# Patient Record
Sex: FEMALE | Race: WHITE | NOT HISPANIC OR LATINO | Employment: OTHER | URBAN - METROPOLITAN AREA
[De-identification: names, ages, dates, MRNs, and addresses within clinical notes are randomized per-mention and may not be internally consistent; named-entity substitution may affect disease eponyms.]

---

## 2017-08-17 ENCOUNTER — COMMUNICATION - HEALTHEAST (OUTPATIENT)
Dept: INTERNAL MEDICINE | Facility: CLINIC | Age: 77
End: 2017-08-17

## 2017-08-31 ENCOUNTER — AMBULATORY - HEALTHEAST (OUTPATIENT)
Dept: NURSING | Facility: CLINIC | Age: 77
End: 2017-08-31

## 2017-08-31 DIAGNOSIS — M81.0 OSTEOPOROSIS, SENILE: ICD-10-CM

## 2017-09-16 ENCOUNTER — COMMUNICATION - HEALTHEAST (OUTPATIENT)
Dept: INTERNAL MEDICINE | Facility: CLINIC | Age: 77
End: 2017-09-16

## 2017-09-16 DIAGNOSIS — I10 UNSPECIFIED ESSENTIAL HYPERTENSION: ICD-10-CM

## 2017-09-17 ENCOUNTER — COMMUNICATION - HEALTHEAST (OUTPATIENT)
Dept: SCHEDULING | Facility: CLINIC | Age: 77
End: 2017-09-17

## 2017-11-16 ENCOUNTER — OFFICE VISIT - HEALTHEAST (OUTPATIENT)
Dept: INTERNAL MEDICINE | Facility: CLINIC | Age: 77
End: 2017-11-16

## 2017-11-16 DIAGNOSIS — I83.90 ASYMPTOMATIC VARICOSE VEINS: ICD-10-CM

## 2017-11-16 DIAGNOSIS — K21.00 REFLUX ESOPHAGITIS: ICD-10-CM

## 2017-11-16 DIAGNOSIS — Z00.00 ROUTINE GENERAL MEDICAL EXAMINATION AT A HEALTH CARE FACILITY: ICD-10-CM

## 2017-11-16 DIAGNOSIS — K58.9 IRRITABLE BOWEL SYNDROME: ICD-10-CM

## 2017-11-16 DIAGNOSIS — M81.0 OSTEOPOROSIS, SENILE: ICD-10-CM

## 2017-11-16 DIAGNOSIS — Z90.13 S/P BILATERAL MASTECTOMY: ICD-10-CM

## 2017-11-16 DIAGNOSIS — I10 ESSENTIAL HYPERTENSION: ICD-10-CM

## 2017-11-16 DIAGNOSIS — E78.5 HYPERLIPIDEMIA: ICD-10-CM

## 2017-11-16 DIAGNOSIS — G60.9 HEREDITARY AND IDIOPATHIC PERIPHERAL NEUROPATHY: ICD-10-CM

## 2017-11-16 DIAGNOSIS — G25.81 RESTLESS LEGS SYNDROME (RLS): ICD-10-CM

## 2017-11-16 DIAGNOSIS — D75.839 THROMBOCYTOSIS: ICD-10-CM

## 2017-11-16 DIAGNOSIS — C50.919 MALIGNANT NEOPLASM OF FEMALE BREAST (H): ICD-10-CM

## 2017-11-16 DIAGNOSIS — R30.0 DYSURIA: ICD-10-CM

## 2017-11-16 LAB
CHOLEST SERPL-MCNC: 188 MG/DL
FASTING STATUS PATIENT QL REPORTED: YES
HDLC SERPL-MCNC: 63 MG/DL
LDLC SERPL CALC-MCNC: 110 MG/DL
TRIGL SERPL-MCNC: 75 MG/DL

## 2017-11-16 ASSESSMENT — MIFFLIN-ST. JEOR: SCORE: 1201.16

## 2017-11-17 LAB
ERYTHROCYTE [DISTWIDTH] IN BLOOD BY AUTOMATED COUNT: 15.4 % (ref 11–14.5)
HCT VFR BLD AUTO: 48.1 % (ref 35–47)
HGB BLD-MCNC: 15.6 G/DL (ref 12–16)
LAB AP CHARGES (HE HISTORICAL CONVERSION): NORMAL
MCH RBC QN AUTO: 29.2 PG (ref 27–34)
MCHC RBC AUTO-ENTMCNC: 32.4 G/DL (ref 32–36)
MCV RBC AUTO: 90 FL (ref 80–100)
PATH REPORT.COMMENTS IMP SPEC: NORMAL
PATH REPORT.COMMENTS IMP SPEC: NORMAL
PATH REPORT.FINAL DX SPEC: NORMAL
PATH REPORT.MICROSCOPIC SPEC OTHER STN: ABNORMAL
PATH REPORT.MICROSCOPIC SPEC OTHER STN: NORMAL
PLATELET # BLD AUTO: 591 THOU/UL (ref 140–440)
PMV BLD AUTO: 9.9 FL (ref 8.5–12.5)
RBC # BLD AUTO: 5.35 MILL/UL (ref 3.8–5.4)
WBC: 8 THOU/UL (ref 4–11)

## 2017-11-20 ENCOUNTER — COMMUNICATION - HEALTHEAST (OUTPATIENT)
Dept: INTERNAL MEDICINE | Facility: CLINIC | Age: 77
End: 2017-11-20

## 2017-11-22 ENCOUNTER — COMMUNICATION - HEALTHEAST (OUTPATIENT)
Dept: INTERNAL MEDICINE | Facility: CLINIC | Age: 77
End: 2017-11-22

## 2017-11-22 DIAGNOSIS — R79.89 INCREASED PLATELET COUNT: ICD-10-CM

## 2017-11-24 ENCOUNTER — AMBULATORY - HEALTHEAST (OUTPATIENT)
Dept: INTERNAL MEDICINE | Facility: CLINIC | Age: 77
End: 2017-11-24

## 2017-11-27 ENCOUNTER — COMMUNICATION - HEALTHEAST (OUTPATIENT)
Dept: INTERNAL MEDICINE | Facility: CLINIC | Age: 77
End: 2017-11-27

## 2017-11-27 DIAGNOSIS — D75.839 THROMBOCYTOSIS: ICD-10-CM

## 2017-11-29 ENCOUNTER — COMMUNICATION - HEALTHEAST (OUTPATIENT)
Dept: HEALTH INFORMATION MANAGEMENT | Facility: CLINIC | Age: 77
End: 2017-11-29

## 2017-11-29 ENCOUNTER — COMMUNICATION - HEALTHEAST (OUTPATIENT)
Dept: TELEHEALTH | Facility: CLINIC | Age: 77
End: 2017-11-29

## 2017-12-01 ENCOUNTER — COMMUNICATION - HEALTHEAST (OUTPATIENT)
Dept: ONCOLOGY | Facility: CLINIC | Age: 77
End: 2017-12-01

## 2017-12-01 ENCOUNTER — COMMUNICATION - HEALTHEAST (OUTPATIENT)
Dept: INTERNAL MEDICINE | Facility: CLINIC | Age: 77
End: 2017-12-01

## 2017-12-01 ENCOUNTER — OFFICE VISIT - HEALTHEAST (OUTPATIENT)
Dept: INTERNAL MEDICINE | Facility: CLINIC | Age: 77
End: 2017-12-01

## 2017-12-01 DIAGNOSIS — D75.839 THROMBOCYTOSIS: ICD-10-CM

## 2017-12-01 DIAGNOSIS — Z01.818 PREOP EXAM FOR INTERNAL MEDICINE: ICD-10-CM

## 2017-12-01 DIAGNOSIS — I10 ESSENTIAL HYPERTENSION: ICD-10-CM

## 2017-12-01 ASSESSMENT — MIFFLIN-ST. JEOR: SCORE: 1202.98

## 2017-12-07 ENCOUNTER — COMMUNICATION - HEALTHEAST (OUTPATIENT)
Dept: ONCOLOGY | Facility: CLINIC | Age: 77
End: 2017-12-07

## 2017-12-15 ENCOUNTER — OFFICE VISIT - HEALTHEAST (OUTPATIENT)
Dept: INTERNAL MEDICINE | Facility: CLINIC | Age: 77
End: 2017-12-15

## 2017-12-15 DIAGNOSIS — R21 RASH: ICD-10-CM

## 2017-12-21 ENCOUNTER — OFFICE VISIT - HEALTHEAST (OUTPATIENT)
Dept: ONCOLOGY | Facility: CLINIC | Age: 77
End: 2017-12-21

## 2017-12-21 DIAGNOSIS — R79.89 INCREASED PLATELET COUNT: ICD-10-CM

## 2017-12-21 DIAGNOSIS — D75.839 THROMBOCYTOSIS: ICD-10-CM

## 2017-12-21 ASSESSMENT — MIFFLIN-ST. JEOR: SCORE: 1210.69

## 2017-12-26 LAB — ANA SER QL: 0.3 U

## 2017-12-27 LAB
MISCELLANEOUS TEST DEPT. - HE HISTORICAL: NORMAL
PERFORMING LAB: NORMAL
SPECIMEN STATUS: NORMAL
TEST NAME: NORMAL

## 2017-12-29 ENCOUNTER — RECORDS - HEALTHEAST (OUTPATIENT)
Dept: ADMINISTRATIVE | Facility: OTHER | Age: 77
End: 2017-12-29

## 2018-01-02 ENCOUNTER — RECORDS - HEALTHEAST (OUTPATIENT)
Dept: ADMINISTRATIVE | Facility: OTHER | Age: 78
End: 2018-01-02

## 2018-01-03 ENCOUNTER — RECORDS - HEALTHEAST (OUTPATIENT)
Dept: ADMINISTRATIVE | Facility: OTHER | Age: 78
End: 2018-01-03

## 2018-01-04 ENCOUNTER — OFFICE VISIT - HEALTHEAST (OUTPATIENT)
Dept: ONCOLOGY | Facility: CLINIC | Age: 78
End: 2018-01-04

## 2018-01-04 DIAGNOSIS — D46.9 MYELODYSPLASTIC SYNDROME (H): ICD-10-CM

## 2018-01-04 DIAGNOSIS — D47.3 ESSENTIAL THROMBOCYTHEMIA (H): ICD-10-CM

## 2018-01-04 ASSESSMENT — MIFFLIN-ST. JEOR: SCORE: 1206.15

## 2018-01-08 ENCOUNTER — COMMUNICATION - HEALTHEAST (OUTPATIENT)
Dept: INTERNAL MEDICINE | Facility: CLINIC | Age: 78
End: 2018-01-08

## 2018-01-08 DIAGNOSIS — R35.0 URINARY FREQUENCY: ICD-10-CM

## 2018-01-08 DIAGNOSIS — G47.00 INSOMNIA: ICD-10-CM

## 2018-01-09 ENCOUNTER — AMBULATORY - HEALTHEAST (OUTPATIENT)
Dept: INFUSION THERAPY | Facility: CLINIC | Age: 78
End: 2018-01-09

## 2018-01-09 ENCOUNTER — COMMUNICATION - HEALTHEAST (OUTPATIENT)
Dept: INTERNAL MEDICINE | Facility: CLINIC | Age: 78
End: 2018-01-09

## 2018-01-09 ENCOUNTER — AMBULATORY - HEALTHEAST (OUTPATIENT)
Dept: LAB | Facility: CLINIC | Age: 78
End: 2018-01-09

## 2018-01-09 DIAGNOSIS — E78.5 HYPERLIPEMIA: ICD-10-CM

## 2018-01-09 DIAGNOSIS — D46.9 MYELODYSPLASTIC SYNDROME (H): ICD-10-CM

## 2018-01-09 DIAGNOSIS — K21.00 REFLUX ESOPHAGITIS: ICD-10-CM

## 2018-01-09 DIAGNOSIS — I10 ESSENTIAL HYPERTENSION: ICD-10-CM

## 2018-01-09 DIAGNOSIS — D47.3 ESSENTIAL THROMBOCYTHEMIA (H): ICD-10-CM

## 2018-01-09 LAB
BASOPHILS # BLD AUTO: 0.1 THOU/UL (ref 0–0.2)
BASOPHILS NFR BLD AUTO: 1 % (ref 0–2)
EOSINOPHIL # BLD AUTO: 0.6 THOU/UL (ref 0–0.4)
EOSINOPHIL NFR BLD AUTO: 8 % (ref 0–6)
ERYTHROCYTE [DISTWIDTH] IN BLOOD BY AUTOMATED COUNT: 15.2 % (ref 11–14.5)
HCT VFR BLD AUTO: 46.6 % (ref 35–47)
HGB BLD-MCNC: 15.4 G/DL (ref 12–16)
LYMPHOCYTES # BLD AUTO: 1.7 THOU/UL (ref 0.8–4.4)
LYMPHOCYTES NFR BLD AUTO: 25 % (ref 20–40)
MCH RBC QN AUTO: 29 PG (ref 27–34)
MCHC RBC AUTO-ENTMCNC: 33 G/DL (ref 32–36)
MCV RBC AUTO: 88 FL (ref 80–100)
MONOCYTES # BLD AUTO: 0.6 THOU/UL (ref 0–0.9)
MONOCYTES NFR BLD AUTO: 9 % (ref 2–10)
NEUTROPHILS # BLD AUTO: 4 THOU/UL (ref 2–7.7)
NEUTROPHILS NFR BLD AUTO: 57 % (ref 50–70)
PLATELET # BLD AUTO: 587 THOU/UL (ref 140–440)
PMV BLD AUTO: 9.1 FL (ref 8.5–12.5)
RBC # BLD AUTO: 5.31 MILL/UL (ref 3.8–5.4)
WBC: 7 THOU/UL (ref 4–11)

## 2018-01-10 LAB
LAB AP CHARGES (HE HISTORICAL CONVERSION): NORMAL
PATH REPORT.COMMENTS IMP SPEC: NORMAL
PATH REPORT.FINAL DX SPEC: NORMAL
PATH REPORT.MICROSCOPIC SPEC OTHER STN: NORMAL
RESULT FLAG (HE HISTORICAL CONVERSION): NORMAL

## 2018-01-16 ENCOUNTER — OFFICE VISIT - HEALTHEAST (OUTPATIENT)
Dept: ONCOLOGY | Facility: CLINIC | Age: 78
End: 2018-01-16

## 2018-01-16 DIAGNOSIS — D47.3 ESSENTIAL THROMBOCYTHEMIA (H): ICD-10-CM

## 2018-01-17 LAB
BKR LAB AP CORE BIOPSY/ASPIRATE CLOT: ABNORMAL
LAB AP ASPIRATE SMEAR (HE HISTORICAL CONVERSION): ABNORMAL
LAB AP BONE MARROW DIFF (HE HISTORICAL CONVERSION): ABNORMAL
LAB AP CHARGES (HE HISTORICAL CONVERSION): ABNORMAL
LAB AP CYTOGENETICS REPORT,ADDENDUM (HE HISTORICAL CONVERSION): ABNORMAL
PATH REPORT.COMMENTS IMP SPEC: ABNORMAL
PATH REPORT.FINAL DX SPEC: ABNORMAL
PATH REPORT.MICROSCOPIC SPEC OTHER STN: ABNORMAL
PATH REPORT.RELEVANT HX SPEC: ABNORMAL
RESULT FLAG (HE HISTORICAL CONVERSION): ABNORMAL
SPECIMEN STATUS: NORMAL

## 2018-01-23 ENCOUNTER — COMMUNICATION - HEALTHEAST (OUTPATIENT)
Dept: ONCOLOGY | Facility: CLINIC | Age: 78
End: 2018-01-23

## 2018-01-23 ENCOUNTER — AMBULATORY - HEALTHEAST (OUTPATIENT)
Dept: INFUSION THERAPY | Facility: CLINIC | Age: 78
End: 2018-01-23

## 2018-01-23 DIAGNOSIS — D47.3 ESSENTIAL THROMBOCYTHEMIA (H): ICD-10-CM

## 2018-01-23 LAB
BASOPHILS # BLD AUTO: 0.1 THOU/UL (ref 0–0.2)
BASOPHILS NFR BLD AUTO: 1 % (ref 0–2)
EOSINOPHIL # BLD AUTO: 0.5 THOU/UL (ref 0–0.4)
EOSINOPHIL NFR BLD AUTO: 5 % (ref 0–6)
ERYTHROCYTE [DISTWIDTH] IN BLOOD BY AUTOMATED COUNT: 15.1 % (ref 11–14.5)
HCT VFR BLD AUTO: 45 % (ref 35–47)
HGB BLD-MCNC: 15 G/DL (ref 12–16)
LYMPHOCYTES # BLD AUTO: 1.9 THOU/UL (ref 0.8–4.4)
LYMPHOCYTES NFR BLD AUTO: 22 % (ref 20–40)
MCH RBC QN AUTO: 29.2 PG (ref 27–34)
MCHC RBC AUTO-ENTMCNC: 33.3 G/DL (ref 32–36)
MCV RBC AUTO: 88 FL (ref 80–100)
MONOCYTES # BLD AUTO: 0.7 THOU/UL (ref 0–0.9)
MONOCYTES NFR BLD AUTO: 8 % (ref 2–10)
NEUTROPHILS # BLD AUTO: 5.3 THOU/UL (ref 2–7.7)
NEUTROPHILS NFR BLD AUTO: 63 % (ref 50–70)
PLATELET # BLD AUTO: 570 THOU/UL (ref 140–440)
PMV BLD AUTO: 8.8 FL (ref 8.5–12.5)
RBC # BLD AUTO: 5.14 MILL/UL (ref 3.8–5.4)
WBC: 8.3 THOU/UL (ref 4–11)

## 2018-01-30 ENCOUNTER — AMBULATORY - HEALTHEAST (OUTPATIENT)
Dept: INFUSION THERAPY | Facility: CLINIC | Age: 78
End: 2018-01-30

## 2018-01-30 ENCOUNTER — COMMUNICATION - HEALTHEAST (OUTPATIENT)
Dept: ONCOLOGY | Facility: CLINIC | Age: 78
End: 2018-01-30

## 2018-01-30 DIAGNOSIS — D47.3 ESSENTIAL THROMBOCYTHEMIA (H): ICD-10-CM

## 2018-01-30 LAB
BASOPHILS # BLD AUTO: 0.1 THOU/UL (ref 0–0.2)
BASOPHILS NFR BLD AUTO: 1 % (ref 0–2)
EOSINOPHIL # BLD AUTO: 0.5 THOU/UL (ref 0–0.4)
EOSINOPHIL NFR BLD AUTO: 6 % (ref 0–6)
ERYTHROCYTE [DISTWIDTH] IN BLOOD BY AUTOMATED COUNT: 15.4 % (ref 11–14.5)
HCT VFR BLD AUTO: 45.2 % (ref 35–47)
HGB BLD-MCNC: 15.1 G/DL (ref 12–16)
LYMPHOCYTES # BLD AUTO: 1.9 THOU/UL (ref 0.8–4.4)
LYMPHOCYTES NFR BLD AUTO: 22 % (ref 20–40)
MCH RBC QN AUTO: 29.2 PG (ref 27–34)
MCHC RBC AUTO-ENTMCNC: 33.4 G/DL (ref 32–36)
MCV RBC AUTO: 87 FL (ref 80–100)
MONOCYTES # BLD AUTO: 0.7 THOU/UL (ref 0–0.9)
MONOCYTES NFR BLD AUTO: 8 % (ref 2–10)
NEUTROPHILS # BLD AUTO: 5.5 THOU/UL (ref 2–7.7)
NEUTROPHILS NFR BLD AUTO: 64 % (ref 50–70)
PLATELET # BLD AUTO: 583 THOU/UL (ref 140–440)
PMV BLD AUTO: 8.8 FL (ref 8.5–12.5)
RBC # BLD AUTO: 5.17 MILL/UL (ref 3.8–5.4)
WBC: 8.7 THOU/UL (ref 4–11)

## 2018-02-06 ENCOUNTER — AMBULATORY - HEALTHEAST (OUTPATIENT)
Dept: INFUSION THERAPY | Facility: CLINIC | Age: 78
End: 2018-02-06

## 2018-02-06 ENCOUNTER — COMMUNICATION - HEALTHEAST (OUTPATIENT)
Dept: ONCOLOGY | Facility: CLINIC | Age: 78
End: 2018-02-06

## 2018-02-06 DIAGNOSIS — D47.3 ESSENTIAL THROMBOCYTHEMIA (H): ICD-10-CM

## 2018-02-06 LAB
BASOPHILS # BLD AUTO: 0.1 THOU/UL (ref 0–0.2)
BASOPHILS NFR BLD AUTO: 1 % (ref 0–2)
EOSINOPHIL # BLD AUTO: 0.4 THOU/UL (ref 0–0.4)
EOSINOPHIL NFR BLD AUTO: 5 % (ref 0–6)
ERYTHROCYTE [DISTWIDTH] IN BLOOD BY AUTOMATED COUNT: 15.6 % (ref 11–14.5)
HCT VFR BLD AUTO: 44.7 % (ref 35–47)
HGB BLD-MCNC: 14.8 G/DL (ref 12–16)
LYMPHOCYTES # BLD AUTO: 1.7 THOU/UL (ref 0.8–4.4)
LYMPHOCYTES NFR BLD AUTO: 21 % (ref 20–40)
MCH RBC QN AUTO: 29 PG (ref 27–34)
MCHC RBC AUTO-ENTMCNC: 33.1 G/DL (ref 32–36)
MCV RBC AUTO: 88 FL (ref 80–100)
MONOCYTES # BLD AUTO: 0.8 THOU/UL (ref 0–0.9)
MONOCYTES NFR BLD AUTO: 10 % (ref 2–10)
NEUTROPHILS # BLD AUTO: 4.9 THOU/UL (ref 2–7.7)
NEUTROPHILS NFR BLD AUTO: 62 % (ref 50–70)
PLATELET # BLD AUTO: 577 THOU/UL (ref 140–440)
PMV BLD AUTO: 8.9 FL (ref 8.5–12.5)
RBC # BLD AUTO: 5.11 MILL/UL (ref 3.8–5.4)
WBC: 8 THOU/UL (ref 4–11)

## 2018-02-13 ENCOUNTER — AMBULATORY - HEALTHEAST (OUTPATIENT)
Dept: INFUSION THERAPY | Facility: CLINIC | Age: 78
End: 2018-02-13

## 2018-02-13 ENCOUNTER — OFFICE VISIT - HEALTHEAST (OUTPATIENT)
Dept: ONCOLOGY | Facility: CLINIC | Age: 78
End: 2018-02-13

## 2018-02-13 DIAGNOSIS — D47.3 ESSENTIAL THROMBOCYTHEMIA (H): ICD-10-CM

## 2018-02-13 LAB
BASOPHILS # BLD AUTO: 0.1 THOU/UL (ref 0–0.2)
BASOPHILS NFR BLD AUTO: 1 % (ref 0–2)
EOSINOPHIL # BLD AUTO: 0.4 THOU/UL (ref 0–0.4)
EOSINOPHIL NFR BLD AUTO: 5 % (ref 0–6)
ERYTHROCYTE [DISTWIDTH] IN BLOOD BY AUTOMATED COUNT: 15.7 % (ref 11–14.5)
HCT VFR BLD AUTO: 46.6 % (ref 35–47)
HGB BLD-MCNC: 15.4 G/DL (ref 12–16)
LYMPHOCYTES # BLD AUTO: 1.7 THOU/UL (ref 0.8–4.4)
LYMPHOCYTES NFR BLD AUTO: 20 % (ref 20–40)
MCH RBC QN AUTO: 28.8 PG (ref 27–34)
MCHC RBC AUTO-ENTMCNC: 33 G/DL (ref 32–36)
MCV RBC AUTO: 87 FL (ref 80–100)
MONOCYTES # BLD AUTO: 0.7 THOU/UL (ref 0–0.9)
MONOCYTES NFR BLD AUTO: 9 % (ref 2–10)
NEUTROPHILS # BLD AUTO: 5.3 THOU/UL (ref 2–7.7)
NEUTROPHILS NFR BLD AUTO: 65 % (ref 50–70)
PLATELET # BLD AUTO: 489 THOU/UL (ref 140–440)
PMV BLD AUTO: 8.7 FL (ref 8.5–12.5)
RBC # BLD AUTO: 5.34 MILL/UL (ref 3.8–5.4)
WBC: 8.3 THOU/UL (ref 4–11)

## 2018-02-27 ENCOUNTER — AMBULATORY - HEALTHEAST (OUTPATIENT)
Dept: INFUSION THERAPY | Facility: CLINIC | Age: 78
End: 2018-02-27

## 2018-02-27 ENCOUNTER — COMMUNICATION - HEALTHEAST (OUTPATIENT)
Dept: ONCOLOGY | Facility: CLINIC | Age: 78
End: 2018-02-27

## 2018-02-27 DIAGNOSIS — D47.3 ESSENTIAL THROMBOCYTHEMIA (H): ICD-10-CM

## 2018-02-27 LAB
BASOPHILS # BLD AUTO: 0.1 THOU/UL (ref 0–0.2)
BASOPHILS NFR BLD AUTO: 1 % (ref 0–2)
EOSINOPHIL # BLD AUTO: 0.3 THOU/UL (ref 0–0.4)
EOSINOPHIL NFR BLD AUTO: 4 % (ref 0–6)
ERYTHROCYTE [DISTWIDTH] IN BLOOD BY AUTOMATED COUNT: 15.9 % (ref 11–14.5)
HCT VFR BLD AUTO: 45.3 % (ref 35–47)
HGB BLD-MCNC: 15.1 G/DL (ref 12–16)
LYMPHOCYTES # BLD AUTO: 1.7 THOU/UL (ref 0.8–4.4)
LYMPHOCYTES NFR BLD AUTO: 22 % (ref 20–40)
MCH RBC QN AUTO: 29.3 PG (ref 27–34)
MCHC RBC AUTO-ENTMCNC: 33.3 G/DL (ref 32–36)
MCV RBC AUTO: 88 FL (ref 80–100)
MONOCYTES # BLD AUTO: 0.7 THOU/UL (ref 0–0.9)
MONOCYTES NFR BLD AUTO: 10 % (ref 2–10)
NEUTROPHILS # BLD AUTO: 4.9 THOU/UL (ref 2–7.7)
NEUTROPHILS NFR BLD AUTO: 63 % (ref 50–70)
PLATELET # BLD AUTO: 488 THOU/UL (ref 140–440)
PMV BLD AUTO: 8.7 FL (ref 8.5–12.5)
RBC # BLD AUTO: 5.16 MILL/UL (ref 3.8–5.4)
WBC: 7.8 THOU/UL (ref 4–11)

## 2018-03-08 ENCOUNTER — OFFICE VISIT - HEALTHEAST (OUTPATIENT)
Dept: INTERNAL MEDICINE | Facility: CLINIC | Age: 78
End: 2018-03-08

## 2018-03-08 DIAGNOSIS — H91.90 HEARING LOSS: ICD-10-CM

## 2018-03-08 DIAGNOSIS — D47.3 ESSENTIAL THROMBOCYTHEMIA (H): ICD-10-CM

## 2018-03-08 DIAGNOSIS — M81.0 OSTEOPOROSIS, SENILE: ICD-10-CM

## 2018-03-14 ENCOUNTER — AMBULATORY - HEALTHEAST (OUTPATIENT)
Dept: INFUSION THERAPY | Facility: CLINIC | Age: 78
End: 2018-03-14

## 2018-03-14 ENCOUNTER — OFFICE VISIT - HEALTHEAST (OUTPATIENT)
Dept: ONCOLOGY | Facility: CLINIC | Age: 78
End: 2018-03-14

## 2018-03-14 DIAGNOSIS — D47.3 ESSENTIAL THROMBOCYTHEMIA (H): ICD-10-CM

## 2018-03-14 LAB
ALBUMIN SERPL-MCNC: 3.7 G/DL (ref 3.5–5)
ALP SERPL-CCNC: 42 U/L (ref 45–120)
ALT SERPL W P-5'-P-CCNC: 20 U/L (ref 0–45)
ANION GAP SERPL CALCULATED.3IONS-SCNC: 7 MMOL/L (ref 5–18)
AST SERPL W P-5'-P-CCNC: 28 U/L (ref 0–40)
BASOPHILS # BLD AUTO: 0.1 THOU/UL (ref 0–0.2)
BASOPHILS NFR BLD AUTO: 1 % (ref 0–2)
BILIRUB SERPL-MCNC: 0.4 MG/DL (ref 0–1)
BUN SERPL-MCNC: 14 MG/DL (ref 8–28)
CALCIUM SERPL-MCNC: 9.9 MG/DL (ref 8.5–10.5)
CHLORIDE BLD-SCNC: 106 MMOL/L (ref 98–107)
CO2 SERPL-SCNC: 28 MMOL/L (ref 22–31)
CREAT SERPL-MCNC: 0.98 MG/DL (ref 0.6–1.1)
EOSINOPHIL # BLD AUTO: 0.3 THOU/UL (ref 0–0.4)
EOSINOPHIL NFR BLD AUTO: 3 % (ref 0–6)
ERYTHROCYTE [DISTWIDTH] IN BLOOD BY AUTOMATED COUNT: 16 % (ref 11–14.5)
GFR SERPL CREATININE-BSD FRML MDRD: 55 ML/MIN/1.73M2
GLUCOSE BLD-MCNC: 139 MG/DL (ref 70–125)
HCT VFR BLD AUTO: 43.1 % (ref 35–47)
HGB BLD-MCNC: 14.2 G/DL (ref 12–16)
LYMPHOCYTES # BLD AUTO: 1.7 THOU/UL (ref 0.8–4.4)
LYMPHOCYTES NFR BLD AUTO: 21 % (ref 20–40)
MCH RBC QN AUTO: 29.2 PG (ref 27–34)
MCHC RBC AUTO-ENTMCNC: 32.9 G/DL (ref 32–36)
MCV RBC AUTO: 89 FL (ref 80–100)
MONOCYTES # BLD AUTO: 0.5 THOU/UL (ref 0–0.9)
MONOCYTES NFR BLD AUTO: 7 % (ref 2–10)
NEUTROPHILS # BLD AUTO: 5.6 THOU/UL (ref 2–7.7)
NEUTROPHILS NFR BLD AUTO: 69 % (ref 50–70)
PLATELET # BLD AUTO: 455 THOU/UL (ref 140–440)
PMV BLD AUTO: 8.8 FL (ref 8.5–12.5)
POTASSIUM BLD-SCNC: 3.9 MMOL/L (ref 3.5–5)
PROT SERPL-MCNC: 7 G/DL (ref 6–8)
RBC # BLD AUTO: 4.86 MILL/UL (ref 3.8–5.4)
SODIUM SERPL-SCNC: 141 MMOL/L (ref 136–145)
WBC: 8.1 THOU/UL (ref 4–11)

## 2018-03-14 ASSESSMENT — MIFFLIN-ST. JEOR: SCORE: 1207.51

## 2018-03-15 ENCOUNTER — COMMUNICATION - HEALTHEAST (OUTPATIENT)
Dept: ONCOLOGY | Facility: CLINIC | Age: 78
End: 2018-03-15

## 2018-03-23 ENCOUNTER — COMMUNICATION - HEALTHEAST (OUTPATIENT)
Dept: ADMINISTRATIVE | Facility: CLINIC | Age: 78
End: 2018-03-23

## 2018-03-29 ENCOUNTER — OFFICE VISIT - HEALTHEAST (OUTPATIENT)
Dept: AUDIOLOGY | Facility: CLINIC | Age: 78
End: 2018-03-29

## 2018-03-29 DIAGNOSIS — H93.13 TINNITUS OF BOTH EARS: ICD-10-CM

## 2018-03-29 DIAGNOSIS — H90.3 SENSORINEURAL HEARING LOSS, BILATERAL: ICD-10-CM

## 2018-05-10 ENCOUNTER — AMBULATORY - HEALTHEAST (OUTPATIENT)
Dept: INFUSION THERAPY | Facility: CLINIC | Age: 78
End: 2018-05-10

## 2018-05-10 ENCOUNTER — HOSPITAL ENCOUNTER (OUTPATIENT)
Dept: CARDIOLOGY | Facility: CLINIC | Age: 78
Discharge: HOME | End: 2018-05-10
Attending: INTERNAL MEDICINE

## 2018-05-10 ENCOUNTER — OFFICE VISIT - HEALTHEAST (OUTPATIENT)
Dept: ONCOLOGY | Facility: CLINIC | Age: 78
End: 2018-05-10

## 2018-05-10 ENCOUNTER — COMMUNICATION - HEALTHEAST (OUTPATIENT)
Dept: ONCOLOGY | Facility: CLINIC | Age: 78
End: 2018-05-10

## 2018-05-10 DIAGNOSIS — R42 DIZZINESS AND GIDDINESS: ICD-10-CM

## 2018-05-10 LAB
ALBUMIN SERPL-MCNC: 3.5 G/DL (ref 3.5–5)
ALP SERPL-CCNC: 48 U/L (ref 45–120)
ALT SERPL W P-5'-P-CCNC: 20 U/L (ref 0–45)
ANION GAP SERPL CALCULATED.3IONS-SCNC: 8 MMOL/L (ref 5–18)
AST SERPL W P-5'-P-CCNC: 22 U/L (ref 0–40)
BASOPHILS # BLD AUTO: 0.1 THOU/UL (ref 0–0.2)
BASOPHILS NFR BLD AUTO: 1 % (ref 0–2)
BILIRUB SERPL-MCNC: 0.4 MG/DL (ref 0–1)
BUN SERPL-MCNC: 16 MG/DL (ref 8–28)
CALCIUM SERPL-MCNC: 9.4 MG/DL (ref 8.5–10.5)
CHLORIDE BLD-SCNC: 106 MMOL/L (ref 98–107)
CO2 SERPL-SCNC: 26 MMOL/L (ref 22–31)
CREAT SERPL-MCNC: 0.77 MG/DL (ref 0.6–1.1)
EOSINOPHIL # BLD AUTO: 0.3 THOU/UL (ref 0–0.4)
EOSINOPHIL NFR BLD AUTO: 4 % (ref 0–6)
ERYTHROCYTE [DISTWIDTH] IN BLOOD BY AUTOMATED COUNT: 16 % (ref 11–14.5)
GFR SERPL CREATININE-BSD FRML MDRD: >60 ML/MIN/1.73M2
GLUCOSE BLD-MCNC: 97 MG/DL (ref 70–125)
HCT VFR BLD AUTO: 42.9 % (ref 35–47)
HGB BLD-MCNC: 14 G/DL (ref 12–16)
LYMPHOCYTES # BLD AUTO: 1.5 THOU/UL (ref 0.8–4.4)
LYMPHOCYTES NFR BLD AUTO: 17 % (ref 20–40)
MCH RBC QN AUTO: 29.4 PG (ref 27–34)
MCHC RBC AUTO-ENTMCNC: 32.6 G/DL (ref 32–36)
MCV RBC AUTO: 90 FL (ref 80–100)
MONOCYTES # BLD AUTO: 0.9 THOU/UL (ref 0–0.9)
MONOCYTES NFR BLD AUTO: 10 % (ref 2–10)
NEUTROPHILS # BLD AUTO: 6.1 THOU/UL (ref 2–7.7)
NEUTROPHILS NFR BLD AUTO: 68 % (ref 50–70)
PLATELET # BLD AUTO: 419 THOU/UL (ref 140–440)
PMV BLD AUTO: 9.1 FL (ref 8.5–12.5)
POTASSIUM BLD-SCNC: 3.9 MMOL/L (ref 3.5–5)
PROT SERPL-MCNC: 6.9 G/DL (ref 6–8)
RBC # BLD AUTO: 4.77 MILL/UL (ref 3.8–5.4)
SODIUM SERPL-SCNC: 140 MMOL/L (ref 136–145)
WBC: 8.9 THOU/UL (ref 4–11)

## 2018-05-11 LAB
ATRIAL RATE - MUSE: 74 BPM
DIASTOLIC BLOOD PRESSURE - MUSE: NORMAL MMHG
INTERPRETATION ECG - MUSE: NORMAL
P AXIS - MUSE: 68 DEGREES
PR INTERVAL - MUSE: 164 MS
QRS DURATION - MUSE: 96 MS
QT - MUSE: 386 MS
QTC - MUSE: 428 MS
R AXIS - MUSE: -14 DEGREES
SYSTOLIC BLOOD PRESSURE - MUSE: NORMAL MMHG
T AXIS - MUSE: 58 DEGREES
VENTRICULAR RATE- MUSE: 74 BPM

## 2018-05-14 ENCOUNTER — OFFICE VISIT - HEALTHEAST (OUTPATIENT)
Dept: INTERNAL MEDICINE | Facility: CLINIC | Age: 78
End: 2018-05-14

## 2018-05-14 DIAGNOSIS — I26.99 PULMONARY EMBOLISM (H): ICD-10-CM

## 2018-05-15 ENCOUNTER — OFFICE VISIT - HEALTHEAST (OUTPATIENT)
Dept: CARDIOLOGY | Facility: CLINIC | Age: 78
End: 2018-05-15

## 2018-05-15 DIAGNOSIS — I26.99 OTHER ACUTE PULMONARY EMBOLISM WITHOUT ACUTE COR PULMONALE (H): ICD-10-CM

## 2018-05-15 DIAGNOSIS — I10 HYPERTENSION: ICD-10-CM

## 2018-05-15 DIAGNOSIS — R55 SYNCOPE AND COLLAPSE: ICD-10-CM

## 2018-05-15 ASSESSMENT — MIFFLIN-ST. JEOR: SCORE: 1207.51

## 2018-06-04 ENCOUNTER — COMMUNICATION - HEALTHEAST (OUTPATIENT)
Dept: FAMILY MEDICINE | Facility: CLINIC | Age: 78
End: 2018-06-04

## 2018-06-04 ENCOUNTER — COMMUNICATION - HEALTHEAST (OUTPATIENT)
Dept: INTERNAL MEDICINE | Facility: CLINIC | Age: 78
End: 2018-06-04

## 2018-06-05 ENCOUNTER — OFFICE VISIT - HEALTHEAST (OUTPATIENT)
Dept: INTERNAL MEDICINE | Facility: CLINIC | Age: 78
End: 2018-06-05

## 2018-06-05 DIAGNOSIS — I26.99 PULMONARY EMBOLI (H): ICD-10-CM

## 2018-06-06 ENCOUNTER — COMMUNICATION - HEALTHEAST (OUTPATIENT)
Dept: INTERNAL MEDICINE | Facility: CLINIC | Age: 78
End: 2018-06-06

## 2018-06-14 ENCOUNTER — AMBULATORY - HEALTHEAST (OUTPATIENT)
Dept: INFUSION THERAPY | Facility: CLINIC | Age: 78
End: 2018-06-14

## 2018-06-14 ENCOUNTER — OFFICE VISIT - HEALTHEAST (OUTPATIENT)
Dept: ONCOLOGY | Facility: CLINIC | Age: 78
End: 2018-06-14

## 2018-06-14 DIAGNOSIS — D47.3 ESSENTIAL THROMBOCYTHEMIA (H): ICD-10-CM

## 2018-06-14 DIAGNOSIS — I26.99 OTHER ACUTE PULMONARY EMBOLISM WITHOUT ACUTE COR PULMONALE (H): ICD-10-CM

## 2018-06-14 LAB
ALBUMIN SERPL-MCNC: 3.8 G/DL (ref 3.5–5)
ALP SERPL-CCNC: 44 U/L (ref 45–120)
ALT SERPL W P-5'-P-CCNC: 23 U/L (ref 0–45)
ANION GAP SERPL CALCULATED.3IONS-SCNC: 10 MMOL/L (ref 5–18)
AST SERPL W P-5'-P-CCNC: 23 U/L (ref 0–40)
BASOPHILS # BLD AUTO: 0.1 THOU/UL (ref 0–0.2)
BASOPHILS NFR BLD AUTO: 1 % (ref 0–2)
BILIRUB SERPL-MCNC: 0.5 MG/DL (ref 0–1)
BUN SERPL-MCNC: 15 MG/DL (ref 8–28)
CALCIUM SERPL-MCNC: 9.3 MG/DL (ref 8.5–10.5)
CHLORIDE BLD-SCNC: 106 MMOL/L (ref 98–107)
CO2 SERPL-SCNC: 26 MMOL/L (ref 22–31)
CREAT SERPL-MCNC: 0.77 MG/DL (ref 0.6–1.1)
EOSINOPHIL # BLD AUTO: 0.4 THOU/UL (ref 0–0.4)
EOSINOPHIL NFR BLD AUTO: 5 % (ref 0–6)
ERYTHROCYTE [DISTWIDTH] IN BLOOD BY AUTOMATED COUNT: 15.1 % (ref 11–14.5)
GFR SERPL CREATININE-BSD FRML MDRD: >60 ML/MIN/1.73M2
GLUCOSE BLD-MCNC: 85 MG/DL (ref 70–125)
HCT VFR BLD AUTO: 43.8 % (ref 35–47)
HGB BLD-MCNC: 14.4 G/DL (ref 12–16)
LDH SERPL L TO P-CCNC: 200 U/L (ref 125–220)
LYMPHOCYTES # BLD AUTO: 1.8 THOU/UL (ref 0.8–4.4)
LYMPHOCYTES NFR BLD AUTO: 22 % (ref 20–40)
MCH RBC QN AUTO: 29.9 PG (ref 27–34)
MCHC RBC AUTO-ENTMCNC: 32.9 G/DL (ref 32–36)
MCV RBC AUTO: 91 FL (ref 80–100)
MONOCYTES # BLD AUTO: 0.8 THOU/UL (ref 0–0.9)
MONOCYTES NFR BLD AUTO: 9 % (ref 2–10)
NEUTROPHILS # BLD AUTO: 5 THOU/UL (ref 2–7.7)
NEUTROPHILS NFR BLD AUTO: 63 % (ref 50–70)
PLATELET # BLD AUTO: 493 THOU/UL (ref 140–440)
PMV BLD AUTO: 9.2 FL (ref 8.5–12.5)
POTASSIUM BLD-SCNC: 4.3 MMOL/L (ref 3.5–5)
PROT SERPL-MCNC: 6.9 G/DL (ref 6–8)
RBC # BLD AUTO: 4.82 MILL/UL (ref 3.8–5.4)
SODIUM SERPL-SCNC: 142 MMOL/L (ref 136–145)
WBC: 8.1 THOU/UL (ref 4–11)

## 2018-06-14 ASSESSMENT — MIFFLIN-ST. JEOR: SCORE: 1212.49

## 2018-06-18 ENCOUNTER — RECORDS - HEALTHEAST (OUTPATIENT)
Dept: ADMINISTRATIVE | Facility: OTHER | Age: 78
End: 2018-06-18

## 2018-06-18 ENCOUNTER — RECORDS - HEALTHEAST (OUTPATIENT)
Dept: BONE DENSITY | Facility: CLINIC | Age: 78
End: 2018-06-18

## 2018-06-18 DIAGNOSIS — M81.0 AGE-RELATED OSTEOPOROSIS WITHOUT CURRENT PATHOLOGICAL FRACTURE: ICD-10-CM

## 2018-07-19 ENCOUNTER — AMBULATORY - HEALTHEAST (OUTPATIENT)
Dept: INFUSION THERAPY | Facility: CLINIC | Age: 78
End: 2018-07-19

## 2018-07-19 ENCOUNTER — OFFICE VISIT - HEALTHEAST (OUTPATIENT)
Dept: ONCOLOGY | Facility: CLINIC | Age: 78
End: 2018-07-19

## 2018-07-19 DIAGNOSIS — D47.3 ESSENTIAL THROMBOCYTHEMIA (H): ICD-10-CM

## 2018-07-19 DIAGNOSIS — I26.99 OTHER ACUTE PULMONARY EMBOLISM WITHOUT ACUTE COR PULMONALE (H): ICD-10-CM

## 2018-07-19 LAB
ALBUMIN SERPL-MCNC: 3.8 G/DL (ref 3.5–5)
ALP SERPL-CCNC: 50 U/L (ref 45–120)
ALT SERPL W P-5'-P-CCNC: 17 U/L (ref 0–45)
ANION GAP SERPL CALCULATED.3IONS-SCNC: 9 MMOL/L (ref 5–18)
AST SERPL W P-5'-P-CCNC: 22 U/L (ref 0–40)
BASOPHILS # BLD AUTO: 0.1 THOU/UL (ref 0–0.2)
BASOPHILS NFR BLD AUTO: 1 % (ref 0–2)
BILIRUB SERPL-MCNC: 0.5 MG/DL (ref 0–1)
BUN SERPL-MCNC: 15 MG/DL (ref 8–28)
CALCIUM SERPL-MCNC: 10.1 MG/DL (ref 8.5–10.5)
CHLORIDE BLD-SCNC: 106 MMOL/L (ref 98–107)
CO2 SERPL-SCNC: 26 MMOL/L (ref 22–31)
CREAT SERPL-MCNC: 0.79 MG/DL (ref 0.6–1.1)
EOSINOPHIL # BLD AUTO: 0.3 THOU/UL (ref 0–0.4)
EOSINOPHIL NFR BLD AUTO: 3 % (ref 0–6)
ERYTHROCYTE [DISTWIDTH] IN BLOOD BY AUTOMATED COUNT: 15.6 % (ref 11–14.5)
GFR SERPL CREATININE-BSD FRML MDRD: >60 ML/MIN/1.73M2
GLUCOSE BLD-MCNC: 108 MG/DL (ref 70–125)
HCT VFR BLD AUTO: 43.9 % (ref 35–47)
HGB BLD-MCNC: 14.5 G/DL (ref 12–16)
LYMPHOCYTES # BLD AUTO: 2.1 THOU/UL (ref 0.8–4.4)
LYMPHOCYTES NFR BLD AUTO: 23 % (ref 20–40)
MCH RBC QN AUTO: 29.8 PG (ref 27–34)
MCHC RBC AUTO-ENTMCNC: 33 G/DL (ref 32–36)
MCV RBC AUTO: 90 FL (ref 80–100)
MONOCYTES # BLD AUTO: 0.8 THOU/UL (ref 0–0.9)
MONOCYTES NFR BLD AUTO: 9 % (ref 2–10)
NEUTROPHILS # BLD AUTO: 5.9 THOU/UL (ref 2–7.7)
NEUTROPHILS NFR BLD AUTO: 64 % (ref 50–70)
PLATELET # BLD AUTO: 462 THOU/UL (ref 140–440)
PMV BLD AUTO: 9.3 FL (ref 8.5–12.5)
POTASSIUM BLD-SCNC: 3.8 MMOL/L (ref 3.5–5)
PROT SERPL-MCNC: 6.8 G/DL (ref 6–8)
RBC # BLD AUTO: 4.87 MILL/UL (ref 3.8–5.4)
SODIUM SERPL-SCNC: 141 MMOL/L (ref 136–145)
WBC: 9.2 THOU/UL (ref 4–11)

## 2018-07-19 RX ORDER — POLYETHYLENE GLYCOL 3350 17 G/17G
17 POWDER, FOR SOLUTION ORAL DAILY PRN
Status: SHIPPED | COMMUNITY
Start: 2018-07-19 | End: 2022-05-04

## 2018-07-19 ASSESSMENT — MIFFLIN-ST. JEOR: SCORE: 1202.52

## 2018-08-06 ENCOUNTER — COMMUNICATION - HEALTHEAST (OUTPATIENT)
Dept: INTERNAL MEDICINE | Facility: CLINIC | Age: 78
End: 2018-08-06

## 2018-08-14 ENCOUNTER — OFFICE VISIT - HEALTHEAST (OUTPATIENT)
Dept: INTERNAL MEDICINE | Facility: CLINIC | Age: 78
End: 2018-08-14

## 2018-08-14 DIAGNOSIS — I26.99 PULMONARY EMBOLI (H): ICD-10-CM

## 2018-08-14 DIAGNOSIS — R53.83 FATIGUE: ICD-10-CM

## 2018-08-14 DIAGNOSIS — D47.3 ESSENTIAL THROMBOCYTHEMIA (H): ICD-10-CM

## 2018-08-14 LAB
ALBUMIN SERPL-MCNC: 3.8 G/DL (ref 3.5–5)
ALP SERPL-CCNC: 51 U/L (ref 45–120)
ALT SERPL W P-5'-P-CCNC: 28 U/L (ref 0–45)
ANION GAP SERPL CALCULATED.3IONS-SCNC: 11 MMOL/L (ref 5–18)
AST SERPL W P-5'-P-CCNC: 20 U/L (ref 0–40)
BILIRUB SERPL-MCNC: 0.4 MG/DL (ref 0–1)
BUN SERPL-MCNC: 15 MG/DL (ref 8–28)
CALCIUM SERPL-MCNC: 10.2 MG/DL (ref 8.5–10.5)
CHLORIDE BLD-SCNC: 105 MMOL/L (ref 98–107)
CO2 SERPL-SCNC: 26 MMOL/L (ref 22–31)
CREAT SERPL-MCNC: 1.04 MG/DL (ref 0.6–1.1)
ERYTHROCYTE [DISTWIDTH] IN BLOOD BY AUTOMATED COUNT: 14.9 % (ref 11–14.5)
GFR SERPL CREATININE-BSD FRML MDRD: 51 ML/MIN/1.73M2
GLUCOSE BLD-MCNC: 76 MG/DL (ref 70–125)
HCT VFR BLD AUTO: 45.7 % (ref 35–47)
HGB BLD-MCNC: 15 G/DL (ref 12–16)
MAGNESIUM SERPL-MCNC: 2.3 MG/DL (ref 1.8–2.6)
MCH RBC QN AUTO: 29.9 PG (ref 27–34)
MCHC RBC AUTO-ENTMCNC: 32.9 G/DL (ref 32–36)
MCV RBC AUTO: 91 FL (ref 80–100)
PLATELET # BLD AUTO: 486 THOU/UL (ref 140–440)
PMV BLD AUTO: 6.7 FL (ref 7–10)
POTASSIUM BLD-SCNC: 4.1 MMOL/L (ref 3.5–5)
PROT SERPL-MCNC: 6.8 G/DL (ref 6–8)
RBC # BLD AUTO: 5.02 MILL/UL (ref 3.8–5.4)
SODIUM SERPL-SCNC: 142 MMOL/L (ref 136–145)
TSH SERPL DL<=0.005 MIU/L-ACNC: 1.16 UIU/ML (ref 0.3–5)
VIT B12 SERPL-MCNC: 488 PG/ML (ref 213–816)
WBC: 8.2 THOU/UL (ref 4–11)

## 2018-08-15 LAB
25(OH)D3 SERPL-MCNC: 43.2 NG/ML (ref 30–80)
25(OH)D3 SERPL-MCNC: 43.2 NG/ML (ref 30–80)

## 2018-08-29 ENCOUNTER — RECORDS - HEALTHEAST (OUTPATIENT)
Dept: GENERAL RADIOLOGY | Facility: CLINIC | Age: 78
End: 2018-08-29

## 2018-08-29 ENCOUNTER — OFFICE VISIT - HEALTHEAST (OUTPATIENT)
Dept: INTERNAL MEDICINE | Facility: CLINIC | Age: 78
End: 2018-08-29

## 2018-08-29 ENCOUNTER — COMMUNICATION - HEALTHEAST (OUTPATIENT)
Dept: INTERNAL MEDICINE | Facility: CLINIC | Age: 78
End: 2018-08-29

## 2018-08-29 DIAGNOSIS — M25.551 PAIN IN RIGHT HIP: ICD-10-CM

## 2018-08-29 DIAGNOSIS — M25.551 HIP PAIN, RIGHT: ICD-10-CM

## 2018-08-29 DIAGNOSIS — R53.83 FATIGUE: ICD-10-CM

## 2018-09-10 ENCOUNTER — AMBULATORY - HEALTHEAST (OUTPATIENT)
Dept: NURSING | Facility: CLINIC | Age: 78
End: 2018-09-10

## 2018-09-12 ENCOUNTER — OFFICE VISIT - HEALTHEAST (OUTPATIENT)
Dept: INTERNAL MEDICINE | Facility: CLINIC | Age: 78
End: 2018-09-12

## 2018-09-12 DIAGNOSIS — M16.11 PRIMARY OSTEOARTHRITIS OF RIGHT HIP: ICD-10-CM

## 2018-09-12 DIAGNOSIS — M81.0 OSTEOPOROSIS, SENILE: ICD-10-CM

## 2018-09-12 RX ORDER — ACETAMINOPHEN 500 MG
1000 TABLET ORAL EVERY EVENING
Status: SHIPPED | COMMUNITY
Start: 2018-09-12 | End: 2022-03-02

## 2018-09-20 ENCOUNTER — COMMUNICATION - HEALTHEAST (OUTPATIENT)
Dept: ONCOLOGY | Facility: CLINIC | Age: 78
End: 2018-09-20

## 2018-09-20 ENCOUNTER — AMBULATORY - HEALTHEAST (OUTPATIENT)
Dept: INFUSION THERAPY | Facility: CLINIC | Age: 78
End: 2018-09-20

## 2018-09-20 DIAGNOSIS — D47.3 ESSENTIAL THROMBOCYTHEMIA (H): ICD-10-CM

## 2018-09-20 LAB
ALBUMIN SERPL-MCNC: 3.6 G/DL (ref 3.5–5)
ALP SERPL-CCNC: 54 U/L (ref 45–120)
ALT SERPL W P-5'-P-CCNC: 32 U/L (ref 0–45)
ANION GAP SERPL CALCULATED.3IONS-SCNC: 9 MMOL/L (ref 5–18)
AST SERPL W P-5'-P-CCNC: 25 U/L (ref 0–40)
BASOPHILS # BLD AUTO: 0.1 THOU/UL (ref 0–0.2)
BASOPHILS NFR BLD AUTO: 1 % (ref 0–2)
BILIRUB SERPL-MCNC: 0.4 MG/DL (ref 0–1)
BUN SERPL-MCNC: 14 MG/DL (ref 8–28)
CALCIUM SERPL-MCNC: 9.8 MG/DL (ref 8.5–10.5)
CHLORIDE BLD-SCNC: 105 MMOL/L (ref 98–107)
CO2 SERPL-SCNC: 27 MMOL/L (ref 22–31)
CREAT SERPL-MCNC: 0.76 MG/DL (ref 0.6–1.1)
EOSINOPHIL # BLD AUTO: 0.3 THOU/UL (ref 0–0.4)
EOSINOPHIL NFR BLD AUTO: 3 % (ref 0–6)
ERYTHROCYTE [DISTWIDTH] IN BLOOD BY AUTOMATED COUNT: 16.1 % (ref 11–14.5)
GFR SERPL CREATININE-BSD FRML MDRD: >60 ML/MIN/1.73M2
GLUCOSE BLD-MCNC: 124 MG/DL (ref 70–125)
HCT VFR BLD AUTO: 44.2 % (ref 35–47)
HGB BLD-MCNC: 14.4 G/DL (ref 12–16)
LYMPHOCYTES # BLD AUTO: 2.2 THOU/UL (ref 0.8–4.4)
LYMPHOCYTES NFR BLD AUTO: 23 % (ref 20–40)
MCH RBC QN AUTO: 30.7 PG (ref 27–34)
MCHC RBC AUTO-ENTMCNC: 32.6 G/DL (ref 32–36)
MCV RBC AUTO: 94 FL (ref 80–100)
MONOCYTES # BLD AUTO: 0.7 THOU/UL (ref 0–0.9)
MONOCYTES NFR BLD AUTO: 8 % (ref 2–10)
NEUTROPHILS # BLD AUTO: 6.4 THOU/UL (ref 2–7.7)
NEUTROPHILS NFR BLD AUTO: 66 % (ref 50–70)
PLATELET # BLD AUTO: 417 THOU/UL (ref 140–440)
PMV BLD AUTO: 8.8 FL (ref 8.5–12.5)
POTASSIUM BLD-SCNC: 3.7 MMOL/L (ref 3.5–5)
PROT SERPL-MCNC: 6.7 G/DL (ref 6–8)
RBC # BLD AUTO: 4.69 MILL/UL (ref 3.8–5.4)
SODIUM SERPL-SCNC: 141 MMOL/L (ref 136–145)
WBC: 9.7 THOU/UL (ref 4–11)

## 2018-10-04 ENCOUNTER — COMMUNICATION - HEALTHEAST (OUTPATIENT)
Dept: INTERNAL MEDICINE | Facility: CLINIC | Age: 78
End: 2018-10-04

## 2018-10-17 ENCOUNTER — OFFICE VISIT - HEALTHEAST (OUTPATIENT)
Dept: ONCOLOGY | Facility: CLINIC | Age: 78
End: 2018-10-17

## 2018-10-17 ENCOUNTER — AMBULATORY - HEALTHEAST (OUTPATIENT)
Dept: INFUSION THERAPY | Facility: CLINIC | Age: 78
End: 2018-10-17

## 2018-10-17 DIAGNOSIS — D47.3 ESSENTIAL THROMBOCYTHEMIA (H): ICD-10-CM

## 2018-10-17 DIAGNOSIS — C50.919 BREAST CANCER (H): ICD-10-CM

## 2018-10-17 DIAGNOSIS — I26.99 PULMONARY EMBOLI (H): ICD-10-CM

## 2018-10-17 LAB
ALBUMIN SERPL-MCNC: 3.8 G/DL (ref 3.5–5)
ALP SERPL-CCNC: 45 U/L (ref 45–120)
ALT SERPL W P-5'-P-CCNC: 22 U/L (ref 0–45)
ANION GAP SERPL CALCULATED.3IONS-SCNC: 8 MMOL/L (ref 5–18)
AST SERPL W P-5'-P-CCNC: 20 U/L (ref 0–40)
BASOPHILS # BLD AUTO: 0.1 THOU/UL (ref 0–0.2)
BASOPHILS NFR BLD AUTO: 1 % (ref 0–2)
BILIRUB SERPL-MCNC: 0.4 MG/DL (ref 0–1)
BUN SERPL-MCNC: 13 MG/DL (ref 8–28)
CALCIUM SERPL-MCNC: 10 MG/DL (ref 8.5–10.5)
CHLORIDE BLD-SCNC: 106 MMOL/L (ref 98–107)
CO2 SERPL-SCNC: 27 MMOL/L (ref 22–31)
CREAT SERPL-MCNC: 0.7 MG/DL (ref 0.6–1.1)
EOSINOPHIL # BLD AUTO: 0.3 THOU/UL (ref 0–0.4)
EOSINOPHIL NFR BLD AUTO: 3 % (ref 0–6)
ERYTHROCYTE [DISTWIDTH] IN BLOOD BY AUTOMATED COUNT: 15.6 % (ref 11–14.5)
GFR SERPL CREATININE-BSD FRML MDRD: >60 ML/MIN/1.73M2
GLUCOSE BLD-MCNC: 99 MG/DL (ref 70–125)
HCT VFR BLD AUTO: 43.9 % (ref 35–47)
HGB BLD-MCNC: 14.6 G/DL (ref 12–16)
LYMPHOCYTES # BLD AUTO: 2.1 THOU/UL (ref 0.8–4.4)
LYMPHOCYTES NFR BLD AUTO: 24 % (ref 20–40)
MCH RBC QN AUTO: 31.1 PG (ref 27–34)
MCHC RBC AUTO-ENTMCNC: 33.3 G/DL (ref 32–36)
MCV RBC AUTO: 94 FL (ref 80–100)
MONOCYTES # BLD AUTO: 0.8 THOU/UL (ref 0–0.9)
MONOCYTES NFR BLD AUTO: 9 % (ref 2–10)
NEUTROPHILS # BLD AUTO: 5.6 THOU/UL (ref 2–7.7)
NEUTROPHILS NFR BLD AUTO: 63 % (ref 50–70)
PLATELET # BLD AUTO: 449 THOU/UL (ref 140–440)
PMV BLD AUTO: 8.9 FL (ref 8.5–12.5)
POTASSIUM BLD-SCNC: 3.7 MMOL/L (ref 3.5–5)
PROT SERPL-MCNC: 7 G/DL (ref 6–8)
RBC # BLD AUTO: 4.69 MILL/UL (ref 3.8–5.4)
SODIUM SERPL-SCNC: 141 MMOL/L (ref 136–145)
WBC: 8.9 THOU/UL (ref 4–11)

## 2018-11-01 ENCOUNTER — COMMUNICATION - HEALTHEAST (OUTPATIENT)
Dept: INTERNAL MEDICINE | Facility: CLINIC | Age: 78
End: 2018-11-01

## 2018-11-09 ENCOUNTER — OFFICE VISIT - HEALTHEAST (OUTPATIENT)
Dept: INTERNAL MEDICINE | Facility: CLINIC | Age: 78
End: 2018-11-09

## 2018-11-09 ENCOUNTER — OFFICE VISIT - HEALTHEAST (OUTPATIENT)
Dept: PHYSICAL THERAPY | Facility: REHABILITATION | Age: 78
End: 2018-11-09

## 2018-11-09 DIAGNOSIS — M62.81 GENERALIZED MUSCLE WEAKNESS: ICD-10-CM

## 2018-11-09 DIAGNOSIS — M54.50 ACUTE BILATERAL LOW BACK PAIN WITHOUT SCIATICA: ICD-10-CM

## 2018-11-09 DIAGNOSIS — M25.551 RIGHT HIP PAIN: ICD-10-CM

## 2018-11-09 DIAGNOSIS — M54.50 LUMBAR BACK PAIN: ICD-10-CM

## 2018-11-09 DIAGNOSIS — M25.551 HIP PAIN, RIGHT: ICD-10-CM

## 2018-11-12 ENCOUNTER — HOSPITAL ENCOUNTER (OUTPATIENT)
Dept: PHYSICAL MEDICINE AND REHAB | Facility: CLINIC | Age: 78
Discharge: HOME | End: 2018-11-12
Attending: PHYSICIAN ASSISTANT

## 2018-11-12 DIAGNOSIS — M53.3 SACROILIAC JOINT PAIN: ICD-10-CM

## 2018-11-12 DIAGNOSIS — Z85.9 HISTORY OF CANCER: ICD-10-CM

## 2018-11-12 DIAGNOSIS — M54.50 LUMBAR SPINE PAIN: ICD-10-CM

## 2018-11-12 DIAGNOSIS — M16.11 PRIMARY OSTEOARTHRITIS OF RIGHT HIP: ICD-10-CM

## 2018-11-12 ASSESSMENT — MIFFLIN-ST. JEOR: SCORE: 1188.46

## 2018-11-13 ENCOUNTER — COMMUNICATION - HEALTHEAST (OUTPATIENT)
Dept: INTERNAL MEDICINE | Facility: CLINIC | Age: 78
End: 2018-11-13

## 2018-11-20 ENCOUNTER — COMMUNICATION - HEALTHEAST (OUTPATIENT)
Dept: INTERNAL MEDICINE | Facility: CLINIC | Age: 78
End: 2018-11-20

## 2018-11-26 ENCOUNTER — COMMUNICATION - HEALTHEAST (OUTPATIENT)
Dept: ONCOLOGY | Facility: CLINIC | Age: 78
End: 2018-11-26

## 2018-11-28 ENCOUNTER — COMMUNICATION - HEALTHEAST (OUTPATIENT)
Dept: PHYSICAL MEDICINE AND REHAB | Facility: CLINIC | Age: 78
End: 2018-11-28

## 2018-11-28 DIAGNOSIS — M54.16 LUMBAR RADICULOPATHY: ICD-10-CM

## 2018-11-29 ENCOUNTER — AMBULATORY - HEALTHEAST (OUTPATIENT)
Dept: INFUSION THERAPY | Facility: CLINIC | Age: 78
End: 2018-11-29

## 2018-11-29 DIAGNOSIS — D47.3 ESSENTIAL THROMBOCYTHEMIA (H): ICD-10-CM

## 2018-11-29 LAB
ALBUMIN SERPL-MCNC: 3.9 G/DL (ref 3.5–5)
ALP SERPL-CCNC: 43 U/L (ref 45–120)
ALT SERPL W P-5'-P-CCNC: 41 U/L (ref 0–45)
ANION GAP SERPL CALCULATED.3IONS-SCNC: 8 MMOL/L (ref 5–18)
AST SERPL W P-5'-P-CCNC: 32 U/L (ref 0–40)
BASOPHILS # BLD AUTO: 0.1 THOU/UL (ref 0–0.2)
BASOPHILS NFR BLD AUTO: 1 % (ref 0–2)
BILIRUB SERPL-MCNC: 0.5 MG/DL (ref 0–1)
BUN SERPL-MCNC: 12 MG/DL (ref 8–28)
CALCIUM SERPL-MCNC: 9.6 MG/DL (ref 8.5–10.5)
CHLORIDE BLD-SCNC: 107 MMOL/L (ref 98–107)
CO2 SERPL-SCNC: 27 MMOL/L (ref 22–31)
CREAT SERPL-MCNC: 0.7 MG/DL (ref 0.6–1.1)
EOSINOPHIL # BLD AUTO: 0.3 THOU/UL (ref 0–0.4)
EOSINOPHIL NFR BLD AUTO: 4 % (ref 0–6)
ERYTHROCYTE [DISTWIDTH] IN BLOOD BY AUTOMATED COUNT: 15 % (ref 11–14.5)
GFR SERPL CREATININE-BSD FRML MDRD: >60 ML/MIN/1.73M2
GLUCOSE BLD-MCNC: 80 MG/DL (ref 70–125)
HCT VFR BLD AUTO: 45.9 % (ref 35–47)
HGB BLD-MCNC: 14.9 G/DL (ref 12–16)
LYMPHOCYTES # BLD AUTO: 1.8 THOU/UL (ref 0.8–4.4)
LYMPHOCYTES NFR BLD AUTO: 24 % (ref 20–40)
MCH RBC QN AUTO: 31.3 PG (ref 27–34)
MCHC RBC AUTO-ENTMCNC: 32.5 G/DL (ref 32–36)
MCV RBC AUTO: 96 FL (ref 80–100)
MONOCYTES # BLD AUTO: 0.7 THOU/UL (ref 0–0.9)
MONOCYTES NFR BLD AUTO: 10 % (ref 2–10)
NEUTROPHILS # BLD AUTO: 4.4 THOU/UL (ref 2–7.7)
NEUTROPHILS NFR BLD AUTO: 61 % (ref 50–70)
PLATELET # BLD AUTO: 436 THOU/UL (ref 140–440)
PMV BLD AUTO: 8.8 FL (ref 8.5–12.5)
POTASSIUM BLD-SCNC: 4.2 MMOL/L (ref 3.5–5)
PROT SERPL-MCNC: 6.7 G/DL (ref 6–8)
RBC # BLD AUTO: 4.76 MILL/UL (ref 3.8–5.4)
SODIUM SERPL-SCNC: 142 MMOL/L (ref 136–145)
WBC: 7.4 THOU/UL (ref 4–11)

## 2018-11-30 ENCOUNTER — COMMUNICATION - HEALTHEAST (OUTPATIENT)
Dept: ONCOLOGY | Facility: CLINIC | Age: 78
End: 2018-11-30

## 2018-11-30 ENCOUNTER — HOSPITAL ENCOUNTER (OUTPATIENT)
Dept: PHYSICAL MEDICINE AND REHAB | Facility: CLINIC | Age: 78
Discharge: HOME | End: 2018-11-30
Attending: PHYSICIAN ASSISTANT

## 2018-11-30 DIAGNOSIS — M54.16 LUMBAR RADICULOPATHY: ICD-10-CM

## 2018-12-03 ENCOUNTER — COMMUNICATION - HEALTHEAST (OUTPATIENT)
Dept: ONCOLOGY | Facility: CLINIC | Age: 78
End: 2018-12-03

## 2018-12-13 ENCOUNTER — OFFICE VISIT - HEALTHEAST (OUTPATIENT)
Dept: INTERNAL MEDICINE | Facility: CLINIC | Age: 78
End: 2018-12-13

## 2018-12-13 DIAGNOSIS — R35.0 URINARY FREQUENCY: ICD-10-CM

## 2018-12-13 DIAGNOSIS — I26.99 OTHER PULMONARY EMBOLISM WITHOUT ACUTE COR PULMONALE, UNSPECIFIED CHRONICITY (H): ICD-10-CM

## 2018-12-13 DIAGNOSIS — M54.41 MIDLINE LOW BACK PAIN WITH RIGHT-SIDED SCIATICA, UNSPECIFIED CHRONICITY: ICD-10-CM

## 2018-12-14 ENCOUNTER — HOSPITAL ENCOUNTER (OUTPATIENT)
Dept: PHYSICAL MEDICINE AND REHAB | Facility: CLINIC | Age: 78
Discharge: HOME | End: 2018-12-14
Attending: PHYSICIAN ASSISTANT

## 2018-12-14 DIAGNOSIS — M54.16 LUMBAR RADICULITIS: ICD-10-CM

## 2018-12-14 DIAGNOSIS — M51.26 LUMBAR DISC HERNIATION: ICD-10-CM

## 2019-01-17 ENCOUNTER — OFFICE VISIT - HEALTHEAST (OUTPATIENT)
Dept: ONCOLOGY | Facility: CLINIC | Age: 79
End: 2019-01-17

## 2019-01-17 ENCOUNTER — AMBULATORY - HEALTHEAST (OUTPATIENT)
Dept: INFUSION THERAPY | Facility: CLINIC | Age: 79
End: 2019-01-17

## 2019-01-17 DIAGNOSIS — D47.3 ESSENTIAL THROMBOCYTHEMIA (H): ICD-10-CM

## 2019-01-17 LAB
ALBUMIN SERPL-MCNC: 3.9 G/DL (ref 3.5–5)
ALP SERPL-CCNC: 45 U/L (ref 45–120)
ALT SERPL W P-5'-P-CCNC: 23 U/L (ref 0–45)
ANION GAP SERPL CALCULATED.3IONS-SCNC: 7 MMOL/L (ref 5–18)
AST SERPL W P-5'-P-CCNC: 20 U/L (ref 0–40)
BASOPHILS # BLD AUTO: 0.1 THOU/UL (ref 0–0.2)
BASOPHILS NFR BLD AUTO: 1 % (ref 0–2)
BILIRUB SERPL-MCNC: 0.4 MG/DL (ref 0–1)
BUN SERPL-MCNC: 14 MG/DL (ref 8–28)
CALCIUM SERPL-MCNC: 10.1 MG/DL (ref 8.5–10.5)
CHLORIDE BLD-SCNC: 105 MMOL/L (ref 98–107)
CO2 SERPL-SCNC: 29 MMOL/L (ref 22–31)
CREAT SERPL-MCNC: 0.79 MG/DL (ref 0.6–1.1)
EOSINOPHIL # BLD AUTO: 0.4 THOU/UL (ref 0–0.4)
EOSINOPHIL NFR BLD AUTO: 5 % (ref 0–6)
ERYTHROCYTE [DISTWIDTH] IN BLOOD BY AUTOMATED COUNT: 14.9 % (ref 11–14.5)
GFR SERPL CREATININE-BSD FRML MDRD: >60 ML/MIN/1.73M2
GLUCOSE BLD-MCNC: 109 MG/DL (ref 70–125)
HCT VFR BLD AUTO: 45 % (ref 35–47)
HGB BLD-MCNC: 14.7 G/DL (ref 12–16)
LYMPHOCYTES # BLD AUTO: 1.6 THOU/UL (ref 0.8–4.4)
LYMPHOCYTES NFR BLD AUTO: 20 % (ref 20–40)
MCH RBC QN AUTO: 31.8 PG (ref 27–34)
MCHC RBC AUTO-ENTMCNC: 32.7 G/DL (ref 32–36)
MCV RBC AUTO: 97 FL (ref 80–100)
MONOCYTES # BLD AUTO: 0.7 THOU/UL (ref 0–0.9)
MONOCYTES NFR BLD AUTO: 8 % (ref 2–10)
NEUTROPHILS # BLD AUTO: 5.5 THOU/UL (ref 2–7.7)
NEUTROPHILS NFR BLD AUTO: 67 % (ref 50–70)
PLATELET # BLD AUTO: 413 THOU/UL (ref 140–440)
PMV BLD AUTO: 8.7 FL (ref 8.5–12.5)
POTASSIUM BLD-SCNC: 3.9 MMOL/L (ref 3.5–5)
PROT SERPL-MCNC: 7.1 G/DL (ref 6–8)
RBC # BLD AUTO: 4.62 MILL/UL (ref 3.8–5.4)
SODIUM SERPL-SCNC: 141 MMOL/L (ref 136–145)
WBC: 8.2 THOU/UL (ref 4–11)

## 2019-01-17 RX ORDER — ASPIRIN 325 MG
325 TABLET ORAL DAILY
Status: ON HOLD | COMMUNITY
Start: 2019-01-17 | End: 2022-03-05

## 2019-02-12 ENCOUNTER — OFFICE VISIT - HEALTHEAST (OUTPATIENT)
Dept: INTERNAL MEDICINE | Facility: CLINIC | Age: 79
End: 2019-02-12

## 2019-02-12 DIAGNOSIS — Z00.00 ENCOUNTER FOR MEDICARE ANNUAL WELLNESS EXAM: ICD-10-CM

## 2019-02-12 DIAGNOSIS — C50.919 MALIGNANT NEOPLASM OF FEMALE BREAST, UNSPECIFIED ESTROGEN RECEPTOR STATUS, UNSPECIFIED LATERALITY, UNSPECIFIED SITE OF BREAST (H): ICD-10-CM

## 2019-02-12 DIAGNOSIS — K21.00 REFLUX ESOPHAGITIS: ICD-10-CM

## 2019-02-12 DIAGNOSIS — R35.0 URINARY FREQUENCY: ICD-10-CM

## 2019-02-12 DIAGNOSIS — M81.0 AGE-RELATED OSTEOPOROSIS WITHOUT CURRENT PATHOLOGICAL FRACTURE: ICD-10-CM

## 2019-02-12 DIAGNOSIS — I10 ESSENTIAL HYPERTENSION: ICD-10-CM

## 2019-02-12 DIAGNOSIS — E78.5 HYPERLIPIDEMIA: ICD-10-CM

## 2019-02-12 DIAGNOSIS — I26.99 OTHER PULMONARY EMBOLISM WITHOUT ACUTE COR PULMONALE, UNSPECIFIED CHRONICITY (H): ICD-10-CM

## 2019-02-12 DIAGNOSIS — D47.3 ESSENTIAL THROMBOCYTHEMIA (H): ICD-10-CM

## 2019-02-12 DIAGNOSIS — M81.0 OSTEOPOROSIS, SENILE: ICD-10-CM

## 2019-02-12 DIAGNOSIS — Z00.00 HEALTH CARE MAINTENANCE: ICD-10-CM

## 2019-02-12 DIAGNOSIS — L65.9 HAIR LOSS: ICD-10-CM

## 2019-02-12 LAB
CHOLEST SERPL-MCNC: 200 MG/DL
FASTING STATUS PATIENT QL REPORTED: YES
HDLC SERPL-MCNC: 58 MG/DL
LDLC SERPL CALC-MCNC: 113 MG/DL
TRIGL SERPL-MCNC: 146 MG/DL

## 2019-02-12 ASSESSMENT — MIFFLIN-ST. JEOR: SCORE: 1200.25

## 2019-02-13 LAB
25(OH)D3 SERPL-MCNC: 41.4 NG/ML (ref 30–80)
25(OH)D3 SERPL-MCNC: 41.4 NG/ML (ref 30–80)

## 2019-02-22 ENCOUNTER — OFFICE VISIT - HEALTHEAST (OUTPATIENT)
Dept: INTERNAL MEDICINE | Facility: CLINIC | Age: 79
End: 2019-02-22

## 2019-02-22 DIAGNOSIS — H61.21 IMPACTED CERUMEN OF RIGHT EAR: ICD-10-CM

## 2019-03-05 ENCOUNTER — COMMUNICATION - HEALTHEAST (OUTPATIENT)
Dept: ADMINISTRATIVE | Facility: CLINIC | Age: 79
End: 2019-03-05

## 2019-03-15 ENCOUNTER — COMMUNICATION - HEALTHEAST (OUTPATIENT)
Dept: NURSING | Facility: CLINIC | Age: 79
End: 2019-03-15

## 2019-03-19 ENCOUNTER — AMBULATORY - HEALTHEAST (OUTPATIENT)
Dept: NURSING | Facility: CLINIC | Age: 79
End: 2019-03-19

## 2019-03-28 ENCOUNTER — RECORDS - HEALTHEAST (OUTPATIENT)
Dept: ADMINISTRATIVE | Facility: OTHER | Age: 79
End: 2019-03-28

## 2019-03-29 ENCOUNTER — RECORDS - HEALTHEAST (OUTPATIENT)
Dept: ADMINISTRATIVE | Facility: OTHER | Age: 79
End: 2019-03-29

## 2019-07-12 ENCOUNTER — OFFICE VISIT - HEALTHEAST (OUTPATIENT)
Dept: ONCOLOGY | Facility: CLINIC | Age: 79
End: 2019-07-12

## 2019-07-12 ENCOUNTER — AMBULATORY - HEALTHEAST (OUTPATIENT)
Dept: INFUSION THERAPY | Facility: CLINIC | Age: 79
End: 2019-07-12

## 2019-07-12 DIAGNOSIS — Z86.711 HISTORY OF PULMONARY EMBOLISM: ICD-10-CM

## 2019-07-12 DIAGNOSIS — D47.3 ESSENTIAL THROMBOCYTHEMIA (H): ICD-10-CM

## 2019-07-12 DIAGNOSIS — L65.9 HAIR LOSS: ICD-10-CM

## 2019-07-12 DIAGNOSIS — Z79.899 ENCOUNTER FOR MEDICATION MANAGEMENT: ICD-10-CM

## 2019-07-12 LAB
ALBUMIN SERPL-MCNC: 3.7 G/DL (ref 3.5–5)
ALP SERPL-CCNC: 51 U/L (ref 45–120)
ALT SERPL W P-5'-P-CCNC: 14 U/L (ref 0–45)
ANION GAP SERPL CALCULATED.3IONS-SCNC: 7 MMOL/L (ref 5–18)
AST SERPL W P-5'-P-CCNC: 16 U/L (ref 0–40)
BASOPHILS # BLD AUTO: 0.1 THOU/UL (ref 0–0.2)
BASOPHILS NFR BLD AUTO: 1 % (ref 0–2)
BILIRUB SERPL-MCNC: 0.3 MG/DL (ref 0–1)
BUN SERPL-MCNC: 15 MG/DL (ref 8–28)
CALCIUM SERPL-MCNC: 9.7 MG/DL (ref 8.5–10.5)
CHLORIDE BLD-SCNC: 105 MMOL/L (ref 98–107)
CO2 SERPL-SCNC: 29 MMOL/L (ref 22–31)
CREAT SERPL-MCNC: 0.78 MG/DL (ref 0.6–1.1)
EOSINOPHIL # BLD AUTO: 0.3 THOU/UL (ref 0–0.4)
EOSINOPHIL NFR BLD AUTO: 4 % (ref 0–6)
ERYTHROCYTE [DISTWIDTH] IN BLOOD BY AUTOMATED COUNT: 14.6 % (ref 11–14.5)
GFR SERPL CREATININE-BSD FRML MDRD: >60 ML/MIN/1.73M2
GLUCOSE BLD-MCNC: 92 MG/DL (ref 70–125)
HCT VFR BLD AUTO: 43.5 % (ref 35–47)
HGB BLD-MCNC: 14.1 G/DL (ref 12–16)
LDH SERPL L TO P-CCNC: 148 U/L (ref 125–220)
LYMPHOCYTES # BLD AUTO: 1.7 THOU/UL (ref 0.8–4.4)
LYMPHOCYTES NFR BLD AUTO: 23 % (ref 20–40)
MCH RBC QN AUTO: 31.4 PG (ref 27–34)
MCHC RBC AUTO-ENTMCNC: 32.4 G/DL (ref 32–36)
MCV RBC AUTO: 97 FL (ref 80–100)
MONOCYTES # BLD AUTO: 0.6 THOU/UL (ref 0–0.9)
MONOCYTES NFR BLD AUTO: 8 % (ref 2–10)
NEUTROPHILS # BLD AUTO: 4.6 THOU/UL (ref 2–7.7)
NEUTROPHILS NFR BLD AUTO: 64 % (ref 50–70)
PLATELET # BLD AUTO: 462 THOU/UL (ref 140–440)
PMV BLD AUTO: 9.1 FL (ref 8.5–12.5)
POTASSIUM BLD-SCNC: 3.9 MMOL/L (ref 3.5–5)
PROT SERPL-MCNC: 6.6 G/DL (ref 6–8)
RBC # BLD AUTO: 4.49 MILL/UL (ref 3.8–5.4)
SODIUM SERPL-SCNC: 141 MMOL/L (ref 136–145)
TSH SERPL DL<=0.005 MIU/L-ACNC: 1.2 UIU/ML (ref 0.3–5)
WBC: 7.2 THOU/UL (ref 4–11)

## 2019-08-20 ENCOUNTER — OFFICE VISIT - HEALTHEAST (OUTPATIENT)
Dept: INTERNAL MEDICINE | Facility: CLINIC | Age: 79
End: 2019-08-20

## 2019-08-20 DIAGNOSIS — L60.3 BRITTLE NAILS: ICD-10-CM

## 2019-08-20 DIAGNOSIS — M25.551 HIP PAIN, RIGHT: ICD-10-CM

## 2019-08-20 DIAGNOSIS — R21 RASH: ICD-10-CM

## 2019-08-22 ENCOUNTER — RECORDS - HEALTHEAST (OUTPATIENT)
Dept: ADMINISTRATIVE | Facility: OTHER | Age: 79
End: 2019-08-22

## 2019-09-03 ENCOUNTER — COMMUNICATION - HEALTHEAST (OUTPATIENT)
Dept: INTERNAL MEDICINE | Facility: CLINIC | Age: 79
End: 2019-09-03

## 2019-09-11 ENCOUNTER — RECORDS - HEALTHEAST (OUTPATIENT)
Dept: ADMINISTRATIVE | Facility: OTHER | Age: 79
End: 2019-09-11

## 2019-09-11 ENCOUNTER — COMMUNICATION - HEALTHEAST (OUTPATIENT)
Dept: INTERNAL MEDICINE | Facility: CLINIC | Age: 79
End: 2019-09-11

## 2019-09-19 ENCOUNTER — AMBULATORY - HEALTHEAST (OUTPATIENT)
Dept: NURSING | Facility: CLINIC | Age: 79
End: 2019-09-19

## 2019-09-24 ENCOUNTER — OFFICE VISIT - HEALTHEAST (OUTPATIENT)
Dept: INTERNAL MEDICINE | Facility: CLINIC | Age: 79
End: 2019-09-24

## 2019-09-24 DIAGNOSIS — M81.0 OSTEOPOROSIS, SENILE: ICD-10-CM

## 2019-09-24 DIAGNOSIS — K21.00 REFLUX ESOPHAGITIS: ICD-10-CM

## 2019-10-01 ENCOUNTER — OFFICE VISIT - HEALTHEAST (OUTPATIENT)
Dept: INTERNAL MEDICINE | Facility: CLINIC | Age: 79
End: 2019-10-01

## 2019-10-01 DIAGNOSIS — R07.9 CHEST PAIN, UNSPECIFIED TYPE: ICD-10-CM

## 2019-10-01 LAB
ATRIAL RATE - MUSE: 70 BPM
DIASTOLIC BLOOD PRESSURE - MUSE: NORMAL
INTERPRETATION ECG - MUSE: NORMAL
P AXIS - MUSE: 75 DEGREES
PR INTERVAL - MUSE: 160 MS
QRS DURATION - MUSE: 94 MS
QT - MUSE: 386 MS
QTC - MUSE: 416 MS
R AXIS - MUSE: -10 DEGREES
SYSTOLIC BLOOD PRESSURE - MUSE: NORMAL
T AXIS - MUSE: 82 DEGREES
TROPONIN I SERPL-MCNC: <0.01 NG/ML (ref 0–0.29)
VENTRICULAR RATE- MUSE: 70 BPM

## 2019-10-16 ENCOUNTER — OFFICE VISIT - HEALTHEAST (OUTPATIENT)
Dept: ONCOLOGY | Facility: CLINIC | Age: 79
End: 2019-10-16

## 2019-10-16 ENCOUNTER — AMBULATORY - HEALTHEAST (OUTPATIENT)
Dept: INFUSION THERAPY | Facility: CLINIC | Age: 79
End: 2019-10-16

## 2019-10-16 DIAGNOSIS — D47.3 ESSENTIAL THROMBOCYTHEMIA (H): ICD-10-CM

## 2019-10-16 DIAGNOSIS — D46.9 MYELODYSPLASTIC SYNDROME (H): ICD-10-CM

## 2019-10-16 LAB
ERYTHROCYTE [DISTWIDTH] IN BLOOD BY AUTOMATED COUNT: 15.6 % (ref 11–14.5)
HCT VFR BLD AUTO: 45.2 % (ref 35–47)
HGB BLD-MCNC: 14.5 G/DL (ref 12–16)
MCH RBC QN AUTO: 31.2 PG (ref 27–34)
MCHC RBC AUTO-ENTMCNC: 32.1 G/DL (ref 32–36)
MCV RBC AUTO: 97 FL (ref 80–100)
PLATELET # BLD AUTO: 456 THOU/UL (ref 140–440)
PMV BLD AUTO: 9 FL (ref 8.5–12.5)
RBC # BLD AUTO: 4.65 MILL/UL (ref 3.8–5.4)
WBC: 7.2 THOU/UL (ref 4–11)

## 2019-10-24 ENCOUNTER — OFFICE VISIT - HEALTHEAST (OUTPATIENT)
Dept: INTERNAL MEDICINE | Facility: CLINIC | Age: 79
End: 2019-10-24

## 2019-10-24 DIAGNOSIS — K21.00 REFLUX ESOPHAGITIS: ICD-10-CM

## 2019-10-24 DIAGNOSIS — D47.3 ESSENTIAL THROMBOCYTHEMIA (H): ICD-10-CM

## 2019-10-28 ENCOUNTER — COMMUNICATION - HEALTHEAST (OUTPATIENT)
Dept: SCHEDULING | Facility: CLINIC | Age: 79
End: 2019-10-28

## 2019-10-28 DIAGNOSIS — E78.5 HYPERLIPIDEMIA, UNSPECIFIED HYPERLIPIDEMIA TYPE: ICD-10-CM

## 2019-11-13 ENCOUNTER — COMMUNICATION - HEALTHEAST (OUTPATIENT)
Dept: INTERNAL MEDICINE | Facility: CLINIC | Age: 79
End: 2019-11-13

## 2019-11-26 ENCOUNTER — COMMUNICATION - HEALTHEAST (OUTPATIENT)
Dept: ADMINISTRATIVE | Facility: HOSPITAL | Age: 79
End: 2019-11-26

## 2019-11-27 ENCOUNTER — COMMUNICATION - HEALTHEAST (OUTPATIENT)
Dept: INTERNAL MEDICINE | Facility: CLINIC | Age: 79
End: 2019-11-27

## 2019-11-27 DIAGNOSIS — R07.9 CHEST PAIN, UNSPECIFIED TYPE: ICD-10-CM

## 2019-12-11 ENCOUNTER — COMMUNICATION - HEALTHEAST (OUTPATIENT)
Dept: ADMINISTRATIVE | Facility: HOSPITAL | Age: 79
End: 2019-12-11

## 2020-01-14 ENCOUNTER — OFFICE VISIT - HEALTHEAST (OUTPATIENT)
Dept: ONCOLOGY | Facility: CLINIC | Age: 80
End: 2020-01-14

## 2020-01-14 ENCOUNTER — AMBULATORY - HEALTHEAST (OUTPATIENT)
Dept: INFUSION THERAPY | Facility: CLINIC | Age: 80
End: 2020-01-14

## 2020-01-14 DIAGNOSIS — D47.3 ESSENTIAL THROMBOCYTHEMIA (H): ICD-10-CM

## 2020-01-14 DIAGNOSIS — M89.8X6 BONE PAIN OF LOWER LEG: ICD-10-CM

## 2020-01-14 LAB
ALBUMIN SERPL-MCNC: 3.8 G/DL (ref 3.5–5)
ALP SERPL-CCNC: 42 U/L (ref 45–120)
ALT SERPL W P-5'-P-CCNC: 19 U/L (ref 0–45)
ANION GAP SERPL CALCULATED.3IONS-SCNC: 9 MMOL/L (ref 5–18)
AST SERPL W P-5'-P-CCNC: 18 U/L (ref 0–40)
BASOPHILS # BLD AUTO: 0.1 THOU/UL (ref 0–0.2)
BASOPHILS NFR BLD AUTO: 1 % (ref 0–2)
BILIRUB SERPL-MCNC: 0.5 MG/DL (ref 0–1)
BUN SERPL-MCNC: 11 MG/DL (ref 8–28)
CALCIUM SERPL-MCNC: 9.4 MG/DL (ref 8.5–10.5)
CHLORIDE BLD-SCNC: 106 MMOL/L (ref 98–107)
CO2 SERPL-SCNC: 27 MMOL/L (ref 22–31)
CREAT SERPL-MCNC: 0.74 MG/DL (ref 0.6–1.1)
EOSINOPHIL # BLD AUTO: 0.2 THOU/UL (ref 0–0.4)
EOSINOPHIL NFR BLD AUTO: 3 % (ref 0–6)
ERYTHROCYTE [DISTWIDTH] IN BLOOD BY AUTOMATED COUNT: 14.6 % (ref 11–14.5)
GFR SERPL CREATININE-BSD FRML MDRD: >60 ML/MIN/1.73M2
GLUCOSE BLD-MCNC: 87 MG/DL (ref 70–125)
HCT VFR BLD AUTO: 46 % (ref 35–47)
HGB BLD-MCNC: 14.9 G/DL (ref 12–16)
LYMPHOCYTES # BLD AUTO: 1.4 THOU/UL (ref 0.8–4.4)
LYMPHOCYTES NFR BLD AUTO: 23 % (ref 20–40)
MCH RBC QN AUTO: 31.5 PG (ref 27–34)
MCHC RBC AUTO-ENTMCNC: 32.4 G/DL (ref 32–36)
MCV RBC AUTO: 97 FL (ref 80–100)
MONOCYTES # BLD AUTO: 0.5 THOU/UL (ref 0–0.9)
MONOCYTES NFR BLD AUTO: 8 % (ref 2–10)
NEUTROPHILS # BLD AUTO: 3.8 THOU/UL (ref 2–7.7)
NEUTROPHILS NFR BLD AUTO: 64 % (ref 50–70)
PLATELET # BLD AUTO: 404 THOU/UL (ref 140–440)
PMV BLD AUTO: 8.9 FL (ref 8.5–12.5)
POTASSIUM BLD-SCNC: 3.6 MMOL/L (ref 3.5–5)
PROT SERPL-MCNC: 6.9 G/DL (ref 6–8)
RBC # BLD AUTO: 4.73 MILL/UL (ref 3.8–5.4)
SODIUM SERPL-SCNC: 142 MMOL/L (ref 136–145)
WBC: 6 THOU/UL (ref 4–11)

## 2020-01-21 ENCOUNTER — HOSPITAL ENCOUNTER (OUTPATIENT)
Dept: NUCLEAR MEDICINE | Facility: CLINIC | Age: 80
Setting detail: RADIATION/ONCOLOGY SERIES
Discharge: STILL ADMITTED OR ADMINISTRATIVE | End: 2020-01-21
Attending: INTERNAL MEDICINE

## 2020-01-21 DIAGNOSIS — M89.8X6 BONE PAIN OF LOWER LEG: ICD-10-CM

## 2020-01-24 ENCOUNTER — COMMUNICATION - HEALTHEAST (OUTPATIENT)
Dept: ONCOLOGY | Facility: CLINIC | Age: 80
End: 2020-01-24

## 2020-02-04 ENCOUNTER — OFFICE VISIT - HEALTHEAST (OUTPATIENT)
Dept: FAMILY MEDICINE | Facility: CLINIC | Age: 80
End: 2020-02-04

## 2020-02-04 DIAGNOSIS — R51.9 FACIAL PAIN: ICD-10-CM

## 2020-02-04 DIAGNOSIS — G60.9 IDIOPATHIC PERIPHERAL NEUROPATHY: ICD-10-CM

## 2020-02-04 DIAGNOSIS — M79.604 LEG PAIN, BILATERAL: ICD-10-CM

## 2020-02-04 DIAGNOSIS — B35.3 TINEA PEDIS OF BOTH FEET: ICD-10-CM

## 2020-02-04 DIAGNOSIS — R82.90 ABNORMAL URINE ODOR: ICD-10-CM

## 2020-02-04 DIAGNOSIS — M79.605 LEG PAIN, BILATERAL: ICD-10-CM

## 2020-02-04 LAB
ALBUMIN UR-MCNC: NEGATIVE MG/DL
APPEARANCE UR: CLEAR
BACTERIA #/AREA URNS HPF: ABNORMAL HPF
BILIRUB UR QL STRIP: NEGATIVE
COLOR UR AUTO: YELLOW
GLUCOSE UR STRIP-MCNC: NEGATIVE MG/DL
HGB UR QL STRIP: NEGATIVE
KETONES UR STRIP-MCNC: NEGATIVE MG/DL
LEUKOCYTE ESTERASE UR QL STRIP: ABNORMAL
NITRATE UR QL: NEGATIVE
PH UR STRIP: 6.5 [PH] (ref 5–8)
RBC #/AREA URNS AUTO: ABNORMAL HPF
SP GR UR STRIP: 1.01 (ref 1–1.03)
SQUAMOUS #/AREA URNS AUTO: ABNORMAL LPF
UROBILINOGEN UR STRIP-ACNC: ABNORMAL
WBC #/AREA URNS AUTO: ABNORMAL HPF
WBC CLUMPS #/AREA URNS HPF: PRESENT /[HPF]

## 2020-02-05 ENCOUNTER — AMBULATORY - HEALTHEAST (OUTPATIENT)
Dept: FAMILY MEDICINE | Facility: CLINIC | Age: 80
End: 2020-02-05

## 2020-02-05 ENCOUNTER — COMMUNICATION - HEALTHEAST (OUTPATIENT)
Dept: FAMILY MEDICINE | Facility: CLINIC | Age: 80
End: 2020-02-05

## 2020-02-05 DIAGNOSIS — R82.90 ABNORMAL URINE ODOR: ICD-10-CM

## 2020-02-06 LAB — BACTERIA SPEC CULT: ABNORMAL

## 2020-02-07 ENCOUNTER — COMMUNICATION - HEALTHEAST (OUTPATIENT)
Dept: FAMILY MEDICINE | Facility: CLINIC | Age: 80
End: 2020-02-07

## 2020-02-09 ENCOUNTER — COMMUNICATION - HEALTHEAST (OUTPATIENT)
Dept: INTERNAL MEDICINE | Facility: CLINIC | Age: 80
End: 2020-02-09

## 2020-02-09 DIAGNOSIS — G47.00 INSOMNIA, UNSPECIFIED TYPE: ICD-10-CM

## 2020-02-24 ENCOUNTER — COMMUNICATION - HEALTHEAST (OUTPATIENT)
Dept: INTERNAL MEDICINE | Facility: CLINIC | Age: 80
End: 2020-02-24

## 2020-02-24 DIAGNOSIS — I10 ESSENTIAL HYPERTENSION: ICD-10-CM

## 2020-03-01 ENCOUNTER — COMMUNICATION - HEALTHEAST (OUTPATIENT)
Dept: FAMILY MEDICINE | Facility: CLINIC | Age: 80
End: 2020-03-01

## 2020-03-01 DIAGNOSIS — B35.3 TINEA PEDIS OF BOTH FEET: ICD-10-CM

## 2020-03-03 RX ORDER — KETOCONAZOLE 20 MG/G
CREAM TOPICAL
Qty: 60 G | Refills: 0 | Status: SHIPPED | OUTPATIENT
Start: 2020-03-03 | End: 2022-03-02

## 2020-03-13 ENCOUNTER — COMMUNICATION - HEALTHEAST (OUTPATIENT)
Dept: ONCOLOGY | Facility: CLINIC | Age: 80
End: 2020-03-13

## 2020-03-13 DIAGNOSIS — D47.3 ESSENTIAL THROMBOCYTHEMIA (H): ICD-10-CM

## 2020-03-25 ENCOUNTER — OFFICE VISIT - HEALTHEAST (OUTPATIENT)
Dept: INTERNAL MEDICINE | Facility: CLINIC | Age: 80
End: 2020-03-25

## 2020-03-25 DIAGNOSIS — R35.0 URINARY FREQUENCY: ICD-10-CM

## 2020-06-11 ENCOUNTER — AMBULATORY - HEALTHEAST (OUTPATIENT)
Dept: ONCOLOGY | Facility: CLINIC | Age: 80
End: 2020-06-11

## 2020-06-11 DIAGNOSIS — D47.3 ESSENTIAL THROMBOCYTHEMIA (H): ICD-10-CM

## 2020-06-16 ENCOUNTER — OFFICE VISIT - HEALTHEAST (OUTPATIENT)
Dept: ONCOLOGY | Facility: CLINIC | Age: 80
End: 2020-06-16

## 2020-06-16 ENCOUNTER — AMBULATORY - HEALTHEAST (OUTPATIENT)
Dept: INFUSION THERAPY | Facility: CLINIC | Age: 80
End: 2020-06-16

## 2020-06-16 DIAGNOSIS — Z79.899 ENCOUNTER FOR MEDICATION MANAGEMENT: ICD-10-CM

## 2020-06-16 DIAGNOSIS — D47.3 ESSENTIAL THROMBOCYTHEMIA (H): ICD-10-CM

## 2020-06-16 LAB
ALBUMIN SERPL-MCNC: 3.7 G/DL (ref 3.5–5)
ALP SERPL-CCNC: 56 U/L (ref 45–120)
ALT SERPL W P-5'-P-CCNC: 15 U/L (ref 0–45)
ANION GAP SERPL CALCULATED.3IONS-SCNC: 9 MMOL/L (ref 5–18)
AST SERPL W P-5'-P-CCNC: 17 U/L (ref 0–40)
BASOPHILS # BLD AUTO: 0.1 THOU/UL (ref 0–0.2)
BASOPHILS NFR BLD AUTO: 1 % (ref 0–2)
BILIRUB SERPL-MCNC: 0.4 MG/DL (ref 0–1)
BUN SERPL-MCNC: 15 MG/DL (ref 8–28)
CALCIUM SERPL-MCNC: 10 MG/DL (ref 8.5–10.5)
CHLORIDE BLD-SCNC: 107 MMOL/L (ref 98–107)
CO2 SERPL-SCNC: 28 MMOL/L (ref 22–31)
CREAT SERPL-MCNC: 0.8 MG/DL (ref 0.6–1.1)
EOSINOPHIL # BLD AUTO: 0.2 THOU/UL (ref 0–0.4)
EOSINOPHIL NFR BLD AUTO: 2 % (ref 0–6)
ERYTHROCYTE [DISTWIDTH] IN BLOOD BY AUTOMATED COUNT: 14.6 % (ref 11–14.5)
GFR SERPL CREATININE-BSD FRML MDRD: >60 ML/MIN/1.73M2
GLUCOSE BLD-MCNC: 96 MG/DL (ref 70–125)
HCT VFR BLD AUTO: 43.1 % (ref 35–47)
HGB BLD-MCNC: 14.3 G/DL (ref 12–16)
LDH SERPL L TO P-CCNC: 173 U/L (ref 125–220)
LYMPHOCYTES # BLD AUTO: 1.7 THOU/UL (ref 0.8–4.4)
LYMPHOCYTES NFR BLD AUTO: 26 % (ref 20–40)
MCH RBC QN AUTO: 32.6 PG (ref 27–34)
MCHC RBC AUTO-ENTMCNC: 33.2 G/DL (ref 32–36)
MCV RBC AUTO: 98 FL (ref 80–100)
MONOCYTES # BLD AUTO: 0.6 THOU/UL (ref 0–0.9)
MONOCYTES NFR BLD AUTO: 9 % (ref 2–10)
NEUTROPHILS # BLD AUTO: 4.1 THOU/UL (ref 2–7.7)
NEUTROPHILS NFR BLD AUTO: 62 % (ref 50–70)
PLATELET # BLD AUTO: 379 THOU/UL (ref 140–440)
PMV BLD AUTO: 8.9 FL (ref 8.5–12.5)
POTASSIUM BLD-SCNC: 4 MMOL/L (ref 3.5–5)
PROT SERPL-MCNC: 6.9 G/DL (ref 6–8)
RBC # BLD AUTO: 4.39 MILL/UL (ref 3.8–5.4)
SODIUM SERPL-SCNC: 144 MMOL/L (ref 136–145)
WBC: 6.7 THOU/UL (ref 4–11)

## 2020-06-30 ENCOUNTER — RECORDS - HEALTHEAST (OUTPATIENT)
Dept: ADMINISTRATIVE | Facility: OTHER | Age: 80
End: 2020-06-30

## 2020-08-29 ENCOUNTER — COMMUNICATION - HEALTHEAST (OUTPATIENT)
Dept: INTERNAL MEDICINE | Facility: CLINIC | Age: 80
End: 2020-08-29

## 2020-08-29 DIAGNOSIS — I10 ESSENTIAL HYPERTENSION: ICD-10-CM

## 2020-09-01 RX ORDER — LISINOPRIL 5 MG/1
TABLET ORAL
Qty: 90 TABLET | Refills: 2 | Status: SHIPPED | OUTPATIENT
Start: 2020-09-01 | End: 2022-05-04

## 2020-09-15 ENCOUNTER — OFFICE VISIT - HEALTHEAST (OUTPATIENT)
Dept: ONCOLOGY | Facility: CLINIC | Age: 80
End: 2020-09-15

## 2020-09-15 ENCOUNTER — AMBULATORY - HEALTHEAST (OUTPATIENT)
Dept: INFUSION THERAPY | Facility: CLINIC | Age: 80
End: 2020-09-15

## 2020-09-15 DIAGNOSIS — Z79.899 ENCOUNTER FOR MEDICATION MANAGEMENT: ICD-10-CM

## 2020-09-15 DIAGNOSIS — D47.3 ESSENTIAL THROMBOCYTHEMIA (H): ICD-10-CM

## 2020-09-15 DIAGNOSIS — K13.79 MOUTH SORES: ICD-10-CM

## 2020-09-15 LAB
ALBUMIN SERPL-MCNC: 3.7 G/DL (ref 3.5–5)
ALP SERPL-CCNC: 77 U/L (ref 45–120)
ALT SERPL W P-5'-P-CCNC: 13 U/L (ref 0–45)
ANION GAP SERPL CALCULATED.3IONS-SCNC: 8 MMOL/L (ref 5–18)
AST SERPL W P-5'-P-CCNC: 15 U/L (ref 0–40)
BASOPHILS # BLD AUTO: 0.1 THOU/UL (ref 0–0.2)
BASOPHILS NFR BLD AUTO: 1 % (ref 0–2)
BILIRUB SERPL-MCNC: 0.3 MG/DL (ref 0–1)
BUN SERPL-MCNC: 12 MG/DL (ref 8–28)
CALCIUM SERPL-MCNC: 9.6 MG/DL (ref 8.5–10.5)
CHLORIDE BLD-SCNC: 106 MMOL/L (ref 98–107)
CO2 SERPL-SCNC: 27 MMOL/L (ref 22–31)
CREAT SERPL-MCNC: 0.79 MG/DL (ref 0.6–1.1)
EOSINOPHIL # BLD AUTO: 0.2 THOU/UL (ref 0–0.4)
EOSINOPHIL NFR BLD AUTO: 2 % (ref 0–6)
ERYTHROCYTE [DISTWIDTH] IN BLOOD BY AUTOMATED COUNT: 14.6 % (ref 11–14.5)
GFR SERPL CREATININE-BSD FRML MDRD: >60 ML/MIN/1.73M2
GLUCOSE BLD-MCNC: 127 MG/DL (ref 70–125)
HCT VFR BLD AUTO: 42.7 % (ref 35–47)
HGB BLD-MCNC: 14.3 G/DL (ref 12–16)
IMM GRANULOCYTES # BLD: 0 THOU/UL
IMM GRANULOCYTES NFR BLD: 0 %
LDH SERPL L TO P-CCNC: 163 U/L (ref 125–220)
LYMPHOCYTES # BLD AUTO: 1.5 THOU/UL (ref 0.8–4.4)
LYMPHOCYTES NFR BLD AUTO: 23 % (ref 20–40)
MCH RBC QN AUTO: 32.4 PG (ref 27–34)
MCHC RBC AUTO-ENTMCNC: 33.5 G/DL (ref 32–36)
MCV RBC AUTO: 97 FL (ref 80–100)
MONOCYTES # BLD AUTO: 0.5 THOU/UL (ref 0–0.9)
MONOCYTES NFR BLD AUTO: 8 % (ref 2–10)
NEUTROPHILS # BLD AUTO: 4.5 THOU/UL (ref 2–7.7)
NEUTROPHILS NFR BLD AUTO: 66 % (ref 50–70)
PLATELET # BLD AUTO: 430 THOU/UL (ref 140–440)
PMV BLD AUTO: 9 FL (ref 8.5–12.5)
POTASSIUM BLD-SCNC: 3.6 MMOL/L (ref 3.5–5)
PROT SERPL-MCNC: 6.6 G/DL (ref 6–8)
RBC # BLD AUTO: 4.42 MILL/UL (ref 3.8–5.4)
SODIUM SERPL-SCNC: 141 MMOL/L (ref 136–145)
WBC: 6.8 THOU/UL (ref 4–11)

## 2020-09-30 ENCOUNTER — COMMUNICATION - HEALTHEAST (OUTPATIENT)
Dept: INTERNAL MEDICINE | Facility: CLINIC | Age: 80
End: 2020-09-30

## 2020-09-30 DIAGNOSIS — K21.00 REFLUX ESOPHAGITIS: ICD-10-CM

## 2020-10-02 RX ORDER — FAMOTIDINE 20 MG/1
TABLET, FILM COATED ORAL
Qty: 180 TABLET | Refills: 3 | Status: SHIPPED | OUTPATIENT
Start: 2020-10-02 | End: 2021-12-29

## 2020-10-22 ENCOUNTER — COMMUNICATION - HEALTHEAST (OUTPATIENT)
Dept: SCHEDULING | Facility: CLINIC | Age: 80
End: 2020-10-22

## 2020-10-22 DIAGNOSIS — E78.5 HYPERLIPIDEMIA, UNSPECIFIED HYPERLIPIDEMIA TYPE: ICD-10-CM

## 2020-11-03 ENCOUNTER — COMMUNICATION - HEALTHEAST (OUTPATIENT)
Dept: ONCOLOGY | Facility: CLINIC | Age: 80
End: 2020-11-03

## 2020-11-03 DIAGNOSIS — D47.3 ESSENTIAL THROMBOCYTHEMIA (H): ICD-10-CM

## 2020-11-12 ENCOUNTER — COMMUNICATION - HEALTHEAST (OUTPATIENT)
Dept: ONCOLOGY | Facility: CLINIC | Age: 80
End: 2020-11-12

## 2020-12-15 ENCOUNTER — OFFICE VISIT - HEALTHEAST (OUTPATIENT)
Dept: ONCOLOGY | Facility: CLINIC | Age: 80
End: 2020-12-15

## 2020-12-15 ENCOUNTER — AMBULATORY - HEALTHEAST (OUTPATIENT)
Dept: INFUSION THERAPY | Facility: CLINIC | Age: 80
End: 2020-12-15

## 2020-12-15 ENCOUNTER — COMMUNICATION - HEALTHEAST (OUTPATIENT)
Dept: ADMINISTRATIVE | Facility: HOSPITAL | Age: 80
End: 2020-12-15

## 2020-12-15 DIAGNOSIS — D75.839 THROMBOCYTOSIS: ICD-10-CM

## 2020-12-15 DIAGNOSIS — D47.3 ESSENTIAL THROMBOCYTHEMIA (H): ICD-10-CM

## 2020-12-15 LAB
ALBUMIN SERPL-MCNC: 3.9 G/DL (ref 3.5–5)
ALP SERPL-CCNC: 80 U/L (ref 45–120)
ALT SERPL W P-5'-P-CCNC: 16 U/L (ref 0–45)
ANION GAP SERPL CALCULATED.3IONS-SCNC: 9 MMOL/L (ref 5–18)
AST SERPL W P-5'-P-CCNC: 19 U/L (ref 0–40)
BASOPHILS # BLD AUTO: 0.1 THOU/UL (ref 0–0.2)
BASOPHILS NFR BLD AUTO: 1 % (ref 0–2)
BILIRUB SERPL-MCNC: 0.4 MG/DL (ref 0–1)
BUN SERPL-MCNC: 14 MG/DL (ref 8–28)
CALCIUM SERPL-MCNC: 9.6 MG/DL (ref 8.5–10.5)
CHLORIDE BLD-SCNC: 105 MMOL/L (ref 98–107)
CO2 SERPL-SCNC: 28 MMOL/L (ref 22–31)
CREAT SERPL-MCNC: 0.77 MG/DL (ref 0.6–1.1)
EOSINOPHIL # BLD AUTO: 0.2 THOU/UL (ref 0–0.4)
EOSINOPHIL NFR BLD AUTO: 2 % (ref 0–6)
ERYTHROCYTE [DISTWIDTH] IN BLOOD BY AUTOMATED COUNT: 14.7 % (ref 11–14.5)
FERRITIN SERPL-MCNC: 116 NG/ML (ref 10–130)
GFR SERPL CREATININE-BSD FRML MDRD: >60 ML/MIN/1.73M2
GLUCOSE BLD-MCNC: 96 MG/DL (ref 70–125)
HCT VFR BLD AUTO: 46.1 % (ref 35–47)
HGB BLD-MCNC: 15 G/DL (ref 12–16)
IMM GRANULOCYTES # BLD: 0 THOU/UL
IMM GRANULOCYTES NFR BLD: 0 %
LDH SERPL L TO P-CCNC: 174 U/L (ref 125–220)
LYMPHOCYTES # BLD AUTO: 1.6 THOU/UL (ref 0.8–4.4)
LYMPHOCYTES NFR BLD AUTO: 23 % (ref 20–40)
MCH RBC QN AUTO: 31.5 PG (ref 27–34)
MCHC RBC AUTO-ENTMCNC: 32.5 G/DL (ref 32–36)
MCV RBC AUTO: 97 FL (ref 80–100)
MONOCYTES # BLD AUTO: 0.6 THOU/UL (ref 0–0.9)
MONOCYTES NFR BLD AUTO: 9 % (ref 2–10)
NEUTROPHILS # BLD AUTO: 4.6 THOU/UL (ref 2–7.7)
NEUTROPHILS NFR BLD AUTO: 65 % (ref 50–70)
PLATELET # BLD AUTO: 495 THOU/UL (ref 140–440)
PMV BLD AUTO: 8.7 FL (ref 8.5–12.5)
POTASSIUM BLD-SCNC: 4 MMOL/L (ref 3.5–5)
PROT SERPL-MCNC: 7.4 G/DL (ref 6–8)
RBC # BLD AUTO: 4.76 MILL/UL (ref 3.8–5.4)
SODIUM SERPL-SCNC: 142 MMOL/L (ref 136–145)
WBC: 7.1 THOU/UL (ref 4–11)

## 2021-01-19 ENCOUNTER — COMMUNICATION - HEALTHEAST (OUTPATIENT)
Dept: ONCOLOGY | Facility: CLINIC | Age: 81
End: 2021-01-19

## 2021-01-19 DIAGNOSIS — D47.3 ESSENTIAL THROMBOCYTHEMIA (H): ICD-10-CM

## 2021-01-19 RX ORDER — HYDROXYUREA 500 MG/1
CAPSULE ORAL
Qty: 120 CAPSULE | Refills: 2 | Status: SHIPPED | OUTPATIENT
Start: 2021-01-19 | End: 2022-03-01

## 2021-01-26 ENCOUNTER — AMBULATORY - HEALTHEAST (OUTPATIENT)
Dept: INFUSION THERAPY | Facility: CLINIC | Age: 81
End: 2021-01-26

## 2021-01-26 ENCOUNTER — OFFICE VISIT - HEALTHEAST (OUTPATIENT)
Dept: ONCOLOGY | Facility: CLINIC | Age: 81
End: 2021-01-26

## 2021-01-26 DIAGNOSIS — D47.3 ESSENTIAL THROMBOCYTHEMIA (H): ICD-10-CM

## 2021-01-26 LAB
ERYTHROCYTE [DISTWIDTH] IN BLOOD BY AUTOMATED COUNT: 14.8 % (ref 11–14.5)
HCT VFR BLD AUTO: 44.8 % (ref 35–47)
HGB BLD-MCNC: 14.7 G/DL (ref 12–16)
MCH RBC QN AUTO: 31.6 PG (ref 27–34)
MCHC RBC AUTO-ENTMCNC: 32.8 G/DL (ref 32–36)
MCV RBC AUTO: 96 FL (ref 80–100)
PLATELET # BLD AUTO: 454 THOU/UL (ref 140–440)
PMV BLD AUTO: 8.6 FL (ref 8.5–12.5)
RBC # BLD AUTO: 4.65 MILL/UL (ref 3.8–5.4)
WBC: 7.4 THOU/UL (ref 4–11)

## 2021-02-09 ENCOUNTER — COMMUNICATION - HEALTHEAST (OUTPATIENT)
Dept: INTERNAL MEDICINE | Facility: CLINIC | Age: 81
End: 2021-02-09

## 2021-02-09 DIAGNOSIS — G47.00 INSOMNIA, UNSPECIFIED TYPE: ICD-10-CM

## 2021-02-10 ENCOUNTER — COMMUNICATION - HEALTHEAST (OUTPATIENT)
Dept: INTERNAL MEDICINE | Facility: CLINIC | Age: 81
End: 2021-02-10

## 2021-02-16 ENCOUNTER — COMMUNICATION - HEALTHEAST (OUTPATIENT)
Dept: ONCOLOGY | Facility: CLINIC | Age: 81
End: 2021-02-16

## 2021-02-18 ENCOUNTER — AMBULATORY - HEALTHEAST (OUTPATIENT)
Dept: NURSING | Facility: CLINIC | Age: 81
End: 2021-02-18

## 2021-03-11 ENCOUNTER — AMBULATORY - HEALTHEAST (OUTPATIENT)
Dept: NURSING | Facility: CLINIC | Age: 81
End: 2021-03-11

## 2021-03-20 ENCOUNTER — COMMUNICATION - HEALTHEAST (OUTPATIENT)
Dept: SCHEDULING | Facility: CLINIC | Age: 81
End: 2021-03-20

## 2021-03-20 DIAGNOSIS — E78.5 HYPERLIPIDEMIA, UNSPECIFIED HYPERLIPIDEMIA TYPE: ICD-10-CM

## 2021-04-27 ENCOUNTER — COMMUNICATION - HEALTHEAST (OUTPATIENT)
Dept: SCHEDULING | Facility: CLINIC | Age: 81
End: 2021-04-27

## 2021-04-27 DIAGNOSIS — E78.5 HYPERLIPIDEMIA, UNSPECIFIED HYPERLIPIDEMIA TYPE: ICD-10-CM

## 2021-04-28 RX ORDER — SIMVASTATIN 20 MG
TABLET ORAL
Qty: 90 TABLET | Refills: 1 | Status: SHIPPED | OUTPATIENT
Start: 2021-04-28 | End: 2022-05-04

## 2021-05-18 ENCOUNTER — COMMUNICATION - HEALTHEAST (OUTPATIENT)
Dept: INTERNAL MEDICINE | Facility: CLINIC | Age: 81
End: 2021-05-18

## 2021-05-18 DIAGNOSIS — G47.00 INSOMNIA, UNSPECIFIED TYPE: ICD-10-CM

## 2021-05-18 RX ORDER — TRAZODONE HYDROCHLORIDE 100 MG/1
100 TABLET ORAL AT BEDTIME
Qty: 90 TABLET | Refills: 3 | Status: SHIPPED | OUTPATIENT
Start: 2021-05-18 | End: 2022-05-04

## 2021-05-29 ENCOUNTER — RECORDS - HEALTHEAST (OUTPATIENT)
Dept: ADMINISTRATIVE | Facility: CLINIC | Age: 81
End: 2021-05-29

## 2021-05-30 ENCOUNTER — RECORDS - HEALTHEAST (OUTPATIENT)
Dept: ADMINISTRATIVE | Facility: CLINIC | Age: 81
End: 2021-05-30

## 2021-05-30 NOTE — PATIENT INSTRUCTIONS - HE
Recent Results (from the past 24 hour(s))   Comprehensive Metabolic Panel   Result Value Ref Range    Sodium 141 136 - 145 mmol/L    Potassium 3.9 3.5 - 5.0 mmol/L    Chloride 105 98 - 107 mmol/L    CO2 29 22 - 31 mmol/L    Anion Gap, Calculation 7 5 - 18 mmol/L    Glucose 92 70 - 125 mg/dL    BUN 15 8 - 28 mg/dL    Creatinine 0.78 0.60 - 1.10 mg/dL    GFR MDRD Af Amer >60 >60 mL/min/1.73m2    GFR MDRD Non Af Amer >60 >60 mL/min/1.73m2    Bilirubin, Total 0.3 0.0 - 1.0 mg/dL    Calcium 9.7 8.5 - 10.5 mg/dL    Protein, Total 6.6 6.0 - 8.0 g/dL    Albumin 3.7 3.5 - 5.0 g/dL    Alkaline Phosphatase 51 45 - 120 U/L    AST 16 0 - 40 U/L    ALT 14 0 - 45 U/L   Lactate Dehydrogenase (LDH)   Result Value Ref Range    LD (LDH) 148 125 - 220 U/L   HM1 (CBC with Diff)   Result Value Ref Range    WBC 7.2 4.0 - 11.0 thou/uL    RBC 4.49 3.80 - 5.40 mill/uL    Hemoglobin 14.1 12.0 - 16.0 g/dL    Hematocrit 43.5 35.0 - 47.0 %    MCV 97 80 - 100 fL    MCH 31.4 27.0 - 34.0 pg    MCHC 32.4 32.0 - 36.0 g/dL    RDW 14.6 (H) 11.0 - 14.5 %    Platelets 462 (H) 140 - 440 thou/uL    MPV 9.1 8.5 - 12.5 fL    Neutrophils % 64 50 - 70 %    Lymphocytes % 23 20 - 40 %    Monocytes % 8 2 - 10 %    Eosinophils % 4 0 - 6 %    Basophils % 1 0 - 2 %    Neutrophils Absolute 4.6 2.0 - 7.7 thou/uL    Lymphocytes Absolute 1.7 0.8 - 4.4 thou/uL    Monocytes Absolute 0.6 0.0 - 0.9 thou/uL    Eosinophils Absolute 0.3 0.0 - 0.4 thou/uL    Basophils Absolute 0.1 0.0 - 0.2 thou/uL

## 2021-05-30 NOTE — PROGRESS NOTES
[facility] Hematology and Oncology Progress Note    Patient: Libertad Hernadez  MRN: [id number]  Date of Service: 7/12/2019        Reason for Visit    Follow-up essential thrombocytosis (ET).    Assessment and Plan:    1) JAK2 positive essential thrombocytosis:    78 y.o.     1990's history of stage II breast cancer with positive lymph nodes.  Completed chemotherapy and tamoxifen.    2018, January - diagnosed JAK2 positive essential thrombocytosis.      High risk for thrombotic complication based on age, AK2 V617F mutation and cardiovascular risk factors of hypertension and diabetes.      Unilateral bone marrow biopsy showed 30% cellularity, 1-2+/3 reticulin stain.  Cytogenetics- normal.    01/16/18 - Started Hydrea therapy 500 mg every other day.    07/12/19:    CBC - PLTs slightly high @ 462,000.  Otherwise WNL.    CMP - WNL     The patient looks and feels quite good.  Tolerating Hydrea therapy very well.  No nausea.  Chronic arthritis and chronic neuropathy (bottoms of feet burn and ankle and shin pains) previously on gabapentin.  Hair thinning and slowly falling out.  Progressive chronic (R) hip discomfort - putting off needed LAMBERT.    Continue Hydrea, but increase from 500 mg daily to 500 mg/daily 6 days/week + 500 mg twice daily one day/week.     Add on TSH cascade.    10/16/19 - provider follow up with CBC to be certain Plts dropping with slightly increased dose Hydrea.    2) Pulmonary Embolism:    05/12/18 - took evening Trazodone in morning.  Caused drowsiness and she fell and injured her ankle.  3 days later noted (L) chest pain and was seen in the ED.    CTA chest - pulmonary emboli in the left lower lobe.  Small clot burden. Trace effusion without evidence of pulmonary infarct.  No RV strain.  Evidence of previous granulomatous disease.  Large simple liver cyst.    US (B) legs - No evidence of DVT in either leg.    Treated with Xarelto ~7 months.    No bleeding or major side effects from Xarelto, however she  could not afford any longer.  She does not want to take warfarin because of needing lab monitoring and her perception of a rat poison. If she has a recurrent venous thromboembolism she should be on anticoagulation for extended duration.    With ET, duration of Xarelto controversial, minimum 6 months, perhaps lifelong.    Continue  mg/day.    3) History bilateral early stage breast cancers:    1992 Stage II breast cancer  - (L) mastectomy + adjuvant chemotherapy.   No adjuvant endocrine therapy due to (-) ER status    2010 stage I breast cancer - (R) mastectomy and EBRT.  No adjuvant endocrine therapy due to (-) ER status    ECOG Performance:     ECOG Performance Status: 1     Distress Assessment:    Distress Assessment Score: 3         TT > 25 minutes face to face with > 50% in coordination and counseling of care.    Olvin Villanueva, CNP  _________________________________________    Interim History:    The patient presents alone, looking and feeling quite good.  She denies any intolerance to Hydrea therapy.  No nausea.  She has not had any recent syncopal episodes.  She denies cough, fever, chills, chest pain, shortness of breath, palpitations, focal weakness, bowel or bladder issues, visual or mentation disturbances.  Her appetite, weight and PS remain very stable.  She is noting more (R) hip pain and has been putting off LAMBERT for quite some time, considering now having it done.    Pain Status:    Currently in Pain: Yes    Review of Systems:    Constitutional  Constitutional (WDL): Exceptions to WDL  Fatigue: Fatigue relieved by rest  Neurosensory  Neurosensory (WDL): Exceptions to WDL  Peripheral Motor Neuropathy: Asymptomatic, clinical or diagnostic observations only, intervention not indicated  Ataxia: Asymptomatic, clinical or diagnostic observations only, intervention not indicated  Peripheral Sensory Neuropathy: Asymptomatic, loss of deep tendon reflexes or paresthesia  Eye   Eye Disorder (WDL): All  eye disorder elements are within defined limits  Ear  Ear Disorder (WDL): All ear disorder elements are within defined limits  Cardiovascular  Cardiovascular (WDL): All cardiovascular elements are within defined limits  Pulmonary  Respiratory (WDL): Within Defined Limits  Gastrointestinal  Gastrointestinal (WDL): Exceptions to WDL(had an episode 3 weeks ago with lower abdomen pain and epigastric pain. it was a bad day but has not had this since)  Genitourinary  Genitourinary (WDL): All genitourinary elements are within defined limits  Lymphatic  Lymph (WDL): All lymph disorder elements are within defined limits  Musculoskeletal and Connective Tissue  Musculoskeletal and Connetive Tissue Disorders (WDL): Exceptions to WDL  Arthralgia: Moderate pain, limiting instrumental ADL(arthritis feels like its gotten worse lately)  Bone Pain: Moderate pain, limiting instrumental ADL(right hip , left ankle and lower back pain)  Muscle Weakness : Symptomatic, perceived by patient but not evident on physical exam  Integumentary  Integumentary (WDL): All integumentary elements are within defined limits  Patient Coping  Patient Coping: Accepting;Open/discussion  Distress Assessment  Distress Assessment Score: 3  Accompanied by  Accompanied by: Alone  Oral Chemo Adherence       Past Histories:    Past Medical History:   Diagnosis Date     Breast Cancer     Created by [employer] Annotation: Oct 21 2010  9:56AM - Shirley Comer: stage 2;  10/18 lymph nodes positives/p chemo and [id number]      Hyperlipidemia     Created by Conversion      Hypertension     Created by Conversion      Osteopenia     Created by Conversion      Peripheral Neuropathy     Created by Conversion      Primary cancer of bone marrow (H)      Varicose Veins     Created by Conversion        Physical Exam:    Recent Vitals 7/12/2019   Height -   Weight 163 lbs   BSA (m2) 1.84 m2   /61   Pulse 71   Temp 97.6   Temp src 1   SpO2 95   Some recent  data might be hidden     General:  Alert.  Pleasant.  No acute distress.    HEENT:  Anicteric sclera.  CONSTANCE.  Normal buccal mucosa.   Lymphatics:  No abnormally enlarged lymph nodes.  Chest:  Lungs clear to auscultation.  Heart:   S1 S2 heard.  RRR.  No murmur heard.   Abdomen:  Soft.  Not tender or distended.  No palpable masses, organomegaly or guarding  Extremities:  No edema.  Skin:   No rash noted.  CNS:   Non focal.    Lab Results:    Reviewed with patient.    Recent Results (from the past 24 hour(s))   Comprehensive Metabolic Panel   Result Value Ref Range    Sodium 141 136 - 145 mmol/L    Potassium 3.9 3.5 - 5.0 mmol/L    Chloride 105 98 - 107 mmol/L    CO2 29 22 - 31 mmol/L    Anion Gap, Calculation 7 5 - 18 mmol/L    Glucose 92 70 - 125 mg/dL    BUN 15 8 - 28 mg/dL    Creatinine 0.78 0.60 - 1.10 mg/dL    GFR MDRD Af Amer >60 >60 mL/min/1.73m2    GFR MDRD Non Af Amer >60 >60 mL/min/1.73m2    Bilirubin, Total 0.3 0.0 - 1.0 mg/dL    Calcium 9.7 8.5 - 10.5 mg/dL    Protein, Total 6.6 6.0 - 8.0 g/dL    Albumin 3.7 3.5 - 5.0 g/dL    Alkaline Phosphatase 51 45 - 120 U/L    AST 16 0 - 40 U/L    ALT 14 0 - 45 U/L   Lactate Dehydrogenase (LDH)   Result Value Ref Range    LD (LDH) 148 125 - 220 U/L   HM1 (CBC with Diff)   Result Value Ref Range    WBC 7.2 4.0 - 11.0 thou/uL    RBC 4.49 3.80 - 5.40 mill/uL    Hemoglobin 14.1 12.0 - 16.0 g/dL    Hematocrit 43.5 35.0 - 47.0 %    MCV 97 80 - 100 fL    MCH 31.4 27.0 - 34.0 pg    MCHC 32.4 32.0 - 36.0 g/dL    RDW 14.6 (H) 11.0 - 14.5 %    Platelets 462 (H) 140 - 440 thou/uL    MPV 9.1 8.5 - 12.5 fL    Neutrophils % 64 50 - 70 %    Lymphocytes % 23 20 - 40 %    Monocytes % 8 2 - 10 %    Eosinophils % 4 0 - 6 %    Basophils % 1 0 - 2 %    Neutrophils Absolute 4.6 2.0 - 7.7 thou/uL    Lymphocytes Absolute 1.7 0.8 - 4.4 thou/uL    Monocytes Absolute 0.6 0.0 - 0.9 thou/uL    Eosinophils Absolute 0.3 0.0 - 0.4 thou/uL    Basophils Absolute 0.1 0.0 - 0.2 thou/uL        Imaging:    No results found.

## 2021-05-31 VITALS — WEIGHT: 161 LBS | BODY MASS INDEX: 26.79 KG/M2

## 2021-05-31 VITALS — WEIGHT: 163.2 LBS | HEIGHT: 65 IN | BODY MASS INDEX: 27.19 KG/M2

## 2021-05-31 VITALS — HEIGHT: 65 IN | BODY MASS INDEX: 27.01 KG/M2 | WEIGHT: 162.1 LBS

## 2021-05-31 VITALS — HEIGHT: 65 IN | WEIGHT: 162.5 LBS | BODY MASS INDEX: 27.07 KG/M2

## 2021-05-31 VITALS — WEIGHT: 164.2 LBS | HEIGHT: 65 IN | BODY MASS INDEX: 27.36 KG/M2

## 2021-05-31 VITALS — BODY MASS INDEX: 27.46 KG/M2 | WEIGHT: 165 LBS

## 2021-05-31 NOTE — PROGRESS NOTES
Clinic Note    Assessment:     Assessment and Plan:  1. Hip pain, right  She has had hip pain in her right hip for at least the last 6 months, going on one year.  - Ambulatory referral to Orthopedics    2. Rash  - ketoconazole (NIZORAL) 2 % cream; Apply to rash 3 times daily until skin clears  Dispense: 15 g; Refill: 0    3. Brittle nails  Recommended that she try OTC prenatal vitamin.       Patient Instructions   I sent a prescription for ketoconazole cream into your pharmacy.  Apply a mixture of antifungal cream with a small amount of cortisone lotion to your foot 3 times daily.    If you do not start to see resolution after 7 days, call the clinic and I will place referral to dermatology.    Referral to [facility] is in, regarding your hip pain.  Please call to schedule.  Number is [phone]    Consider using prenatal vitamin for hair, skin, and nail health.  Moisturize daily.    Return in about 3 months (around 11/20/2019).         Subjective:      Libertad Hernadez is a 78 y.o. female who comes to the clinic today for a variety of issues.    Right hip pain: She has had right hip pain for at least the last year.  She tried a variety of exercises physical activity routines which have not helped much.  She had one session of physical therapy but did not return due to cost of copayments.  She had steroid injection in her spine last year to help with some low back pain but has not improved her hip pain much.  She does not have any radiating pain down the leg.  Ambulation is difficult.  Range of motion is limited.    Rash: She has a dry, scaly rash on the bottom of her left foot that has been present for the last month or so.  She has been using cortisone cream without much success.    Brittle nails: She has had some cracking of her nails on her feet and her hands lately.  She is wondering about supplements or dietary changes she can make to improve this.    The following portions of the patient's history were  reviewed and updated as appropriate: Allergies, medications, problems, prior note.    Review of Systems:    Review is otherwise negative except for what is mentioned above.     Social Hx:    Social History     Tobacco Use   Smoking Status Never Smoker   Smokeless Tobacco Never Used         Objective:     Vitals:    08/20/19 1451   BP: 122/64   Pulse: 64   Weight: 163 lb 4.8 oz (74.1 kg)       Exam:    General: No apparent distress. Calm. Alert and Oriented X3. Pt behavior is appropriate.  Musculoskeletal: Pain with palpation to right hip joint space with limited range of motion  Neurologic: Interactive, alert, no focal findings, CNs intact.   Skin: Dry scaling type skin lesion on plantar aspect of patient's left foot with some evidence of central clearing      Patient Active Problem List   Diagnosis     Breast cancer (H)     Irritable Bowel Syndrome     Chronic Reflux Esophagitis     Tuberculin PPD Induration Positive Interpretation     Peripheral neuropathy     Varicose Veins     Restless Legs Syndrome     Essential hypertension     Hyperlipidemia     Wrist fracture     Osteoporosis, senile     S/P bilateral mastectomy     Urinary frequency     Essential thrombocythemia (H)     Pulmonary emboli (H)     History of pulmonary embolism     Encounter for medication management     Current Outpatient Medications   Medication Sig Dispense Refill     acetaminophen (TYLENOL) 500 MG tablet Take 1,000 mg by mouth every evening.              ascorbic acid, vitamin C, (VITAMIN C) 250 MG tablet Take 250 mg by mouth daily.       aspirin 325 MG tablet Take 325 mg by mouth daily.       BIOTIN ORAL Take 5,000 mcg by mouth daily.        calcium carbonate-vitamin D3 (CALCIUM 600 + D,3,) 600 mg(1,500mg) -200 unit per tablet Take 1 tablet by mouth 2 (two) times a day.        hydroxyurea (HYDREA) 500 mg capsule Take 500 mg daily 6 days/week and 500 mg twice daily one day/week. 100 capsule 2     lisinopril (PRINIVIL,ZESTRIL) 5 MG tablet  Take 1 tablet (5 mg total) by mouth daily. 90 tablet 3     polyethylene glycol (MIRALAX) 17 gram packet Take 17 g by mouth daily as needed.       polyvinyl alcohol (LIQUIFILM TEARS) 1.4 % ophthalmic solution Administer 1 drop to both eyes as needed for dry eyes.       ranitidine (ZANTAC) 150 MG tablet Take 1 tablet (150 mg total) by mouth 2 (two) times a day. 180 tablet 3     simvastatin (ZOCOR) 20 MG tablet Take 1 tablet (20 mg total) by mouth at bedtime. 90 tablet 3     traZODone (DESYREL) 100 MG tablet Take 1 tablet (100 mg total) by mouth at bedtime. 90 tablet 3     ketoconazole (NIZORAL) 2 % cream Apply to rash 3 times daily until skin clears 15 g 0     Current Facility-Administered Medications   Medication Dose Route Frequency Provider Last Rate Last Dose     denosumab 60 mg (PROLIA 60 mg/ml)  60 mg Subcutaneous Q6 Months Renetta Carrillo MD   60 mg at 03/19/19 0856         Delano Blake CNP (Rob)    8/20/2019

## 2021-05-31 NOTE — TELEPHONE ENCOUNTER
Spoke to Libertad and she has switched Ins. Since last jenny. PA started and will call her back   After getting back that information.

## 2021-05-31 NOTE — TELEPHONE ENCOUNTER
Patient called. When is she due for a Osteo f/u with Dr. Carrillo?   She is due for a Prolia injection after 09.19.2019 and was hoping to combine both appointments.   She may be due for a DXA scan, can some one check on this for her also?    Libertad @ [phone]

## 2021-05-31 NOTE — PATIENT INSTRUCTIONS - HE
I sent a prescription for ketoconazole cream into your pharmacy.  Apply a mixture of antifungal cream with a small amount of cortisone lotion to your foot 3 times daily.    If you do not start to see resolution after 7 days, call the clinic and I will place referral to dermatology.    Referral to [facility] is in, regarding your hip pain.  Please call to schedule.  Number is [phone]    Consider using prenatal vitamin for hair, skin, and nail health.  Moisturize daily.

## 2021-06-01 VITALS — WEIGHT: 164.1 LBS | BODY MASS INDEX: 26.49 KG/M2

## 2021-06-01 VITALS — BODY MASS INDEX: 27.24 KG/M2 | WEIGHT: 163.5 LBS | HEIGHT: 65 IN

## 2021-06-01 VITALS — BODY MASS INDEX: 25.97 KG/M2 | WEIGHT: 160.9 LBS

## 2021-06-01 VITALS — BODY MASS INDEX: 26.91 KG/M2 | WEIGHT: 161.7 LBS

## 2021-06-01 VITALS — HEIGHT: 66 IN | BODY MASS INDEX: 25.71 KG/M2 | WEIGHT: 160 LBS

## 2021-06-01 VITALS — BODY MASS INDEX: 25.89 KG/M2 | WEIGHT: 161.1 LBS | HEIGHT: 66 IN

## 2021-06-01 VITALS — BODY MASS INDEX: 26.64 KG/M2 | WEIGHT: 160.1 LBS

## 2021-06-01 VITALS — BODY MASS INDEX: 25.82 KG/M2 | WEIGHT: 160 LBS

## 2021-06-01 VITALS — WEIGHT: 158.9 LBS | BODY MASS INDEX: 25.54 KG/M2 | HEIGHT: 66 IN

## 2021-06-01 VITALS — WEIGHT: 162.1 LBS | BODY MASS INDEX: 26.97 KG/M2

## 2021-06-01 VITALS — BODY MASS INDEX: 26.16 KG/M2 | WEIGHT: 162.1 LBS

## 2021-06-01 NOTE — PROGRESS NOTES
"Prolia Injection Phone Screen      Screening questions have been asked 2-3 days prior to administration visit for Prolia. If any questions are answered with \"Yes,\" this phone encounter were will routed to ordering provider for further evaluation.     1.  When was the last injection?  3/19/19    2.  Has insurance for this injection been verified?  Yes    3.  Did you experience any new onset achiness or rashes that lasted for over a month with your previous Prolia injection?   Yes, right hip pain and bilateral calf pain intermittently    4.  Do you have a fever over 101?F or a new deep cough that is unusual for you today? No    5.  Have you started any new medications in the last 6 months that you were told could affect your immune system? These may have been prescribed by oncologist, transplant, rheumatology, or dermatology.   No    6.  In the last 6 months have you have gastric bypass or parathyroid surgery?   No    7.  Do you plan dental work requiring drilling into the bone such as implants/extractions or oral surgery in the next 2-3 months?   No    8. Do you have new insurance since the last injection? No    Patient informed if symptoms discussed above present prior to their administration appointment, they are to notify clinic immediately.     Silvia Rodriguez     The following steps were completed to comply with the REMS program for Prolia:  1. Ordering provider has previously reviewed information in the Medication Guide and Patient Counseling Chart, including the serious risks of Prolia  and the symptoms of each risk and have been advised to seek prompt medical attention if they have signs or symptoms of any of the serious risks.  2. Provided each patient a copy of the Medication Guide and Patient Brochure.  See MAR for administration details.   Indication: Prolia  (denosumab) is a prescription medicine used to treat osteoporosis in patients who:   Are at high risk for fracture, meaning patients who have had a " fracture related to osteoporosis, or who have multiple risk factors for fracture; Cannot use another osteoporosis medicine or other osteoporosis medicines did not work well.   The timeline for early/late injections would be 4 weeks early and any time after the 6 month jaz. If a patient receives their injection late, then the subsequent injection would be 6 months from the date that they actually received the injection    Have the screening questions been asked prior to this administration? Yes

## 2021-06-01 NOTE — PROGRESS NOTES
Clinic Note    Assessment:     Assessment and Plan:  1. Chest pain, unspecified type  EKG is largely unremarkable/unchanged from prior studies.  Check troponin today.  Her symptoms could be related to her switch from Zantac to Pepcid last week.  We will add twice daily Carafate to her regimen and have her follow-up with her PCP in 2 weeks for recheck.  - Electrocardiogram Perform and Read       Patient Instructions   EKG looks normal today.    Lab work today to check troponin levels.    Continue with Pepcid twice daily.    Prescription for Carafate sent into pharmacy.  Take 1 tablet twice daily.    Follow-up with Dr. Carrillo for recheck of symptoms in approximately 2 weeks.    Return in about 2 weeks (around 10/15/2019).         Subjective:      Libertad Hernadez is a 79 y.o. female who comes to clinic today with left-sided jaw pain, chest pressure, and fatigue.    She had a few intermittent episodes of jaw pain that started last week.  She has not had the sensation for the last few days now.  Says that the jaw pain was not related to eating, mouth opening/closing, or overuse.  She does not have a history of TMJ.    She had been having some chest pressure for the last week or so.  Last week, she stopped using Zantac due to reports of carcinogenic ingredients in the medication and switched to Pepcid.  Since then, she is been having some substernal chest pressure that is intermittent but seems to be worse at night.  She is also been feeling nauseous at times and has been burping more often.  No vomiting.    Denies any dyspnea on exertion.  No orthopnea.  No shortness of breath.  No new cough.    She has been feeling more fatigued lately    The following portions of the patient's history were reviewed and updated as appropriate: Allergies, medications, problems, prior note.    Review of Systems:    Review is otherwise negative except for what is mentioned above.     Social Hx:    Social History     Tobacco Use   Smoking  Status Never Smoker   Smokeless Tobacco Never Used         Objective:     Vitals:    10/01/19 1429   BP: 122/70   Pulse: 68   Weight: 161 lb 4.8 oz (73.2 kg)       Exam:    General: No apparent distress. Calm. Alert and Oriented X3. Pt behavior is appropriate.  Head:Atraumatic. Normocephalic, non-tender to palpation  Neck: Supple. No JVD. Full ROM. No adenopathy  Eyes: PERRL, No discharge. No strabismus. No nystagmus.  Ears: TMs pearly gray with landmarks visible.   Nose/Mouth/Throat: Patent nares, no oral lesions, pharynx clear and without exudate. Uvula mid-line. Nasal septum mid-line. Clear turbinates.   Lymph: No axillar or cervical adenopathy.   Chest/Lungs: Normal chest wall, clear to auscultation, normal respiratory effort and rate.   Heart/Pulses: Regular rate and rhythm, strong and equal radial pulses, no murmurs. Capillary refill <2 seconds. No edema.   Abdomen: Soft, no palpable masses. No hepatosplenomegaly, mild tenderness to epigastric region noted. Bowel sounds active in all quadrants. No increased tympany.   Genitalia: Not examined.   Musculoskeletal: No CVA tenderness with palpation. Good ROM with extremities.   Neurologic: Interactive, alert, no focal findings, CNs intact.   Skin: Warm, dry. Normal hair pattern. Free of lesions. Normal skin turgor.       Patient Active Problem List   Diagnosis     Breast cancer (H)     Irritable Bowel Syndrome     Chronic Reflux Esophagitis     Tuberculin PPD Induration Positive Interpretation     Peripheral neuropathy     Varicose Veins     Restless Legs Syndrome     Essential hypertension     Hyperlipidemia     Wrist fracture     Osteoporosis, senile     S/P bilateral mastectomy     Urinary frequency     Essential thrombocythemia (H)     Pulmonary emboli (H)     History of pulmonary embolism     Encounter for medication management     Current Outpatient Medications   Medication Sig Dispense Refill     acetaminophen (TYLENOL) 500 MG tablet Take 1,000 mg by mouth  every evening.              ascorbic acid, vitamin C, (VITAMIN C) 250 MG tablet Take 250 mg by mouth daily.       aspirin 325 MG tablet Take 325 mg by mouth daily.       BIOTIN ORAL Take 5,000 mcg by mouth daily.        calcium carbonate-vitamin D3 (CALCIUM 600 + D,3,) 600 mg(1,500mg) -200 unit per tablet Take 1 tablet by mouth 2 (two) times a day.        famotidine (PEPCID) 20 MG tablet Take 1 tablet (20 mg total) by mouth 2 (two) times a day. 180 tablet 3     hydroxyurea (HYDREA) 500 mg capsule Take 500 mg daily 6 days/week and 500 mg twice daily one day/week. 100 capsule 2     ketoconazole (NIZORAL) 2 % cream Apply to rash 3 times daily until skin clears 15 g 0     lisinopril (PRINIVIL,ZESTRIL) 5 MG tablet Take 1 tablet (5 mg total) by mouth daily. 90 tablet 3     polyethylene glycol (MIRALAX) 17 gram packet Take 17 g by mouth daily as needed.       polyvinyl alcohol (LIQUIFILM TEARS) 1.4 % ophthalmic solution Administer 1 drop to both eyes as needed for dry eyes.       ranitidine (ZANTAC) 150 MG tablet Take 1 tablet (150 mg total) by mouth 2 (two) times a day. 180 tablet 3     simvastatin (ZOCOR) 20 MG tablet Take 1 tablet (20 mg total) by mouth at bedtime. 90 tablet 3     traZODone (DESYREL) 100 MG tablet Take 1 tablet (100 mg total) by mouth at bedtime. 90 tablet 3     No current facility-administered medications for this visit.        I spent 25 minutes with patient face to face, of which >50% was counseling regarding the above plan       Delano Blake (Rob), BOONE    10/1/2019

## 2021-06-01 NOTE — TELEPHONE ENCOUNTER
See other encounter. PA Approved and patient can schedule.  Silvia Rodriguez CMA ............... 9:23 AM, 09/12/19

## 2021-06-01 NOTE — PROGRESS NOTES
1. Chronic Reflux Esophagitis  famotidine (PEPCID) 20 MG tablet   2. Osteoporosis, senile  DXA Bone Density Scan     Patient was educated on safety of Prolia utilizing Patient Counseling Chart for Healthcare Providers, as outlined by the Prolia REMS progam.     Return in about 6 months (around 3/24/2020) for Recheck, Annual physical, Prolia injection.    Patient Instructions   Prolia 8th today.  Prolia 9th in 6 months with me with your physical.    DXA in 6/2020 .   Phone number to schedule [phone].    Daily calcium need is 9465-2690 mg a day from the diet and supplements.  Calcium citrate is easier to digest.  Vitamin D 2000 IU daily recommended.      Chief Complaint   Patient presents with     Follow-up     F/U Prolia- Achiness in shine bones and joints       /65   Pulse 71   Wt 161 lb 4.8 oz (73.2 kg)   SpO2 95%   BMI 26.84 kg/m        Did you experience any problems with previous Prolia injection? On and off achiness in the ankles, lower leg. Just had hip injection and feeling much better  Any medication change in the last 6 months? no  Did you take prednisone or other immunosupressant drugs in the last 6 months   (chemo, transplant, rheum, dermatology conditions)? no  Did you have any serious infection in the last 6 months?no  Any recent hospitalizations?no  Do you plan any dental work in the next 2-3 months?no  How much calcium do you take daily from the diet and supplements?1200 mg  How much vit D do you take daily? 2000 IU  Last DXA? 6/2018  Reviewed and discussed      Patient is here today for the 8th Prolia injection. Patient tolerated previous injections well.   We discussed calcium and vit D daily needs today.   Next Prolia injection will be in 6 months.     SHe is concerned about recent Zantac recall. I sent new Rx for Pepcid.    15 minutes spent with the patient and more then 50 % of the time in counseling.  This note has been dictated using voice recognition software. Any grammatical  or context distortions are unintentional and inherent to the software      Patient Active Problem List   Diagnosis     Breast cancer (H)     Irritable Bowel Syndrome     Chronic Reflux Esophagitis     Tuberculin PPD Induration Positive Interpretation     Peripheral neuropathy     Varicose Veins     Restless Legs Syndrome     Essential hypertension     Hyperlipidemia     Wrist fracture     Osteoporosis, senile     S/P bilateral mastectomy     Urinary frequency     Essential thrombocythemia (H)     Pulmonary emboli (H)     History of pulmonary embolism     Encounter for medication management       Current Outpatient Medications on File Prior to Visit   Medication Sig Dispense Refill     ascorbic acid, vitamin C, (VITAMIN C) 250 MG tablet Take 250 mg by mouth daily.       aspirin 325 MG tablet Take 325 mg by mouth daily.       BIOTIN ORAL Take 5,000 mcg by mouth daily.        calcium carbonate-vitamin D3 (CALCIUM 600 + D,3,) 600 mg(1,500mg) -200 unit per tablet Take 1 tablet by mouth 2 (two) times a day.        hydroxyurea (HYDREA) 500 mg capsule Take 500 mg daily 6 days/week and 500 mg twice daily one day/week. 100 capsule 2     ketoconazole (NIZORAL) 2 % cream Apply to rash 3 times daily until skin clears 15 g 0     lisinopril (PRINIVIL,ZESTRIL) 5 MG tablet Take 1 tablet (5 mg total) by mouth daily. 90 tablet 3     polyethylene glycol (MIRALAX) 17 gram packet Take 17 g by mouth daily as needed.       polyvinyl alcohol (LIQUIFILM TEARS) 1.4 % ophthalmic solution Administer 1 drop to both eyes as needed for dry eyes.       ranitidine (ZANTAC) 150 MG tablet Take 1 tablet (150 mg total) by mouth 2 (two) times a day. 180 tablet 3     simvastatin (ZOCOR) 20 MG tablet Take 1 tablet (20 mg total) by mouth at bedtime. 90 tablet 3     traZODone (DESYREL) 100 MG tablet Take 1 tablet (100 mg total) by mouth at bedtime. 90 tablet 3     acetaminophen (TYLENOL) 500 MG tablet Take 1,000 mg by mouth every evening.               Current Facility-Administered Medications on File Prior to Visit   Medication Dose Route Frequency Provider Last Rate Last Dose     [COMPLETED] denosumab 60 mg (PROLIA 60 mg/ml)  60 mg Subcutaneous Q6 Months Renetta Carrillo MD   60 mg at 09/24/19 1523

## 2021-06-01 NOTE — TELEPHONE ENCOUNTER
Left voicemail for patient to return call to clinic. When patient returns call, please give them below message.      She can set up an appointment for her Prolia injection.

## 2021-06-01 NOTE — PROGRESS NOTES
Spoke with Dr. Carrillo regarding new achiness and she would like to discuss at an appointment next week. Patient scheduled to f/u with Dr. Carrillo 9/24/19. No shot given today.  Silvia Rodriguez CMA ............... 1:39 PM, 09/19/19

## 2021-06-01 NOTE — PATIENT INSTRUCTIONS - HE
EKG looks normal today.    Lab work today to check troponin levels.    Continue with Pepcid twice daily.    Prescription for Carafate sent into pharmacy.  Take 1 tablet twice daily.    Follow-up with Dr. Carrillo for recheck of symptoms in approximately 2 weeks.

## 2021-06-01 NOTE — PATIENT INSTRUCTIONS - HE
Prolia 8th today.  Prolia 9th in 6 months with me with your physical.    DXA in 6/2020 .   Phone number to schedule [phone].    Daily calcium need is 2574-4408 mg a day from the diet and supplements.  Calcium citrate is easier to digest.  Vitamin D 2000 IU daily recommended.

## 2021-06-02 VITALS — BODY MASS INDEX: 26.98 KG/M2 | WEIGHT: 161.9 LBS | HEIGHT: 65 IN

## 2021-06-02 VITALS — WEIGHT: 161 LBS | BODY MASS INDEX: 26.79 KG/M2

## 2021-06-02 VITALS — WEIGHT: 161.8 LBS | BODY MASS INDEX: 26.92 KG/M2

## 2021-06-02 VITALS — BODY MASS INDEX: 26.26 KG/M2 | WEIGHT: 162.7 LBS

## 2021-06-02 VITALS — BODY MASS INDEX: 25.91 KG/M2 | WEIGHT: 160.5 LBS

## 2021-06-02 VITALS — WEIGHT: 159.3 LBS | HEIGHT: 65 IN | BODY MASS INDEX: 26.54 KG/M2

## 2021-06-02 VITALS — WEIGHT: 161.9 LBS | BODY MASS INDEX: 26.94 KG/M2

## 2021-06-02 VITALS — BODY MASS INDEX: 25.84 KG/M2 | WEIGHT: 160.1 LBS

## 2021-06-02 VITALS — BODY MASS INDEX: 26.79 KG/M2 | WEIGHT: 161 LBS

## 2021-06-02 NOTE — PROGRESS NOTES
[facility] Hematology and Oncology Progress Note    Patient: Libertad Hernadez  MRN: [id number]  Date of Service: 10/16/2019        Reason for Visit    Follow-up essential thrombocytosis.    Assessment and Plan  Cancer Staging  No matching staging information was found for the patient.    ECOG Performance   ECOG Performance Status: 0     Distress Assessment  Distress Assessment Score: 3(disease related)    Pain  Currently in Pain: No/denies    #. JAK2 positive essential thrombocytosis, diagnosed in 2018.  High risk for thrombotic complication based on age, JAK2 V617F mutation and cardiovascular risk factor of hypertension, diabetes.  Unilateral bone marrow biopsy showed 30% cellularity, 1-2+/3 reticulin stain.  Cytogenetics- normal.  She developed acute appeared to be provoked pulmonary emboli (small [place]) in May 2018.   Labs were reviewed.  Her platelet counts is slightly elevated at 456.  Normal WBC and hemoglobin.  No major intolerable side effects from hydroxyurea (mild intermittent mucositis).     Recommended to increase hydroxyurea to 500 mg 5 days a week, 1000 mg 2 days a week.  She does not need to split the dose.    Follow-up labs and provider visit in about 3 months.     #.  Small burden pulmonary emboli in the left lower lobe in May 2018, presented with syncopal episode and chest pain   No DVT.  She was treated with Xarelto for about 7 months.  She stop in December 2018 and started aspirin 325 mg daily.  She did not have any bleeding or major side effects from Xarelto, however she could not afford any longer.      #.  History of stage II breast cancer with positive lymph nodes (10/18) in 1992. S/p left breast mastectomy, axillary lymph node dissection followed by adjuvant chemotherapy and tamoxifen.  She had a second breast cancer in the right breast in 2010, stage I, post lumpectomy followed by radiation.  Did not take adjuvant endocrine therapy.     Problem List    1. Essential thrombocythemia (H)  HM1(CBC  and Differential)    Comprehensive Metabolic Panel      ______________________________________________________________________________  Diagnosis  January 2018-diagnosed with ANGELA 2 positive essential thrombocytosis platelet counts at high 500.    Treatment to date  January 2018-started Hydrea 500 mg every other day (due to patient's fear of side effects) and dose adjusted to 500 mg once a day in July 2018    History of Present Illness    Libertad presents today accompanied by her .  She has intermittent dizzy spells and they are not getting better.  She feels she is more tired since she started Hydrea.  No mouth sores persistently, however she noted intermittent soreness in her mouth and easily resolved with baking soda mouth rinse.      Pain Status  Currently in Pain: No/denies    Review of Systems  Constitutional  Constitutional (WDL): Exceptions to WDL  Fatigue: Concerns(all the time)  Hot flashes/Night Sweats: Concerns  Neurosensory  Neurosensory (WDL): Exceptions to WDL  Peripheral Motor Neuropathy: Concerns(both feet)  Dizziness: Concerns(intermittent for a few months. )  Eye   Eye Disorder (WDL): Exceptions to WDL(Terrible night vision)  Blurred Vision: Concerns(dizziness spells)  Ear  Ear Disorder (WDL): Exceptions to WDL  Tinnitus: Concerns(both ears)  Cardiovascular  Cardiovascular (WDL): All cardiovascular elements are within defined limits  Pulmonary  Respiratory (WDL): Exceptions to WDL  Dyspnea: Concerns(with activity)  Gastrointestinal  Gastrointestinal (WDL): Exceptions to WDL  Mucositis Oral: Concerns(intermittent)  Constipation: Concerns(Miralax prn)  Genitourinary  Genitourinary (WDL): All genitourinary elements are within defined limits  Lymphatic  Lymph (WDL): All lymph disorder elements are within defined limits  Musculoskeletal and Connective Tissue  Musculoskeletal and Connetive Tissue Disorders (WDL): Exceptions to WDL  Arthralgia: Concerns(right hip and right  knee)  Integumentary  Integumentary (WDL): All integumentary elements are within defined limits  Patient Coping  Patient Coping: Accepting  Accompanied by  Accompanied by: Family Member  Oral Chemo Adherence         Past History  Past Medical History:   Diagnosis Date     Breast Cancer     Created by Conversion [facility] Annotation: Oct 21 2010  9:56AM - Shirley [place]er: stage 2;  10/18 lymph nodes positives/p chemo and [id number]      Hyperlipidemia     Created by Conversion      Hypertension     Created by Conversion      Osteopenia     Created by Conversion      Peripheral Neuropathy     Created by Conversion      Primary cancer of bone marrow (H)      Varicose Veins     Created by Conversion        Physical Exam    Recent Vitals 10/16/2019   Weight 162 lbs 2 oz   BSA (m2) 1.84 m2   /69   Pulse 68   Temp 97.6   Temp src 1   SpO2 98   Some recent data might be hidden     General: alert, awake, not in acute distress  HEENT: Head: Normal, normocephalic, atraumatic.  Eye: Normal external eye, conjunctiva, lids cornea, CONSTANCE.  Ears:  Non-tender.  Nose: Normal external nose, mucus membranes and septum.  Pharynx: Dental Hygiene adequate. Normal buccal mucosa. Normal pharynx.  Neck / Thyroid: Supple, no masses, nodes, nodules or enlargement.  Lymphatics: No abnormally enlarged lymph nodes.  Chest: Normal chest wall and respirations. Clear to auscultation.  Breasts: surgically absent breast bilaterally.  Heart: S1 S2 RRR, no murmur.   Abdomen: abdomen is soft without significant tenderness, masses, organomegaly or guarding  Extremities: normal strength, tone, and muscle mass  Skin: normal. no rash or abnormalities  CNS: non focal.      Lab Results    Recent Results (from the past 168 hour(s))   HM2 (CBC W/O DIFF)   Result Value Ref Range    WBC 7.2 4.0 - 11.0 thou/uL    RBC 4.65 3.80 - 5.40 mill/uL    Hemoglobin 14.5 12.0 - 16.0 g/dL    Hematocrit 45.2 35.0 - 47.0 %    MCV 97 80 - 100 fL    MCH 31.2 27.0 -  34.0 pg    MCHC 32.1 32.0 - 36.0 g/dL    RDW 15.6 (H) 11.0 - 14.5 %    Platelets 456 (H) 140 - 440 thou/uL    MPV 9.0 8.5 - 12.5 fL       Imaging    No results found.    Signed by: Camden Bourne MD

## 2021-06-02 NOTE — PROGRESS NOTES
Assessment/Plan:        1. Chronic Reflux Esophagitis     2. Essential thrombocythemia (H)       Multiple issues discussed with the patient today.  She had a lot of questions.  She will continue same treatment for GERD.  Since she did not she did not have any similar chest pressure, she will continue monitoring for this symptoms.  I will see her in March for her next Prolia injection and annual wellness visit.    Return in about 5 months (around 3/24/2020) for Annual physical.    There are no Patient Instructions on file for this visit.    Chief Complaint   Patient presents with     Follow-up     F/U Chest Pain     Patient is here today for the follow-up.  She was seen on October 1 for atypical chest pain, started after she switch from Zantac to Pepcid a week before.  At that time Carafate was added to Pepcid and she did not have a similar type of chest pain.  She was describing the chest pressure at that time and having also occasional nausea.  She was also describing some occasional episodes of the left jaw pain mostly in the left TMJ area.    She denies any of the symptoms now.  I did examine her left ear and it was normal and she does have some mild crepitations in the left TMJ.  I do not think that the symptoms are related to the chest pressure and nausea that she has.    Since is taking Carafate, the symptoms of nausea and chest pressure also resolved.    She did follow-up with heme-onc for essential thrombocytosis and the hydroxyurea dose was recently increased.    She also mentioned today that she is struggling with some hip pain and right knee and lower leg pain and she had recently done the hip steroid injection and her symptoms are improving.    /62   Pulse 72   Wt 161 lb 12.8 oz (73.4 kg)   SpO2 97%   BMI 26.92 kg/m    25 minutes spent with the patient and more then 50 % of the time in counseling.  This note has been dictated using voice recognition software. Any grammatical or context  distortions are unintentional and inherent to the software      Patient Active Problem List   Diagnosis     Breast cancer (H)     Irritable Bowel Syndrome     Chronic Reflux Esophagitis     Tuberculin PPD Induration Positive Interpretation     Peripheral neuropathy     Varicose Veins     Restless Legs Syndrome     Essential hypertension     Hyperlipidemia     Wrist fracture     Osteoporosis, senile     S/P bilateral mastectomy     Urinary frequency     Essential thrombocythemia (H)     Pulmonary emboli (H)     History of pulmonary embolism     Encounter for medication management       Current Outpatient Medications on File Prior to Visit   Medication Sig Dispense Refill     ascorbic acid, vitamin C, (VITAMIN C) 250 MG tablet Take 250 mg by mouth daily.       aspirin 325 MG tablet Take 325 mg by mouth daily.       BIOTIN ORAL Take 5,000 mcg by mouth daily.        calcium carbonate-vitamin D3 (CALCIUM 600 + D,3,) 600 mg(1,500mg) -200 unit per tablet Take 1 tablet by mouth 2 (two) times a day.        famotidine (PEPCID) 20 MG tablet Take 1 tablet (20 mg total) by mouth 2 (two) times a day. 180 tablet 3     hydroxyurea (HYDREA) 500 mg capsule Take 500 mg daily 6 days/week and 500 mg twice daily one day/week. 100 capsule 2     lisinopril (PRINIVIL,ZESTRIL) 5 MG tablet Take 1 tablet (5 mg total) by mouth daily. 90 tablet 3     polyethylene glycol (MIRALAX) 17 gram packet Take 17 g by mouth daily as needed.       polyvinyl alcohol (LIQUIFILM TEARS) 1.4 % ophthalmic solution Administer 1 drop to both eyes as needed for dry eyes.       simvastatin (ZOCOR) 20 MG tablet Take 1 tablet (20 mg total) by mouth at bedtime. 90 tablet 3     sucralfate (CARAFATE) 1 gram tablet Take 1 tablet (1 g total) by mouth 2 (two) times a day. 60 tablet 1     traZODone (DESYREL) 100 MG tablet Take 1 tablet (100 mg total) by mouth at bedtime. 90 tablet 3     acetaminophen (TYLENOL) 500 MG tablet Take 1,000 mg by mouth every evening.               No current facility-administered medications on file prior to visit.

## 2021-06-03 VITALS — WEIGHT: 163.3 LBS | BODY MASS INDEX: 27.17 KG/M2

## 2021-06-03 VITALS
OXYGEN SATURATION: 97 % | SYSTOLIC BLOOD PRESSURE: 139 MMHG | DIASTOLIC BLOOD PRESSURE: 62 MMHG | HEART RATE: 72 BPM | BODY MASS INDEX: 26.92 KG/M2 | WEIGHT: 161.8 LBS

## 2021-06-03 VITALS
DIASTOLIC BLOOD PRESSURE: 65 MMHG | SYSTOLIC BLOOD PRESSURE: 137 MMHG | BODY MASS INDEX: 26.84 KG/M2 | OXYGEN SATURATION: 95 % | WEIGHT: 161.3 LBS | HEART RATE: 71 BPM

## 2021-06-03 VITALS
DIASTOLIC BLOOD PRESSURE: 69 MMHG | TEMPERATURE: 97.6 F | OXYGEN SATURATION: 98 % | WEIGHT: 162.1 LBS | SYSTOLIC BLOOD PRESSURE: 150 MMHG | BODY MASS INDEX: 26.97 KG/M2 | HEART RATE: 68 BPM

## 2021-06-03 VITALS
DIASTOLIC BLOOD PRESSURE: 70 MMHG | WEIGHT: 161.3 LBS | BODY MASS INDEX: 26.84 KG/M2 | SYSTOLIC BLOOD PRESSURE: 122 MMHG | HEART RATE: 68 BPM

## 2021-06-03 VITALS — WEIGHT: 163 LBS | BODY MASS INDEX: 27.12 KG/M2

## 2021-06-03 NOTE — TELEPHONE ENCOUNTER
Refill Approved    Rx renewed per Medication Renewal Policy. Medication was last renewed on 10/1/19.    Kathi Dunham, [facility] Connection Triage/Med Refill 11/28/2019     Requested Prescriptions   Pending Prescriptions Disp Refills     sucralfate (CARAFATE) 1 gram tablet [Pharmacy Med Name: SUCRALFATE 1GM TABS] 60 tablet 1     Sig: TAKE ONE TABLET BY MOUTH TWICE A DAY       GI Medications Refill Protocol Passed - 11/27/2019  4:53 AM        Passed - PCP or prescribing provider visit in last 12 or next 3 months.     Last office visit with prescriber/PCP: 10/1/2019 Delano Blake CNP OR same dept: 10/24/2019 Renetta Carrillo MD OR same specialty: 10/24/2019 Renetta Carrillo MD  Last physical: 2/12/2019 Last MTM visit: Visit date not found   Next visit within 3 mo: Visit date not found  Next physical within 3 mo: Visit date not found  Prescriber OR PCP: Delano Blake CNP  Last diagnosis associated with med order: 1. Chest pain, unspecified type  - sucralfate (CARAFATE) 1 gram tablet [Pharmacy Med Name: SUCRALFATE 1GM TABS]; TAKE ONE TABLET BY MOUTH TWICE A DAY  Dispense: 60 tablet; Refill: 1    If protocol passes may refill for 12 months if within 3 months of last provider visit (or a total of 15 months).

## 2021-06-03 NOTE — TELEPHONE ENCOUNTER
Medication Request  Medication name:   Ranitidine  mg tabs   - Quantity: 180  Sig: Take 1 tablet by mouth twice a day  Last Fill Date: 8/15/19  [facility] [place]  Reason for request: Request received from patient's pharmacy via fax.      Medication is not listed on patient's current medication list.  When did you use medication last?:  Information not provided.   Patient offered appointment:  N/a  Okay to leave a detailed message: no

## 2021-06-04 VITALS
HEART RATE: 73 BPM | OXYGEN SATURATION: 98 % | BODY MASS INDEX: 26.92 KG/M2 | SYSTOLIC BLOOD PRESSURE: 150 MMHG | WEIGHT: 161.8 LBS | TEMPERATURE: 97.7 F | DIASTOLIC BLOOD PRESSURE: 70 MMHG

## 2021-06-04 VITALS
DIASTOLIC BLOOD PRESSURE: 82 MMHG | HEART RATE: 76 BPM | SYSTOLIC BLOOD PRESSURE: 150 MMHG | WEIGHT: 160.9 LBS | BODY MASS INDEX: 26.78 KG/M2

## 2021-06-04 VITALS
HEART RATE: 74 BPM | WEIGHT: 160.1 LBS | OXYGEN SATURATION: 97 % | DIASTOLIC BLOOD PRESSURE: 73 MMHG | BODY MASS INDEX: 26.64 KG/M2 | TEMPERATURE: 97.9 F | SYSTOLIC BLOOD PRESSURE: 138 MMHG

## 2021-06-05 ENCOUNTER — RECORDS - HEALTHEAST (OUTPATIENT)
Dept: INTERNAL MEDICINE | Facility: CLINIC | Age: 81
End: 2021-06-05

## 2021-06-05 VITALS
HEART RATE: 67 BPM | DIASTOLIC BLOOD PRESSURE: 82 MMHG | WEIGHT: 153.8 LBS | TEMPERATURE: 98 F | BODY MASS INDEX: 25.59 KG/M2 | OXYGEN SATURATION: 98 % | SYSTOLIC BLOOD PRESSURE: 180 MMHG

## 2021-06-05 VITALS
TEMPERATURE: 97.4 F | HEART RATE: 77 BPM | BODY MASS INDEX: 26.24 KG/M2 | WEIGHT: 157.7 LBS | OXYGEN SATURATION: 98 % | DIASTOLIC BLOOD PRESSURE: 77 MMHG | SYSTOLIC BLOOD PRESSURE: 166 MMHG

## 2021-06-05 VITALS
OXYGEN SATURATION: 98 % | BODY MASS INDEX: 26.04 KG/M2 | HEART RATE: 76 BPM | TEMPERATURE: 98 F | SYSTOLIC BLOOD PRESSURE: 134 MMHG | WEIGHT: 156.5 LBS | DIASTOLIC BLOOD PRESSURE: 63 MMHG

## 2021-06-05 DIAGNOSIS — R63.4 WEIGHT LOSS: ICD-10-CM

## 2021-06-05 NOTE — PROGRESS NOTES
ASSESSMENT:  1. Leg pain, bilateral  Patient has several weeks to even several months of bilateral leg pain, generally the anterior surfaces, right worse than left, tends to be worse in the middle of the night not really present during the day.  Not clear of the origin of this, she does have a known history of neuropathy and that certainly a possibility.  If not typical for restless leg, recent bone scan did not reveal any obvious bony abnormality of the lower extremities.    2. Peripheral neuropathy  Patient with a known history of neuropathy, though is not clear if the leg pain she is having is from neuropathy but she did not benefit from gabapentin teen.    3. Abnormal urine odor  Objective sensation of what she describes as a medicinal odor to her urine and some increased urinary frequency.  - Urinalysis Macro & Micro  - Culture, Urine    4. Tinea pedis of both feet  Patient previously diagnosed with a bilateral plantar foot tinea infection.  - ketoconazole (NIZORAL) 2 % cream; Apply to fungal rash twice daily  Dispense: 60 g; Refill: 0    5. Facial pain  Bilateral facial pain, present in an area below the ears, not in the area of the TMJ exactly, though it is possible that there is some referred pain or arthritis in this area.        PLAN:  1.  Prescription for acute econazole 2% cream to apply twice daily to the fungal infection of the feet.  2.  Urinalysis and urine culture, would treat if positive.  3.  In terms of the leg pain it seems to be worse in the middle of the night, patient already takes trazodone 100 mg at night, I advised that she could take another 50 mg in the middle of the night.  4.  Of note, the patient has not done well on gabapentin teen, another possible option would be a trial of restless leg medication.  5.  I told the patient I do not have any simple or easy answers to some of her above medical issues, she probably will need ongoing follow-up if they continue persist or  "worsen.    Orders Placed This Encounter   Procedures     Culture, Urine     Urinalysis Macro & Micro     There are no discontinued medications.    Return in about 1 week (around 2/11/2020) for recheck w/ PCP if symptoms persist or worsen.    CHIEF COMPLAINT:  Chief Complaint   Patient presents with     Jaw Pain     both sides that she has noticed off and on for a \"couple of months\"      Leg Pain     in front part of lower leg at night that she has noticed in both legs off and on and will keep her awake at night      Foot Problem     foot fungus is still bothersome and she is out cream that David gave her      Nocturia     pt thinks her urine smells \"medicinal\" and gets a pain in the evenings        SUBJECTIVE:  Libertad is a 79 y.o. female has come into clinic today for an office visit with complaints of nocturia, foot problems, and pain to her jaw and leg. She was last seen in clinic 10/24/2019 for an office visit.    Leg: She reports having a bone scan last week due to having terrible pain from her ankle to her knees in her right leg; the scan showed that there was nothing wrong. She says the pain is a very deep ache. The patient does have a history of neuropathy in her feet. Additionally, the patient states that this causes her to not be able to sleep. She says that now she is starting to feel the pain in her left leg as well. She says that she takes acetaminophen to try and help but it seems to not to do anything. As this issue bothers her more during the night than day. She denies any swelling in her legs. Also, it does not help when she gets up and walks around.     Bottom of feet: She reports having fungus on the bottom of both her feet. She has run out of the fungal cream that was prescribed and wants to get another prescription.     : When she was younger she had a lot of bladder infections. She reports that she has not had one in 20 years. The patient reports that her urine smell metallic and leaves a burning " sensation after urinating. Also, she seems to be urinating more often than usual.     Jaw: She denies any pain when opening jaw. The patient does have pain to palpation. She reports that she had a past history of problems with her jaw clicking.     Trazodone: She remembers taking 2 trazodone pills and blacked out on the floor. Now she is on 1 pill due to the symptoms experienced with taking 2.     REVIEW OF SYSTEMS:   All other systems are negative.    PFSH:  Immunization History   Administered Date(s) Administered     DT (pediatric) 10/01/2005     Hep A, historic 04/04/2008, 10/09/2008     Influenza high dose,seasonal,PF, 65+ yrs 10/13/2015, 10/07/2016, 10/04/2017, 10/17/2018, 10/11/2019     Influenza, Seasonal, Inj PF IIV3 10/20/2010     Influenza, inj, historic,unspecified 10/09/2008, 09/25/2013, 09/01/2014     Influenza,seasonal, Inj IIV3 11/20/2001, 10/18/2004     Pneumo Conj 13-V (2010&after) 12/18/2014     Pneumo Polysac 23-V 10/01/2005     Td, Adult, Absorbed 04/28/1997     Td,adult,historic,unspecified 10/01/2005, 11/09/2012     Tdap 11/09/2012     ZOSTER, LIVE 04/06/2009     ZOSTER, RECOMBINANT, IM 07/26/2019, 10/11/2019     Social History     Socioeconomic History     Marital status:      Spouse name: Not on file     Number of children: Not on file     Years of education: Not on file     Highest education level: Not on file   Occupational History     Not on file   Social Needs     Financial resource strain: Not on file     Food insecurity:     Worry: Not on file     Inability: Not on file     Transportation needs:     Medical: Not on file     Non-medical: Not on file   Tobacco Use     Smoking status: Never Smoker     Smokeless tobacco: Never Used   Substance and Sexual Activity     Alcohol use: Yes     Alcohol/week: 1.0 standard drinks     Types: 1 Glasses of wine per week     Drug use: No     Sexual activity: Never   Lifestyle     Physical activity:     Days per week: Not on file     Minutes per  session: Not on file     Stress: Not on file   Relationships     Social connections:     Talks on phone: Not on file     Gets together: Not on file     Attends Islam service: Not on file     Active member of club or organization: Not on file     Attends meetings of clubs or organizations: Not on file     Relationship status: Not on file     Intimate partner violence:     Fear of current or ex partner: Not on file     Emotionally abused: Not on file     Physically abused: Not on file     Forced sexual activity: Not on file   Other Topics Concern     Not on file   Social History Narrative     Not on file     Past Medical History:   Diagnosis Date     Breast Cancer     Created by Conversion [facility] Annotation: Oct 21 2010  9:56AM - Shirley Comer: stage 2;  10/18 lymph nodes positives/p chemo and [id number]      Hyperlipidemia     Created by Conversion      Hypertension     Created by Conversion      Osteopenia     Created by Conversion      Peripheral Neuropathy     Created by Conversion      Primary cancer of bone marrow (H)      Varicose Veins     Created by Conversion      Family History   Problem Relation Age of Onset     Hypertension Father      Heart disease Father      Diabetes Sister      Prostate cancer Brother        MEDICATIONS:  Current Outpatient Medications   Medication Sig Dispense Refill     acetaminophen (TYLENOL) 500 MG tablet Take 1,000 mg by mouth every evening.              ascorbic acid, vitamin C, (VITAMIN C) 250 MG tablet Take 250 mg by mouth daily.       aspirin 325 MG tablet Take 325 mg by mouth daily.       BIOTIN ORAL Take 5,000 mcg by mouth daily.        calcium carbonate-vitamin D3 (CALCIUM 600 + D,3,) 600 mg(1,500mg) -200 unit per tablet Take 1 tablet by mouth 2 (two) times a day.        famotidine (PEPCID) 20 MG tablet Take 1 tablet (20 mg total) by mouth 2 (two) times a day. 180 tablet 3     hydroxyurea (HYDREA) 500 mg capsule Take 500 mg daily 6 days/week and 500 mg  twice daily one day/week. 100 capsule 2     lisinopril (PRINIVIL,ZESTRIL) 5 MG tablet Take 1 tablet (5 mg total) by mouth daily. 90 tablet 3     polyethylene glycol (MIRALAX) 17 gram packet Take 17 g by mouth daily as needed.       polyvinyl alcohol (LIQUIFILM TEARS) 1.4 % ophthalmic solution Administer 1 drop to both eyes as needed for dry eyes.       simvastatin (ZOCOR) 20 MG tablet Take 1 tablet (20 mg total) by mouth at bedtime. 90 tablet 3     sucralfate (CARAFATE) 1 gram tablet Take 1 tablet (1 g total) by mouth 2 (two) times a day. 180 tablet 3     traZODone (DESYREL) 100 MG tablet Take 1 tablet (100 mg total) by mouth at bedtime. 90 tablet 3     ketoconazole (NIZORAL) 2 % cream Apply to fungal rash twice daily 60 g 0     No current facility-administered medications for this visit.        TOBACCO USE:  Social History     Tobacco Use   Smoking Status Never Smoker   Smokeless Tobacco Never Used       VITALS:  Vitals:    02/04/20 1126   BP: 150/82   Pulse: 76   Weight: 160 lb 14.4 oz (73 kg)     Wt Readings from Last 3 Encounters:   02/04/20 160 lb 14.4 oz (73 kg)   01/14/20 161 lb 12.8 oz (73.4 kg)   10/24/19 161 lb 12.8 oz (73.4 kg)       PHYSICAL EXAM:  Constitutional:   Reveals a healthy appearing female.  Vitals: per nursing notes.  HEENT: No repoducable TMJ pain   Ears:  External canals, TMs clear.    Eyes:  EOMs full, PERRL.   Lungs: Clear to A&P without rales or wheezes.  Respiratory effort normal.  Cardiac:   Regular rate and rhythm, normal S1, S2, no murmur or gallop.  Musculoskeletal: No peripheral swelling.  Neuro:  Alert and oriented. Cranial nerves, motor, sensory exams are intact.  No gross focal deficits.  Psychiatric:  Memory intact, mood appropriate.  Lower extremity exam: no obvious swelling or deformities on lower leg  Skin: diffuse redness plantar surfaces both feet.     DATA REVIEWED:  Additional History from Old Records Summarized (2): None.  Decision to Obtain Records (1):  None.  Radiology Tests Summarized or Ordered (1): 01/21/2020 NM bone scan whole body reviewed.   Labs Reviewed or Ordered (1): Labs ordered today.  Medicine Test Summarized or Ordered (1): None.  Independent Review of EKG, X-RAY, or RAPID STREP (2 each): None.    The visit lasted a total of 17 minutes face to face with the patient. Over 50% of the time was spent counseling and educating the patient about the problem listed above.    I, Dolly Schmitz, am scribing for and in the presence of, Dr. Vasques.    I, Dr. Vasques, personally performed the services described in this documentation, as scribed by Dolly Schmitz in my presence, and it is both accurate and complete.    Total data points: 2

## 2021-06-05 NOTE — PROGRESS NOTES
Patient here today for labs and follow-up visit with Dr. Bourne for essential thrombocytopenia/THAI Panchal RN

## 2021-06-05 NOTE — TELEPHONE ENCOUNTER
Left message on patient voicemail to return call. Please relay message below to patient when she call back. Thanks.     Denita REED CMA

## 2021-06-05 NOTE — TELEPHONE ENCOUNTER
Dr. Bourne, please review and advise.   Patient calling for results of NM Bone scan.   Brisa Candelario RN

## 2021-06-05 NOTE — PROGRESS NOTES
[facility] Hematology and Oncology Progress Note    Patient: Libertad eHrnadez  MRN: [id number]  Date of Service: 1/14/2020        Reason for Visit    Follow-up essential thrombocytosis.    Assessment and Plan  Cancer Staging  No matching staging information was found for the patient.    ECOG Performance   ECOG Performance Status: 0     Distress Assessment  Distress Assessment Score: 3    Pain  Currently in Pain: No/denies  Pain Score (Initial OR Reassessment): No/Denies Pain  Location: R lower back and radiates down to R toes    #. JAK2 positive essential thrombocytosis, diagnosed in 2018.  High risk for thrombotic complication based on age, JAK2 V617F mutation and cardiovascular risk factor of hypertension, diabetes.  Unilateral bone marrow biopsy showed 30% cellularity, 1-2+/3 reticulin stain.  Cytogenetics- normal.  She developed acute appeared to be provoked pulmonary emboli (small Harcourt) in May 2018.   She is clinically stable in terms of myeloproliferative neoplasm.  No thrombotic events.  Labs were reviewed and her platelet count is in normal range along with normal WBC and hemoglobin.  No major intolerable side effects from hydroxyurea except mild intermittent mucositis.    I  I recommend to continue hydroxyurea 500 mg 5 days a week, 1000 mg 2 days a week. She continue aspirin 325 mg daily.    Follow-up labs and provider visit in about 3 months.    #.  Right-sided hip pain, or back pain with radiculopathy.   She had MRI of the lumbar spine without contrast little over a year ago and it was unremarkable.  Because of her underlying history of breast cancer x2, I suggested to obtain a nuclear medicine bone scan.  She can complete at any time.  I will inform her when the result is available.    #.  Small burden pulmonary emboli in the left lower lobe in May 2018, presented with syncopal episode and chest pain   No DVT.  She was treated with Xarelto for about 7 months.  She stop in December 2018 and started aspirin  325 mg daily.  She did not have any bleeding or major side effects from Xarelto, however she could not afford any longer.      #.  History of stage II breast cancer with positive lymph nodes (10/18) in 1992. S/p left breast mastectomy, axillary lymph node dissection followed by adjuvant chemotherapy and tamoxifen.  She had a second breast cancer in the right breast in 2010, stage I, post lumpectomy followed by radiation.  Did not take adjuvant endocrine therapy.     Problem List    1. Essential thrombocythemia (H)     2. Bone pain of lower leg  NM Bone Scan Whole Body      ______________________________________________________________________________  Diagnosis  January 2018-diagnosed with ANGELA 2 positive essential thrombocytosis platelet counts at high 500.    Treatment to date  January 2018-started Hydrea 500 mg every other day (due to patient's fear of side effects) and dose adjusted to 500 mg once a day in July 2018, then adjusted to 500 mg 6 days a week/1000 mg 1 day a week in July 2019, then further dose increase to 500 mg 5 days a week/1000 mg 2 days a week in October 2019.    History of Present Illness    Libertad presents herself today.  She has increasing hot flashes and night sweats, occasional palpitation.  She is currently taking hydroxyurea as directed.  No major side effects.  She reported worsening right-sided back pain radiating down to her right toe.  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  Noted more achy bone in her lower extremity and back pain woke her up from sleep at times.  No other bone pain.  Appetite is good.  Weight is stable.  No trouble breathing.      Pain Status  Currently in Pain: No/denies    Review of Systems  Constitutional  Constitutional (WDL): Exceptions to WDL  Fatigue: Fatigue relieved by rest(doesn't sleep well due to R sided pain)  Fever: None  Chills: None  Weight Gain: None  Weight Loss: None  Neurosensory  Neurosensory (WDL): Exceptions to  "WDL  Peripheral Motor Neuropathy: None  Ataxia: Asymptomatic, clinical or diagnostic observations only, intervention not indicated(\"don't have good balance\")  Peripheral Sensory Neuropathy: Asymptomatic, loss of deep tendon reflexes or paresthesia(intermittent hands>feet, at night)  Confusion: None  Syncope: None  Eye   Eye Disorder (WDL): All eye disorder elements are within defined limits  Ear  Ear Disorder (WDL): All ear disorder elements are within defined limits  Cardiovascular  Cardiovascular (WDL): Exceptions to WDL  Palpitations: Definition: A disorder characterized by inflammation of the muscle tissue of the heart.(occ @ noc)  Edema: No  Phlebitis: None  Superficial thrombophlebitis: None  Pulmonary  Respiratory (WDL): Within Defined Limits  Gastrointestinal  Gastrointestinal (WDL): Exceptions to WDL  Anorexia: None  Constipation: Occasional or intermittent symptoms, occasional use of stool softeners, laxatives, dietary modification, or enema(miralax controls)  Diarrhea: None  Dysphagia: None  Esophagitis: Asymptomatic, clinical or diagnostic observations only, intervention not indicated(@ noc)  Nausea: None  Pharyngitis: None  Vomiting: None  Dysgeusia: None  Dry Mouth: None  Genitourinary  Genitourinary (WDL): All genitourinary elements are within defined limits  Lymphatic  Lymph (WDL): All lymph disorder elements are within defined limits  Musculoskeletal and Connective Tissue  Musculoskeletal and Connetive Tissue Disorders (WDL): Exceptions to WDL  Arthralgia: Mild pain(R back, R hip, feet, ankles)  Bone Pain: None  Muscle Weakness : None  Myalgia: Mild pain(R leg)  Integumentary  Integumentary (WDL): All integumentary elements are within defined limits  Patient Coping  Patient Coping: Accepting;Open/discussion  Distress Assessment  Distress Assessment Score: 3  Accompanied by  Accompanied by: Alone  Oral Chemo Adherence  Oral Chemo Adherence  What Oral Chemotherapy is the patient taking?: " Hydroxyurea  Did the patient fill and  the prescription as written?: Yes  Did patient start taking oral chemo on time?: Yes, patient started taking oral chemo on time  Does the patient report missing any doses?: No    Past History  Past Medical History:   Diagnosis Date     Breast Cancer     Created by Conversion [facility] Annotation: Oct 21 2010  9:56AM - Shirley Comer: stage 2;  10/18 lymph nodes positives/p chemo and [id number]      Hyperlipidemia     Created by Conversion      Hypertension     Created by Conversion      Osteopenia     Created by Conversion      Peripheral Neuropathy     Created by Conversion      Primary cancer of bone marrow (H)      Varicose Veins     Created by Conversion        Physical Exam    Recent Vitals 1/14/2020   Weight 161 lbs 13 oz   BSA (m2) 1.83 m2   /70   Pulse 73   Temp 97.7   Temp src 1   SpO2 98   Some recent data might be hidden     General: alert, awake, not in acute distress  HEENT: Head: Normal, normocephalic, atraumatic.  Eye: Normal external eye, conjunctiva, lids cornea, CONSTANCE.  Ears:  Non-tender.  Nose: Normal external nose, mucus membranes and septum.  Pharynx: Dental Hygiene adequate. Normal buccal mucosa. Normal pharynx.  Neck / Thyroid: Supple, no masses, nodes, nodules or enlargement.  Lymphatics: No abnormally enlarged lymph nodes.  Chest: Normal chest wall and respirations. Clear to auscultation.  Heart: S1 S2 RRR, no murmur.   Abdomen: abdomen is soft without significant tenderness, masses, organomegaly or guarding  Extremities: normal strength, tone, and muscle mass.  Tenderness on palpation over the right hip.  Skin: normal. no rash or abnormalities  CNS: non focal.    Lab Results    Recent Results (from the past 168 hour(s))   Comprehensive Metabolic Panel   Result Value Ref Range    Sodium 142 136 - 145 mmol/L    Potassium 3.6 3.5 - 5.0 mmol/L    Chloride 106 98 - 107 mmol/L    CO2 27 22 - 31 mmol/L    Anion Gap, Calculation 9 5 - 18  mmol/L    Glucose 87 70 - 125 mg/dL    BUN 11 8 - 28 mg/dL    Creatinine 0.74 0.60 - 1.10 mg/dL    GFR MDRD Af Amer >60 >60 mL/min/1.73m2    GFR MDRD Non Af Amer >60 >60 mL/min/1.73m2    Bilirubin, Total 0.5 0.0 - 1.0 mg/dL    Calcium 9.4 8.5 - 10.5 mg/dL    Protein, Total 6.9 6.0 - 8.0 g/dL    Albumin 3.8 3.5 - 5.0 g/dL    Alkaline Phosphatase 42 (L) 45 - 120 U/L    AST 18 0 - 40 U/L    ALT 19 0 - 45 U/L   HM1 (CBC with Diff)   Result Value Ref Range    WBC 6.0 4.0 - 11.0 thou/uL    RBC 4.73 3.80 - 5.40 mill/uL    Hemoglobin 14.9 12.0 - 16.0 g/dL    Hematocrit 46.0 35.0 - 47.0 %    MCV 97 80 - 100 fL    MCH 31.5 27.0 - 34.0 pg    MCHC 32.4 32.0 - 36.0 g/dL    RDW 14.6 (H) 11.0 - 14.5 %    Platelets 404 140 - 440 thou/uL    MPV 8.9 8.5 - 12.5 fL    Neutrophils % 64 50 - 70 %    Lymphocytes % 23 20 - 40 %    Monocytes % 8 2 - 10 %    Eosinophils % 3 0 - 6 %    Basophils % 1 0 - 2 %    Neutrophils Absolute 3.8 2.0 - 7.7 thou/uL    Lymphocytes Absolute 1.4 0.8 - 4.4 thou/uL    Monocytes Absolute 0.5 0.0 - 0.9 thou/uL    Eosinophils Absolute 0.2 0.0 - 0.4 thou/uL    Basophils Absolute 0.1 0.0 - 0.2 thou/uL       Imaging    No results found.    Signed by: Camden Bourne MD

## 2021-06-06 NOTE — TELEPHONE ENCOUNTER
RN cannot approve Refill Request    RN can NOT refill this medication med is not covered by policy/route to provider. Last office visit: 2/4/2020 Jacques Vasques MD Last Physical: Visit date not found Last MTM visit: Visit date not found Last visit same specialty: 2/4/2020 Jacques Vasques MD.  Next visit within 3 mo: Visit date not found  Next physical within 3 mo: Visit date not found      Valorie Moon, [facility] Triage/Med Refill 3/1/2020    Requested Prescriptions   Pending Prescriptions Disp Refills     ketoconazole (NIZORAL) 2 % cream [Pharmacy Med Name: KETOCONAZOLE 2% CREA] 60 g 0     Sig: APPLY TO AFFECTED AREA(S) TWO TIMES A DAY       There is no refill protocol information for this order

## 2021-06-06 NOTE — TELEPHONE ENCOUNTER
Refill Approved    Rx renewed per Medication Renewal Policy. Medication was last renewed on 2/12/19.    Laurel Trevino, [facility] Triage/Med Refill 2/25/2020     Requested Prescriptions   Pending Prescriptions Disp Refills     lisinopriL (PRINIVIL,ZESTRIL) 5 MG tablet [Pharmacy Med Name: LISINOPRIL 5MG TABS] 90 tablet 3     Sig: TAKE ONE TABLET BY MOUTH EVERY DAY       [facility] Inhibitors Refill Protocol Passed - 2/24/2020  4:27 AM        Passed - PCP or prescribing provider visit in past 12 months       Last office visit with prescriber/PCP: 10/1/2019 Delano Blake CNP OR same dept: 10/24/2019 Renetta Carrillo MD OR same specialty: 10/24/2019 Renetta Carrillo MD  Last physical: 2/12/2019 Last MTM visit: Visit date not found   Next visit within 3 mo: Visit date not found  Next physical within 3 mo: Visit date not found  Prescriber OR PCP: Delano Blake CNP  Last diagnosis associated with med order: There are no diagnoses linked to this encounter.  If protocol passes may refill for 12 months if within 3 months of last provider visit (or a total of 15 months).             Passed - Serum Potassium in past 12 months     Lab Results   Component Value Date    Potassium 3.6 01/14/2020             Passed - Blood pressure filed in past 12 months     BP Readings from Last 1 Encounters:   02/04/20 150/82             Passed - Serum Creatinine in past 12 months     Creatinine   Date Value Ref Range Status   01/14/2020 0.74 0.60 - 1.10 mg/dL Final

## 2021-06-06 NOTE — TELEPHONE ENCOUNTER
RN cannot approve Refill Request    RN can NOT refill this medication associated diagnosis needed. Last office visit: 10/1/2019 Delano Blake CNP Last Physical: 2/12/2019 Last MTM visit: Visit date not found Last visit same specialty: 10/24/2019 Renetta Carrillo MD.  Next visit within 3 mo: Visit date not found  Next physical within 3 mo: Visit date not found      Luda Newberry, [facility] Connection Triage/Med Refill 2/12/2020    Requested Prescriptions   Pending Prescriptions Disp Refills     traZODone (DESYREL) 100 MG tablet [Pharmacy Med Name: TRAZODONE HCL 100MG TABS] 90 tablet 3     Sig: TAKE ONE TABLET BY MOUTH AT BEDTIME       Tricyclics/Misc Antidepressant/Antianxiety Meds Refill Protocol Passed - 2/9/2020  4:35 AM        Passed - PCP or prescribing provider visit in last year     Last office visit with prescriber/PCP: 10/1/2019 Delano Blake CNP OR same dept: 10/24/2019 Renetta Carrillo MD OR same specialty: 10/24/2019 Renetta Carrillo MD  Last physical: 2/12/2019 Last MTM visit: Visit date not found   Next visit within 3 mo: Visit date not found  Next physical within 3 mo: Visit date not found  Prescriber OR PCP: Delano Blake CNP  Last diagnosis associated with med order: There are no diagnoses linked to this encounter.  If protocol passes may refill for 12 months if within 3 months of last provider visit (or a total of 15 months).

## 2021-06-07 NOTE — PROGRESS NOTES
"Libertad Hernadez is a 79 y.o. female who is being evaluated via a billable telephone visit.      The patient has been notified of following:     \"This telephone visit will be conducted via a call between you and your physician/provider. We have found that certain health care needs can be provided without the need for a physical exam.  This service lets us provide the care you need with a short phone conversation.  If a prescription is necessary we can send it directly to your pharmacy.  If lab work is needed we can place an order for that and you can then stop by our lab to have the test done at a later time.    If during the course of the call the physician/provider feels a telephone visit is not appropriate, you will not be charged for this service.\"         Libertad Hernadez complains of    Chief Complaint   Patient presents with     Urinary Tract Infection     Possible UTI-Burning and frequent urination, A little cloudy       I have reviewed and updated the patient's Past Medical History, Social History, Family History and Medication List.    ALLERGIES  Adhesive tape-silicones and Penicillins    Additional provider notes: see below    Assessment/Plan:  1. Urinary frequency  Patient reports 2 days symptom of burning and frequent urination. No hematuria, fever, chills, vomiting, abdominal or back pain. She had UTI in Feb and was treated with cephalosporin. She is allergic to penicillin. Based on that urine culture result, we will start treatment with Septra and pyridium. She will increase fluid intake. She will call if not better in 2-3 days since we are treating her UTI blindly at this point.        Phone call duration:  7 minutes    Geri Thomason"

## 2021-06-08 NOTE — PATIENT INSTRUCTIONS - HE
Recent Results (from the past 24 hour(s))   Comprehensive Metabolic Panel   Result Value Ref Range    Sodium 144 136 - 145 mmol/L    Potassium 4.0 3.5 - 5.0 mmol/L    Chloride 107 98 - 107 mmol/L    CO2 28 22 - 31 mmol/L    Anion Gap, Calculation 9 5 - 18 mmol/L    Glucose 96 70 - 125 mg/dL    BUN 15 8 - 28 mg/dL    Creatinine 0.80 0.60 - 1.10 mg/dL    GFR MDRD Af Amer >60 >60 mL/min/1.73m2    GFR MDRD Non Af Amer >60 >60 mL/min/1.73m2    Bilirubin, Total 0.4 0.0 - 1.0 mg/dL    Calcium 10.0 8.5 - 10.5 mg/dL    Protein, Total 6.9 6.0 - 8.0 g/dL    Albumin 3.7 3.5 - 5.0 g/dL    Alkaline Phosphatase 56 45 - 120 U/L    AST 17 0 - 40 U/L    ALT 15 0 - 45 U/L   HM1 (CBC with Diff)   Result Value Ref Range    WBC 6.7 4.0 - 11.0 thou/uL    RBC 4.39 3.80 - 5.40 mill/uL    Hemoglobin 14.3 12.0 - 16.0 g/dL    Hematocrit 43.1 35.0 - 47.0 %    MCV 98 80 - 100 fL    MCH 32.6 27.0 - 34.0 pg    MCHC 33.2 32.0 - 36.0 g/dL    RDW 14.6 (H) 11.0 - 14.5 %    Platelets 379 140 - 440 thou/uL    MPV 8.9 8.5 - 12.5 fL    Neutrophils % 62 50 - 70 %    Lymphocytes % 26 20 - 40 %    Monocytes % 9 2 - 10 %    Eosinophils % 2 0 - 6 %    Basophils % 1 0 - 2 %    Neutrophils Absolute 4.1 2.0 - 7.7 thou/uL    Lymphocytes Absolute 1.7 0.8 - 4.4 thou/uL    Monocytes Absolute 0.6 0.0 - 0.9 thou/uL    Eosinophils Absolute 0.2 0.0 - 0.4 thou/uL    Basophils Absolute 0.1 0.0 - 0.2 thou/uL

## 2021-06-08 NOTE — PROGRESS NOTES
[facility] Hematology and Oncology Progress Note    Patient: Libertad Hernadez  MRN: [id number]  Date of Service: 6/16/2020        Reason for Visit    Follow-up essential thrombocytosis (ET).    Assessment and Plan:    1. JAK2 positive essential thrombocytosis:    79 y.o.     1990's history of stage II breast cancer with positive lymph nodes.  Completed chemotherapy and tamoxifen.    2018, January - diagnosed JAK2 positive essential thrombocytosis.      High risk for thrombotic complication based on age, AK2 V617F mutation and cardiovascular risk factors of hypertension and diabetes.      Unilateral bone marrow biopsy showed 30% cellularity, 1-2+/3 reticulin stain.  Cytogenetics- normal.    01/16/18 - Started Hydrea therapy 500 mg every other day.    06/16/20:    CBC - basically WNL.    CMP - WNL     LDH - pending.    Libertad looks and feels quite good other than her chronic (R) hip pain - still debating having LAMBERT.  Tolerating Hydrea therapy very well.  No nausea.  Chronic arthritis and chronic neuropathy (bottoms of feet burn and ankle and shin pains) previously on gabapentin.  Hot flashes and occasional night sweats, can go for months without.      Clinically stable.  No thrombotic events.  Platelet count is in normal range along with normal WBC and hemoglobin.  No major intolerable side effects from hydroxyurea.         Continue hydroxyurea 500 mg 5 days a week, 1000 mg 2 days a week.      Continue aspirin 325 mg daily.     09/16/20 - Follow-up with HEME1, LDH, CMP and provider.    2. Pulmonary Embolism:    05/12/18 - took evening Trazodone in morning.  Caused drowsiness and she fell and injured her ankle.  3 days later noted (L) chest pain and was seen in the ED.    CTA chest - pulmonary emboli in the left lower lobe.  Small clot burden. Trace effusion without evidence of pulmonary infarct.  No RV strain.  Evidence of previous granulomatous disease.  Large simple liver cyst.    US (B) legs - No evidence of DVT in either  leg.    Treated with Xarelto ~7 months.    No bleeding or major side effects from Xarelto, however she could not afford any longer.  She does not want to take warfarin because of needing lab monitoring and her perception of a rat poison. If she has a recurrent venous thromboembolism she should be on anticoagulation for extended duration.    With ET, duration of Xarelto controversial, minimum 6 months, perhaps lifelong.    Continue  mg/day.    3. History bilateral early stage breast cancers:    1992 Stage II breast cancer  - (L) mastectomy + adjuvant chemotherapy.   No adjuvant endocrine therapy due to (-) ER status    2010 stage I breast cancer - (R) mastectomy and EBRT.  No adjuvant endocrine therapy due to (-) ER status    4. Right-sided hip pain, or back pain with radiculopathy.  LAMBERT.    11/27/18 MRI of the lumbar spine without contrast was unremarkable.      01/21/20 Nuclear medicine bone scan - focal radiotracer uptake in the right superior acetabular region favored to represent degenerative change.    Libertad is still debating having LAMBERT.  I offered referral to Ortho.  She is not yet ready to commit.       ECOG Performance:     ECOG Performance Status: 1     Distress Assessment:    Distress Assessment Score: 3         TT > 25 minutes face to face with > 50% in coordination and counseling of care.    Olvin Villanueva, CNP  _________________________________________    Interim History:    The patient presents alone, looking and feeling quite good other than her chronic (R) hip pain.  She is still debating having LAMBERT.  Her spouse had one several years ago without any improvement in symptoms.  She denies any intolerance to Hydrea therapy.  No nausea.  She has not had any recent syncopal episodes.  She denies cough, fever, chills, chest pain, shortness of breath, palpitations, focal weakness, bowel or bladder issues, visual or mentation disturbances.  Her appetite, weight and PS remain very stable.      Pain  Status:    Currently in Pain: Yes    Review of Systems:    Constitutional  Constitutional (WDL): Exceptions to WDL  Fatigue: Fatigue not relieved by rest - Limiting instrumental ADL  Neurosensory  Neurosensory (WDL): Exceptions to WDL  Peripheral Motor Neuropathy: Asymptomatic, clinical or diagnostic observations only, intervention not indicated(right hip pain)  Ataxia: Moderate symptoms, limiting instrumental ADL(right hip pain)  Peripheral Sensory Neuropathy: Asymptomatic, loss of deep tendon reflexes or paresthesia(positional)  Eye   Eye Disorder (WDL): Assessment not pertinent to visit  Ear  Ear Disorder (WDL): Assessment not pertinent to visit  Cardiovascular  Cardiovascular (WDL): All cardiovascular elements are within defined limits  Pulmonary  Respiratory (WDL): Exceptions to WDL  Cough: Mild symptoms, nonprescription intervention indicated(cough at night)  Gastrointestinal  Gastrointestinal (WDL): Exceptions to WDL  Constipation: Occasional or intermittent symptoms, occasional use of stool softeners, laxatives, dietary modification, or enema  Genitourinary  Genitourinary (WDL): All genitourinary elements are within defined limits  Lymphatic  Lymph (WDL): Assessment not pertinent to visit  Musculoskeletal and Connective Tissue  Musculoskeletal and Connetive Tissue Disorders (WDL): Exceptions to WDL  Arthralgia: Moderate pain, limiting instrumental ADL(right hip)  Bone Pain: Moderate pain, limiting instrumental ADL(right hip/leg)  Muscle Weakness : Symptomatic, perceived by patient but not evident on physical exam(RLE)  Myalgia: Mild pain(RLE)  Integumentary  Integumentary (WDL): All integumentary elements are within defined limits  Patient Coping  Patient Coping: Open/discussion  Distress Assessment  Distress Assessment Score: 3  Accompanied by  Accompanied by: Alone  Oral Chemo Adherence       Past Histories:    Past Medical History:   Diagnosis Date     Breast Cancer     Created by [facility]  Annotation: Oct 21 2010  9:56AM - Shirley Comer: stage 2;  10/18 lymph nodes positives/p chemo and [id number]      Hyperlipidemia     Created by Conversion      Hypertension     Created by Conversion      Osteopenia     Created by Conversion      Peripheral Neuropathy     Created by Conversion      Primary cancer of bone marrow (H)      Varicose Veins     Created by Conversion        Physical Exam:    Recent Vitals 2/4/2020   Weight 160 lbs 14 oz   BSA (m2) 1.83 m2   /82   Pulse 76   Temp -   Temp src -   SpO2 -   Some recent data might be hidden     General:  Alert.  Pleasant.  No acute distress.    HEENT:  Anicteric sclera.  CONSTANCE.  Normal buccal mucosa.   Lymphatics:  No abnormally enlarged lymph nodes.  Chest:  Lungs clear to auscultation.  Heart:   S1 S2 heard.  RRR.  No murmur heard.   Abdomen:  Soft.  Not tender.  Not distended.  No palpable masses, organomegaly or guarding  Extremities:  No edema.  Skin:   No rash noted.  CNS:   Non focal.    Lab Results:    Reviewed with patient.    Recent Results (from the past 24 hour(s))   Comprehensive Metabolic Panel   Result Value Ref Range    Sodium 144 136 - 145 mmol/L    Potassium 4.0 3.5 - 5.0 mmol/L    Chloride 107 98 - 107 mmol/L    CO2 28 22 - 31 mmol/L    Anion Gap, Calculation 9 5 - 18 mmol/L    Glucose 96 70 - 125 mg/dL    BUN 15 8 - 28 mg/dL    Creatinine 0.80 0.60 - 1.10 mg/dL    GFR MDRD Af Amer >60 >60 mL/min/1.73m2    GFR MDRD Non Af Amer >60 >60 mL/min/1.73m2    Bilirubin, Total 0.4 0.0 - 1.0 mg/dL    Calcium 10.0 8.5 - 10.5 mg/dL    Protein, Total 6.9 6.0 - 8.0 g/dL    Albumin 3.7 3.5 - 5.0 g/dL    Alkaline Phosphatase 56 45 - 120 U/L    AST 17 0 - 40 U/L    ALT 15 0 - 45 U/L   HM1 (CBC with Diff)   Result Value Ref Range    WBC 6.7 4.0 - 11.0 thou/uL    RBC 4.39 3.80 - 5.40 mill/uL    Hemoglobin 14.3 12.0 - 16.0 g/dL    Hematocrit 43.1 35.0 - 47.0 %    MCV 98 80 - 100 fL    MCH 32.6 27.0 - 34.0 pg    MCHC 33.2 32.0 - 36.0 g/dL    RDW 14.6  (H) 11.0 - 14.5 %    Platelets 379 140 - 440 thou/uL    MPV 8.9 8.5 - 12.5 fL    Neutrophils % 62 50 - 70 %    Lymphocytes % 26 20 - 40 %    Monocytes % 9 2 - 10 %    Eosinophils % 2 0 - 6 %    Basophils % 1 0 - 2 %    Neutrophils Absolute 4.1 2.0 - 7.7 thou/uL    Lymphocytes Absolute 1.7 0.8 - 4.4 thou/uL    Monocytes Absolute 0.6 0.0 - 0.9 thou/uL    Eosinophils Absolute 0.2 0.0 - 0.4 thou/uL    Basophils Absolute 0.1 0.0 - 0.2 thou/uL       Imaging:    No results found.

## 2021-06-11 NOTE — TELEPHONE ENCOUNTER
Refill Approved    Rx renewed per Medication Renewal Policy. Medication was last renewed on 2/25/20.    Laurel Trevino, [facility] Triage/Med Refill 9/1/2020     Requested Prescriptions   Pending Prescriptions Disp Refills     lisinopriL (PRINIVIL,ZESTRIL) 5 MG tablet [Pharmacy Med Name: LISINOPRIL 5MG TABS] 90 tablet 2     Sig: TAKE ONE TABLET BY MOUTH EVERY DAY       [employer] Refill Protocol Passed - 8/29/2020 10:42 AM        Passed - PCP or prescribing provider visit in past 12 months       Last office visit with prescriber/PCP: 10/1/2019 Delano Blake CNP OR same dept: 10/24/2019 Renetta Carrillo MD OR same specialty: 10/24/2019 Renetta Carrillo MD  Last physical: 2/12/2019 Last MTM visit: Visit date not found   Next visit within 3 mo: Visit date not found  Next physical within 3 mo: Visit date not found  Prescriber OR PCP: Delano Blake CNP  Last diagnosis associated with med order: 1. Essential hypertension  - lisinopriL (PRINIVIL,ZESTRIL) 5 MG tablet [Pharmacy Med Name: LISINOPRIL 5MG TABS]; TAKE ONE TABLET BY MOUTH EVERY DAY  Dispense: 90 tablet; Refill: 2    If protocol passes may refill for 12 months if within 3 months of last provider visit (or a total of 15 months).             Passed - Serum Potassium in past 12 months     Lab Results   Component Value Date    Potassium 4.0 06/16/2020             Passed - Blood pressure filed in past 12 months     BP Readings from Last 1 Encounters:   06/16/20 138/73             Passed - Serum Creatinine in past 12 months     Creatinine   Date Value Ref Range Status   06/16/2020 0.80 0.60 - 1.10 mg/dL Final

## 2021-06-11 NOTE — PROGRESS NOTES
Patient here today for labs and 3 month follow-up with Dr. Bourne for essential thrombocythemia /THAI Panchal RN

## 2021-06-11 NOTE — PROGRESS NOTES
[facility] Hematology and Oncology Progress Note    Patient: Libertad Hernadez  MRN: [id number]  Date of Service: 9/15/2020        Reason for Visit    Follow-up essential thrombocytosis.    Assessment and Plan  Cancer Staging  No matching staging information was found for the patient.    ECOG Performance   ECOG Performance Status: 0     Distress Assessment  Distress Assessment Score: 5(Covid, the world right now, visit today)    Pain  Currently in Pain: Yes  Pain Score (Initial OR Reassessment): 3  Location: R hip    #. JAK2 positive essential thrombocytosis.    High risk for thrombotic complication based on age, JAK2 V617F mutation and cardiovascular risk factor of hypertension, diabetes.  She developed acute appeared to be provoked pulmonary emboli (small [place]) in May 2018.      Clinically stable.  She has some side effects with mild mucositis and fatigue from Hydrea.  Reviewed her labs and they are in satisfactory range.  I discussed about using Magic mouthwash for mild mucositis. If she can managed the side effects, I advised her to continue hydroxyurea at current dose of 500 mg 5 days a week, 1000 mg 2 days a week given her platelet count has been upper limit of normal range.   Continue aspirin 325 mg daily.   Follow-up labs and provider visit in 3 months.    #.  Wt loss   About 4 lbs in the last 6-7 months. No other associated symptoms. Will continue to monitor. Consider imaging with any other concerns due to h/o recurrent breast cancer.    Problem List    1. Essential thrombocythemia (H)  HM1(CBC and Differential)    Comprehensive Metabolic Panel    Lactate Dehydrogenase (LDH)   2. Mouth sores  alum/mag hydrox-simethicone-diphenhydramine-lidocaine (MAGIC MOUTHWASH) suspension      ______________________________________________________________________________  Diagnosis  January 2018-diagnosed with ANGELA 2 positive essential thrombocytosis platelet counts at high 500.   -  Unilateral bone marrow biopsy showed 30%  cellularity, 1-2+/3 reticulin stain.  Cytogenetics- normal.     Treatment to date  January 2018-started Hydrea 500 mg every other day (due to patient's fear of side effects) and dose adjusted to 500 mg once a day in July 2018, then adjusted to 500 mg 6 days a week/1000 mg 1 day a week in July 2019, then further dose increase to 500 mg 5 days a week/1000 mg 2 days a week in October 2019.    Other commorbidities  #.  Small burden pulmonary emboli in the left lower lobe in May 2018, presented with syncopal episode and chest pain   No DVT.  She was treated with Xarelto for about 7 months.  She stop in December 2018 and started aspirin 325 mg daily.  She did not have any bleeding or major side effects from Xarelto, however she could not afford any longer.      #.  History of stage II breast cancer with positive lymph nodes (10/18) in 1992. S/p left breast mastectomy, axillary lymph node dissection followed by adjuvant chemotherapy and tamoxifen.  She had a second breast cancer in the right breast in 2010, stage I, post lumpectomy followed by radiation.  Did not take adjuvant endocrine therapy.      History of Present Illness    Libertad presented herself today.  Reported tiredness, fatigue, dizziness in the morning. Wt loss reported.  Reported sores in the mouth sometimes. No skin ulcers. Arthritis +.  Takes Hydrea regularly.    Pain Status  Currently in Pain: Yes    Review of Systems  Constitutional  Constitutional (WDL): Exceptions to WDL  Fatigue: Fatigue relieved by rest  Fever: None  Chills: None  Weight Gain: None  Weight Loss: to <10% from baseline, intervention not indicated(4# 6/16/20)  Neurosensory  Neurosensory (WDL): Exceptions to WDL  Peripheral Motor Neuropathy: None  Ataxia: None  Peripheral Sensory Neuropathy: Asymptomatic, loss of deep tendon reflexes or paresthesia(intermittent numbness in hands and feet @ noc)  Confusion: None  Syncope: None  Eye   Eye Disorder (WDL): Exceptions to WDL  Blurred Vision:  Intervention not indicated(intermittent blurriness in eyes-L>R)  Dry Eye: None  Eye Pain: None  Watering Eyes: None  Ear  Ear Disorder (WDL): Exceptions to WDL  Ear Pain: Mild Pain(intermittent ear pain)  Tinnitus: None  Cardiovascular  Cardiovascular (WDL): All cardiovascular elements are within defined limits  Pulmonary  Respiratory (WDL): Within Defined Limits  Gastrointestinal  Gastrointestinal (WDL): Exceptions to WDL  Anorexia: None  Constipation: Occasional or intermittent symptoms, occasional use of stool softeners, laxatives, dietary modification, or enema(Last BM 9/15/20, miralax prn)  Diarrhea: None  Dysphagia: None  Esophagitis: Symptomatic, altered eating/swallowing, oral supplements indicated  Nausea: None  Pharyngitis: None  Vomiting: None  Dysgeusia: None  Dry Mouth: None  Genitourinary  Genitourinary (WDL): Exceptions to WDL  Urinary Frequency: Present(up 2-3 times per noc, small amounts)  Urinary Retention: None  Urinary Tract Pain: None  Lymphatic  Lymph (WDL): All lymph disorder elements are within defined limits  Musculoskeletal and Connective Tissue  Musculoskeletal and Connetive Tissue Disorders (WDL): Exceptions to WDL  Arthralgia: Mild pain(R hip)  Bone Pain: None  Muscle Weakness : None  Myalgia: None  Integumentary  Integumentary (WDL): Exceptions to WDL  Alopecia: Hair loss of up to 50% of normal for that individual that is not obvious from a distance but only on close inspection, a different hair style may be required to cover the hair loss but it does not require a wig or hair piece to camouflage(says hair is thinning)  Rash Maculo-Papular: None  Pruritus: None  Urticaria: None  Palmar-Plantar Erythrodysesthesia Syndrome: None  Flushing: None  Patient Coping  Patient Coping: Accepting;Open/discussion  Distress Assessment  Distress Assessment Score: 5(Covid, the world right now, visit today)  Accompanied by  Accompanied by: Alone  Oral Chemo Adherence       Past History  Past Medical  History:   Diagnosis Date     Breast Cancer     Created by Conversion [facility] Annotation: Oct 21 2010  9:56AM - Shirley Comer: stage 2;  10/18 lymph nodes positives/p chemo and [id number]      Hyperlipidemia     Created by Conversion      Hypertension     Created by Conversion      Osteopenia     Created by Conversion      Peripheral Neuropathy     Created by Conversion      Primary cancer of bone marrow (H)      Varicose Veins     Created by Conversion        Physical Exam    Recent Vitals 9/15/2020   Weight 156 lbs 8 oz   BSA (m2) 1.8 m2   /63   Pulse 76   Temp 98   Temp src 1   SpO2 98   Some recent data might be hidden     General: alert, awake, not in acute distress  HEENT: Head: Normal, normocephalic, atraumatic.  Eye: Normal external eye, conjunctiva, lids cornea, CONSTANCE.  Ears:  Non-tender.  Nose: Normal external nose, mucus membranes and septum.  Pharynx: Dental Hygiene adequate. Superficial clean based small ulcers in buccal mucosa.  Neck / Thyroid: Supple, no masses, nodes, nodules or enlargement.  Lymphatics: No abnormally enlarged lymph nodes.  Chest: Normal chest wall and respirations. Clear to auscultation.  Heart: S1 S2 RRR, no murmur.   Abdomen: abdomen is soft without significant tenderness, masses, organomegaly or guarding  Extremities: normal strength, tone, and muscle mass  Skin: normal. no rash or abnormalities  CNS: non focal.    Lab Results    Recent Results (from the past 168 hour(s))   LD(LDH)   Result Value Ref Range    LD (LDH) 163 125 - 220 U/L   Comprehensive Metabolic Panel   Result Value Ref Range    Sodium 141 136 - 145 mmol/L    Potassium 3.6 3.5 - 5.0 mmol/L    Chloride 106 98 - 107 mmol/L    CO2 27 22 - 31 mmol/L    Anion Gap, Calculation 8 5 - 18 mmol/L    Glucose 127 (H) 70 - 125 mg/dL    BUN 12 8 - 28 mg/dL    Creatinine 0.79 0.60 - 1.10 mg/dL    GFR MDRD Af Amer >60 >60 mL/min/1.73m2    GFR MDRD Non Af Amer >60 >60 mL/min/1.73m2    Bilirubin, Total 0.3 0.0 - 1.0  mg/dL    Calcium 9.6 8.5 - 10.5 mg/dL    Protein, Total 6.6 6.0 - 8.0 g/dL    Albumin 3.7 3.5 - 5.0 g/dL    Alkaline Phosphatase 77 45 - 120 U/L    AST 15 0 - 40 U/L    ALT 13 0 - 45 U/L   HM1 (CBC with Diff)   Result Value Ref Range    WBC 6.8 4.0 - 11.0 thou/uL    RBC 4.42 3.80 - 5.40 mill/uL    Hemoglobin 14.3 12.0 - 16.0 g/dL    Hematocrit 42.7 35.0 - 47.0 %    MCV 97 80 - 100 fL    MCH 32.4 27.0 - 34.0 pg    MCHC 33.5 32.0 - 36.0 g/dL    RDW 14.6 (H) 11.0 - 14.5 %    Platelets 430 140 - 440 thou/uL    MPV 9.0 8.5 - 12.5 fL    Neutrophils % 66 50 - 70 %    Lymphocytes % 23 20 - 40 %    Monocytes % 8 2 - 10 %    Eosinophils % 2 0 - 6 %    Basophils % 1 0 - 2 %    Immature Granulocyte % 0 <=0 %    Neutrophils Absolute 4.5 2.0 - 7.7 thou/uL    Lymphocytes Absolute 1.5 0.8 - 4.4 thou/uL    Monocytes Absolute 0.5 0.0 - 0.9 thou/uL    Eosinophils Absolute 0.2 0.0 - 0.4 thou/uL    Basophils Absolute 0.1 0.0 - 0.2 thou/uL    Immature Granulocyte Absolute 0.0 <=0.0 thou/uL       Imaging    No results found.    Signed by: Camden Bourne MD

## 2021-06-12 NOTE — TELEPHONE ENCOUNTER
Who is calling:  Patient  Reason for Call:  Checking status of refill for Famotidine. She is completley out. I did make patient aware that request is still pending for Dr to approve the refill.   Date of last appointment with primary care: 3/25/2020  Okay to leave a detailed message: Yes

## 2021-06-12 NOTE — PROGRESS NOTES
Chief Complaint   Patient presents with     PROLIA #4     1. Did you experience any problems with previous Prolia injection? NO  2. Do you feel sick today?(fever, RS, GI,  issues)? NO  3. Any medication changes in the last 6 months? NO  4. Did you take prednisone or other immunosuppressant drugs in the last 6 months?(chemo, transplant, rheu, dermatology conditions)? NO  5. Did you have any serious infection in the last 6 months? (pancreatitis) NO  6. Any recent hospitalizations/ surgeries (especially gastric bypass, thyroid, parathyroid)? NO  7. Do you plan any dental work?(especially implants and extractions) in the next 2-3 months? NO  8. Did you have any fractures in the last year? NO    Due to Prolia PA is in process so, ABN was given/ signed and sent to medical records to scan     Yearly F/U appointment  with Dr. Carrillo is made.     María Gonzalez, [facility] 8/31/2017 8:21 AM

## 2021-06-12 NOTE — TELEPHONE ENCOUNTER
Refill Approved    Rx renewed per Medication Renewal Policy. Medication was last renewed on 10/29/19.    Laurel Trevino, [facility] Connection Triage/Med Refill 10/23/2020     Requested Prescriptions   Pending Prescriptions Disp Refills     simvastatin (ZOCOR) 20 MG tablet [Pharmacy Med Name: SIMVASTATIN 20MG TABS] 90 tablet 3     Sig: TAKE ONE TABLET BY MOUTH AT BEDTIME       Statins Refill Protocol (Hmg CoA Reductase Inhibitors) Passed - 10/22/2020  7:10 AM        Passed - PCP or prescribing provider visit in past 12 months      Last office visit with prescriber/PCP: 10/24/2019 Renetta Carrillo MD OR same dept: Visit date not found OR same specialty: Visit date not found  Last physical: 11/16/2017 Last MTM visit: Visit date not found   Next visit within 3 mo: Visit date not found  Next physical within 3 mo: Visit date not found  Prescriber OR PCP: Renetta Carrillo MD  Last diagnosis associated with med order: 1. Hyperlipidemia, unspecified hyperlipidemia type  - simvastatin (ZOCOR) 20 MG tablet [Pharmacy Med Name: SIMVASTATIN 20MG TABS]; TAKE ONE TABLET BY MOUTH AT BEDTIME  Dispense: 90 tablet; Refill: 3    If protocol passes may refill for 12 months if within 3 months of last provider visit (or a total of 15 months).

## 2021-06-13 NOTE — PROGRESS NOTES
Patient here today for labs and follow-up visit with Dr. Bourne for essential thrombocytosis/THAI Panchal RN

## 2021-06-13 NOTE — PROGRESS NOTES
[facility] Hematology and Oncology Progress Note    Patient: Libertad Hernadez  MRN: [id number]  Date of Service: 12/15/2020        Reason for Visit    Follow-up essential thrombocytosis.    Assessment and Plan  Cancer Staging  No matching staging information was found for the patient.    ECOG Performance   ECOG Performance Status: 0     Distress Assessment  Distress Assessment Score: 5(car trouble getting here)    Pain  Currently in Pain: Yes  Location: R lower, R hip, R knee, R ankle, R lower leg    #. JAK2 positive essential thrombocytosis   High risk for thrombotic complication based on age, JAK2 V617F mutation and cardiovascular risk factor of hypertension, diabetes.  She developed acute appeared to be provoked pulmonary emboli (small [place]) in May 2018.   She appears to be clinically stable.  Labs were reviewed and it showed normal WBC, hemoglobin.  However her platelet count has increased to 500 K at this point.  Normal electrolytes, liver function and LDH.   I advised her to increase hydroxyurea to 500 mg 4 days a week, or 1000 mg 3 days a week due to increase in platelet count.   Continue aspirin 325 mg daily.   Follow-up labs provide a visit in about 6 weeks.  Because of her insurance changes, she will be transferring care to [facility].  She will cancel our appointment if she is able to see a doctor at [facility] soon enough.     #.  Thrombocytosis   She is normocytic.  We would expect macrocytosis with hydroxyurea use.  The question is whether she has coexisting microcytosis masking macrocytosis.  I will check ferritin level to assure she is not iron deficient.    #.  Isolated episode of chest tightness   I advised her to seek medical attention right away if she has recurrent episode.    #.  Right lower back, right knee, right hip, right ankle pain related to osteoarthritis.   Not on any medication.  She uses warm packs and limited exercises.  I suggested a trial of glucosamine to see whether  it has some benefit for her.    #.  She is overdue for annual physical.  She is also looking for a primary care provider.    Problem List    1. Essential thrombocythemia (H)     2. Thrombocytosis (H)  Ferritin      ______________________________________________________________________________  Diagnosis  January 2018-diagnosed with ANGELA 2 positive essential thrombocytosis platelet counts at high 500.   -  Unilateral bone marrow biopsy showed 30% cellularity, 1-2+/3 reticulin stain.  Cytogenetics- normal.     Treatment to date  January 2018-started Hydrea 500 mg every other day (due to patient's fear of side effects) and dose adjusted to 500 mg once a day in July 2018, then adjusted to 500 mg 6 days a week/1000 mg 1 day a week in July 2019, then further dose increase to 500 mg 5 days a week/1000 mg 2 days a week in October 2019.    Other commorbidities  #.  Small burden pulmonary emboli in the left lower lobe in May 2018, presented with syncopal episode and chest pain   No DVT.  She was treated with Xarelto for about 7 months.  She stop in December 2018 and started aspirin 325 mg daily.  She did not have any bleeding or major side effects from Xarelto, however she could not afford any longer.      #.  History of stage II breast cancer with positive lymph nodes (10/18) in 1992. S/p left breast mastectomy, axillary lymph node dissection followed by adjuvant chemotherapy and tamoxifen.  She had a second breast cancer in the right breast in 2010, stage I, post right breast mastectomy followed by radiation (reported the tumor was close to the chest wall).  Did not take adjuvant endocrine therapy.      History of Present Illness    Libertad presented herself today for follow up.    Reported chest tightness in the middle of the chest along with shortness of breath while she was watching news about 2-3 weeks ago.  Lasted about 5-10 minutes and resolve on its own.  No diaphoresis.  Did not seek medical attention at that point.  No  recurrence.    She has ongoing right lower back, right knee, right hip and right ankle pain.  No significant change from prior evaluations.  She is going to have a follow-up with orthopedic surgeon for another knee joint injection.  She admitted that she has been taking hydroxyurea 1 tablet 5 days a week, 2 tablets 2 days a week regularly.  She also take aspirin 325 mg daily.  Apart from tiredness, she does not recall any other intolerance.    Pain Status  Currently in Pain: Yes    Review of Systems  Constitutional  Constitutional (WDL): Exceptions to WDL  Fatigue: Fatigue relieved by rest(doesn't sleep well at night)  Fever: None  Chills: None  Weight Gain: None  Weight Loss: None  Neurosensory  Neurosensory (WDL): Exceptions to WDL  Peripheral Motor Neuropathy: None  Ataxia: None  Peripheral Sensory Neuropathy: Asymptomatic, loss of deep tendon reflexes or paresthesia(feet)  Confusion: None  Syncope: None  Eye   Eye Disorder (WDL): Exceptions to WDL  Blurred Vision: Intervention not indicated  Dry Eye: None  Eye Pain: None  Watering Eyes: None  Ear  Ear Disorder (WDL): Exceptions to WDL  Ear Pain: None  Tinnitus: Mild symptoms, intervention not indicated(chronic, intermittent)  Cardiovascular  Cardiovascular (WDL): Exceptions to WDL  Palpitations: None  Edema: Yes  Edema Limbs: 5 - 10% inter-limb discrepancy in volume or circumference at point of greatest visible difference, swelling or obscuration of anatomic architecture on close inspection(slight bilat ankles)  Phlebitis: None  Superficial thrombophlebitis: None  Pulmonary  Respiratory (WDL): Exceptions to WDL  Cough: Mild symptoms, nonprescription intervention indicated(occ, at night, phlegm-clear)  Dyspnea: None  Hypoxia: None  Gastrointestinal  Gastrointestinal (WDL): Exceptions to WDL  Anorexia: None  Constipation: Occasional or intermittent symptoms, occasional use of stool softeners, laxatives, dietary modification, or enema(Last BM 12/15/20)  Diarrhea:  None  Dysphagia: None  Esophagitis: Symptomatic, altered eating/swallowing, oral supplements indicated  Nausea: None  Pharyngitis: None  Vomiting: None  Dysgeusia: None  Dry Mouth: None  Genitourinary  Genitourinary (WDL): All genitourinary elements are within defined limits  Lymphatic  Lymph (WDL): All lymph disorder elements are within defined limits  Musculoskeletal and Connective Tissue  Musculoskeletal and Connetive Tissue Disorders (WDL): Exceptions to WDL  Arthralgia: Moderate pain, limiting instrumental ADL  Bone Pain: Moderate pain, limiting instrumental ADL  Muscle Weakness : None  Myalgia: Moderate pain, limiting instrumental ADL  Integumentary  Integumentary (WDL): All integumentary elements are within defined limits  Patient Coping  Patient Coping: Accepting;Open/discussion  Distress Assessment  Distress Assessment Score: 5(car trouble getting here)  Accompanied by  Accompanied by: Alone  Oral Chemo Adherence       Past History  Past Medical History:   Diagnosis Date     Breast Cancer     Created by [facility] Annotation: Oct 21 2010  [id number] - Shirley Comer: stage 2;  10/18 lymph nodes positives/p chemo and [id number]      Hyperlipidemia     Created by Conversion      Hypertension     Created by Conversion      Osteopenia     Created by Conversion      Peripheral Neuropathy     Created by Conversion      Primary cancer of bone marrow (H)      Varicose Veins     Created by Conversion        Physical Exam    Recent Vitals 12/15/2020   Weight 157 lbs 11 oz   BSA (m2) 1.81 m2   /77   Pulse 77   Temp 97.4   Temp src 1   SpO2 98   Some recent data might be hidden     General: alert, awake, not in acute distress  HEENT: Head: Normal, normocephalic, atraumatic.  Eye: Normal external eye, conjunctiva, lids cornea, CONSTANCE.  Ears:  Non-tender.  Nose: Normal external nose, mucus membranes and septum.  Pharynx: Dental Hygiene adequate. Normal buccal mucosa. Normal pharynx.  Neck / Thyroid:  Supple, no masses, nodes, nodules or enlargement.  Lymphatics: No abnormally enlarged lymph nodes.  Chest: Normal chest wall and respirations. Clear to auscultation.  Heart: S1 S2 RRR, no murmur.   Abdomen: abdomen is soft without significant tenderness, masses, organomegaly or guarding  Extremities: normal strength, tone, and muscle mass  Skin: normal. no rash or abnormalities  CNS: non focal.    Lab Results    Recent Results (from the past 168 hour(s))   Comprehensive Metabolic Panel   Result Value Ref Range    Sodium 142 136 - 145 mmol/L    Potassium 4.0 3.5 - 5.0 mmol/L    Chloride 105 98 - 107 mmol/L    CO2 28 22 - 31 mmol/L    Anion Gap, Calculation 9 5 - 18 mmol/L    Glucose 96 70 - 125 mg/dL    BUN 14 8 - 28 mg/dL    Creatinine 0.77 0.60 - 1.10 mg/dL    GFR MDRD Af Amer >60 >60 mL/min/1.73m2    GFR MDRD Non Af Amer >60 >60 mL/min/1.73m2    Bilirubin, Total 0.4 0.0 - 1.0 mg/dL    Calcium 9.6 8.5 - 10.5 mg/dL    Protein, Total 7.4 6.0 - 8.0 g/dL    Albumin 3.9 3.5 - 5.0 g/dL    Alkaline Phosphatase 80 45 - 120 U/L    AST 19 0 - 40 U/L    ALT 16 0 - 45 U/L   Lactate Dehydrogenase (LDH)   Result Value Ref Range    LD (LDH) 174 125 - 220 U/L   HM1 (CBC with Diff)   Result Value Ref Range    WBC 7.1 4.0 - 11.0 thou/uL    RBC 4.76 3.80 - 5.40 mill/uL    Hemoglobin 15.0 12.0 - 16.0 g/dL    Hematocrit 46.1 35.0 - 47.0 %    MCV 97 80 - 100 fL    MCH 31.5 27.0 - 34.0 pg    MCHC 32.5 32.0 - 36.0 g/dL    RDW 14.7 (H) 11.0 - 14.5 %    Platelets 495 (H) 140 - 440 thou/uL    MPV 8.7 8.5 - 12.5 fL    Neutrophils % 65 50 - 70 %    Lymphocytes % 23 20 - 40 %    Monocytes % 9 2 - 10 %    Eosinophils % 2 0 - 6 %    Basophils % 1 0 - 2 %    Immature Granulocyte % 0 <=0 %    Neutrophils Absolute 4.6 2.0 - 7.7 thou/uL    Lymphocytes Absolute 1.6 0.8 - 4.4 thou/uL    Monocytes Absolute 0.6 0.0 - 0.9 thou/uL    Eosinophils Absolute 0.2 0.0 - 0.4 thou/uL    Basophils Absolute 0.1 0.0 - 0.2 thou/uL    Immature Granulocyte Absolute 0.0  <=0.0 thou/uL       Imaging    No results found.    Signed by: Camden Bourne MD

## 2021-06-13 NOTE — TELEPHONE ENCOUNTER
Diamond, Nurse Advisor from [facility] called with approval for continuity of care for patient. She said 2 forms were completed and approved for 1/1-3/31/21    [facility] authorization #[id number]    Dr. Bourne authorization #[id number]    Call back number [phone]. She said to give authorization# for reference.    I called back to see if additional information needed. Talked to Pinky Mullins.  He said no additional information is needed.  [facility] approved coverage for Dr. Bourne and [facility] for 1/1-3/31. I asked what happens to covered after 3/31.  He said coverage will need to be updated and extended after 3/31.  He said this is the responsibility of the provider and can be completed online.     Ref#[id number] for phone call.    Message routed to DAJUAN Dobson/THAI Panchal RN

## 2021-06-14 NOTE — PROGRESS NOTES
Faxed [facility] (famotidine, hydroxyurea) per patient's request to [phone]/THAI Panchal RN

## 2021-06-14 NOTE — PROGRESS NOTES
Assessment and Plan:     ADDENDUM:  Patient has scheduled cataract surgery on 12/5/2017. No contraindications for this surgery based on her complete physical exam done.    Acute complaints discussed today  1.  Worsening symptoms of GERD, patient cannot tolerate PPIs.  She is taking ranitidine twice a day.  She will schedule EGD.  2.  Urinary incontinence and frequency, UA will be checked today.  She will start Detrol.  Prescription sent.    1. Osteoporosis, senile    - HM2(CBC w/o Differential)  - Comprehensive Metabolic Panel  - Urinalysis-UC if Indicated  - Lipid Profile  - Vitamin D, Total (25-Hydroxy)    2. Essential hypertension    - HM2(CBC w/o Differential)  - Comprehensive Metabolic Panel  - Urinalysis-UC if Indicated  - Lipid Profile  - Vitamin D, Total (25-Hydroxy)    3. Hyperlipidemia    - HM2(CBC w/o Differential)  - Comprehensive Metabolic Panel  - Urinalysis-UC if Indicated  - Lipid Profile  - Vitamin D, Total (25-Hydroxy)    4. Restless Legs Syndrome    - HM2(CBC w/o Differential)  - Comprehensive Metabolic Panel  - Urinalysis-UC if Indicated  - Lipid Profile  - Vitamin D, Total (25-Hydroxy)    5. Malignant neoplasm of female breast    - HM2(CBC w/o Differential)  - Comprehensive Metabolic Panel  - Urinalysis-UC if Indicated  - Lipid Profile  - Vitamin D, Total (25-Hydroxy)    6. Irritable bowel syndrome    - HM2(CBC w/o Differential)  - Comprehensive Metabolic Panel  - Urinalysis-UC if Indicated  - Lipid Profile  - Vitamin D, Total (25-Hydroxy)    7. Chronic Reflux Esophagitis    - HM2(CBC w/o Differential)  - Comprehensive Metabolic Panel  - Urinalysis-UC if Indicated  - Lipid Profile  - Vitamin D, Total (25-Hydroxy)    8. Peripheral Neuropathy    - HM2(CBC w/o Differential)  - Comprehensive Metabolic Panel  - Urinalysis-UC if Indicated  - Lipid Profile  - Vitamin D, Total (25-Hydroxy)    9. Varicose Veins    - HM2(CBC w/o Differential)  - Comprehensive Metabolic Panel  - Urinalysis-UC if  Indicated  - Lipid Profile  - Vitamin D, Total (25-Hydroxy)      The patient's current medical problems were reviewed.    I have had an Advance Directives discussion with the patient.  The following health maintenance schedule was reviewed with the patient and provided in printed form in the after visit summary:   Health Maintenance   Topic Date Due     MAMMOGRAM  1940     DXA SCAN  06/06/2018     FALL RISK ASSESSMENT  11/16/2018     ADVANCE DIRECTIVES DISCUSSED WITH PATIENT  12/22/2020     TD 18+ HE  11/09/2022     PNEUMOCOCCAL POLYSACCHARIDE VACCINE AGE 65 AND OVER  Completed     INFLUENZA VACCINE RULE BASED  Completed     PNEUMOCOCCAL CONJUGATE VACCINE FOR ADULTS (PCV13 OR PREVNAR)  Completed     ZOSTER VACCINE  Completed        Subjective:   Chief Complaint: Libertad Hernadez is an 77 y.o. female here for an Annual Wellness visit.   HPI: Patient is here today for complete physical.  She is complaining about increased urinary frequency and urgency and some urinary leakage over the last year.  She denies any blood in the urine, pain with urination.  She is also complaining about worsening symptoms of the heartburn, has this issue for many years.  She could not tolerate PPIs because of the worsening abdominal pain and nausea.  She is taking ranitidine twice a day.    Review of Systems:    Please see above.  The rest of the review of systems are negative for all systems.    Patient Care Team:  Renetta Carrillo MD as PCP - General     Patient Active Problem List   Diagnosis     Breast Cancer     Irritable Bowel Syndrome     Chronic Reflux Esophagitis     Tuberculin PPD Induration Positive Interpretation     Peripheral Neuropathy     Varicose Veins     Restless Legs Syndrome     Essential hypertension     Hyperlipidemia     Wrist fracture     Osteoporosis, senile     Past Medical History:   Diagnosis Date     Breast Cancer     Created by [facility] Annotation: Oct 21 2010  9:56AM - Shirley Comer: stage  2;  10/18 lymph nodes positives/p chemo and [id number]      Hyperlipidemia     Created by Conversion      Hypertension     Created by Conversion      Osteopenia     Created by Conversion      Peripheral Neuropathy     Created by Conversion      Varicose Veins     Created by Conversion       Past Surgical History:   Procedure Laterality Date     FOOT SURGERY  2010     IN INCISE FINGER TENDON SHEATH      Description: Hand Incision Tendon Sheath Of A Finger;  Recorded: 04/04/2008;     IN MASTECTOMY, PARTIAL      Description: Right Breast Partial Mastectomy Inferior Lateral Quarter;  Recorded: 10/21/2010;     IN MASTECTOMY, RADICAL      Description: Radical Mastectomy Left Breast;  Recorded: 04/04/2008;     IN REMOVAL ADENOIDS,PRIMARY,<13 Y/O      Description: Adenoidectomy;  Recorded: 04/04/2008;     IN REMOVAL GALLBLADDER      Description: Cholecystectomy;  Recorded: 04/04/2008;     IN REMOVAL OF TONSILS,<13 Y/O      Description: Tonsillectomy;  Recorded: 04/04/2008;      Family History   Problem Relation Age of Onset     Hypertension Father      Heart disease Father      Diabetes Sister      Prostate cancer Brother       Social History     Social History     Marital status:      Spouse name: N/A     Number of children: N/A     Years of education: N/A     Occupational History     Not on file.     Social History Main Topics     Smoking status: Never Smoker     Smokeless tobacco: Never Used     Alcohol use 0.6 oz/week     1 Glasses of wine per week     Drug use: No     Sexual activity: No     Other Topics Concern     Not on file     Social History Narrative      Current Outpatient Prescriptions   Medication Sig Dispense Refill     aspirin 81 MG EC tablet Take 1 tablet (81 mg total) by mouth daily. (Patient taking differently: Take 81 mg by mouth daily. States takes 1 in am and 1 in pm for total daily of 162 mg)  0     BIOTIN ORAL Take 5,000 mcg by mouth daily.        calcium carbonate-vitamin D3 (CALCIUM 600 +  "D,3,) 600 mg(1,500mg) -200 unit per tablet Take 2 tablets by mouth daily.       FLUZONE HIGH-DOSE 2016-17, PF, 180 mcg/0.5 mL Syrg injection        FLUZONE HIGH-DOSE 2017-18, PF, 180 mcg/0.5 mL Syrg injection TAKE AS DIRECTED  0     latanoprost (XALATAN) 0.005 % ophthalmic solution        lisinopril (PRINIVIL,ZESTRIL) 5 MG tablet Take 1 tablet (5 mg total) by mouth daily. 90 tablet 3     ranitidine (ZANTAC) 150 MG tablet Take 1 tablet (150 mg total) by mouth 2 (two) times a day. 180 tablet 3     simvastatin (ZOCOR) 20 MG tablet Take 1 tablet (20 mg total) by mouth daily. 90 tablet 3     traZODone (DESYREL) 100 MG tablet Take 2 tablets (200 mg total) by mouth bedtime. 180 tablet 3     No current facility-administered medications for this visit.       Objective:   Vital Signs:   Visit Vitals     /80     Pulse 62     Ht 5' 5\" (1.651 m)     Wt 162 lb 1.6 oz (73.5 kg)     BMI 26.97 kg/m2        VisionScreening:  No exam data present     PHYSICAL EXAM  Constitutional:  oriented to person, place, and time, appears well-nourished. No distress.   HENT:   Head: Normocephalic.   Mouth/Throat: Oropharynx is clear and moist.   Eyes: Conjunctivae are normal. Pupils are equal, round, and reactive to light.   Neck: Normal range of motion. Neck supple.   Cardiovascular: Normal rate, regular rhythm and normal heart sounds.    Pulmonary/Chest: Effort normal and breath sounds normal.  Breast exam: s/p bilateral mastectomy, no axillary LAD.   Abdominal: Soft. Bowel sounds are normal.   Musculoskeletal: Normal range of motion.   Neurological: alert and oriented to person, place, and time. Skin: Skin is warm.   Psychiatric: normal mood and affect.      Assessment Results 11/16/2017   Activities of Daily Living No help needed   Instrumental Activities of Daily Living No help needed   Mini Cog Total Score 5   Some recent data might be hidden     A Mini-Cog score of 0-2 suggests the possibility of dementia, score of 3-5 suggests no " dementia    Identified Health Risks:     The patient was provided with written information regarding signs of hearing loss.  Patient's advanced directive was discussed and I am comfortable with the patient's wishes.

## 2021-06-14 NOTE — PROGRESS NOTES
Clinic Note    Assessment:     Assessment and Plan:    1. Rash    I do not appreciate any type of noticeable or substantial rash today.  I think that the patient is likely dealing with some itchiness secondary to dry skin, as well as some contact irritation from the elastic band on her underwear.  See instructions below.     Patient Instructions   Go to your pharmacy and buy a 16 ounce tub of Eucerin.  Make sure that it is the intensive healing type.    Find a tube of hydrocortisone 1% with aloe at your pharmacy.    Performed the following twice daily and after showers:    -Take a quarter size dollop of the Eucerin and place it in your hands.  -Scored a dime size amount of hydrocortisone cream into the Eucerin.  -Mixed thoroughly.  -Apply liberal amounts of the cream/hydrocortisone to the irritated areas on her belt line.  -Apply small amount of this material to behind her ears as well.    If you are still having symptoms after 2 weeks come back and see me.  I do not think that you have shingles today.                 Subjective:      Libertad Hernadez is a 77 y.o. female who comes to the clinic with a rash. Says that the rash is itchy and burning. It is on bilateral hips and lower abdomen. Started one week ago. She has tried lotion and some cortisone cream, she says that this helps only a little. Says that she seems to have a rash like this every winter, but it usually goes away on its own. Has never seen anyone for this rash before. No hx of skin issues.     No weeping or oozing from the rash.  She tells me that she is having a cataract procedure on her eye in the next 2 weeks and she worries that this rash may  inhibit her from having the procedure done.    The following portions of the patient's history were reviewed and updated as appropriate: Allergies, Medications, Problem List.     Review of Systems:    Review is negative except for what is mentioned above.     Social Hx:    History   Smoking Status     Never  Smoker   Smokeless Tobacco     Never Used         Objective:     Vitals:    12/15/17 1259   BP: 132/84   Pulse: 64   Temp: 97.4  F (36.3  C)   SpO2: 98%   Weight: 165 lb (74.8 kg)       Exam:    General: No apparent distress. Calm. Alert and Oriented X3. Pt behavior is appropriate.  Skin: The patient was placed supine on the exam table.  There is very light amount of erythema along her beltline if anything at all.  I do not appreciate any vesicular or maculopapular or eczematous or fungal type of rash.  If anything it appears as though she may have some slight irritation from the elastic band on her underwear.  I do not appreciate any rash behind either of her ears.      Patient Active Problem List   Diagnosis     Breast Cancer     Irritable Bowel Syndrome     Chronic Reflux Esophagitis     Tuberculin PPD Induration Positive Interpretation     Peripheral Neuropathy     Varicose Veins     Restless Legs Syndrome     Essential hypertension     Hyperlipidemia     Wrist fracture     Osteoporosis, senile     S/P bilateral mastectomy     Current Outpatient Prescriptions   Medication Sig Dispense Refill     aspirin 81 MG EC tablet Take 1 tablet (81 mg total) by mouth daily.  0     BIOTIN ORAL Take 5,000 mcg by mouth daily.        calcium carbonate-vitamin D3 (CALCIUM 600 + D,3,) 600 mg(1,500mg) -200 unit per tablet Take 2 tablets by mouth daily.       latanoprost (XALATAN) 0.005 % ophthalmic solution        lisinopril (PRINIVIL,ZESTRIL) 5 MG tablet Take 1 tablet (5 mg total) by mouth daily. 90 tablet 3     ranitidine (ZANTAC) 150 MG tablet Take 1 tablet (150 mg total) by mouth 2 (two) times a day. 180 tablet 3     simvastatin (ZOCOR) 20 MG tablet Take 1 tablet (20 mg total) by mouth daily. 90 tablet 3     traZODone (DESYREL) 100 MG tablet Take 2 tablets (200 mg total) by mouth bedtime. 180 tablet 3     tolterodine (DETROL LA) 4 MG ER capsule Take 1 capsule (4 mg total) by mouth daily. 30 capsule 2     No current  facility-administered medications for this visit.        Total time spent with patient was 15 minutes with >50% of time spent in face-to-face counseling regarding the above plan       Delano Blake CNP (Rob)    12/15/2017

## 2021-06-14 NOTE — PROGRESS NOTES
ASSESSMENT:    1. Preop exam for internal medicine  No contraindication for the surgical procedure.  Patient is on aspirin and was asked to stop because of the glaucoma stenting procedure.  Not certain if this is necessary but she certainly does not need the aspirin at this time was encouraged to restart 2 days after the procedure if that is okay with the surgeon.    2. Essential hypertension  Well-controlled on current meds and labs were great just 2 weeks ago.  They have been included the bottom of this visit.    3. Thrombocytosis  Not in a risky or dangerous range but interesting.  We will repeat today.  - HM1(CBC and Differential)  - HM1 (CBC with Diff)    PLAN:  Patient Instructions   No aspirin between now and the procedure and restart 2 days after          Orders Placed This Encounter   Procedures     HM1 (CBC with Diff)     Return if symptoms worsen or fail to improve.    ASSESSED PROBLEMS:  Problem List Items Addressed This Visit     Essential hypertension      Other Visit Diagnoses     Preop exam for internal medicine    -  Primary    Thrombocytosis        Relevant Orders    HM1(CBC and Differential)    HM1 (CBC with Diff)          CHIEF COMPLAINT:  Chief Complaint   Patient presents with     Pre-op Exam     cataract - right eye- - 12/05/2017- [facility]        HISTORY OF PRESENT ILLNESS:  Libertad Hernadez is a 77 y.o. female here for an internal medicine pre-operative consultation. The exam is requested by Dr. Nunes in preparation for Right Cataract to be performed at [facility] on 12/05/2017. Symptoms are blurred vision bilateral and requiring bilateral cataract surgery for correction as well as a procedure for her glaucoma providing some sort of stent in decreasing the pressure of the eye..     Libertad Hernadez has tolerated previous surgeries well without bleeding or anesthesia difficulty.     Past/Current Medical Problems:  Previous bleeding disorders: Negative    History of  anesthesia reactions: nausia    Past Medical History:   Diagnosis Date     Breast Cancer     Created by Conversion [facility] Annotation: Oct 21 2010  9:56AM - Shirley Comer: stage 2;  10/18 lymph nodes positives/p chemo and [id number]      Hyperlipidemia     Created by Conversion      Hypertension     Created by Conversion      Osteopenia     Created by Conversion      Peripheral Neuropathy     Created by Conversion      Varicose Veins     Created by Conversion          REVIEW OF SYSTEMS:   Constitutional: Negative  Eyes: cataracts  ENT: Negative  Respiratory: Negative  Breast/Skin: Negative  Cardiovascular:Negative   GI: constipation  Urinary: Negative  Reproductive: Negative  Musculoskeletal: Negative  Neuro: Negative  Endocrine: Negative  Mental Health: Negative   Lymph: Negative  Heme: Negative     Remaining Review of Systems negative.    QUESTIONS/HISTORY PERTINENT TO PRE-OP:  Body Piercings: None    Family history of bleeding disorders: Negative    Family history of anesthesia reactions: Negative      Surgeries:  Past Surgical History:   Procedure Laterality Date     FOOT SURGERY  2010     DC INCISE FINGER TENDON SHEATH      Description: Hand Incision Tendon Sheath Of A Finger;  Recorded: 04/04/2008;     DC MASTECTOMY, PARTIAL      Description: Right Breast Partial Mastectomy Inferior Lateral Quarter;  Recorded: 10/21/2010;     DC MASTECTOMY, RADICAL      Description: Radical Mastectomy Left Breast;  Recorded: 04/04/2008;     DC REMOVAL ADENOIDS,PRIMARY,<13 Y/O      Description: Adenoidectomy;  Recorded: 04/04/2008;     DC REMOVAL GALLBLADDER      Description: Cholecystectomy;  Recorded: 04/04/2008;     DC REMOVAL OF TONSILS,<13 Y/O      Description: Tonsillectomy;  Recorded: 04/04/2008;       Family History:   Family History   Problem Relation Age of Onset     Hypertension Father      Heart disease Father      Diabetes Sister      Prostate cancer Brother        Social History:  Social History  "    Social History     Marital status:      Spouse name: N/A     Number of children: N/A     Years of education: N/A     Occupational History     Not on file.     Social History Main Topics     Smoking status: Never Smoker     Smokeless tobacco: Never Used     Alcohol use 0.6 oz/week     1 Glasses of wine per week     Drug use: No     Sexual activity: No     Other Topics Concern     Not on file     Social History Narrative       VITALS:  Vitals:    12/01/17 0752   BP: 122/62   Pulse: 60   Weight: 162 lb 8 oz (73.7 kg)   Height: 5' 5\" (1.651 m)     Wt Readings from Last 3 Encounters:   12/01/17 162 lb 8 oz (73.7 kg)   11/16/17 162 lb 1.6 oz (73.5 kg)   12/20/16 165 lb 1.6 oz (74.9 kg)     Body mass index is 27.04 kg/(m^2).    PHYSICAL EXAM:  General Appearance: Alert, cooperative, no distress, appears stated age.  HEENT: EMOI,  Neck: Supple without adenopathy   Back: No CVA tenderness   Lungs: Clear to auscultation bilaterally, good air movement.  Heart: Regular rate and rhythm, S1 and S2 normal, no murmur or bruit.  Abdomen: Soft, non-tender, no HSM or masses.  Musculoskeletal: No gross abnormalities.  Extremities: No CCE, pulses II/IV and symmetric,   Skin: No worrisome lesions noted..  Neurologic: CNII-XII intact, strength V/V and symmetric, DTRs II/IV and symmetric, sensory grossly intact  Psychiatric: Normal mood and affect.      Ref Range & Units 11/16/17 11:29 AM     Sodium 136 - 145 mmol/L 141   Potassium 3.5 - 5.0 mmol/L 4.6   Chloride 98 - 107 mmol/L 104   CO2 22 - 31 mmol/L 28   Anion Gap, Calculation 5 - 18 mmol/L 9   Glucose 70 - 125 mg/dL 83   BUN 8 - 28 mg/dL 14   Creatinine 0.60 - 1.10 mg/dL 0.74   GFR MDRD Af Amer >60 mL/min/1.73m2 >60   GFR MDRD Non Af Amer >60 mL/min/1.73m2 >60   Bilirubin, Total 0.0 - 1.0 mg/dL 0.6   Calcium 8.5 - 10.5 mg/dL 10.4   Protein, Total 6.0 - 8.0 g/dL 7.0   Albumin 3.5 - 5.0 g/dL 3.8   Alkaline Phosphatase 45 - 120 U/L 48   AST 0 - 40 U/L 23   ALT 0 - 45 U/L 17 "           MEDICATIONS:  Current Outpatient Prescriptions   Medication Sig Dispense Refill     aspirin 81 MG EC tablet Take 1 tablet (81 mg total) by mouth daily.  0     BIOTIN ORAL Take 5,000 mcg by mouth daily.        calcium carbonate-vitamin D3 (CALCIUM 600 + D,3,) 600 mg(1,500mg) -200 unit per tablet Take 2 tablets by mouth daily.       latanoprost (XALATAN) 0.005 % ophthalmic solution        lisinopril (PRINIVIL,ZESTRIL) 5 MG tablet Take 1 tablet (5 mg total) by mouth daily. 90 tablet 3     ranitidine (ZANTAC) 150 MG tablet Take 1 tablet (150 mg total) by mouth 2 (two) times a day. 180 tablet 3     simvastatin (ZOCOR) 20 MG tablet Take 1 tablet (20 mg total) by mouth daily. 90 tablet 3     traZODone (DESYREL) 100 MG tablet Take 2 tablets (200 mg total) by mouth bedtime. 180 tablet 3     tolterodine (DETROL LA) 4 MG ER capsule Take 1 capsule (4 mg total) by mouth daily. 30 capsule 2     No current facility-administered medications for this visit.        ALLERGIES:  Allergies   Allergen Reactions     Adhesive Tape-Silicones Itching     States itchy rash with certain tapes     Penicillins Hives

## 2021-06-14 NOTE — PROGRESS NOTES
Patient here today for labs and follow-up visit with Dr. Bourne for essential thrombocythemia/THAI Panchal RN

## 2021-06-14 NOTE — CONSULTS
[facility] Hematology and Oncology Consult Note    Patient: Libertad Hernadez  MRN: [id number]  Date of Service: 12/21/2017      Reason for Visit    Referred by Dr. Renetta Carrillo MD regarding thrombocytosis    Assessment/Plan    ECOG Performance   ECOG Performance Status: 0  Distress Assessment  Distress Assessment Score: 3    A 77-year-old female with mild thrombocytosis.  She had prior chemotherapy exposure in 1992 during breast cancer treatment.   Upon review of her blood work, she had completely normal hemogram until November 2017.  She was noted to have platelet count of 570-590K with normal WBC and hemoglobin.  She did not recall any changes in her health no medication except she started on aspirin.  She does not have any chronic infection.  She has some arthritis in her thumb and knees, but no evidence of inflammatory arthritis.   We discussed about reactive thrombocytosis versus primary bone marrow disease with myeloproliferative neoplasm myelodysplastic syndrome.  I noted mild eosinophilia in the last hemogram otherwise unremarkable differential.  Peripheral smear morphology was reviewed and noted erythrocytosis and thrombocytosis.  The following labs were ordered to evaluate as the initial tests.  I explained to her that based on the findings, we will determine the next step in management.  If any of the studies suggest primary bone marrow disease, we will request a unilateral bone marrow biopsy for further evaluation.  Patient voiced understanding.   Follow-up with me in about 2 weeks to review the results and next step in care.    Problem List    1. Thrombocytosis  Antinuclear Antibodies Screen (HEIDI)    Reticulocytes    Lactate Dehydrogenase (LDH)    C-Reactive Protein    JAK2 V617F with reflex to JAK2 exon 12, CALR, MPL - Misc. Lab Test    Ferritin    Iron and Transferrin Iron Binding Capacity    CANCELED: HM1(CBC and Differential)    CANCELED: HM1 (CBC with Diff)   2. Increased platelet count  HM1(CBC and  Differential)    HM1 (CBC with Diff)     ______________________________________________________________________________    History    Ms. Libertad Hernadez is a very pleasant 69-year-old female accompanied by her  today for evaluation of thrombocytosis.  Her platelet count was found to be elevated on routine blood work.  She denies any headaches.  No prior history of blood clots or bleeding.  She denies fever, night sweats, or palpable adenopathy or bone pain.    Patient reported that she has prior history of breast cancer ×2.  In 1992, she was diagnosed with a left breast cancer with locally advanced breast cancer reported 10/18 lymph node positive.  She had left breast mastectomy, axillary lymph node dissection followed by adjuvant chemotherapy for 7 treatments and tamoxifen for 5 years.  She had another breast cancer in her right breast in 2010 has a stage I breast cancer which she underwent lumpectomy followed by radiation.  She did not take any endocrine therapy after.  Some of the details are not clear.  She was treated at [facility] in [place].    Her last colonoscopy was in 2012 and reported normal.  She was told she would not need any further screening colonoscopy.  Status post cystectomy in 2008.    Past History    Past Medical History:   Diagnosis Date     Breast Cancer     Created by [employer] [facility] Annotation: Oct 21 2010  9:56AM - Shirley Comer: stage 2;  10/18 lymph nodes positives/p chemo and [id number]      Hyperlipidemia     Created by Conversion      Hypertension     Created by Conversion      Osteopenia     Created by Conversion      Peripheral Neuropathy     Created by Conversion      Varicose Veins     Created by Conversion     Family History   Problem Relation Age of Onset     Hypertension Father      Heart disease Father      Diabetes Sister      Prostate cancer Brother       Past Surgical History:   Procedure Laterality Date     FOOT SURGERY  2010     SHEY THAKKAR  "FINGER TENDON SHEATH      Description: Hand Incision Tendon Sheath Of A Finger;  Recorded: 04/04/2008;     TN MASTECTOMY, PARTIAL      Description: Right Breast Partial Mastectomy Inferior Lateral Quarter;  Recorded: 10/21/2010;     TN MASTECTOMY, RADICAL      Description: Radical Mastectomy Left Breast;  Recorded: 04/04/2008;     TN REMOVAL ADENOIDS,PRIMARY,<11 Y/O      Description: Adenoidectomy;  Recorded: 04/04/2008;     TN REMOVAL GALLBLADDER      Description: Cholecystectomy;  Recorded: 04/04/2008;     TN REMOVAL OF TONSILS,<11 Y/O      Description: Tonsillectomy;  Recorded: 04/04/2008;    Social History     Social History     Marital status:      Spouse name: N/A     Number of children: N/A     Years of education: N/A     Occupational History     Not on file.     Social History Main Topics     Smoking status: Never Smoker     Smokeless tobacco: Never Used     Alcohol use 0.6 oz/week     1 Glasses of wine per week     Drug use: No     Sexual activity: No     Other Topics Concern     Not on file     Social History Narrative        Allergies    Allergies   Allergen Reactions     Adhesive Tape-Silicones Itching     States itchy rash with certain tapes     Penicillins Hives       Review of Systems  Pain  Currently in Pain: No/denies   Apart from describing in history, the remainder of comprehensive ROS was negative.      Physical Exam    Recent Vitals 12/21/2017   Height 5' 5\"   Weight 164 lbs 3 oz   BSA (m2) 1.85 m2   /67   Pulse 70   Temp -   SpO2 100   Some recent data might be hidden     General: alert, awake, not in acute distress  HEENT: Head: Normal, normocephalic, atraumatic.  Eye: Normal external eye, conjunctiva, lids cornea, CONSTANCE.  Ears: Non-tender.  Nose: Normal external nose, mucus membranes and septum.  Pharynx: Dental Hygiene adequate. Normal buccal mucosa. Normal pharynx.  Neck / Thyroid: Supple, no masses, nodes, nodules or enlargement.  Lymphatics: No abnormally enlarged lymph " nodes.  Chest: Normal chest wall and respirations. Clear to auscultation.  Heart: S1 S2 RRR, no murmur.   Abdomen: abdomen is soft without significant tenderness, masses, organomegaly or guarding  Extremities: normal strength, tone, and muscle mass  Skin: normal. no rash or abnormalities  CNS: non focal.    Lab Results    Recent Results (from the past 168 hour(s))   Reticulocytes   Result Value Ref Range    Retic Absolute Count 0.057 0.010 - 0.110 mill/uL   Lactate Dehydrogenase (LDH)   Result Value Ref Range    LD (LDH) 189 125 - 220 U/L   C-Reactive Protein   Result Value Ref Range    CRP 0.2 0.0 - 0.8 mg/dL   HM1 (CBC with Diff)   Result Value Ref Range    WBC 8.9 4.0 - 11.0 thou/uL    RBC 5.01 3.80 - 5.40 mill/uL    Hemoglobin 14.6 12.0 - 16.0 g/dL    Hematocrit 43.7 35.0 - 47.0 %    MCV 87 80 - 100 fL    MCH 29.1 27.0 - 34.0 pg    MCHC 33.4 32.0 - 36.0 g/dL    RDW 15.4 (H) 11.0 - 14.5 %    Platelets 466 (H) 140 - 440 thou/uL    MPV 9.0 8.5 - 12.5 fL    Neutrophils % 63 50 - 70 %    Lymphocytes % 21 20 - 40 %    Monocytes % 8 2 - 10 %    Eosinophils % 7 (H) 0 - 6 %    Basophils % 1 0 - 2 %    Neutrophils Absolute 5.6 2.0 - 7.7 thou/uL    Lymphocytes Absolute 1.9 0.8 - 4.4 thou/uL    Monocytes Absolute 0.7 0.0 - 0.9 thou/uL    Eosinophils Absolute 0.6 (H) 0.0 - 0.4 thou/uL    Basophils Absolute 0.1 0.0 - 0.2 thou/uL       Imaging Results    No results found.      Signed by: Camden Bourne MD

## 2021-06-14 NOTE — PROGRESS NOTES
[facility] Hematology and Oncology Progress Note    Patient: Libertad Hernadez  MRN: [id number]  Date of Service: 1/26/2021        Reason for Visit    Follow-up essential thrombocytosis.    Assessment and Plan  Cancer Staging  No matching staging information was found for the patient.    ECOG Performance   ECOG Performance Status: 0     Distress Assessment  Distress Assessment Score: 5(due to pandemic)    Pain  Currently in Pain: Yes  Location: R hip    #. JAK2 positive essential thrombocytosis   High risk for thrombotic complication based on age, JAK2 V617F mutation and cardiovascular risk factor of hypertension, diabetes.  She developed acute appeared to be provoked pulmonary emboli (small [place]) in May 2018.   She is clinically stable.  Labs are reviewed.  Noted normal WBC and hemoglobin.  Her platelet count has slightly improved from 6 weeks ago.  I recommended to continue current dose of hydroxyurea 500 mg 4 days a week, 1000 mg 3 days a week.  I advised her to follow-up and recheck labs in about 2-3 months.     Because of the insurance changes, she will have to find a different clinic and different hematologist/oncologist.  I advised her to find a new doctor as soon as possible, that way she can arrange a follow-up in appropriate timeframe.    I completed medication refill from [facility] with refill on it for the meantime.    #.  She is also looking for a primary care provider within her network as well.     Problem List    1. Essential thrombocythemia (H)  HM2(CBC w/o Differential)      ______________________________________________________________________________  Diagnosis  January 2018-diagnosed with ANGELA 2 positive essential thrombocytosis platelet counts at high 500.   -  Unilateral bone marrow biopsy showed 30% cellularity, 1-2+/3 reticulin stain.  Cytogenetics- normal.     Treatment to date  January 2018-started Hydrea 500 mg every other day (due to patient's fear of side effects) and dose adjusted to 500 mg  once a day in July 2018, then adjusted to 500 mg 6 days a week/1000 mg 1 day a week in July 2019, then further dose increase to 500 mg 5 days a week/1000 mg 2 days a week in October 2019.    Other commorbidities  #.  Small burden pulmonary emboli in the left lower lobe in May 2018, presented with syncopal episode and chest pain   No DVT.  She was treated with Xarelto for about 7 months.  She stop in December 2018 and started aspirin 325 mg daily.  She did not have any bleeding or major side effects from Xarelto, however she could not afford any longer.      #.  History of stage II breast cancer with positive lymph nodes (10/18) in 1992. S/p left breast mastectomy, axillary lymph node dissection followed by adjuvant chemotherapy and tamoxifen.  She had a second breast cancer in the right breast in 2010, stage I, post right breast mastectomy followed by radiation (reported the tumor was close to the chest wall).  Did not take adjuvant endocrine therapy.      History of Present Illness    Libertad presented herself today.  She has ongoing fatigue and not sleeping well.  She has occasional night sweats and hot flashes.  She is concerned about thinning of her hair.  She has tried cough.  Noted shortness of breath with stairs no chest pain.  Occasional mouth sores.  She is doing okay on hydroxyurea at this point.  We have increase hydroxyurea to 2 tablets 3-day a week, 1 tablet 4 days a week in mid December 2020.  She is under a lot of stress because of the COVID-19 and she was frustrated with getting  an appointment for vaccine.     Pain Status  Currently in Pain: Yes    Review of Systems  Constitutional  Constitutional (WDL): Exceptions to WDL  Fatigue: Fatigue relieved by rest(not sleeping well at night)  Fever: None  Chills: None  Weight Gain: None  Weight Loss: to <10% from baseline, intervention not indicated(4# 12/15/20)  Neurosensory  Neurosensory (WDL): Exceptions to WDL  Peripheral Motor Neuropathy: Asymptomatic,  clinical or diagnostic observations only, intervention not indicated  Ataxia: Asymptomatic, clinical or diagnostic observations only, intervention not indicated  Peripheral Sensory Neuropathy: Asymptomatic, loss of deep tendon reflexes or paresthesia(hands, feet, legs-worse at night)  Confusion: None  Syncope: None  Eye   Eye Disorder (WDL): Exceptions to WDL  Blurred Vision: None  Dry Eye: Asymptomatic, clinical or diagnostic observations only, mild symptoms relieved by lubricants  Eye Pain: None  Watering Eyes: None  Ear  Ear Disorder (WDL): Exceptions to WDL  Ear Pain: Mild Pain(itchy)  Tinnitus: Mild symptoms, intervention not indicated(chronic)  Cardiovascular  Cardiovascular (WDL): Exceptions to WDL  Palpitations: None  Edema: Yes  Edema Limbs: 5 - 10% inter-limb discrepancy in volume or circumference at point of greatest visible difference, swelling or obscuration of anatomic architecture on close inspection(bilat LE)  Phlebitis: None  Superficial thrombophlebitis: None  Pulmonary  Respiratory (WDL): Exceptions to WDL  Cough: Mild symptoms, nonprescription intervention indicated(occ, at night, dry, tickle)  Dyspnea: Shortness of breath with moderate exertion(going upstairs)  Hypoxia: None  Gastrointestinal  Gastrointestinal (WDL): Exceptions to WDL  Anorexia: None  Constipation: Occasional or intermittent symptoms, occasional use of stool softeners, laxatives, dietary modification, or enema(Last BM 1/25/21)  Diarrhea: None  Dysphagia: None  Esophagitis: Symptomatic, altered eating/swallowing, oral supplements indicated  Nausea: None  Pharyngitis: None  Vomiting: None  Dysgeusia: None  Dry Mouth: None  Genitourinary  Genitourinary (WDL): Exceptions to WDL(urinary urgency, burning in abd at night)  Urinary Frequency: Present(up ~ 2 times at night)  Urinary Retention: None  Urinary Tract Pain: None  Lymphatic  Lymph (WDL): All lymph disorder elements are within defined limits  Musculoskeletal and Connective  Tissue  Musculoskeletal and Connetive Tissue Disorders (WDL): Exceptions to WDL  Arthralgia: Moderate pain, limiting instrumental ADL(R hip)  Bone Pain: Moderate pain, limiting instrumental ADL(R hip)  Muscle Weakness : None  Myalgia: None  Integumentary  Integumentary (WDL): All integumentary elements are within defined limits  Patient Coping  Patient Coping: Accepting;Open/discussion  Distress Assessment  Distress Assessment Score: 5(due to pandemic)  Accompanied by  Accompanied by: Alone  Oral Chemo Adherence       Past History  Past Medical History:   Diagnosis Date     Breast Cancer     Created by [employer] [facility] Annotation: Oct 21 2010  9:56AM - Shirley Comer: stage 2;  10/18 lymph nodes positives/p chemo and [id number]      Hyperlipidemia     Created by Conversion      Hypertension     Created by Conversion      Osteopenia     Created by Conversion      Peripheral Neuropathy     Created by Conversion      Primary cancer of bone marrow (H)      Varicose Veins     Created by Conversion        Physical Exam    Recent Vitals 1/26/2021   Weight 153 lbs 13 oz   BSA (m2) 1.79 m2   /82   Pulse 67   Temp 98   Temp src 1   SpO2 98   Some recent data might be hidden     General: alert, awake, not in acute distress  HEENT: Head: Normal, normocephalic, atraumatic.  Eye: Normal external eye, conjunctiva, lids cornea, CONSTANCE.  Ears:  Non-tender.  Nose: Normal external nose, mucus membranes and septum.  Pharynx: Dental Hygiene adequate. Normal buccal mucosa. Normal pharynx.  Neck / Thyroid: Supple, no masses, nodes, nodules or enlargement.  Lymphatics: No abnormally enlarged lymph nodes.  Chest: Normal chest wall and respirations. Clear to auscultation.  Heart: S1 S2 RRR, no murmur.   Abdomen: abdomen is soft without significant tenderness, masses, organomegaly or guarding  Extremities: normal strength, tone, and muscle mass  Skin: normal. no rash or abnormalities  CNS: non focal.    Lab Results    Recent  Results (from the past 168 hour(s))   HM2(CBC w/o Differential)   Result Value Ref Range    WBC 7.4 4.0 - 11.0 thou/uL    RBC 4.65 3.80 - 5.40 mill/uL    Hemoglobin 14.7 12.0 - 16.0 g/dL    Hematocrit 44.8 35.0 - 47.0 %    MCV 96 80 - 100 fL    MCH 31.6 27.0 - 34.0 pg    MCHC 32.8 32.0 - 36.0 g/dL    RDW 14.8 (H) 11.0 - 14.5 %    Platelets 454 (H) 140 - 440 thou/uL    MPV 8.6 8.5 - 12.5 fL       Imaging    No results found.    Signed by: Camden Bourne MD

## 2021-06-15 NOTE — TELEPHONE ENCOUNTER
Patient called and left message stating she didn't receive printout after last visit with Dr. Bourne and unable to see on [employer]. She requested copy be mailed or put on [employer].     Called patient and told her I would mail a copy of her AVS and lab results from 1/26/21 today. Patient verbalized understanding/THAI Panchal RN

## 2021-06-15 NOTE — TELEPHONE ENCOUNTER
Refill Approved    Rx renewed per Medication Renewal Policy. Medication was last renewed on 2/13/20.    Olvin Means, [facility] Triage/Med Refill 2/9/2021     Requested Prescriptions   Pending Prescriptions Disp Refills     traZODone (DESYREL) 100 MG tablet [Pharmacy Med Name: TRAZODONE HCL 100MG TABS] 90 tablet 3     Sig: TAKE ONE TABLET BY MOUTH AT BEDTIME       Tricyclics/Misc Antidepressant/Antianxiety Meds Refill Protocol Passed - 2/9/2021  8:39 AM        Passed - PCP or prescribing provider visit in last year     Last office visit with prescriber/PCP: 10/24/2019 Renetta Carrillo MD OR same dept: Visit date not found OR same specialty: 10/24/2019 Renetta Carrillo MD  Last physical: 11/16/2017 Last MTM visit: Visit date not found   Next visit within 3 mo: Visit date not found  Next physical within 3 mo: Visit date not found  Prescriber OR PCP: Renetta Carrillo MD  Last diagnosis associated with med order: 1. Insomnia, unspecified type  - traZODone (DESYREL) 100 MG tablet [Pharmacy Med Name: TRAZODONE HCL 100MG TABS]; TAKE ONE TABLET BY MOUTH AT BEDTIME  Dispense: 90 tablet; Refill: 3    If protocol passes may refill for 12 months if within 3 months of last provider visit (or a total of 15 months).

## 2021-06-15 NOTE — PROGRESS NOTES
[facility] Hematology and Oncology Progress Note    Patient: Libertad Hernadez  MRN: [id number]  Date of Service: 01/04/2018        Reason for Visit    Follow-up thrombocytosis.    Assessment and Plan  No matching staging information was found for the patient.    ECOG Performance   ECOG Performance Status: 0     Distress Assessment  Distress Assessment Score: 3    Pain  Currently in Pain: No/denies    #. JAK2 positive essential thrombocytosis     I review her labs in detail and also MPN panel.  I explained to her that her thrombocytosis is not likely reactive, or bad it is likely myeloproliferative neoplasm based on the presence of ANGELA 2 mutation although low burden of mutation.  I explained to her about WHO diagnostic criteria and we will need to proceed with bone marrow biopsy to satisfy that diagnostic criteria and assess the baseline disease status and having any evidence of progression of disease.   We reviewed the biology, clinical significance of essential thrombocythemia and explained to the patient that this is a clonal platelet production issues and falls into the category of myeloproliferative neoplasm. I also explained to the patient that at least half of the patient with essential thrombocythemia are asymptomatic at the time of diagnosis. Then, we discussed about goal of treatment to prevent thrombotic and hemorrhagic complications. Currently available treatments are not curative. In general, most patient with essential thrombocythemia has normal life expectancy, with expected median overall survival over 10 years. I also discussed about risk of transformation to acute leukemia, myelodysplastic syndrome, or progression of disease to myelofibrosis.     I discussed about the risk stratification which is mainly for the results of thrombotic complications. Because of his age, JAK2 V617F mutation and cardiovascular risk factor of hypertension, diabetes, he is at high risk of thrombotic complications. Cytoreductive  therapy (hydroxyurea as such) plus antiplatelet agents are recommended in high-risk patient.    I think she may need very low dose of hydroxyurea and explained to her that we would like to have the platelet around 400 K.      Plan:  -To resume aspirin 81 mg once daily.    -We will have a unilateral bone marrow biopsy and aspiration.    -She will review hydroxyurea medication.    -Follow-up with me about a week after completion of bone marrow biopsy.   - Patient education materials about essential thrombocythemia as well as hydroxyurea were given.        Problem List    1. Essential thrombocythemia  lidocaine 10 mg/mL (1 %) injection 10 mL    Bone Marrow Biopsy and Exam    Flow cytometry    HM1(CBC and Differential)    Chromosome Analysis, Bone Marrow    Bone Marrow Biopsy    Bone Marrow Aspiration    LORazepam tablet 0.5 mg (ATIVAN)    HYDROcodone-acetaminophen 5-325 mg per tablet 1 tablet    naloxone injection 0.2-0.4 mg (NARCAN)    naloxone injection 0.2-0.4 mg (NARCAN)   2. Myelodysplastic syndrome   Chromosome Analysis, Bone Marrow      ______________________________________________________________________________    History of Present Illness    Libertad presented herself today for follow-up.  She is overall doing okay with her ongoing issue with hot flashes, intermittent dizziness, or peripheral neuropathy in both feet and constipation.  She denies any pain.  She denies any chest pain, coronary event or cerebrovascular event.  Denies any leg swelling or pain.  She has stopped taking aspirin in the last several weeks due to stomach upset.    Pain Status  Currently in Pain: No/denies    Review of Systems    Constitutional  Constitutional (WDL): Exceptions to WDL  Fatigue: None  Fever: None  Chills: None  Weight Gain: None  Weight Loss: None  Neurosensory  Neurosensory (WDL): Exceptions to WDL  Peripheral Motor Neuropathy: Asymptomatic, clinical or diagnostic observations only, intervention not indicated  Ataxia:  Asymptomatic, clinical or diagnostic observations only, intervention not indicated  Peripheral Sensory Neuropathy: Asymptomatic, loss of deep tendon reflexes or paresthesia (neuropathy of bilat feet)  Confusion: None  Syncope: None  Eye   Eye Disorder (WDL): Exceptions to WDL  Blurred Vision: None  Dry Eye: Asymptomatic, clinical or diagnostic observations only, mild symptoms relieved by lubricants  Eye Pain: None  Watering Eyes: None  Ear  Ear Disorder (WDL): Exceptions to WDL  Ear Pain: None  Tinnitus: Mild symptoms, intervention not indicated (constant, bilat ears-chronic)  Cardiovascular  Cardiovascular (WDL): All cardiovascular elements are within defined limits  Pulmonary  Respiratory (WDL): Within Defined Limits  Gastrointestinal  Gastrointestinal (WDL): Exceptions to WDL  Anorexia: None  Constipation: Occasional or intermittent symptoms, occasional use of stool softeners, laxatives, dietary modification, or enema  Diarrhea: None  Dysphagia: None  Esophagitis: Asymptomatic, clinical or diagnostic observations only, intervention not indicated  Nausea: None  Pharyngitis: None  Vomiting: None  Dysgeusia: None  Dry Mouth: None  Genitourinary  Genitourinary (WDL): All genitourinary elements are within defined limits  Lymphatic  Lymph (WDL): All lymph disorder elements are within defined limits  Musculoskeletal and Connective Tissue  Musculoskeletal and Connetive Tissue Disorders (WDL): All Musculoskeletal and Connetive Tissue Disorder elements are within defined limits  Integumentary  Integumentary (WDL): All integumentary elements are within defined limits  Patient Coping  Patient Coping: Open/discussion  Distress Assessment  Distress Assessment Score: 3  Accompanied by  Accompanied by: Alone  Oral Chemo Adherence       Past History  Past Medical History:   Diagnosis Date     Breast Cancer     Created by [facility] Annotation: Oct 21 2010  9:56AM - Shirley Comer: stage 2;  10/18 lymph nodes  "positives/p chemo and [id number]      Hyperlipidemia     Created by Conversion      Hypertension     Created by Conversion      Osteopenia     Created by Conversion      Peripheral Neuropathy     Created by Conversion      Varicose Veins     Created by Conversion        Physical Exam    Recent Vitals 1/4/2018   Height 5' 5\"   Weight 163 lbs 3 oz   BSA (m2) 1.84 m2   /64   Pulse 78   Temp -   SpO2 99   Some recent data might be hidden     General: alert, awake, not in acute distress  HEENT: Head: Normal, normocephalic, atraumatic.  Eye: Normal external eye, conjunctiva, lids cornea, CONSTANCE.  Ears:  Non-tender.  Nose: Normal external nose, mucus membranes and septum.  Pharynx: Dental Hygiene adequate. Normal buccal mucosa. Normal pharynx.  Neck / Thyroid: Supple, no masses, nodes, nodules or enlargement.  Lymphatics: No abnormally enlarged lymph nodes.  Chest: Normal chest wall and respirations. Clear to auscultation.  Heart: S1 S2 RRR, no murmur.   Abdomen: abdomen is soft without significant tenderness, masses, organomegaly or guarding  Extremities: normal strength, tone, and muscle mass  Skin: normal. no rash or abnormalities  CNS: non focal.      Lab Results    No results found for this or any previous visit (from the past 168 hour(s)).    Imaging    No results found.      Signed by: Camden Bourne MD  "

## 2021-06-15 NOTE — PROGRESS NOTES
[facility] Hematology and Oncology Progress Note    Patient: Libertad Hernadez  MRN: [id number]  Date of Service: 1/16/2018        Reason for Visit    Follow-up essential thrombocytosis.    Assessment and Plan  No matching staging information was found for the patient.    ECOG Performance   ECOG Performance Status: 0     Distress Assessment  Distress Assessment Score: 4    Pain  Currently in Pain: No/denies  Pain Score (Initial OR Reassessment): No/Denies Pain    #. JAK2 positive essential thrombocytosis, diagnosis in January 2018.  High risk for thrombotic complication based on age,AK2 V617F mutation and cardiovascular risk factor of hypertension, diabetes.  Unilateral bone marrow biopsy showed 30% cellularity, 1-2+/3 reticulin stain.  Cytogenetics pending     She is clinically feeling well.  We reviewed the bone marrow biopsy results.  There is no evidence of leukemia.  Bone marrow biopsy confirmed the evidence of essential thrombocytosis.  She has reviewed the hydroxyurea information.  She is very reluctant to take the medication however she agreed that she would need medication to control her platelet counts.  She signed consent for hydroxyurea.  We decided to start hydroxyurea 500 mg every other day.  She is advised to call us with any concerns or question arise after she started on hydroxyurea.       Plan:  -We will start hydroxyurea 500 mg every other day per patient preference.  Will follow up hemogram weekly.  She is aware that we will need to adjust the medication dose until we achieve the target platelet around 400 K.   -Continue aspirin 81 mg once daily.    -Follow-up with me in about 4 weeks with labs and exam.    Problem List    1. Essential thrombocythemia  HM1(CBC and Differential)      ______________________________________________________________________________    History of Present Illness    Libertad presented today accompanied by her  for follow-up.  She is overall doing okay with her ongoing  issue with hot flashes, intermittent dizziness, or peripheral neuropathy in both feet and constipation.  She denies any pain.  She denies any chest pain, coronary event or cerebrovascular event.  Denies any leg swelling or pain.      Pain Status  Currently in Pain: No/denies    Review of Systems    Constitutional  Constitutional (WDL): Exceptions to WDL  Fatigue: None  Fever: None  Chills: None  Weight Gain: None  Weight Loss: None  Neurosensory  Neurosensory (WDL): Exceptions to WDL  Peripheral Motor Neuropathy: None  Ataxia: Asymptomatic, clinical or diagnostic observations only, intervention not indicated  Peripheral Sensory Neuropathy: Asymptomatic, loss of deep tendon reflexes or paresthesia (bilat feet)  Confusion: None  Syncope: None  Eye   Eye Disorder (WDL): Exceptions to WDL  Blurred Vision: None  Dry Eye: Asymptomatic, clinical or diagnostic observations only, mild symptoms relieved by lubricants  Eye Pain: None  Watering Eyes: None  Ear  Ear Disorder (WDL): Exceptions to WDL  Ear Pain: None  Tinnitus: Mild symptoms, intervention not indicated (chronic, constant)  Cardiovascular  Cardiovascular (WDL): All cardiovascular elements are within defined limits  Palpitations: Definition: A disorder characterized by inflammation of the muscle tissue of the heart. (intermittent at night)  Edema: No  Phlebitis: None  Superficial thrombophlebitis: None  Pulmonary  Respiratory (WDL): Exceptions to WDL  Cough: None  Dyspnea: Shortness of breath with moderate exertion (occ with climbing stairs)  Hypoxia: None  Gastrointestinal  Gastrointestinal (WDL): Exceptions to WDL  Anorexia: None  Constipation: Occasional or intermittent symptoms, occasional use of stool softeners, laxatives, dietary modification, or enema (Lat  1/15/18)  Diarrhea: None  Dysphagia: None  Esophagitis: Asymptomatic, clinical or diagnostic observations only, intervention not indicated (intermittent at noc)  Nausea: None  Pharyngitis:  "None  Vomiting: None  Dysgeusia: None  Dry Mouth: None  Genitourinary  Genitourinary (WDL): All genitourinary elements are within defined limits  Lymphatic  Lymph (WDL): All lymph disorder elements are within defined limits  Musculoskeletal and Connective Tissue  Musculoskeletal and Connetive Tissue Disorders (WDL): Exceptions to WDL  Arthralgia: Mild pain (bilat knees L>R, R wrist, and R heel, \"trigger\" fingers)  Bone Pain: None  Muscle Weakness : None  Myalgia: None  Integumentary  Integumentary (WDL): All integumentary elements are within defined limits  Patient Coping  Patient Coping: Accepting  Distress Assessment  Distress Assessment Score: 4  Accompanied by  Accompanied by: Family Member  Oral Chemo Adherence       Past History  Past Medical History:   Diagnosis Date     Breast Cancer     Created by [employer] [facility] Annotation: Oct 21 2010  9:56AM - Shirley Comer: stage 2;  10/18 lymph nodes positives/p chemo and [id number]      Hyperlipidemia     Created by [employer]version      Hypertension     Created by Conversion      Osteopenia     Created by Conversion      Peripheral Neuropathy     Created by Conversion      Varicose Veins     Created by Conversion        Physical Exam    Recent Vitals 1/16/2018   Height -   Weight 161 lbs   BSA (m2) -   /73   Pulse 77   Temp 97.3   Temp src 1   SpO2 96   Some recent data might be hidden     General: alert, awake, not in acute distress  HEENT: Head: Normal, normocephalic, atraumatic.  Eye: Normal external eye, conjunctiva, lids cornea, CONSTANCE.  Ears:  Non-tender.  Nose: Normal external nose, mucus membranes and septum.  Pharynx: Dental Hygiene adequate. Normal buccal mucosa. Normal pharynx.  Neck / Thyroid: Supple, no masses, nodes, nodules or enlargement.  Lymphatics: No abnormally enlarged lymph nodes.  Chest: Normal chest wall and respirations. Clear to auscultation.  Heart: S1 S2 RRR, no murmur.   Abdomen: abdomen is soft without significant tenderness, " masses, organomegaly or guarding  Extremities: normal strength, tone, and muscle mass  Skin: normal. no rash or abnormalities  CNS: non focal.      Lab Results    No results found for this or any previous visit (from the past 168 hour(s)).    Imaging    No results found.      Signed by: Camden Bourne MD

## 2021-06-15 NOTE — PROGRESS NOTES
Bone Marrow Biopsy and Aspiration Procedure Note     Informed consent was obtained and potential risks including bleeding, infection and pain were reviewed with the patient.     Posterior iliac crest(s) prepped with Betadine.     Lidocaine 1% local anesthesia infiltrated into the subcutaneous tissue.    Left PIC bone marrow biopsy and bone marrow aspirate was obtained.     The procedure was tolerated well and there were no complications.    Specimens sent for: routine.    Physician: Daniel Castro

## 2021-06-15 NOTE — PROGRESS NOTES
Patient tolerated BM biopsy without problems.  Patient awake and alert but states feels tired after medications.  Instructed not to drive or operate dangerous machinery for a min. Of 4 hours or if still tired/drowsy. She verbalized understanding.  Denies pain.  No blood or shadowing noted on dressing.  BM discharge instructions given and explained to patient and patient's .  Instructed to keep dressing on for 24 hours and to monitor dressing for bleeding.  Instructed to call, if bleeding continues after 15 minutes of pressure. Instructed to call if signs and symptoms of infection noted.  Both verbalized understanding of these instructions. Patient taken out to car, by writer, by w/c and assisted into car. THAI Panchal RN.

## 2021-06-16 ENCOUNTER — RECORDS - HEALTHEAST (OUTPATIENT)
Dept: LAB | Facility: CLINIC | Age: 81
End: 2021-06-16

## 2021-06-16 PROBLEM — D47.3 ESSENTIAL THROMBOCYTHEMIA (H): Status: ACTIVE | Noted: 2018-01-16

## 2021-06-16 PROBLEM — Z90.13 S/P BILATERAL MASTECTOMY: Status: ACTIVE | Noted: 2017-11-16

## 2021-06-16 PROBLEM — Z86.711 HISTORY OF PULMONARY EMBOLISM: Status: ACTIVE | Noted: 2019-07-12

## 2021-06-16 PROBLEM — R35.0 URINARY FREQUENCY: Status: ACTIVE | Noted: 2018-01-09

## 2021-06-16 PROBLEM — I26.99 PULMONARY EMBOLI (H): Status: ACTIVE | Noted: 2018-06-14

## 2021-06-16 LAB
ANION GAP SERPL CALCULATED.3IONS-SCNC: 11 MMOL/L (ref 5–18)
BUN SERPL-MCNC: 11 MG/DL (ref 8–28)
CALCIUM SERPL-MCNC: 9.8 MG/DL (ref 8.5–10.5)
CHLORIDE BLD-SCNC: 105 MMOL/L (ref 98–107)
CHOLEST SERPL-MCNC: 183 MG/DL
CO2 SERPL-SCNC: 25 MMOL/L (ref 22–31)
CREAT SERPL-MCNC: 0.68 MG/DL (ref 0.6–1.1)
FASTING STATUS PATIENT QL REPORTED: NORMAL
GFR SERPL CREATININE-BSD FRML MDRD: >60 ML/MIN/1.73M2
GLUCOSE BLD-MCNC: 84 MG/DL (ref 70–125)
HDLC SERPL-MCNC: 64 MG/DL
LDLC SERPL CALC-MCNC: 98 MG/DL
POTASSIUM BLD-SCNC: 4.6 MMOL/L (ref 3.5–5)
SODIUM SERPL-SCNC: 141 MMOL/L (ref 136–145)
TRIGL SERPL-MCNC: 107 MG/DL

## 2021-06-16 NOTE — PROGRESS NOTES
[facility] Hematology and Oncology Progress Note    Patient: Libertad Hernadez  MRN: [id number]  Date of Service: 2/13/2018        Reason for Visit    Follow-up essential thrombocytosis.    Assessment and Plan  No matching staging information was found for the patient.    ECOG Performance   ECOG Performance Status: 0     Distress Assessment  Distress Assessment Score: 4    Pain  Currently in Pain: No/denies  Pain Score (Initial OR Reassessment): No/Denies Pain  Location: knees, R wrist    #. JAK2 positive essential thrombocytosis, diagnosis in January 2018.  High risk for thrombotic complication based on age,AK2 V617F mutation and cardiovascular risk factor of hypertension, diabetes.  Unilateral bone marrow biopsy showed 30% cellularity, 1-2+/3 reticulin stain.  Cytogenetics- normal.  #.  Intolerance to aspirin (persistent nosebleeding)  #.  Left eye pressure and blurry vision (history of cataract removal and glaucoma)     She is clinically feeling well.  Her platelet count is now less than 500 K.  I explained to her that her platelet count appeared to be starting to get under control but not to the target level yet.  She does not have any intolerable side effects.  She is agree to continue hydroxyurea 500 mg every other day.  She has stopped taking aspirin due to persistent nose bleeding.  She again asked about the duration of hydroxyurea and I explained to her that it will be a long-term medication to maintain her platelet count around 400 K to avoid the complication of thrombocytosis.    Skin itch was unclear related to medication or underlying myeloproliferative neoplasm or simply dry skin.  No rash.  No dermatitis.  She is advised to aggressive moisturization and monitor.   She is advised to follow-up with her ophthalmologist for evaluation.         Plan:  -Follow-up hemogram in 2 weeks.    -Follow-up with me/Olvin Villanueva NP in about 4 weeks with labs and exam.  If her platelet count is less than 450 K, will plan  "to follow-up every 3 months going forward    Problem List    1. Essential thrombocythemia  HM1(CBC and Differential)    HM1 (CBC with Diff)      ______________________________________________________________________________    History of Present Illness    Libertad presented by herself today.  She reported nosebleeding every morning after she restarted back on aspirin 81 mg daily.  She is self discontinued aspirin about a week ago and bleeding has stopped.  She had similar nosebleeding prior.  She has skin itch, without rash all over the body.  She complained about left eye as burning pain and intermittently seen curtainlike blurry vision.  She had eye exam about 3 weeks ago and noticeably worse in the last couple weeks.    She is status post bilateral cataract removal.  She reported that she had glaucoma in the left eye as well.    Pain Status  Currently in Pain: No/denies    Review of Systems    Constitutional  Constitutional (WDL): Exceptions to WDL  Fatigue: Fatigue relieved by rest  Fever: None  Chills: None  Weight Gain: None  Weight Loss: None  Neurosensory  Neurosensory (WDL): Exceptions to WDL  Peripheral Motor Neuropathy: None  Ataxia: Asymptomatic, clinical or diagnostic observations only, intervention not indicated  Peripheral Sensory Neuropathy: Asymptomatic, loss of deep tendon reflexes or paresthesia (bilat feet-intermittent-usually @ noc )  Confusion: None  Syncope: None  Eye   Eye Disorder (WDL): Exceptions to WDL (sometimes wakes up and eyes matted shut/wears glasses)  Blurred Vision: Intervention not indicated (intermittent, left eye \"curtain\")  Dry Eye: Asymptomatic, clinical or diagnostic observations only, mild symptoms relieved by lubricants  Eye Pain: Mild pain (intermittent pain, burning.  L eye)  Watering Eyes: None  Ear  Ear Disorder (WDL): Exceptions to WDL (itchy)  Ear Pain: None  Tinnitus: Mild symptoms, intervention not indicated (chronic, constant)  Cardiovascular  Cardiovascular (WDL): " Exceptions to WDL  Palpitations: None  Phlebitis: None  Superficial thrombophlebitis: None  Pulmonary  Respiratory (WDL): Exceptions to WDL  Cough: Mild symptoms, nonprescription intervention indicated (occ cough in mornings)  Dyspnea: Shortness of breath with moderate exertion  Hypoxia: None  Gastrointestinal  Gastrointestinal (WDL): Exceptions to WDL  Anorexia: None  Constipation: Persistent symptoms with regular use of laxatives or enemas, limiting instrumental ADL (miralax helps)  Diarrhea: None  Dysphagia: None  Esophagitis: Symptomatic, altered eating/swallowing, oral supplements indicated (@ noc)  Nausea: None  Pharyngitis: None  Vomiting: None  Dysgeusia: None  Dry Mouth: None  Genitourinary  Genitourinary (WDL): All genitourinary elements are within defined limits  Lymphatic  Lymph (WDL): All lymph disorder elements are within defined limits  Musculoskeletal and Connective Tissue  Musculoskeletal and Connetive Tissue Disorders (WDL): Exceptions to WDL  Arthralgia: Mild pain (bilat knees)  Bone Pain: None  Muscle Weakness : Symptomatic, perceived by patient but not evident on physical exam  Myalgia: Mild pain  Integumentary  Integumentary (WDL): Exceptions to WDL  Alopecia: None  Rash Maculo-Papular: None  Pruritus: Mild or localized, topical intervention indicated (left FA, left abd, top of left foot, back)  Urticaria: None  Palmar-Plantar Erythrodysesthesia Syndrome: None  Flushing: None  Patient Coping  Patient Coping: Accepting  Distress Assessment  Distress Assessment Score: 4  Accompanied by  Accompanied by: Family Member  Oral Chemo Adherence  Oral Chemo Adherence  What Oral Chemotherapy is the patient taking?: Hydroxyurea  Did the patient fill and  the prescription as written?: Yes  Did patient start taking oral chemo on time?: Yes, patient started taking oral chemo on time  Does the patient report missing any doses?: No    Past History  Past Medical History:   Diagnosis Date     Breast Cancer      Created by Conversion [facility] Annotation: Oct 21 2010  9:56AM - Shirley Comer: stage 2;  10/18 lymph nodes positives/p chemo and [id number]      Hyperlipidemia     Created by Conversion      Hypertension     Created by Conversion      Osteopenia     Created by Conversion      Peripheral Neuropathy     Created by Conversion      Varicose Veins     Created by Conversion        Physical Exam    Recent Vitals 2/13/2018   Height -   Weight 161 lbs 11 oz   BSA (m2) -   /72   Pulse 76   Temp 97.4   Temp src -   SpO2 97   Some recent data might be hidden     General: alert, awake, not in acute distress  HEENT: Head: Normal, normocephalic, atraumatic.  Eye: Normal external eye, conjunctiva, lids cornea, CONSTANCE.  Ears:  Non-tender.  Nose: Normal external nose, mucus membranes and septum.  Pharynx: Dental Hygiene adequate. Normal buccal mucosa. Normal pharynx.  Neck / Thyroid: Supple, no masses, nodes, nodules or enlargement.  Lymphatics: No abnormally enlarged lymph nodes.  Chest: Normal chest wall and respirations. Clear to auscultation.  Heart: S1 S2 RRR, no murmur.   Abdomen: abdomen is soft without significant tenderness, masses, organomegaly or guarding  Extremities: normal strength, tone, and muscle mass  Skin: normal. no rash or abnormalities  CNS: non focal.      Lab Results    Recent Results (from the past 168 hour(s))   HM1 (CBC with Diff)   Result Value Ref Range    WBC 8.3 4.0 - 11.0 thou/uL    RBC 5.34 3.80 - 5.40 mill/uL    Hemoglobin 15.4 12.0 - 16.0 g/dL    Hematocrit 46.6 35.0 - 47.0 %    MCV 87 80 - 100 fL    MCH 28.8 27.0 - 34.0 pg    MCHC 33.0 32.0 - 36.0 g/dL    RDW 15.7 (H) 11.0 - 14.5 %    Platelets 489 (H) 140 - 440 thou/uL    MPV 8.7 8.5 - 12.5 fL    Neutrophils % 65 50 - 70 %    Lymphocytes % 20 20 - 40 %    Monocytes % 9 2 - 10 %    Eosinophils % 5 0 - 6 %    Basophils % 1 0 - 2 %    Neutrophils Absolute 5.3 2.0 - 7.7 thou/uL    Lymphocytes Absolute 1.7 0.8 - 4.4 thou/uL     Monocytes Absolute 0.7 0.0 - 0.9 thou/uL    Eosinophils Absolute 0.4 0.0 - 0.4 thou/uL    Basophils Absolute 0.1 0.0 - 0.2 thou/uL       Imaging    No results found.      Signed by: Camden Bourne MD

## 2021-06-16 NOTE — TELEPHONE ENCOUNTER
Telephone Encounter by Sangita Awan LPN at 2/6/2020  8:09 AM     Author: Sangita Awan LPN Service: -- Author Type: Licensed Nurse    Filed: 2/6/2020  8:10 AM Encounter Date: 2/5/2020 Status: Signed    : Sangita Awan LPN (Licensed Nurse)       Patient Returning Call  Reason for call:  patient returning call   Information relayed to patient:       2/5/20 5:17 PM   Note      ----- Message from Jacques Vasques MD sent at 2/5/2020  4:43 PM CST -----  Patient does have a urinary tract or bladder infection, I did fax an antibiotic Keflex, that she can start today.      Patient has additional questions:  No  If YES, what are your questions/concerns:  Patient verbalized understanding above message .  Okay to leave a detailed message?: No

## 2021-06-16 NOTE — PROGRESS NOTES
[facility] Hematology and Oncology Progress Note    Patient: Libertad Hernadez  MRN: [id number]  Date of Service: 3/14/2018        Reason for Visit    Follow-up essential thrombocytosis.    Assessment and Plan  No matching staging information was found for the patient.    ECOG Performance   ECOG Performance Status: 0     Distress Assessment  Distress Assessment Score: 4    Pain  Pain Score (Initial OR Reassessment): 4  Location: knees, R wrist, feet    #. JAK2 positive essential thrombocytosis, diagnosis in January 2018.  High risk for thrombotic complication based on age,AK2 V617F mutation and cardiovascular risk factor of hypertension, diabetes.  Unilateral bone marrow biopsy showed 30% cellularity, 1-2+/3 reticulin stain.  Cytogenetics- normal.  #.  Intolerance to aspirin (persistent nosebleeding)     She is clinically feeling well.  Her platelet count is now around 450 K.  I explained to her that her platelet count appeared to be around the target level.  He tolerates well at the current dose of hydroxyurea 500 mg every other day.           Plan:  -Continue hydroxyurea 500 mg every other day.  New prescription sent.  -No aspirin due to intolerance and bleeding.  -Follow-up with me/Olvin Villanueva NP in about 3 months with labs and exam.      Problem List    1. Essential thrombocythemia  HM1(CBC and Differential)    Comprehensive Metabolic Panel    Lactate Dehydrogenase (LDH)      ______________________________________________________________________________    History of Present Illness    Libertad presented by herself today.  She reported nosebleeding every morning after she restarted back on aspirin 81 mg daily.  She is self discontinued aspirin about a week ago and bleeding has stopped.  She had similar nosebleeding prior.  She has skin itch, without rash all over the body.  She complained about left eye as burning pain and intermittently seen curtainlike blurry vision.  She had eye exam about 3 weeks ago and noticeably  worse in the last couple weeks.    She is status post bilateral cataract removal.  She reported that she had glaucoma in the left eye as well.    Pain Status       Review of Systems    Constitutional  Constitutional (WDL): Exceptions to WDL  Fatigue: Fatigue relieved by rest  Fever: None  Chills: None  Weight Gain: 5 - <10% from baseline (3# 3/8/18)  Weight Loss: None  Neurosensory  Neurosensory (WDL): Exceptions to WDL  Peripheral Motor Neuropathy: Asymptomatic, clinical or diagnostic observations only, intervention not indicated  Ataxia: Asymptomatic, clinical or diagnostic observations only, intervention not indicated  Peripheral Sensory Neuropathy: Asymptomatic, loss of deep tendon reflexes or paresthesia (bilat feet-worse @ noc)  Confusion: None  Syncope: None  Eye   Eye Disorder (WDL): Exceptions to WDL  Blurred Vision: None  Dry Eye: Asymptomatic, clinical or diagnostic observations only, mild symptoms relieved by lubricants  Eye Pain: Mild pain (left eye-burning, pressure, feels like has sand in eyes)  Watering Eyes: None  Ear  Ear Disorder (WDL): Exceptions to WDL (bilat ears itch-internally)  Ear Pain: None  Tinnitus: Mild symptoms, intervention not indicated (chronic-bilat eyes)  Cardiovascular  Cardiovascular (WDL): All cardiovascular elements are within defined limits  Pulmonary  Respiratory (WDL): Exceptions to WDL  Cough: Mild symptoms, nonprescription intervention indicated  Dyspnea: Shortness of breath with moderate exertion (going up stairs)  Hypoxia: None  Gastrointestinal  Gastrointestinal (WDL): Exceptions to WDL  Anorexia: None  Constipation: Persistent symptoms with regular use of laxatives or enemas, limiting instrumental ADL  Diarrhea: None  Dysphagia: None  Esophagitis: Symptomatic, altered eating/swallowing, oral supplements indicated  Nausea: None  Pharyngitis: None  Vomiting: None  Dysgeusia: None  Dry Mouth: None  Genitourinary  Genitourinary (WDL): All genitourinary elements are within  "defined limits  Lymphatic  Lymph (WDL): All lymph disorder elements are within defined limits  Musculoskeletal and Connective Tissue  Musculoskeletal and Connetive Tissue Disorders (WDL): Exceptions to WDL  Arthralgia: Mild pain (bilat knees)  Bone Pain: None  Muscle Weakness : Symptomatic, perceived by patient but not evident on physical exam  Myalgia: Mild pain (feet)  Integumentary  Integumentary (WDL): Exceptions to WDL  Alopecia: None  Rash Maculo-Papular: None  Pruritus: Mild or localized, topical intervention indicated (back of neck)  Urticaria: None  Palmar-Plantar Erythrodysesthesia Syndrome: None  Flushing: None  Patient Coping  Patient Coping: Accepting  Distress Assessment  Distress Assessment Score: 4  Accompanied by  Accompanied by: Alone  Oral Chemo Adherence  Oral Chemo Adherence  What Oral Chemotherapy is the patient taking?: Hydroxyurea  Did the patient fill and  the prescription as written?: Yes  Did patient start taking oral chemo on time?: Yes, patient started taking oral chemo on time  Does the patient report missing any doses?: No    Past History  Past Medical History:   Diagnosis Date     Breast Cancer     Created by [employer] [facility] Annotation: Oct 21 2010  9:56AM - Shirley Comer: stage 2;  10/18 lymph nodes positives/p chemo and [id number]      Hyperlipidemia     Created by Conversion      Hypertension     Created by Conversion      Osteopenia     Created by Conversion      Peripheral Neuropathy     Created by Conversion      Varicose Veins     Created by Conversion        Physical Exam    Recent Vitals 3/14/2018   Height 5' 5\"   Weight 163 lbs 8 oz   BSA (m2) 1.84 m2   /63   Pulse 71   Temp -   Temp src -   SpO2 98   Some recent data might be hidden     General: alert, awake, not in acute distress  HEENT: Head: Normal, normocephalic, atraumatic.  Eye: Normal external eye, conjunctiva, lids cornea, CONSTANCE.  Ears:  Non-tender.  Nose: Normal external nose, mucus " membranes and septum.  Pharynx: Dental Hygiene adequate. Normal buccal mucosa. Normal pharynx.  Neck / Thyroid: Supple, no masses, nodes, nodules or enlargement.  Lymphatics: No abnormally enlarged lymph nodes.  Chest: Normal chest wall and respirations. Clear to auscultation.  Heart: S1 S2 RRR, no murmur.   Abdomen: abdomen is soft without significant tenderness, masses, organomegaly or guarding  Extremities: normal strength, tone, and muscle mass  Skin: normal. no rash or abnormalities  CNS: non focal.      Lab Results    Recent Results (from the past 168 hour(s))   Comprehensive Metabolic Panel   Result Value Ref Range    Sodium 141 136 - 145 mmol/L    Potassium 3.9 3.5 - 5.0 mmol/L    Chloride 106 98 - 107 mmol/L    CO2 28 22 - 31 mmol/L    Anion Gap, Calculation 7 5 - 18 mmol/L    Glucose 139 (H) 70 - 125 mg/dL    BUN 14 8 - 28 mg/dL    Creatinine 0.98 0.60 - 1.10 mg/dL    GFR MDRD Af Amer >60 >60 mL/min/1.73m2    GFR MDRD Non Af Amer 55 (L) >60 mL/min/1.73m2    Bilirubin, Total 0.4 0.0 - 1.0 mg/dL    Calcium 9.9 8.5 - 10.5 mg/dL    Protein, Total 7.0 6.0 - 8.0 g/dL    Albumin 3.7 3.5 - 5.0 g/dL    Alkaline Phosphatase 42 (L) 45 - 120 U/L    AST 28 0 - 40 U/L    ALT 20 0 - 45 U/L   HM1 (CBC with Diff)   Result Value Ref Range    WBC 8.1 4.0 - 11.0 thou/uL    RBC 4.86 3.80 - 5.40 mill/uL    Hemoglobin 14.2 12.0 - 16.0 g/dL    Hematocrit 43.1 35.0 - 47.0 %    MCV 89 80 - 100 fL    MCH 29.2 27.0 - 34.0 pg    MCHC 32.9 32.0 - 36.0 g/dL    RDW 16.0 (H) 11.0 - 14.5 %    Platelets 455 (H) 140 - 440 thou/uL    MPV 8.8 8.5 - 12.5 fL    Neutrophils % 69 50 - 70 %    Lymphocytes % 21 20 - 40 %    Monocytes % 7 2 - 10 %    Eosinophils % 3 0 - 6 %    Basophils % 1 0 - 2 %    Neutrophils Absolute 5.6 2.0 - 7.7 thou/uL    Lymphocytes Absolute 1.7 0.8 - 4.4 thou/uL    Monocytes Absolute 0.5 0.0 - 0.9 thou/uL    Eosinophils Absolute 0.3 0.0 - 0.4 thou/uL    Basophils Absolute 0.1 0.0 - 0.2 thou/uL       Imaging    No results  found.      Signed by: Camden Bourne MD

## 2021-06-16 NOTE — PROGRESS NOTES
1. Essential thrombocythemia     2. Osteoporosis, senile  DXA Bone Density Scan   3. Hearing loss  Ambulatory referral to Audiology       Return in about 6 months (around 9/8/2018) for Recheck.    Patient Instructions   Follow up BP in 3-4 months with David.    Prolia 5th today.  Prolia 6th in 6 months with my nurse. I will see you in 1 year.  DXA in 6/2018 .   Phone number to schedule [phone].  Please avoid any extensive dental work as implants and teeth extractions for the next 1-2 months.  Daily calcium need is 5876-1176 mg a day from the diet and supplements.  Calcium citrate is easier to digest.  Vitamin D 2000 IU daily recommended.          Chief Complaint   Patient presents with     Osteoporosis Follow Up       /70  Pulse 64  Wt 160 lb 1.6 oz (72.6 kg)  BMI 26.64 kg/m2      Did you experience any problems with previous Prolia injection? no  Any medication change in the last 6 months? no  Did you take prednisone or other immunosupressant drugs in the last 6 months   (chemo, transplant, rheum, dermatology conditions)? no  Did you have any serious infection in the last 6 months?no  Any recent hospitalizations?no  Do you plan any dental work in the next 2-3 months?no  How much calcium do you take daily from the diet and supplements?1200 mg  How much vit D do you take daily? 2000 IU  Last DXA? 6/2016      Patient is here today for the 5th Prolia injection. Patient tolerated previous injections well.   We discussed calcium and vit D daily needs today.   Next Prolia injection will be in 6 months.     Patient was recently diagnosed with essential thrombocythemia and is on hydroxyurea now.  Reglan decreased now and is in the 400s.  She has regular visits with oncology.  Discussed possible increased immunosuppression with use of Prolia and hydroxyurea together but since the frequency of infection very low describing the literature, will continue the same treatment and she will keep me updated if she  starts having more infection of any kind in the future.  She has a pressure sore and bunion formation her right foot that she wanted me to check today.  They recommended visit with podiatrist.  She also complained today about hearing loss and would like to have audiology testing.    25 minutes spent with the patient and more then 50 % of the time in counseling.  This note has been dictated using voice recognition software. Any grammatical or context distortions are unintentional and inherent to the software      Patient Active Problem List   Diagnosis     Breast Cancer     Irritable Bowel Syndrome     Chronic Reflux Esophagitis     Tuberculin PPD Induration Positive Interpretation     Peripheral Neuropathy     Varicose Veins     Restless Legs Syndrome     Essential hypertension     Hyperlipidemia     Wrist fracture     Osteoporosis, senile     S/P bilateral mastectomy     Urinary frequency     Essential thrombocythemia       Current Outpatient Prescriptions on File Prior to Visit   Medication Sig Dispense Refill     BIOTIN ORAL Take 5,000 mcg by mouth daily.        calcium carbonate-vitamin D3 (CALCIUM 600 + D,3,) 600 mg(1,500mg) -200 unit per tablet Take 2 tablets by mouth daily.       hydroxyurea (HYDREA) 500 mg capsule Take 1 capsule (500 mg total) by mouth every other day. 30 capsule 0     lisinopril (PRINIVIL,ZESTRIL) 5 MG tablet Take 1 tablet (5 mg total) by mouth daily. 90 tablet 3     ranitidine (ZANTAC) 150 MG tablet Take 1 tablet (150 mg total) by mouth 2 (two) times a day. 180 tablet 2     simvastatin (ZOCOR) 20 MG tablet Take 1 tablet (20 mg total) by mouth daily. 90 tablet 2     tolterodine (DETROL LA) 4 MG ER capsule Take 1 capsule (4 mg total) by mouth daily. 90 capsule 2     traZODone (DESYREL) 100 MG tablet TAKE TWO TABLETS BY MOUTH AT BEDTIME 180 tablet 3     No current facility-administered medications on file prior to visit.

## 2021-06-17 NOTE — PROGRESS NOTES
Patient here today for labs, EKG, and OV with Dr. Bourne. Hx Essential thrombocytosis.  THAI Panchal RN

## 2021-06-17 NOTE — TELEPHONE ENCOUNTER
Telephone Encounter by Brisa Candelario RN at 11/3/2020  8:13 AM     Author: Brisa Candelario RN Service: -- Author Type: Registered Nurse    Filed: 11/3/2020  8:14 AM Encounter Date: 11/3/2020 Status: Signed    : Brisa Candelario RN (Registered Nurse)       Cancer Care Refill Protocol Passed    Rerun Protocol (11/3/2020 7:57 AM)     Cancer Care visit in the last 12 months     Last office visit: 9/15/2020 Camden Bourne MD Next office visit within 3 mo: 12/15/2020 Camden Bourne MD  Last MTM visit: Visit date not found    Prescriber or current provider: Camden Bourne MD  Last diagnosis associated with med order:   1. Essential thrombocythemia (H)  - hydroxyurea (HYDREA) 500 mg capsule [Pharmacy Med Name: HYDROXYUREA 500MG CAPS]; TAKE ONE CAPSULE BY MOUTH 6 DAYS A WEEK AND ONE CAPSULE TWICE DAILY FOR 1 DAY A WEEK  Dispense: 100 capsule; Refill: 2

## 2021-06-17 NOTE — PROGRESS NOTES
[facility] Hematology and Oncology Progress Note    Patient: Libertad Hernadez  MRN: [id number]  Date of Service: 5/10/2018        Reason for Visit    Follow-up essential thrombocytosis.    Assessment and Plan  No matching staging information was found for the patient.    ECOG Performance   ECOG Performance Status: 0     Distress Assessment  Distress Assessment Score: 5    Pain  Currently in Pain: No/denies  Pain Score (Initial OR Reassessment): No/Denies Pain  Location: left ankle    #.  Syncopal episode   Presentation sounds like vasovagal.  It does not appear to be stroke or other neurologic event.  She denied any chest pain or shortness of breath.  She has chronic heartburn symptoms but they are not active today.  Her syncopal episode could be related to medication that she took trazodone right before the episode. Patient then reported that she has to get into bed within 10-20 minutes after she takes trazodone as it usually knocks her out fairly quickly and she reported it happened in the past. I advised her that trazodone is the medication that she will need to take before bedtime for sleep aid, and does not need to compensate on the next morning if she missed to take the dose.       I checked for blood work today and they were unremarkable.  The EKG today did not show any evidence of old coronary event no arrhythmia. (-Reviewed EKG and it showed normal sinus rhythm with 74 beats per minutes.  She has possible left atrial enlargement, left ventricular hypertrophy.  No acute ST or T-wave abnormalities.  Compared to her previous EKG from 2016, no changes noted). I explained to her that I do not feel that hydroxyurea is the culprit of her symptoms.     Advised her that if she has a recurrent episode, she should be checked into an emergency room.      #.  Insomnia, improved.   She would like to be off trazodone and she is wondering how she can stop taking the medication.  I explained to her that she has been taking  trazodone for many years (maybe 10 years or more) and it should be tapered.  I discussed with the pharmacy as well.  I advised her to decrease to 50 mg for 1 week, then 25 mg for 1 week.  If she develop any jitteriness, neurologic symptoms, she is advised to stop tapering at that dosing level.    I will follow-up with her in 2 weeks before she finished tapering off trazodone.  At the same time, I advised her to check with Dr. Carrillo as well.     #. JAK2 positive essential thrombocytosis, diagnosis in January 2018.  High risk for thrombotic complication based on age,AK2 V617F mutation and cardiovascular risk factor of hypertension, diabetes.  Unilateral bone marrow biopsy showed 30% cellularity, 1-2+/3 reticulin stain.  Cytogenetics- normal.   Advised to continue Hydrea 5 mg every other day.  I explained to her that we want to make sure that she does not develop acute arterial or venous events based on her essential thrombocytosis.  Current platelet level is in normal range that I advised her to continue at current dose.    #.  Intolerance to aspirin (persistent nosebleeding)    #.  History of stage II breast cancer with positive lymph nodes (10/18) in 1990s.  Completed chemotherapy and tamoxifen.       Problem List    1. Dizziness and giddiness  ECG 12 lead with MUSE    HM1(CBC and Differential)    Comprehensive Metabolic Panel    HM1 (CBC with Diff)      TT: 40 minutes and more than 50% was spent on coordination and counseling of care.  ______________________________________________________________________________    History of Present Illness    Libertad presented today accompanied by her .  Libertad called in earlier today due to her acute neurologic event that happened 2 days ago and requested to be seen.  She reported she had syncopal episode 2 days ago when she was going to attend an event.  Has been was a bystander.  It occurred after she uses the bathroom for urination.  She denies any straining.  She  "suddenly felt queasiness in her stomach while she was standing and suddenly she felt weak. She denies any experience of chest pain, palpitation, shortness of breath with the incident. She has loss of consciousness for a few seconds.  No tongue biting, leaking her urine.  No focal weakness.  She was fine after she woke up.  She reported that she took trazodone 100 mg on that morning as she forgot to take it at bedtime the night before.  She did not seek medical attention right away.  She called today and wondering whether it was related to hydroxyurea or not.    She has history of chest palpitation.    No recurrence since.    Pain Status  Currently in Pain: No/denies    Review of Systems    Constitutional  Constitutional (WDL): Exceptions to WDL  Fatigue: Fatigue not relieved by rest - Limiting instrumental ADL  Fever: None  Chills: None  Weight Gain: None  Weight Loss: None  Neurosensory  Neurosensory (WDL): Exceptions to WDL  Peripheral Motor Neuropathy: None  Ataxia: Asymptomatic, clinical or diagnostic observations only, intervention not indicated (due to lightheadedness)  Peripheral Sensory Neuropathy: Asymptomatic, loss of deep tendon reflexes or paresthesia (bilat )  Confusion: None  Syncope: Fainting, orthostatic collapse (took evening medication in the morning-Tues 5/8/18.  )  Eye   Eye Disorder (WDL): Exceptions to WDL  Blurred Vision: Intervention not indicated  Dry Eye: Asymptomatic, clinical or diagnostic observations only, mild symptoms relieved by lubricants  Eye Pain: None  Watering Eyes: None  Ear  Ear Disorder (WDL): Exceptions to WDL  Ear Pain: None  Tinnitus: Mild symptoms, intervention not indicated (chronic)  Cardiovascular  Cardiovascular (WDL): Exceptions to WDL  Palpitations: Definition: A disorder characterized by inflammation of the muscle tissue of the heart. (\"beats heavy\")  Edema: Yes  Edema Limbs: 5 - 10% inter-limb discrepancy in volume or circumference at point of greatest visible " difference, swelling or obscuration of anatomic architecture on close inspection (l ankle-twisted when fell on 5/8/18)  Phlebitis: None  Superficial thrombophlebitis: None  Pulmonary  Respiratory (WDL): Exceptions to WDL  Cough: None  Dyspnea: Shortness of breath with moderate exertion (going upstairs)  Hypoxia: None  Gastrointestinal  Gastrointestinal (WDL): Exceptions to WDL  Anorexia: None  Constipation: Persistent symptoms with regular use of laxatives or enemas, limiting instrumental ADL  Diarrhea: None  Dysphagia: None  Esophagitis: None  Nausea: Loss of appetite without alteration in eating habits  Pharyngitis: None  Vomiting: None  Dysgeusia: None  Dry Mouth: None  Genitourinary  Genitourinary (WDL): All genitourinary elements are within defined limits  Lymphatic  Lymph (WDL): All lymph disorder elements are within defined limits  Musculoskeletal and Connective Tissue  Musculoskeletal and Connetive Tissue Disorders (WDL): Exceptions to WDL  Arthralgia: Mild pain (left ankle )  Bone Pain: None  Muscle Weakness : None  Myalgia: None  Integumentary  Integumentary (WDL): All integumentary elements are within defined limits  Patient Coping  Patient Coping: Accepting  Distress Assessment  Distress Assessment Score: 5  Accompanied by  Accompanied by: Family Member  Oral Chemo Adherence       Past History  Past Medical History:   Diagnosis Date     Breast Cancer     Created by [employer] [facility] Annotation: Oct 21 2010  [id number] - Shirley Comer: stage 2;  10/18 lymph nodes positives/p chemo and [id number]      Hyperlipidemia     Created by Conversion      Hypertension     Created by Conversion      Osteopenia     Created by Conversion      Peripheral Neuropathy     Created by Conversion      Varicose Veins     Created by Conversion        Physical Exam    Recent Vitals 5/10/2018   Height -   Weight -   BSA (m2) -   /65   Pulse 81   Temp -   Temp src -   SpO2 -   Some recent data might be hidden      General: alert, awake, not in acute distress  HEENT: Head: Normal, normocephalic, atraumatic.  Eye: Normal external eye, conjunctiva, lids cornea, CONSTANCE.  Ears:  Non-tender.  Nose: Normal external nose, mucus membranes and septum.  Pharynx: Dental Hygiene adequate. Normal buccal mucosa. Normal pharynx.  Neck / Thyroid: Supple, no masses, nodes, nodules or enlargement.  Lymphatics: No abnormally enlarged lymph nodes.  Chest: Normal chest wall and respirations. Clear to auscultation.  Heart: S1 S2 RRR, no murmur.   Abdomen: abdomen is soft without significant tenderness, masses, organomegaly or guarding  Extremities: normal strength, tone, and muscle mass  Skin: normal. no rash or abnormalities  CNS: non focal.      Lab Results    Recent Results (from the past 168 hour(s))   HM1 (CBC with Diff)   Result Value Ref Range    WBC 8.9 4.0 - 11.0 thou/uL    RBC 4.77 3.80 - 5.40 mill/uL    Hemoglobin 14.0 12.0 - 16.0 g/dL    Hematocrit 42.9 35.0 - 47.0 %    MCV 90 80 - 100 fL    MCH 29.4 27.0 - 34.0 pg    MCHC 32.6 32.0 - 36.0 g/dL    RDW 16.0 (H) 11.0 - 14.5 %    Platelets 419 140 - 440 thou/uL    MPV 9.1 8.5 - 12.5 fL    Neutrophils % 68 50 - 70 %    Lymphocytes % 17 (L) 20 - 40 %    Monocytes % 10 2 - 10 %    Eosinophils % 4 0 - 6 %    Basophils % 1 0 - 2 %    Neutrophils Absolute 6.1 2.0 - 7.7 thou/uL    Lymphocytes Absolute 1.5 0.8 - 4.4 thou/uL    Monocytes Absolute 0.9 0.0 - 0.9 thou/uL    Eosinophils Absolute 0.3 0.0 - 0.4 thou/uL    Basophils Absolute 0.1 0.0 - 0.2 thou/uL   ECG 12 lead with MUSE   Result Value Ref Range    SYSTOLIC BLOOD PRESSURE  mmHg    DIASTOLIC BLOOD PRESSURE  mmHg    VENTRICULAR RATE 74 BPM    ATRIAL RATE 74 BPM    P-R INTERVAL 164 ms    QRS DURATION 96 ms    Q-T INTERVAL 386 ms    QTC CALCULATION (BEZET) 428 ms    P Axis 68 degrees    R AXIS -14 degrees    T AXIS 58 degrees    MUSE DIAGNOSIS       Normal sinus rhythm  Possible Left atrial enlargement  RSR' or QR pattern in V1 suggests  right ventricular conduction delay  Left ventricular hypertrophy  Abnormal ECG  When compared with ECG of 29-MAY-2016 07:38,  Premature supraventricular complexes are no longer Present         Imaging    No results found.      Signed by: Camden Bourne MD

## 2021-06-17 NOTE — PATIENT INSTRUCTIONS - HE
Patient Instructions by Delano Blake CNP at 2/12/2019  8:30 AM     Author: Delano Blake CNP Service: -- Author Type: Nurse Practitioner    Filed: 2/12/2019  9:00 AM Encounter Date: 2/12/2019 Status: Addendum    : Delano Blake CNP (Nurse Practitioner)    Related Notes: Original Note by Delano Blake CNP (Nurse Practitioner) filed at 2/12/2019  8:56 AM       Try cutting back on naps to 30 minute daily. Try adding melatonin 5 mg nightly 30 minutes prior to bed. Try this new routine for two weeks and see how you do.     Try using Maalox one hour before bed to help with acid reflux.     Call you insurance company and ask about coverage for the Shingrix vaccine for shingles. It can be cheaper at [employer] or [employer].     Call dermatology consultants at [phone] to have your hair loss evaluated and skin checked.    Increase physical activity.  Make sure to walk for 30 minutes every day.    Patient Education   Signs of Hearing Loss  Hearing loss is a problem shared by many people. In fact, it is one of the most common health conditions, particularly as people age. Most people over age 65 have some hearing loss, and by age 80, almost everyone does. Because hearing loss usually occurs slowly over the years, you may not realize your hearing ability has gotten worse.       Have your hearing checked  Contact your [facility] care provider if you:    Have to strain to hear normal conversation.    Have to watch other peoples faces very carefully to follow what theyre saying.    Need to ask people to repeat what theyve said.    Often misunderstand what people are saying.    Turn the volume of the television or radio up so high that others complain.    Feel that people are mumbling when theyre talking to you.    Find that the effort to hear leaves you feeling tired and irritated.    Notice, when using the phone, that you hear better with 1 ear than the other.    5815-6832 [employer]. [address] [place] [address]  [place] [address]. All rights reserved. This information is not intended as a substitute for professional medical care. Always follow your healthcare professional's instructions.         Patient Education   Preventing Falls in the Home  As you get older, falls are more likely. Thats because your reaction time slows. Your muscles and joints may also get stiffer, making them less flexible. Illness, medications, and vision changes can also affect your balance. A fall could leave you unable to live on your own. To make your home safer, follow these tips:    Floors    Put nonskid pads under area rugs.    Remove throw rugs.    Replace worn floor coverings.    Tack carpets firmly to each step on carpeted stairs. Put nonskid strips on the edges of uncarpeted stairs.    Keep floors and stairs free of clutter and cords.    Arrange furniture so there are clear pathways.    Clean up any spills right away.    Bathrooms    Install grab bars in the tub or shower.    Apply nonskid strips or put a nonskid rubber mat in the tub or shower.    Sit on a bath chair to bathe.    Use bathmats with nonskid backing.    Lighting    Keep a flashlight in each room.    Put a nightlight along the pathway between the bedroom and the bathroom.    7684-8794 [employer]. [address] [place] [address]. All rights reserved. This information is not intended as a substitute for professional medical care. Always follow your healthcare professional's instructions.           Advance Directive  Patients advance directive was discussed and I am comfortable with the patients wishes.  Patient Education   Personalized Prevention Plan  You are due for the preventive services outlined below.  Your care team is available to assist you in scheduling these services.  If you have already completed any of these items, please share that information with your care team to update in your medical record.  Health Maintenance   Topic Date Due    ? ZOSTER VACCINES (2 of 3) 06/01/2009   ? FALL RISK ASSESSMENT  12/13/2019   ? DXA SCAN  06/18/2020   ? TD 18+ HE  11/09/2022   ? ADVANCE DIRECTIVES DISCUSSED WITH PATIENT  11/16/2022   ? PNEUMOCOCCAL POLYSACCHARIDE VACCINE AGE 65 AND OVER  Completed   ? INFLUENZA VACCINE RULE BASED  Completed   ? PNEUMOCOCCAL CONJUGATE VACCINE FOR ADULTS (PCV13 OR PREVNAR)  Completed   ? MAMMOGRAM  Discontinued

## 2021-06-17 NOTE — TELEPHONE ENCOUNTER
RN cannot approve Refill Request    RN can NOT refill this medication PCP messaged that patient is overdue for Office Visit. Last office visit: 10/24/2019 Renetta Carrillo MD Last Physical: 11/16/2017 Last MTM visit: Visit date not found Last visit same specialty: Visit date not found.  Next visit within 3 mo: Visit date not found  Next physical within 3 mo: Visit date not found      Valorie Moon, [facility] Triage/Med Refill 4/27/2021    Requested Prescriptions   Pending Prescriptions Disp Refills     simvastatin (ZOCOR) 20 MG tablet [Pharmacy Med Name: SIMVASTATIN 20MG TABS] 90 tablet 1     Sig: TAKE ONE TABLET BY MOUTH AT BEDTIME       Statins Refill Protocol (Hmg CoA Reductase Inhibitors) Failed - 4/27/2021 10:25 AM        Failed - PCP or prescribing provider visit in past 12 months      Last office visit with prescriber/PCP: 10/24/2019 Renetta Carrillo MD OR same dept: Visit date not found OR same specialty: Visit date not found  Last physical: 11/16/2017 Last MTM visit: Visit date not found   Next visit within 3 mo: Visit date not found  Next physical within 3 mo: Visit date not found  Prescriber OR PCP: Renetta Carrillo MD  Last diagnosis associated with med order: 1. Hyperlipidemia, unspecified hyperlipidemia type  - simvastatin (ZOCOR) 20 MG tablet [Pharmacy Med Name: SIMVASTATIN 20MG TABS]; TAKE ONE TABLET BY MOUTH AT BEDTIME  Dispense: 90 tablet; Refill: 1    If protocol passes may refill for 12 months if within 3 months of last provider visit (or a total of 15 months).

## 2021-06-17 NOTE — PROGRESS NOTES
Audiology only; referred by Renetta Carrillo    History:  Patient has noted decreased hearing ability over the past three years, and also has had bilateral tinnitus since 1985. The tinnitus is non-pulsatile and is not debilitating. She indicated she must put the phone on speaker to make phone calls. She is currently on oral chemotherapy  (Hydroxyurea), and reported having had chemotherapy via infusion in the past. Ms. Hernadez reported no history of monitoring hearing during and after chemotherapy. She denied otalgia, otorrhea, aural fullness, dizziness, significant noise exposure, or recent illness.    Results:  Otoscopy was unremarkable in either ear. Hearing sensitivity was assessed with good reliability using insert phones.      Borderline normal hearing sensitivity for 250-1000 Hz, sloping to mild-moderate sensorineural hearing loss for 0819-3909 Hz, bilaterally.    Speech reception thresholds showed agreement with pure tone findings in each ear. Word recognition ability was excellent, bilaterally, with presentation levels significantly above typical conversational volume in both ears.    Tympanometry was not assessed.    Recommendations:  Follow-up with PCP; retest hearing annually (to monitor) or per medical management/chemotherapy protocol.  Wear hearing protection consistently in noise to preserve residual hearing sensitivity.  Libertad Hernadez is a potential binaural amplification candidate, if patient motivation exists and medical clearance is granted.    Arely Ness, [facility]  [place] Licensed Audiologist [phone]

## 2021-06-17 NOTE — TELEPHONE ENCOUNTER
Refill Request  Medication name:   Requested Prescriptions     Pending Prescriptions Disp Refills     traZODone (DESYREL) 100 MG tablet 90 tablet 0     Sig: Take 1 tablet (100 mg total) by mouth at bedtime.     Who prescribed the medication: Lorena    Last refill on medication: 02/09/21  Requested Pharmacy: [facility]  Last appointment with PCP: 03/25/20  Next appointment: Patient due for appointment    RT Clementine (R)

## 2021-06-18 NOTE — PROGRESS NOTES
Clinic Note    Assessment:     Assessment and Plan:  1. Pulmonary emboli  I am going to send in a refill for her to continue taking her Xarelto.  However, I suspect that when she goes to her pharmacy to fill this that it will be very expensive.  I had an honest conversation with her about the cost of her medication and told her to call back to the clinic if this was going to be cost prohibitive.  She plans on calling her pharmacist to inquire about the cost before filling it.  She has a follow-up appointment with her oncology team in about a week.  I encouraged her to have a detailed discussion about the total duration of the Xarelto treatment.  Today, I ensured her that she would at least need to be on the medication for a minimum of 3 months.    I am going to reach out to our clinical pharmacist about what to do in the event of her not being able to continue using the Xarelto medication and whether or not she needs to be bridged with Lovenox as we move on to warfarin.  Libertad is going to be traveling with her  on 6/6/2018, a day after this note was recorded.  She can be reached on her 's cell phone, number is [phone].     Patient Instructions   Call over to your pharmacy to inquire about the price of your Xarelto medication.  We would like to prescribe 30 days of the 20 mg tablet.  I will send this into your pharmacy.  But if the cost is too high, call back to the clinic and we can try and work something out.    I am going to reach out to your oncologist tomorrow about what to do if your Xarelto is too expensive.    If you can afford the Xarelto, take 120 mg tablet daily for the next 2 months.      Make sure to check with your oncologist next week about how long you take the medication for (whether you need to take it for greater than three months).     No Follow-up on file.         Subjective:      Patient presents the clinic today with questions about her Xarelto medication.    On 5/12/2018, the  patient presented to the [facility] emergency department with chest pain.  4 days prior to her presentation she had noticed that she had been feeling dizzy, and had been experiencing lightheadedness and nausea.  She had extensive workup in the ED which eventually revealed pulmonary emboli in her left lower lobe.  Clot burden appears small.  She had ultrasounds of her bilateral legs which was negative.  She was discharged from the emergency department with a prescription for Xarelto.    3 days later she was evaluated in the rapid access heart clinic.  Patient denied stress testing, and it was felt that her chest discomfort was likely from the pulmonary embolism as well as a recent fall that she has sustained.  It was recommended that she follow-up with her primary care provider.    The patient only has about 6 days left of her Xarelto medication and is wondering about a refill.    She denies chest pain.  She is not having any shortness of breath.  No dyspnea on exertion.  No new swelling in her lower extremities.  Vital signs are stable today.  She was recently [facility] for cellulitis in her left ear.  She was given a prescription for antibiotics and her ear is feeling better.    The following portions of the patient's history were reviewed and updated as appropriate: allergies, medications, problem list, prior ed note, labs.     Review of Systems:    Review is otherwise negative except for what is mentioned above.     Social Hx:    History   Smoking Status     Never Smoker   Smokeless Tobacco     Never Used         Objective:     Vitals:    06/05/18 1526   BP: 128/66   Pulse: 68   Weight: 160 lb 14.4 oz (73 kg)       Exam:    General: No apparent distress. Calm. Alert and Oriented X3. Pt behavior is appropriate.  Chest/Lungs: Normal chest wall, clear to auscultation, normal respiratory effort and rate.   Heart/Pulses: Regular rate and rhythm, strong and equal radial pulses, no murmurs. Capillary  refill <2 seconds. No edema.   Musculoskeletal: No CVA tenderness with palpation. Good ROM with extremities.   Neurologic: Interactive, alert, no focal findings, CNs intact.   Skin: Warm, dry. Normal hair pattern. Free of lesions. Normal skin turgor.       Patient Active Problem List   Diagnosis     Breast Cancer     Irritable Bowel Syndrome     Chronic Reflux Esophagitis     Tuberculin PPD Induration Positive Interpretation     Peripheral Neuropathy     Varicose Veins     Restless Legs Syndrome     Essential hypertension     Hyperlipidemia     Wrist fracture     Osteoporosis, senile     S/P bilateral mastectomy     Urinary frequency     Essential thrombocythemia     Current Outpatient Prescriptions   Medication Sig Dispense Refill     ascorbic acid, vitamin C, (VITAMIN C) 250 MG tablet Take 250 mg by mouth daily.       BIOTIN ORAL Take 5,000 mcg by mouth daily.        calcium carbonate-vitamin D3 (CALCIUM 600 + D,3,) 600 mg(1,500mg) -200 unit per tablet Take 1 tablet by mouth 2 (two) times a day.        hydroxyurea (HYDREA) 500 mg capsule Take 500 mg by mouth every other day.       lisinopril (PRINIVIL,ZESTRIL) 5 MG tablet Take 5 mg by mouth daily.       ranitidine (ZANTAC) 150 MG tablet Take 150 mg by mouth 2 (two) times a day.       simvastatin (ZOCOR) 20 MG tablet Take 20 mg by mouth at bedtime.       tolterodine (DETROL LA) 4 MG ER capsule Take 4 mg by mouth daily.       traZODone (DESYREL) 100 MG tablet Take 100 mg by mouth at bedtime.       polyvinyl alcohol (LIQUIFILM TEARS) 1.4 % ophthalmic solution Administer 1 drop to both eyes as needed for dry eyes.       propylene glycol/peg 400/PF (SYSTANE, PF, OPHT) Apply 1 drop to eye as needed.       rivaroxaban 20 mg Tab Take 1 tablet (20 mg total) by mouth once for 1 dose. 30 tablet 1     Current Facility-Administered Medications   Medication Dose Route Frequency Provider Last Rate Last Dose     denosumab 60 mg (PROLIA 60 mg/ml)  60 mg Subcutaneous Q6 Months Renetta  MD Lorena   60 mg at 03/08/18 1611       I spent 30 minutes with patient face to face, of which >50% was counseling regarding the above plan       Delano Blake CNP (Rob)    6/5/2018

## 2021-06-18 NOTE — PROGRESS NOTES
"Clinic Note    Assessment:     Assessment and Plan:  1. Pulmonary embolism  Patient is largely asymptomatic today in the exam room.  Her vital signs are normal.  She continues to have small amounts of lightheadedness and nausea at times.  No chest pain.  No shortness of breath.  No more dyspnea.  She was given directions to her rapid access appointment for tomorrow.  I reviewed the ER note with her today and offered anticipatory guidance.           Subjective:      Libertad Hernadez is a 77 y.o. female who comes to the clinic for an ER follow-up.     Patient presents to clinic today for follow-up of her pulmonary embolism.    Patient was initially seen in the ER 2 days ago at the [facility] emergency department.    She says that prior to presenting in the ER, she had accidentally taken her evening medications in the morning.  She thinks this led to a syncopal episode in which she fell to the ground.  She did not hit her head at that time but twisted her knee and injured her ankle.    Since her fall she continued to have lightheadedness as well as nausea.  She presented to the ER with pain in her chest rated as a 6 out of 10 that radiated up her left upper back.  Pain with deep inspiration.    EKG showed subtle depression in the ST segments of the lateral leads which was new.  CTA showed small pulmonary embolus in the segmental arterial branch and a tiny emboli in the left lower lobe.  Bilateral lower extremity ultrasounds were negative for DVT.  It was recommended that she be monitored in the hospital, however, the patient did not want to stay overnight.  She was started on Xarelto and discharged.    Today, she says that she is feeling a bit \"blah.\" She is not having any chest pain. She is breathing without pain. No cough. Sometimes she feels lightheaded and woozy, she does not feel like she is going to faint. She is eating and drinking good. She is ambulating without difficulty. Sometimes she has blurry vision, but she " recently had cataract surgery. No slurred speech or confusion. No new numbness or tingling.    Yesterday she was having some pain on the side of her ribs and across her chest. She was having some dyspnea. This has since resolved. She was having some pain in her knee last night but is wondering if this is related to her fall that she sustained prior to going to the ER.     She was told by her hematologist that she should decrease her Detrol and Trazadone to help with her dizziness.     The following portions of the patient's history were reviewed and updated as appropriate: Allergies, medications, problems, prior note.    Review of Systems:    Review is negative except for what is mentioned above.     Social Hx:    History   Smoking Status     Never Smoker   Smokeless Tobacco     Never Used         Objective:     Vitals:    05/14/18 1118   BP: 118/68   Pulse: 66   Weight: 164 lb 1.6 oz (74.4 kg)       Exam:    General: No apparent distress. Calm. Alert and Oriented X3. Pt behavior is appropriate.  Head:Atraumatic. Normocephalic, non-tender to palpation  Neck: Supple. No JVD. Full ROM. No adenopathy  Eyes: PERRL, No discharge. No strabismus. No nystagmus.  Ears: TMs pearly gray with landmarks visible.   Nose/Mouth/Throat: Patent nares, no oral lesions, pharynx clear and without exudate. Uvula mid-line. Nasal septum mid-line. Clear turbinates.   Lymph: No axillar or cervical adenopathy.   Chest/Lungs: Normal chest wall, clear to auscultation, normal respiratory effort and rate.   Heart/Pulses: Regular rate and rhythm, strong and equal radial pulses, no murmurs. Capillary refill <2 seconds. No edema.   Abdomen: Soft, no palpable masses. No hepatosplenomegaly, no tenderness with palpation noted. Bowel sounds active in all quadrants. No increased tympany.   Genitalia: Not examined.   Musculoskeletal: No CVA tenderness with palpation. Good ROM with extremities.   Neurologic: Interactive, alert, no focal findings, CNs  intact.   Skin: Warm, dry. Normal hair pattern. Free of lesions. Normal skin turgor.       Patient Active Problem List   Diagnosis     Breast Cancer     Irritable Bowel Syndrome     Chronic Reflux Esophagitis     Tuberculin PPD Induration Positive Interpretation     Peripheral Neuropathy     Varicose Veins     Restless Legs Syndrome     Essential hypertension     Hyperlipidemia     Wrist fracture     Osteoporosis, senile     S/P bilateral mastectomy     Urinary frequency     Essential thrombocythemia     Current Outpatient Prescriptions   Medication Sig Dispense Refill     ascorbic acid, vitamin C, (VITAMIN C) 250 MG tablet Take 250 mg by mouth daily.       BIOTIN ORAL Take 5,000 mcg by mouth daily.        calcium carbonate-vitamin D3 (CALCIUM 600 + D,3,) 600 mg(1,500mg) -200 unit per tablet Take 1 tablet by mouth 2 (two) times a day.        hydroxyurea (HYDREA) 500 mg capsule Take 500 mg by mouth every other day.       lisinopril (PRINIVIL,ZESTRIL) 5 MG tablet Take 5 mg by mouth daily.       polyvinyl alcohol (LIQUIFILM TEARS) 1.4 % ophthalmic solution Administer 1 drop to both eyes as needed for dry eyes.       ranitidine (ZANTAC) 150 MG tablet Take 150 mg by mouth 2 (two) times a day.       rivaroxaban 15 mg (42)- 20 mg (9) DsPk Take 1 tablet (15mg) po bid for 21 days then take 1 tablet (20mg) daily 51 tablet 0     simvastatin (ZOCOR) 20 MG tablet Take 20 mg by mouth at bedtime.       tolterodine (DETROL LA) 4 MG ER capsule Take 4 mg by mouth daily.       traZODone (DESYREL) 100 MG tablet Take 100 mg by mouth at bedtime.       propylene glycol/peg 400/PF (SYSTANE, PF, OPHT) Apply 1 drop to eye as needed.       Current Facility-Administered Medications   Medication Dose Route Frequency Provider Last Rate Last Dose     denosumab 60 mg (PROLIA 60 mg/ml)  60 mg Subcutaneous Q6 Months Renetta Carrillo MD   60 mg at 03/08/18 1611       Total time spent with patient was 30 minutes with >50% of time spent in face-to-face  counseling regarding the above plan       Delano Blake (Rob), CNP    5/14/2018

## 2021-06-18 NOTE — PROGRESS NOTES
Patient is here for follow-up of essential thrombosis. On hydrea 500 mg every other day.  New dx PE on 5/12/18. Accompanied by spouse, Rodrigue. Brisa Candelario RN

## 2021-06-18 NOTE — PROGRESS NOTES
[facility] Hematology and Oncology Progress Note    Patient: Libertad Hernadez  MRN: [id number]  Date of Service: 6/14/2018        Reason for Visit    Follow-up essential thrombocytosis (ET).    Assessment and Plan:    1) JAK2 positive essential thrombocytosis:    77 y.o.     1990's history of stage II breast cancer with positive lymph nodes.  Completed chemotherapy and tamoxifen.    2018, January - diagnosed JAK2 positive essential thrombocytosis.      High risk for thrombotic complication based on age, AK2 [id number] mutation and cardiovascular risk factors of hypertension and diabetes.      Unilateral bone marrow biopsy showed 30% cellularity, 1-2+/3 reticulin stain.  Cytogenetics- normal.    01/16/18 - Started Hydrea therapy 500 mg every other day.    06/14/18:    CBC - PLTS elevated @ 493,000.  Otherwise WNL.    CMP - WNL    LDH - WNL     Patient looks and feels quite good.  Tolerating Hydrea therapy very well.  No nausea.    Will increase Hydrea from 500 mg every other day to 500 mg Monday through Friday, Saturday and Sunday off.      Not on ASA - intolerance with persistent nosebleeding.    07/12/18 - follow up 4-weeks with CBC and CMP.    2) Pulmonary Embolism:    05/12/18 - took evening Trazodone in morning.  Caused drowsiness and she fell.  She injured her ankle.  3 days later noted (L) chest pain and was seen in the ED.    CTA chest - pulmonary emboli in the left lower lobe.  Small clot burden. Trace effusion without evidence of pulmonary infarct.  No RV strain.  Evidence of previous granulomatous disease.  Large simple liver cyst.    US (B) legs - No evidence of DVT in either leg.    Started and continues on Xarelto therapy.    No s/s bleeding.    With ET, duration of Xarelto controversial, minimum 6 months, perhaps lifelong.    ECOG Performance:     ECOG Performance Status: 1     Distress Assessment:    Distress Assessment Score: 3       TT > 25 minutes face to face with > 50% in coordination and counseling of  care.    Olvin Villanueva, CNP  ____________________________________________________________________    Interim History:    The patient presents accompanied by her .  She looks and feels quite good and has had no more syncopal episodes.  She denies cough, fever, chills, chest pain, shortness of breath, palpitations, focal weakness, bowel or bladder issues, visual or mentation disturbances.  Her appetite, weight and PS remain very stable.  She is tolerating Hydrea therapy without incidence.  No nausea.    Pain Status:    Currently in Pain: No/denies    Review of Systems:    Constitutional  Constitutional (WDL): Exceptions to WDL  Fatigue: Fatigue relieved by rest  Fever: None  Chills: None  Weight Gain: None  Weight Loss: None  Neurosensory  Neurosensory (WDL): Exceptions to WDL  Peripheral Motor Neuropathy: Asymptomatic, clinical or diagnostic observations only, intervention not indicated (chronic hands and feet)  Ataxia: Asymptomatic, clinical or diagnostic observations only, intervention not indicated  Peripheral Sensory Neuropathy: Asymptomatic, loss of deep tendon reflexes or paresthesia (chronic hands and feet)  Confusion: None  Syncope: None  Eye   Eye Disorder (WDL): Exceptions to WDL  Blurred Vision: None  Dry Eye: Asymptomatic, clinical or diagnostic observations only, mild symptoms relieved by lubricants  Eye Pain: None  Watering Eyes: None  Ear  Ear Disorder (WDL): Exceptions to WDL  Ear Pain: None  Tinnitus: Mild symptoms, intervention not indicated (chronic)  Cardiovascular  Cardiovascular (WDL): Exceptions to WDL  Palpitations: None  Edema: Yes (Lt ankle due to prior trauma)  Edema Limbs: 5 - 10% inter-limb discrepancy in volume or circumference at point of greatest visible difference, swelling or obscuration of anatomic architecture on close inspection  Phlebitis: None  Superficial thrombophlebitis: None  Pulmonary  Respiratory (WDL): Exceptions to WDL  Cough: Mild symptoms, nonprescription  "intervention indicated (x one month)  Dyspnea: Shortness of breath with moderate exertion  Hypoxia: None  Gastrointestinal  Gastrointestinal (WDL): Exceptions to WDL  Anorexia: None  Constipation: Occasional or intermittent symptoms, occasional use of stool softeners, laxatives, dietary modification, or enema  Diarrhea: None  Dysphagia: None  Esophagitis: Asymptomatic, clinical or diagnostic observations only, intervention not indicated  Nausea: Loss of appetite without alteration in eating habits (intermittent in AM's)  Pharyngitis: None  Vomiting: None  Dysgeusia: None  Dry Mouth: None  Genitourinary  Genitourinary (WDL): All genitourinary elements are within defined limits  Lymphatic  Lymph (WDL): All lymph disorder elements are within defined limits  Musculoskeletal and Connective Tissue  Musculoskeletal and Connetive Tissue Disorders (WDL): Exceptions to WDL  Arthralgia: Mild pain (Lt ankle, bilat knees)  Bone Pain: None  Muscle Weakness : None  Myalgia: Mild pain (posterior lower legs)  Integumentary  Integumentary (WDL): All integumentary elements are within defined limits  Patient Coping  Patient Coping: Open/discussion  Distress Assessment  Distress Assessment Score: 3  Accompanied by  Accompanied by: Family Member (spouse Rodrigue)  Oral Chemo Adherence       Past Histories:    Past Medical History:   Diagnosis Date     Breast Cancer     Created by [employer] [facility] Annotation: Oct 21 2010  [id number] - [facility]: stage 2;  10/18 lymph nodes positives/p chemo and [id number]      Hyperlipidemia     Created by Conversion      Hypertension     Created by Conversion      Osteopenia     Created by Conversion      Peripheral Neuropathy     Created by Conversion      Primary cancer of bone marrow (H)      Varicose Veins     Created by Conversion        Physical Exam:    Recent Vitals 6/14/2018   Height 5' 6\"   Weight 161 lbs 2 oz   BSA (m2) 1.84 m2   /63   Pulse 75   Temp 97.8   Temp src 1   SpO2 98 "   Some recent data might be hidden     General:  Alert.  Pleasant.  No acute distress.  Accompanied by spouse.  HEENT:  Anicteric sclera.  CONSTANCE.  Normal buccal mucosa.   Lymphatics:  No abnormally enlarged lymph nodes.  Chest:  Lungs clear to auscultation.  Heart:   S1 S2 heard.  RRR.  No murmur heard.   Abdomen:  Soft.  No tenderness, masses, organomegaly or guarding  Extremities:  No edema.  Skin:   No rash noted.  CNS:   Non focal.    Lab Results:    Reviewed with patient and her spouse.    Recent Results (from the past 24 hour(s))   Comprehensive Metabolic Panel   Result Value Ref Range    Sodium 142 136 - 145 mmol/L    Potassium 4.3 3.5 - 5.0 mmol/L    Chloride 106 98 - 107 mmol/L    CO2 26 22 - 31 mmol/L    Anion Gap, Calculation 10 5 - 18 mmol/L    Glucose 85 70 - 125 mg/dL    BUN 15 8 - 28 mg/dL    Creatinine 0.77 0.60 - 1.10 mg/dL    GFR MDRD Af Amer >60 >60 mL/min/1.73m2    GFR MDRD Non Af Amer >60 >60 mL/min/1.73m2    Bilirubin, Total 0.5 0.0 - 1.0 mg/dL    Calcium 9.3 8.5 - 10.5 mg/dL    Protein, Total 6.9 6.0 - 8.0 g/dL    Albumin 3.8 3.5 - 5.0 g/dL    Alkaline Phosphatase 44 (L) 45 - 120 U/L    AST 23 0 - 40 U/L    ALT 23 0 - 45 U/L   Lactate Dehydrogenase (LDH)   Result Value Ref Range    LD (LDH) 200 125 - 220 U/L   HM1 (CBC with Diff)   Result Value Ref Range    WBC 8.1 4.0 - 11.0 thou/uL    RBC 4.82 3.80 - 5.40 mill/uL    Hemoglobin 14.4 12.0 - 16.0 g/dL    Hematocrit 43.8 35.0 - 47.0 %    MCV 91 80 - 100 fL    MCH 29.9 27.0 - 34.0 pg    MCHC 32.9 32.0 - 36.0 g/dL    RDW 15.1 (H) 11.0 - 14.5 %    Platelets 493 (H) 140 - 440 thou/uL    MPV 9.2 8.5 - 12.5 fL    Neutrophils % 63 50 - 70 %    Lymphocytes % 22 20 - 40 %    Monocytes % 9 2 - 10 %    Eosinophils % 5 0 - 6 %    Basophils % 1 0 - 2 %    Neutrophils Absolute 5.0 2.0 - 7.7 thou/uL    Lymphocytes Absolute 1.8 0.8 - 4.4 thou/uL    Monocytes Absolute 0.8 0.0 - 0.9 thou/uL    Eosinophils Absolute 0.4 0.0 - 0.4 thou/uL    Basophils Absolute 0.1  0.0 - 0.2 thou/uL       Imaging:    No results found.

## 2021-06-19 NOTE — PROGRESS NOTES
[facility]ology [facility] Progress Note    Patient: Libertad Hernadez  MRN: [id number]  Date of Service: 7/19/2018        Reason for Visit    Follow-up essential thrombocytosis.    Assessment and Plan  No matching staging information was found for the patient.    ECOG Performance   ECOG Performance Status: 0     Distress Assessment  Distress Assessment Score: 1    Pain  Currently in Pain: Yes  Pain Score (Initial OR Reassessment): 4  Location: joints    #. JAK2 positive essential thrombocytosis, diagnosis in January 2018.  High risk for thrombotic complication based on age, JAK2 V617F mutation and cardiovascular risk factor of hypertension, diabetes.  Unilateral bone marrow biopsy showed 30% cellularity, 1-2+/3 reticulin stain.  Cytogenetics- normal.  She developed acute appeared to be provoked pulmonary emboli (small [place]) in May 2018.   Labs were reviewed.  Her platelets are continued to be stable around 460 K range.  I advised her to increase to 500 mg daily from 5 days a week.    Follow-up labs monthly and provider visit every 3 months.    #.  Small burden pulmonary emboli in the left lower lobe in May 2018, presented with syncopal episode and chest pain   No DVT.  She has been on Xarelto for the last 2 months.  I advised her to remain on for 6 months and revisit.  Because of her underlying myeloproliferative neoplasm, she would qualify for extended anticoagulation therapy.  She does not have any side effects or bleeding.  She is not thrilled about that.  I explained to her that when she decides to stop Xarelto, she should be taking aspirin.  She has history of persistent nose bleeding with aspirin that she could not do that either.  We will continue to revisit that topic in the next visits.    #.  Intolerance to aspirin (persistent nosebleeding)    #.  History of stage II breast cancer with positive lymph nodes (10/18) in 1990s. S/p  Completed chemotherapy and tamoxifen.     Problem List    1. Essential  thrombocythemia (H)  HM1(CBC and Differential)    Comprehensive Metabolic Panel   2. Other acute pulmonary embolism without acute cor pulmonale (H)        ______________________________________________________________________________  Diagnosis  January 2018-diagnosed with ANGELA 2 positive essential thrombocytosis platelet counts at high 500.    Treatment to date  January 2018-started Hydrea 500 mg every other day (due to patient's fear of side effects) and dose adjusted to 500 mg once a day in July 2018    History of Present Illness    Libertad presented today accompanied by her .  She has lost about 3 pounds in a month.  She has increased cough and occasional lightheadedness in the morning.  She has pain in her joints.  No recall side effects from Hydrea.  No skin rash.     Pain Status  Currently in Pain: Yes    Review of Systems    Constitutional  Constitutional (WDL): Exceptions to WDL  Fatigue: Fatigue relieved by rest  Fever: None  Chills: None  Weight Gain: None  Weight Loss: to <10% from baseline, intervention not indicated (#3 6/14/18)  Neurosensory  Neurosensory (WDL): Exceptions to WDL  Peripheral Motor Neuropathy: None  Ataxia: None  Peripheral Sensory Neuropathy: Asymptomatic, loss of deep tendon reflexes or paresthesia (bilat feet)  Confusion: None  Syncope: None  Eye   Eye Disorder (WDL): Exceptions to WDL  Blurred Vision: Intervention not indicated (intermittent, since cataract)  Dry Eye: Asymptomatic, clinical or diagnostic observations only, mild symptoms relieved by lubricants  Eye Pain: None  Watering Eyes: None  Ear  Ear Disorder (WDL): Exceptions to WDL  Ear Pain: None  Tinnitus: Mild symptoms, intervention not indicated (chronic since 1987)  Cardiovascular  Cardiovascular (WDL): All cardiovascular elements are within defined limits  Pulmonary  Respiratory (WDL): Exceptions to WDL  Cough: Mild symptoms, nonprescription intervention indicated (productive, clear)  Dyspnea: None  Hypoxia:  "None  Gastrointestinal  Gastrointestinal (WDL): Exceptions to WDL  Anorexia: None  Constipation: Occasional or intermittent symptoms, occasional use of stool softeners, laxatives, dietary modification, or enema (miralax prn, helps)  Diarrhea: None  Dysphagia: None  Esophagitis: Symptomatic, altered eating/swallowing, oral supplements indicated  Nausea: None  Pharyngitis: None  Vomiting: None  Dysgeusia: None  Dry Mouth: None  Genitourinary  Genitourinary (WDL): All genitourinary elements are within defined limits  Lymphatic  Lymph (WDL): All lymph disorder elements are within defined limits  Musculoskeletal and Connective Tissue  Musculoskeletal and Connetive Tissue Disorders (WDL): Exceptions to WDL  Arthralgia: Mild pain  Bone Pain: None  Muscle Weakness : None  Myalgia: None  Integumentary  Integumentary (WDL): All integumentary elements are within defined limits  Patient Coping  Patient Coping: Accepting  Distress Assessment  Distress Assessment Score: 1  Accompanied by  Accompanied by: Family Member  Oral Chemo Adherence       Past History  Past Medical History:   Diagnosis Date     Breast Cancer     Created by [employer] [facility] Annotation: Oct 21 2010  9:56AM - [facility] [place]er: stage 2;  10/18 lymph nodes positives/p chemo and [id number]      Hyperlipidemia     Created by Conversion      Hypertension     Created by Conversion      Osteopenia     Created by Conversion      Peripheral Neuropathy     Created by Conversion      Primary cancer of bone marrow (H)      Varicose Veins     Created by Conversion        Physical Exam    Recent Vitals 7/19/2018   Height 5' 6\"   Weight 158 lbs 14 oz   BSA (m2) 1.83 m2   /64   Pulse 76   Temp -   Temp src -   SpO2 96   Some recent data might be hidden     General: alert, awake, not in acute distress  HEENT: Head: Normal, normocephalic, atraumatic.  Eye: Normal external eye, conjunctiva, lids cornea, CONSTANCE.  Ears:  Non-tender.  Nose: Normal external nose, " mucus membranes and septum.  Pharynx: Dental Hygiene adequate. Normal buccal mucosa. Normal pharynx.  Neck / Thyroid: Supple, no masses, nodes, nodules or enlargement.  Lymphatics: No abnormally enlarged lymph nodes.  Chest: Normal chest wall and respirations. Clear to auscultation.  Heart: S1 S2 RRR, no murmur.   Abdomen: abdomen is soft without significant tenderness, masses, organomegaly or guarding  Extremities: normal strength, tone, and muscle mass  Skin: normal. no rash or abnormalities  CNS: non focal.      Lab Results    Recent Results (from the past 168 hour(s))   Comprehensive Metabolic Panel   Result Value Ref Range    Sodium 141 136 - 145 mmol/L    Potassium 3.8 3.5 - 5.0 mmol/L    Chloride 106 98 - 107 mmol/L    CO2 26 22 - 31 mmol/L    Anion Gap, Calculation 9 5 - 18 mmol/L    Glucose 108 70 - 125 mg/dL    BUN 15 8 - 28 mg/dL    Creatinine 0.79 0.60 - 1.10 mg/dL    GFR MDRD Af Amer >60 >60 mL/min/1.73m2    GFR MDRD Non Af Amer >60 >60 mL/min/1.73m2    Bilirubin, Total 0.5 0.0 - 1.0 mg/dL    Calcium 10.1 8.5 - 10.5 mg/dL    Protein, Total 6.8 6.0 - 8.0 g/dL    Albumin 3.8 3.5 - 5.0 g/dL    Alkaline Phosphatase 50 45 - 120 U/L    AST 22 0 - 40 U/L    ALT 17 0 - 45 U/L   HM1 (CBC with Diff)   Result Value Ref Range    WBC 9.2 4.0 - 11.0 thou/uL    RBC 4.87 3.80 - 5.40 mill/uL    Hemoglobin 14.5 12.0 - 16.0 g/dL    Hematocrit 43.9 35.0 - 47.0 %    MCV 90 80 - 100 fL    MCH 29.8 27.0 - 34.0 pg    MCHC 33.0 32.0 - 36.0 g/dL    RDW 15.6 (H) 11.0 - 14.5 %    Platelets 462 (H) 140 - 440 thou/uL    MPV 9.3 8.5 - 12.5 fL    Neutrophils % 64 50 - 70 %    Lymphocytes % 23 20 - 40 %    Monocytes % 9 2 - 10 %    Eosinophils % 3 0 - 6 %    Basophils % 1 0 - 2 %    Neutrophils Absolute 5.9 2.0 - 7.7 thou/uL    Lymphocytes Absolute 2.1 0.8 - 4.4 thou/uL    Monocytes Absolute 0.8 0.0 - 0.9 thou/uL    Eosinophils Absolute 0.3 0.0 - 0.4 thou/uL    Basophils Absolute 0.1 0.0 - 0.2 thou/uL       Imaging    No results  found.      Signed by: Camden Bourne MD

## 2021-06-19 NOTE — PROGRESS NOTES
Assessment/Plan:        1. Pulmonary emboli (H)  Thyroid Stimulating Hormone (TSH)    Comprehensive Metabolic Panel    Magnesium    Vitamin B12    HM2(CBC w/o Differential)    Vitamin D, Total (25-Hydroxy)   2. Essential thrombocythemia (H)  Thyroid Stimulating Hormone (TSH)    Comprehensive Metabolic Panel    Magnesium    Vitamin B12    HM2(CBC w/o Differential)    Vitamin D, Total (25-Hydroxy)   3. Fatigue  Thyroid Stimulating Hormone (TSH)    Comprehensive Metabolic Panel    Magnesium    Vitamin B12    HM2(CBC w/o Differential)    Vitamin D, Total (25-Hydroxy)     #1 recently diagnosed pulmonary emboli in May 2018, she will stay on Xarelto until she follow-up with oncology in October.    2.  Thrombocytosis, she will continue with hydroxyurea 500 mg daily.  Her platelets stays stable around 460 K.    3.  Persistent fatigue, medications discussed today.  She described occasional symptoms of dizziness after she takes lisinopril in the morning.  She will try to move lisinopril at the bedtime.  She will try to take hydroxyurea in the afternoon.  She will decrease dose of her insomnia medication.  I will check basic labs today.    This note has been dictated using voice recognition software. Any grammatical or context distortions are unintentional and inherent to the software.      Return in about 6 months (around 2/14/2019) for Annual physical.    There are no Patient Instructions on file for this visit.        Subjective:    Libertad Hernadez is a 77 y.o. female  here for    Chief Complaint   Patient presents with     Follow-up     Pulmonary Embolism     Patient is here today for the follow-up of the small burden pulmonary emboli in the left lower lobe diagnosed during the ER visit in May 2018 where she presented with syncopal episode and chest pain.  Ultrasound did not show DVT.  She has been on Xarelto now for 3 months.  She did see oncologist on July 19 for follow-up of her underlying myeloproliferative neoplasm and  based on that she would probably qualify for extended anticoagulation therapy.  She is taking hydroxyurea for thrombocytosis.  Xarelto is very expensive but she is not interested to switch to warfarin.  She has history of persistent nose bleeding with aspirin.  Oncologist recommended follow-up visit after 6 months on Xarelto.  She also has a history of stage II breast cancer with positive lymph nodes in 1990s.  She had chemotherapy and tamoxifen treatment.    Today she denies any shortness of breath or chest pain.  She is questioning how long she needs to be on anticoagulation treatment.  She described feeling of tiredness on a daily basis.  Sometimes he is provoked with her taking some sleep aids at night.  The syncopal episode definitely happen after she took her nighttime medications in the morning.    Social History     Social History     Marital status:      Spouse name: N/A     Number of children: N/A     Years of education: N/A     Occupational History     Not on file.     Social History Main Topics     Smoking status: Never Smoker     Smokeless tobacco: Never Used     Alcohol use 0.6 oz/week     1 Glasses of wine per week     Drug use: No     Sexual activity: No     Other Topics Concern     Not on file     Social History Narrative       Family History   Problem Relation Age of Onset     Hypertension Father      Heart disease Father      Diabetes Sister      Prostate cancer Brother      Review of Systems:     A 12 point comprehensive review of systems was negative except as noted in HPI.            Objective:    Physical Exam   /72 (Patient Site: Right Arm, Patient Position: Sitting, Cuff Size: Adult Regular)  Pulse 76  Wt 162 lb 1.6 oz (73.5 kg)  BMI 26.16 kg/m2    Constitutional: oriented to person, place, and time, appears well-nourished. No distress.   HENT:   Head: Normocephalic.   Mouth/Throat: Oropharynx is clear and moist.   Eyes: Conjunctivae are normal. Pupils are equal, round, and  reactive to light.   Neck: Normal range of motion. Neck supple.   Cardiovascular: Normal rate, regular rhythm and normal heart sounds.    Pulmonary/Chest: Effort normal and breath sounds normal.  Abdominal: Soft. Bowel sounds are normal.   Musculoskeletal: Normal range of motion.   Neurological: alert and oriented to person, place, and time.  Psychiatric:  normal mood and affect.    Patient Active Problem List   Diagnosis     Breast Cancer     Irritable Bowel Syndrome     Chronic Reflux Esophagitis     Tuberculin PPD Induration Positive Interpretation     Peripheral Neuropathy     Varicose Veins     Restless Legs Syndrome     Essential hypertension     Hyperlipidemia     Wrist fracture     Osteoporosis, senile     S/P bilateral mastectomy     Urinary frequency     Essential thrombocythemia (H)     Pulmonary emboli (H)       Current Outpatient Prescriptions on File Prior to Visit   Medication Sig Dispense Refill     ascorbic acid, vitamin C, (VITAMIN C) 250 MG tablet Take 250 mg by mouth daily.       BIOTIN ORAL Take 5,000 mcg by mouth daily.        calcium carbonate-vitamin D3 (CALCIUM 600 + D,3,) 600 mg(1,500mg) -200 unit per tablet Take 1 tablet by mouth 2 (two) times a day.        hydroxyurea (HYDREA) 500 mg capsule Take 1 capsule (500 mg total) by mouth daily. 90 capsule 2     lisinopril (PRINIVIL,ZESTRIL) 5 MG tablet Take 5 mg by mouth daily.       polyethylene glycol (MIRALAX) 17 gram packet Take 17 g by mouth daily as needed.       polyvinyl alcohol (LIQUIFILM TEARS) 1.4 % ophthalmic solution Administer 1 drop to both eyes as needed for dry eyes.       ranitidine (ZANTAC) 150 MG tablet Take 150 mg by mouth 2 (two) times a day.       simvastatin (ZOCOR) 20 MG tablet Take 20 mg by mouth at bedtime.       tolterodine (DETROL LA) 4 MG ER capsule Take 4 mg by mouth daily.       traZODone (DESYREL) 100 MG tablet Take 100 mg by mouth at bedtime.       XARELTO 20 mg Tab Take 1 tablet (20 mg total) by mouth once daily  30 tablet 1     propylene glycol/peg 400/PF (SYSTANE, PF, OPHT) Apply 1 drop to eye as needed.       Current Facility-Administered Medications on File Prior to Visit   Medication Dose Route Frequency Provider Last Rate Last Dose     denosumab 60 mg (PROLIA 60 mg/ml)  60 mg Subcutaneous Q6 Months Renetta Carrillo MD   60 mg at 03/08/18 1611               Renetta Carrillo  8/14/2018

## 2021-06-19 NOTE — PROGRESS NOTES
Patient here today for labs and 3 week follow-up with Dr. Bourne for essential thromboycytopenia/ADITHYA Panchal RN

## 2021-06-20 NOTE — PROGRESS NOTES
Clinic Note    Assessment:     Assessment and Plan:  1. Hip pain, right  Her x-ray does not show any acute fracture or dislocation.  She does have some moderate bilateral osteoarthritic changes evident on her x-ray with the right being worse than the left.  We talked about using Tylenol for her pain.  We talked about using Cymbalta if the Tylenol were insufficient.  - XR Pelvis W 2 Vw Hips Bilateral; Future    2. Fatigue  She discussed her fatigue with Dr. Carrillo at her appointment 12 days ago.  Lab work was drawn in this regard and has resulted as normal.  I discussed with her the possibility that her hydroxyurea was likely the culprit of her fatigue.     Patient Instructions   Dr. Carrillo got a variety of lab work the last time you saw her in order to better understand your fatigue.  Your lab work was normal.    I think that your symptoms could be related to the hydroxyurea that you are taking.    We will get an x-ray today to determine if you are dealing with severe arthritis.    In the meantime, I would encourage you to use Tylenol for your pain.  For someone like you, I would recommend 500-650 mg every 4-6 hours.  If this is not sufficient, you could increase to 1000 mg every 6 hours.    Let us know if the Tylenol is insufficient for your pain.  We discussed the possibility of adding Cymbalta to your regimen to see if this helps.  You can either schedule an appointment with me or Dr. Carrillo to discuss the Cymbalta further, or you could discuss it with your oncology doctors as well.    No Follow-up on file.         Subjective:      Libertad Hernadez is a 77 y.o. female who comes to the clinic with joint pain.     Her hip has been a problem for the past four months or so. She says that her right hip is the problem. No fevers. No chills. No abdominal pain. She does not spend a lot of time outside. She does not walk much any more due to the pain. Getting into and out of cars in a problem for her. No neck or  shoulder pain. She felt like she may have had a back spasm the other day that was very painful. She fell back in May after fainting. She says that she has been feeling fatigued and tired. She has not had good sleep; she has been waking up very early in the morning and cannot fall back asleep. She thinks that she may feel depressed related to her aching, pain, and inability to do the things that she likes to do. The news depresses her. She is taking hydroxyrea for bone     The following portions of the patient's history were reviewed and updated as appropriate: Allergies, medications, problems, prior note.    Review of Systems:    Review is otherwise negative except for what is mentioned above.     Social Hx:    History   Smoking Status     Never Smoker   Smokeless Tobacco     Never Used         Objective:     Vitals:    08/29/18 0826   BP: 140/82   Pulse: 78   SpO2: 99%   Weight: 160 lb (72.6 kg)       Exam:    General: No apparent distress. Calm. Alert and Oriented X3. Pt behavior is appropriate.  Musculoskeletal: Pain with passive range of motion to the right hip joint.  Patient is tender walking up onto the exam table.  Gait is abnormal due to limp.  Neurologic: Interactive, alert, no focal findings, CNs intact.   Skin: Warm, dry. Normal hair pattern. Free of lesions. Normal skin turgor.       Patient Active Problem List   Diagnosis     Breast Cancer     Irritable Bowel Syndrome     Chronic Reflux Esophagitis     Tuberculin PPD Induration Positive Interpretation     Peripheral Neuropathy     Varicose Veins     Restless Legs Syndrome     Essential hypertension     Hyperlipidemia     Wrist fracture     Osteoporosis, senile     S/P bilateral mastectomy     Urinary frequency     Essential thrombocythemia (H)     Pulmonary emboli (H)     Current Outpatient Prescriptions   Medication Sig Dispense Refill     ascorbic acid, vitamin C, (VITAMIN C) 250 MG tablet Take 250 mg by mouth daily.       BIOTIN ORAL Take 5,000 mcg  by mouth daily.        calcium carbonate-vitamin D3 (CALCIUM 600 + D,3,) 600 mg(1,500mg) -200 unit per tablet Take 1 tablet by mouth 2 (two) times a day.        hydroxyurea (HYDREA) 500 mg capsule Take 1 capsule (500 mg total) by mouth daily. 90 capsule 2     lisinopril (PRINIVIL,ZESTRIL) 5 MG tablet Take 5 mg by mouth daily.       polyethylene glycol (MIRALAX) 17 gram packet Take 17 g by mouth daily as needed.       polyvinyl alcohol (LIQUIFILM TEARS) 1.4 % ophthalmic solution Administer 1 drop to both eyes as needed for dry eyes.       propylene glycol/peg 400/PF (SYSTANE, PF, OPHT) Apply 1 drop to eye as needed.       ranitidine (ZANTAC) 150 MG tablet Take 150 mg by mouth 2 (two) times a day.       simvastatin (ZOCOR) 20 MG tablet Take 20 mg by mouth at bedtime.       tolterodine (DETROL LA) 4 MG ER capsule Take 4 mg by mouth daily.       traZODone (DESYREL) 100 MG tablet Take 100 mg by mouth at bedtime.       XARELTO 20 mg Tab Take 1 tablet (20 mg total) by mouth once daily 30 tablet 1     Current Facility-Administered Medications   Medication Dose Route Frequency Provider Last Rate Last Dose     denosumab 60 mg (PROLIA 60 mg/ml)  60 mg Subcutaneous Q6 Months Renetta Carrillo MD   60 mg at 03/08/18 1611       I spent 25 minutes with patient face to face, of which >50% was counseling regarding the above plan       Delano Blake CNP (Rob)    8/29/2018

## 2021-06-20 NOTE — LETTER
Letter by Jacques Vasques MD at      Author: Jacques Vasques MD Service: -- Author Type: --    Filed:  Encounter Date: 2/7/2020 Status: (Other)         Libertad Hernadez  [id number] [id number]             February 7, 2020         Dear Ms. Hernadez,    Below are the results from your recent visit: As discussed, you do have a confirmed urinary tract or bladder infection, the antibiotic Keflex that I started you on should be effective to treat this.    Resulted Orders   Urinalysis Macro & Micro   Result Value Ref Range    Color, UA Yellow Colorless, Yellow, Straw, Light Yellow    Clarity, UA Clear Clear    Glucose, UA Negative Negative    Bilirubin, UA Negative Negative    Ketones, UA Negative Negative    Specific Gravity, UA 1.010 1.005 - 1.030    Blood, UA Negative Negative    pH, UA 6.5 5.0 - 8.0    Protein, UA Negative Negative mg/dL    Urobilinogen, UA 0.2 E.U./dL 0.2 E.U./dL, 1.0 E.U./dL    Nitrite, UA Negative Negative    Leukocytes, UA Moderate (!) Negative    Bacteria, UA Many (!) None Seen hpf    RBC, UA None Seen None Seen, 0-2 hpf    WBC, UA 25-50 (!) None Seen, 0-5 hpf    Squam Epithel, UA 0-5 None Seen, 0-5 lpf    WBC Clumps Present (!) None Seen   Culture, Urine   Result Value Ref Range    Culture >100,000 col/ml Escherichia coli (!)             Please call with questions or contact us using [employer].    Sincerely,        Electronically signed by Jacques Vasques MD

## 2021-06-20 NOTE — PROGRESS NOTES
1. Did you experience new onset of achiness or rashes that lasted for over a month? Yes   2. Do you feel sick today - fever > 101F, or new deep cough? No  3. Did you have gastric bypass or parathyroid surgery in the last 6 months? No  4. Do you plan any dental implants or extractions in the next 2-3 months? No  5. Have you started any new medications in the last 6 months that you were told could affect your immune system? These may have been prescribed by Oncologist, Transplant Center, Rheumatology or Dermatology. Chemotherapy - Started in January 2018      Spoke with Dr. Carrillo and she would like to see the patient this week to evaluate. Patient will wait on injection until that appt. No injection given today.

## 2021-06-20 NOTE — PROGRESS NOTES
Assessment/Plan:        1. Osteoporosis, senile     2. Primary osteoarthritis of right hip  Ambulatory referral to PT/OT     1.  Osteoporosis treatment will be continued with Prolia injections.  Bone density scan this was done in June of this year was discussed.    #2 right sided low back and right groin pain secondary to osteoarthritis in her right hip, I reviewed x-ray that was done few weeks ago.  I strongly recommended starting physical therapy.  She does not have any symptoms of radiculopathy on the exam today.  This note has been dictated using voice recognition software. Any grammatical or context distortions are unintentional and inherent to the software.      Return in about 6 months (around 3/12/2019) for Annual physical.    Patient Instructions   Prolia 6th today.  Prolia 7th in 6 months with my nurse. I will see you in 1 year.    DXA in 6/2020 .   Phone number to schedule [phone].    Daily calcium need is 2176-5737 mg a day from the diet and supplements.  Calcium citrate is easier to digest.  Vitamin D 2000 IU daily recommended.          Subjective:    Libertad Hernadez is a 77 y.o. female  here for    Chief Complaint   Patient presents with     Osteoporosis Follow Up     discuss achiness and Prolia injection     Patient is here for the follow-up of osteoporosis and she also describes some achiness in the right lower back, right groin area and sometimes in the right anterior leg.  She was seen for the similar symptoms 2 weeks ago when the x-ray showed osteoarthritis in her both hips more right than left.  She denies any weakness, numbness, tingling or incontinence.  She denies any new trauma.  Social History     Social History     Marital status:      Spouse name: N/A     Number of children: N/A     Years of education: N/A     Occupational History     Not on file.     Social History Main Topics     Smoking status: Never Smoker     Smokeless tobacco: Never Used     Alcohol use 0.6 oz/week     1  Glasses of wine per week     Drug use: No     Sexual activity: No     Other Topics Concern     Not on file     Social History Narrative       Family History   Problem Relation Age of Onset     Hypertension Father      Heart disease Father      Diabetes Sister      Prostate cancer Brother      Review of Systems:     A 12 point comprehensive review of systems was negative except as noted in HPI.            Objective:    Physical Exam   /76 (Patient Site: Right Arm, Patient Position: Sitting, Cuff Size: Adult Regular)  Pulse 72  Wt 162 lb 11.2 oz (73.8 kg)  BMI 26.26 kg/m2    Constitutional: oriented to person, place, and time, appears well-nourished. No distress.   HENT:   Head: Normocephalic.     Pulmonary/Chest: Effort normal and breath sounds normal.  Abdominal: Soft.   Musculoskeletal: Normal range of motion in lower back. JUSTYN positive on the right. No radiculopathy.   Neurological: alert and oriented to person, place, and time.  Psychiatric:  normal mood and affect.    Patient Active Problem List   Diagnosis     Breast Cancer     Irritable Bowel Syndrome     Chronic Reflux Esophagitis     Tuberculin PPD Induration Positive Interpretation     Peripheral Neuropathy     Varicose Veins     Restless Legs Syndrome     Essential hypertension     Hyperlipidemia     Wrist fracture     Osteoporosis, senile     S/P bilateral mastectomy     Urinary frequency     Essential thrombocythemia (H)     Pulmonary emboli (H)       Current Outpatient Prescriptions on File Prior to Visit   Medication Sig Dispense Refill     ascorbic acid, vitamin C, (VITAMIN C) 250 MG tablet Take 250 mg by mouth daily.       BIOTIN ORAL Take 5,000 mcg by mouth daily.        calcium carbonate-vitamin D3 (CALCIUM 600 + D,3,) 600 mg(1,500mg) -200 unit per tablet Take 1 tablet by mouth 2 (two) times a day.        hydroxyurea (HYDREA) 500 mg capsule Take 1 capsule (500 mg total) by mouth daily. 90 capsule 2     lisinopril (PRINIVIL,ZESTRIL) 5 MG  tablet Take 5 mg by mouth daily.       polyethylene glycol (MIRALAX) 17 gram packet Take 17 g by mouth daily as needed.       polyvinyl alcohol (LIQUIFILM TEARS) 1.4 % ophthalmic solution Administer 1 drop to both eyes as needed for dry eyes.       propylene glycol/peg 400/PF (SYSTANE, PF, OPHT) Apply 1 drop to eye as needed.       ranitidine (ZANTAC) 150 MG tablet Take 150 mg by mouth 2 (two) times a day.       simvastatin (ZOCOR) 20 MG tablet Take 20 mg by mouth at bedtime.       tolterodine (DETROL LA) 4 MG ER capsule Take 4 mg by mouth daily.       traZODone (DESYREL) 100 MG tablet Take 100 mg by mouth at bedtime.       XARELTO 20 mg Tab Take 1 tablet (20 mg total) by mouth once daily 30 tablet 1     Current Facility-Administered Medications on File Prior to Visit   Medication Dose Route Frequency Provider Last Rate Last Dose     denosumab 60 mg (PROLIA 60 mg/ml)  60 mg Subcutaneous Q6 Months Renetta Carrillo MD   60 mg at 09/12/18 1413               Renetta Carrillo  9/12/2018

## 2021-06-20 NOTE — LETTER
Letter by Camden Bourne MD at      Author: Camden Bourne MD Service: -- Author Type: --    Filed:  Encounter Date: 12/11/2019 Status: Signed                   Office Hours: Monday - Friday 8:00 - 4:30PM    Libertad LINDA Hernadez  [address] [facility] [address]           December 11, 2019      Dear Libertad:    It looks as though you are due to see Dr. Bourne. If you would like to schedule this please call [phone]         Sincerely,        [facility]

## 2021-06-21 NOTE — PROGRESS NOTES
"ASSESSMENT: Libertad Hernadez is a 78 y.o. female with past medical history significant for breast cancer, irritable bowel syndrome, chronic GERD, peripheral neuropathy, restless leg syndrome, hypertension, hyperlipidemia, osteoporosis (on Prolia), Reymundo 2+ essential thrombocytosis (on hydroxyurea), pulmonary emboli (on Xarelto) who presents today for new patient evaluation of a 6-month history of right greater than left low back pain, right buttock pain, and pain involving the right groin and anterior thigh became much more severe 4 days ago.  On exam, patient's pain seems most consistent with sacroiliac joint dysfunction.  She had reproduction of her pain with SI joint provocative maneuvers x1 bilaterally and significant tenderness palpation of the right greater than left sacroiliac joint.  Also within the differential would be a right L2 or L3 radiculitis.  Patient states that she had a \"pinched nerve\" 30 years ago which caused pain in a similar distribution.  Finally, she has no moderate osteoarthritis of the right hip which is contributing to the pain.    NINA: 36  Who 5: 18    PLAN:  A shared decision making model was used.  The patient's values and choices were respected.  The following represents what was discussed and decided upon by the physician assistant and the patient.      1.  DIAGNOSTIC TESTS: I reviewed the x-ray of pelvis and hip.  Given the patient's history of cancer, I have ordered an MRI lumbar spine for further evaluation.    2.  PHYSICAL THERAPY: Patient is currently in physical therapy.  Encouraged to continue with physical therapy and the daily exercises.    3.  MEDICATIONS: No changes are made to the patient's medications.  She is alternating tramadol 50 mg and Tylenol 1000 mg every 6 hours.  She does not tolerate NSAIDs well due to stomach irritation.  I opted not to add a muscle relaxant in this patient with osteoporosis due to increased fall risk.  She states that she already feels drowsy due " "to her other medications.    4.  INTERVENTIONS: No interventions were ordered.  Patient may benefit from interventional pain management if she fails to improve with conservative treatment, depending on the results of her MRI.  She would potentially benefit from a sacroiliac joint injection, hip joint injections, or epidural, if there is right L2 or L3 neural compromise.    5.  PATIENT EDUCATION: Patient is in agreement the above plan.  All questions were answered.    6.  FOLLOW-UP:   A nurse will call the patient with results of her MRI.  At that time I will likely recommend that the patient continue with physical therapy and follow-up with me in 3-4 weeks.  If she has any questions or concerns in the meantime, she should not hesitate to contact our clinic.      SUBJECTIVE:  Libertad Hernadez  Is a 78 y.o. female who presents today in consultation at the request of Delano Blake CNP for new patient evaluation of low back pain and right buttock, hip, and lower extremity pain.  Patient reports this pain began about 6 months ago.  She denies any injury or event to cause the pain.  It became much worse 4 days ago.  Patient states that she woke up in the morning and could hardly get out of bed.  She denies any injury or change in activity the day before to cause the worsening pain.  She states that it is a little bit better now, but she still is quite sore.  She was referred to our clinic for further evaluation and treatment.  Patient does report that 30 years ago she had a \"pinched nerve\" she believes it affected the same area of her right leg.  This was treated with chiropractic manipulation.    Patient complains of right greater than left low back and upper buttock pain.  His pain radiates into the buttock, wraps around the lateral thigh, into the groin, and sometimes radiates down the anterior thigh to the knee.  She denies any pain distal to the knee.  She describes the pain as a deep ache.  She states it is very sore. "  There is sometimes a sharp shooting aspect of the pain.  She states that she feels like her low back and leg to collapse when she is walking.  Patient has numbness and tingling in both feet related to peripheral neuropathy.  She denies any new numbness or tingling or numbness and tingling involving the right buttock or thigh.  Her pain is aggravated with walking.  She states that she cannot roll over in bed due to the pain.  She has to sit up and reposition rather than rolling over.  Her pain is also aggravated with bending and dressing.  Applying ice helps to alleviate the pain.  Patient denies any loss of bowel or bladder control.  Denies any recent fevers, chills, sweats.    Patient attended her initial evaluation for physical therapy.  She is not going to chiropractor currently.  She has not had any spine surgeries or spine injections.  Patient is alternating tramadol 50 mg and Tylenol 1000 mg every 6 hours.    Current Outpatient Medications on File Prior to Encounter   Medication Sig Dispense Refill     acetaminophen (TYLENOL) 500 MG tablet Take 500 mg by mouth every 6 (six) hours as needed for pain.       ascorbic acid, vitamin C, (VITAMIN C) 250 MG tablet Take 250 mg by mouth daily.       BIOTIN ORAL Take 5,000 mcg by mouth daily.        calcium carbonate-vitamin D3 (CALCIUM 600 + D,3,) 600 mg(1,500mg) -200 unit per tablet Take 1 tablet by mouth 2 (two) times a day.        hydroxyurea (HYDREA) 500 mg capsule Take 1 capsule (500 mg total) by mouth daily. 90 capsule 2     lisinopril (PRINIVIL,ZESTRIL) 5 MG tablet Take 5 mg by mouth daily.       polyethylene glycol (MIRALAX) 17 gram packet Take 17 g by mouth daily as needed.       polyvinyl alcohol (LIQUIFILM TEARS) 1.4 % ophthalmic solution Administer 1 drop to both eyes as needed for dry eyes.       propylene glycol/peg 400/PF (SYSTANE, PF, OPHT) Apply 1 drop to eye as needed.       ranitidine (ZANTAC) 150 MG tablet Take 150 mg by mouth 2 (two) times a day.        simvastatin (ZOCOR) 20 MG tablet Take 20 mg by mouth at bedtime.       tolterodine (DETROL LA) 4 MG ER capsule Take 1 capsule by mouth daily 90 capsule 2     traMADol (ULTRAM) 50 mg tablet Take 1 tablet (50 mg total) by mouth every 6 (six) hours as needed for pain. 8 tablet 0     traZODone (DESYREL) 100 MG tablet Take 100 mg by mouth at bedtime.       XARELTO 20 mg Tab Take 1 tablet by mouth once daily 30 tablet 1     Current Facility-Administered Medications on File Prior to Encounter   Medication Dose Route Frequency Provider Last Rate Last Dose     denosumab 60 mg (PROLIA 60 mg/ml)  60 mg Subcutaneous Q6 Months Renetta Carrillo MD   60 mg at 09/12/18 1413       Allergies   Allergen Reactions     Adhesive Tape-Silicones Itching     States itchy rash with certain tapes     Penicillins Hives       Past Medical History:   Diagnosis Date     Breast Cancer     Created by [facility] Annotation: Oct 21 2010  9:56AM - Shirley Comer: stage 2;  10/18 lymph nodes positives/p chemo and [id number]      Hyperlipidemia     Created by Conversion      Hypertension     Created by Conversion      Osteopenia     Created by Conversion      Peripheral Neuropathy     Created by Conversion      Primary cancer of bone marrow (H)      Varicose Veins     Created by Conversion       Patient Active Problem List   Diagnosis     Breast cancer (H)     Irritable Bowel Syndrome     Chronic Reflux Esophagitis     Tuberculin PPD Induration Positive Interpretation     Peripheral neuropathy     Varicose Veins     Restless Legs Syndrome     Essential hypertension     Hyperlipidemia     Wrist fracture     Osteoporosis, senile     S/P bilateral mastectomy     Urinary frequency     Essential thrombocythemia (H)     Pulmonary emboli (H)         Past Surgical History:   Procedure Laterality Date     FOOT SURGERY  2010     PA INCISE FINGER TENDON SHEATH      Description: Hand Incision Tendon Sheath Of A Finger;  Recorded: 04/04/2008;      OR MASTECTOMY, PARTIAL      Description: Right Breast Partial Mastectomy Inferior Lateral Quarter;  Recorded: 10/21/2010;     OR MASTECTOMY, RADICAL      Description: Radical Mastectomy Left Breast;  Recorded: 04/04/2008;     OR REMOVAL ADENOIDS,PRIMARY,<13 Y/O      Description: Adenoidectomy;  Recorded: 04/04/2008;     OR REMOVAL GALLBLADDER      Description: Cholecystectomy;  Recorded: 04/04/2008;     OR REMOVAL OF TONSILS,<13 Y/O      Description: Tonsillectomy;  Recorded: 04/04/2008;       Family History   Problem Relation Age of Onset     Hypertension Father      Heart disease Father      Diabetes Sister      Prostate cancer Brother        Social history: Patient is .  She and her  live in a townhouse.  She is retired.  She denies tobacco use.  She drinks alcohol occasionally.  She denies illicit drug use.    ROS: Positive constipation, poor sleep quality, pain with intimacy, back pain, joint pain, leg pain, fainting.  Specifically negative for bowel/bladder dysfunction, fevers,chills, appetite changes, unexplained weight loss.   Otherwise 13 systems reviewed are negative.  Please see the patient's intake questionnaire from today for details.      OBJECTIVE:  PHYSICAL EXAMINATION:    CONSTITUTIONAL:  Vital signs as above.  No acute distress.  The patient is well nourished and well groomed.  PSYCHIATRIC:  The patient is awake, alert, oriented to person, place, time and answering questions appropriately with clear speech.    HEENT: Normocephalic, atraumatic.  Sclera clear.  Neck is supple.  SKIN:  Skin over the face, bilateral lower extremities, and posterior torso is clean, dry, intact without rashes.    GAIT:  Gait is guarded and antalgic, favoring the right.  The patient is able to heel and toe walk without significant difficulty.    STANDING EXAMINATION: No significant tenderness palpation bilateral lower lumbar paraspinous muscles.  Moderate tenderness palpation right greater than left  sacroiliac joint.  No significant tenderness palpation greater trochanters.  Lumbar flexion within normal limits.  Lumbar extension mildly restricted.  MUSCLE STRENGTH:  The patient has 5/5 strength for the bilateral hip flexors, knee flexors/extensors, ankle dorsiflexors/plantar flexors, great toe extensors, ankle evertors/invertors.  NEUROLOGICAL: 1+ patellar, and trace Achilles reflexes bilaterally.  Negative Babinski's bilaterally.  No ankle clonus bilaterally. Sensation to light touch is intact in the bilateral L4, L5, and S1 dermatomes.  VASCULAR:  2/4 dorsalis pedis pulses bilaterally.  Bilateral lower extremities are warm.  There is no pitting edema of the bilateral lower extremities.  ABDOMINAL:  Soft, non-distended, non-tender throughout all quadrants.  No pulsatile mass palpated in the left lower quadrant.  LYMPH NODES:  No palpable or tender inguinal lymph nodes.  MUSCULOSKELETAL: Straight leg raise negative on the left.  Straight leg raise on the right causes buttock pain, groin pain, and posterior thigh pain.  Mk's test on the left causes lateral hip pain.  Mk's test on the right reproduces pain localizing to the sacroiliac joint.  That test negative bilaterally.    RESULTS:  XR PELVIS W 2 VW HIPS BILATERAL  8/29/2018 9:41 AM     INDICATION: Arthritis type pain  COMPARISON: None.     FINDINGS: No acute fracture or dislocation. There are moderate bilateral osteoarthritic changes with acetabular lip spurring and sclerosis, right greater than left. Mild sclerosis of the inferior sacroiliac joints. The pubic symphysis is intact. Pelvic   phleboliths.

## 2021-06-21 NOTE — PROGRESS NOTES
[facility]   [facility]-P[facility] Initial Evaluation    [facility] Discharge Summary  Patient Name: Libertad Hernadez  Date: 12/10/2018  Referral Diagnosis: Hip and Low Back Pain  Referring provider: Renetta Carrillo MD  Visit Diagnosis:   1. Right hip pain     2. Acute bilateral low back pain without sciatica     3. Generalized muscle weakness         Goals:  Pt. will be independent with home exercise program in : 6 weeks    Pt will: Able to sit <> stand with more ease and less pain within 8 weeks  Pt will: Able to get in and out of a car with less hip pain as leg strength improves within 8 weeks   Pt will: Able to roll in bed without waking due to pain within 8 weeks      Patient was seen for 1 visit on 11/9/18 for initial evaluation only  Patient attended the initial evaluation only and did not schedule any follow-ups.    Therapy will be discontinued at this time.  The patient will need a new referral to resume.    Thank you for your referral.  Concha Snell  12/10/2018  12:47 PM    Patient Name: Libertad Hernadez  Date of evaluation: 11/9/2018  Referral Diagnosis: Lumbar back pain  Referring provider: Renetta Carrillo MD  Visit Diagnosis:     ICD-10-CM    1. Right hip pain M25.551    2. Acute bilateral low back pain without sciatica M54.5    3. Generalized muscle weakness M62.81        Assessment:      Pt. is appropriate for skilled PT intervention as outlined in the Plan of Care (POC).  Pt. is a good candidate for skilled PT services to improve pain levels and function.  Patient presents with low back pain and right hip pain.  Her pain started in August 2018 but woke up yesterday and could not get out of bed due to hip pain.  She had pain starting back in August and had an X-ray which showed arthritic changes and osteophytes.  She presents with decrease and painful trunk AROM, guarded hip AROM, but was unable to do many special tests for her hips due to guarding.  Functional limitations are described  as occurring with: sit to stand, getting in and out of a car, sleeping.  She currently is on a medication for her bone marrow cancer that causes her fatigue and overall achyness.  Feel patient will benefit from physical therapy to decrease symptoms and improve her functional abilities.  Goals:  Pt. will be independent with home exercise program in : 6 weeks  Pt will: Able to sit <> stand with more ease and less pain within 8 weeks  Pt will: Able to get in and out of a car with less hip pain as leg strength improves within 8 weeks   Pt will: Able to roll in bed without waking due to pain within 8 weeks    Patient's expectations/goals are realistic.    Barriers to Learning or Achieving Goals:  Co-morbidities or other medical factors.  HX breast cancer, peripheral neuropathy, HTN, hyperlipidemia, osteoporosis, pulmonary emboli, currently has bone marrow cancer    POC, goals and pathology of condition were reviewed with the patient.  Patient is in agreement with the POC.       Plan / Patient Instructions:        Plan of Care:   Authorization / Certification Start Date: 11/09/18  Authorization / Certification End Date: 01/08/19  Authorization / Certification Number of Visits: 10  Communication with: Referral Source  Patient Related Instruction: Nature of Condition;Treatment plan and rationale;Self Care instruction;Basis of treatment;Body mechanics;Posture  Times per Week: 1-2  Number of Weeks: 8  Number of Visits: 10  Therapeutic Exercise: ROM;Stretching;Strengthening  Neuromuscular Reeducation: kinesio tape;posture;core  Manual Therapy: soft tissue mobilization;myofascial release;joint mobilization;muscle energy;strain counterstrain  Modalities: electrical stimulation    Plan for next visit:   Review stretches  Intro sitting midback and midback rotation, calf stretch  MFR right hip  KT trial in midback     Subjective:        Equipment Available: None    History of Present Illness:    Libertad is a 78 y.o. female who  presents to therapy today with complaints of low back and right hip pain. Date of onset/duration of symptoms is 2018. Onset was gradual. Yesterday she got up and felt more intense pain in her hip.  She noticed when she tried to get up a second time from bed in the morning that she couldn't get out due to pain.  She had the same type of pain late in the summer but yesterday the pain was worse.  She had an X-ray in 2018: FINDINGS: No acute fracture or dislocation. There are moderate bilateral osteoarthritic changes with acetabular lip spurring and sclerosis, right greater than left. Mild sclerosis of the inferior sacroiliac joints. The pubic symphysis is intact. Pelvic phleboliths  She did have a lot of back pain after her second child, saw a chiropractor and symptoms resolved.  She has not had back pain since. Currently she has a lot of stiffness in her back. Symptoms are constant and getting worse.   She also gets gel injected into her knees trying to hold off on TKA.  She finds this helpful and is due for another injection on Monday.   She is currently taking a medication for her bone marrow cancer and it makes her fatigued and achy all over.    Pain Ratin  Pain rating at best: 2  Pain rating at worst: 7  Pain description: aching, dull, pain, sharp, shooting, soreness, weakness and stiffness    Functional limitations are described as occurring with:   sit to stand, getting in and out of a car, sleeping,     Patient reports benefit from:  heat                           Tried ice and that seemed to be better than the ice         Objective:      Note: Items left blank indicates the item was not performed or not indicated at the time of the evaluation.    Patient Outcome Measures :    Lower Extremity Functional Scale (_/80): 29   Scores range from 0-80, where a score of 80 represents maximum function. The minimal clinically important difference is a positive change of 9 points.  Modified Oswestry Low  Back Pain Disablity Questionnaire  in %: 66       Examination  1. Right hip pain     2. Acute bilateral low back pain without sciatica     3. Generalized muscle weakness       Involved side: Right hip, bilateral low back  Posture Observation:      General standing posture is moderate rounded shoulders and thoracic rounding.    Lumbar ROM:         Within Normal Limits unless noted  Date: 1/9/18     *Indicate scale AROM AROM AROM   Lumbar Flexion Hands to mid shins, P     Lumbar Extension       Right Left Right Left Right Left   Lumbar Sidebending         Lumbar Rotation         Thoracic Flexion      Thoracic Extension      Thoracic Sidebending         Thoracic Rotation           All hip AROM is limited due to guarding anticipating pain    Lower Extremity Strength:     Date: 11/9/18     LE strength/5 Right Left Right Left Right Left   Hip Flexion (L1-3) 4 4       Hip Extension (L5-S1)         Hip Abduction (L4-5) 4+ 4+       Hip Adduction (L2-3) 4+ 4+       Hip External Rotation         Hip Internal Rotation         Knee Extension (L3-4) 5 5       Knee Flexion 5 5       Ankle Dorsiflexion (L4-5)         Great Toe Extension (L5)         Ankle Plantar flexion (S1)         Abdominals        Sensation           Reflex Testing  Lumbar Dermatomes Right Left UE Reflexes Right Left   Iliac Crest and Groin (L1)   Biceps (C5-6)     Anterior Medial Thigh (L2)   Brachioradialis (C5-6)     Anterior Thigh, Medial Epicondyle Femur (L3)   Triceps (C7-8)     Lateral Thigh, Anterior Knee, Medial Leg/Malleolus (L4)   Mitesh s test     Lateral Leg, Dorsal Foot (L5)   LE Reflexes     Lateral Foot (S1)   Patellar (L3-4)     Posterior Leg (S2)   Achilles (S1-2)     Other:   Babinski Response       Palpation: right thoracic paraspinals, right gluteius medius, piriformis, ITB near origin, surrounding right greater trochanter  1 Leg Stance: 20+ seconds bilaterally, instability noted on right with excessive movement  Trendelenberg:  negative  Squat: slight increase in right hip pain, guarded  Able to toe and heel walk  Bridging: weakness and pain    Lumbar Special Tests:     Lumbar Special Tests Right Left SI Tests Right  Left   Quadrant test   SI Compression     Straight leg raise - - SI Distraction     Crossover response   POSH Test     Slump - - Sacral Thrust     Sit-up test  FADIR     Trunk extensor endurance test  Resisted Abduction     Prone instability test  Other: JUSTYN + +   Pubic shotgun  Other: SCOUR Unable, pt too guarded Unable, pt too guarded       LE Screen:  good hamstring flexibility, heel cords     Exercises she does after injection: Stand on one leg, e-stim on knees, upper body ergometer,     Exercises:  Exercise #1: stretches: SKC, LTR, supine piriformis, hip ER  Comment #1: hold 30 seconds x 1-3 reps    Treatment Today     TREATMENT MINUTES COMMENTS   Evaluation 25 Lumbar and hip   Self-care/ Home management     Manual therapy     Neuromuscular Re-education     Therapeutic Activity     Therapeutic Exercises 35 edu on heat versus ice; arthritis; knee injections, Silver Sneakers and trying some exercises in the pool  See above or flow sheet for start of HEP  Review exercises she does after her injeciton   Gait training     Modality__________________                Total 60    Blank areas are intentional and mean the treatment did not include these items.       PT Evaluation Code: (Please list factors)  Patient History/Comorbidities: .  HX breast cancer, peripheral neuropathy, HTN, hyperlipidemia, osteoporosis, pulmonary emboli, currently has bone marrow cancer  Examination: pain with trunk and hip AROM, guards with right hip AROM, decrease LE strength  Clinical Presentation: stable  Clinical Decision Making: low    Patient History/  Comorbidities Examination  (body structures and functions, activity limitations, and/or participation restrictions) Clinical Presentation Clinical Decision Making (Complexity)   No documented  Comorbidities or personal factors 1-2 Elements Stable and/or uncomplicated Low   1-2 documented comorbidities or personal factor 3 Elements Evolving clinical presentation with changing characteristics Moderate   3-4 documented comorbidities or personal factors 4 or more Unstable and unpredictable High              Concha Snell, PT  11/9/2018  12:00 PM

## 2021-06-21 NOTE — PROGRESS NOTES
Clinic Note    Assessment:     Assessment and Plan:  1. Lumbar back pain  We reviewed her notes from August and September.  I told her that the pain she is experiencing is the same pain that she had before, and that it was imperative that she start physical therapy.  If physical therapy does not help, we could consider sending her to orthopedics.  In the meantime, I am going to have her see someone from the spine clinic to determine if there is anything else that would benefit her for her back pain.  She is agreeable to this.  She is quite stiff and tender in the exam room, I gave her a short course of tramadol.  - Ambulatory referral to Adult PT- Internal  - Ambulatory referral to [facility]    2. Hip pain, right  See #1.  We are going to have her follow-up with her PCP in 1 month.  If she is not doing much better at that time.  I think that her right hip pain is likely the gina of the rest of her issues.       Patient Instructions   You need to continue using 500-1000 mg of tylenol every six hours.     I gave you a paper prescription for some tramadol today.  I want you to alternate your Tylenol with the tramadol.  Use the tramadol every 6 hours.    Use heat instead of ice.  Use heat for 30 minutes every 4-6 hours.    Gentle stretching is important.  You need to keep moving.  Do not sit or lay for prolonged periods of time.    It is really important that you do physical therapy.  Please call [facility] rehab at [phone] to schedule an appointment.    We will have you see somebody from the spine clinic about your back pain, to see if there is any additional measures that you need besides physical therapy.  We will have you meet with specialty 's today to help arrange this appointment before you leave the clinic.    Follow-up with Dr. Carrillo in 1 month for recheck of your symptoms.    Return in about 1 month (around 12/9/2018).         Subjective:      Libertad Hernadez is a 78 y.o. female who comes to  the clinic today with lumbar back pain, and right hip pain.    Very similar issues.These are not new issues for the patient.  In fact, I saw the patient back in August for we got x-rays of her hips and pelvis at that time, she had some moderate degenerative changes that were worse in the right side when compared to the left.  Possibly some small bone spurs were present as well.  Told her that she was dealing with arthritis and that she could use her Tylenol every 4-6 hours.    She followed up with her PCP, Dr. Carrillo in September and continue to have right-sided groin pain as well as hip pain with low back pain.  It was strongly recommended that she do physical therapy.    Today in the exam room, patient states that she never followed through with physical therapy because her  recently had hip surgery and required her help at home.  That was 6 weeks ago, and she says that he is doing better.    She feels stiff.  She notes that she has been less active.  She has tried using ice, which seems to help a little bit.  Heat did not seem to do much.    She has not tried using any pain creams.    The following portions of the patient's history were reviewed and updated as appropriate: Allergies, medications, problems, prior note.    Review of Systems:    Review is otherwise negative except for what is mentioned above.     Social Hx:    History   Smoking Status     Never Smoker   Smokeless Tobacco     Never Used         Objective:     Vitals:    11/09/18 0852   BP: 152/68   Pulse: 70   Weight: 160 lb 8 oz (72.8 kg)       Exam:    General: No apparent distress. Calm. Alert and Oriented X3. Pt behavior is appropriate.  Musculoskeletal: Right groin tenderness with right leg lift test.  She has fairly good range of motion in her bilateral lower extremities.  DTRs are intact bilaterally.  Neurologic: Interactive, alert, no focal findings, CNs intact.   Skin: Warm, dry. Normal hair pattern. Free of lesions. Normal skin  marlene.       Patient Active Problem List   Diagnosis     Breast cancer (H)     Irritable Bowel Syndrome     Chronic Reflux Esophagitis     Tuberculin PPD Induration Positive Interpretation     Peripheral neuropathy     Varicose Veins     Restless Legs Syndrome     Essential hypertension     Hyperlipidemia     Wrist fracture     Osteoporosis, senile     S/P bilateral mastectomy     Urinary frequency     Essential thrombocythemia (H)     Pulmonary emboli (H)     Current Outpatient Prescriptions   Medication Sig Dispense Refill     acetaminophen (TYLENOL) 500 MG tablet Take 500 mg by mouth every 6 (six) hours as needed for pain.       ascorbic acid, vitamin C, (VITAMIN C) 250 MG tablet Take 250 mg by mouth daily.       BIOTIN ORAL Take 5,000 mcg by mouth daily.        calcium carbonate-vitamin D3 (CALCIUM 600 + D,3,) 600 mg(1,500mg) -200 unit per tablet Take 1 tablet by mouth 2 (two) times a day.        hydroxyurea (HYDREA) 500 mg capsule Take 1 capsule (500 mg total) by mouth daily. 90 capsule 2     lisinopril (PRINIVIL,ZESTRIL) 5 MG tablet Take 5 mg by mouth daily.       polyethylene glycol (MIRALAX) 17 gram packet Take 17 g by mouth daily as needed.       polyvinyl alcohol (LIQUIFILM TEARS) 1.4 % ophthalmic solution Administer 1 drop to both eyes as needed for dry eyes.       propylene glycol/peg 400/PF (SYSTANE, PF, OPHT) Apply 1 drop to eye as needed.       ranitidine (ZANTAC) 150 MG tablet Take 150 mg by mouth 2 (two) times a day.       simvastatin (ZOCOR) 20 MG tablet Take 20 mg by mouth at bedtime.       tolterodine (DETROL LA) 4 MG ER capsule Take 1 capsule by mouth daily 90 capsule 2     traZODone (DESYREL) 100 MG tablet Take 100 mg by mouth at bedtime.       XARELTO 20 mg Tab Take 1 tablet by mouth once daily 30 tablet 1     traMADol (ULTRAM) 50 mg tablet Take 1 tablet (50 mg total) by mouth every 6 (six) hours as needed for pain. 8 tablet 0     Current Facility-Administered Medications   Medication Dose  Route Frequency Provider Last Rate Last Dose     denosumab 60 mg (PROLIA 60 mg/ml)  60 mg Subcutaneous Q6 Months Renetta Carrillo MD   60 mg at 09/12/18 1413       I spent 25 minutes with patient face to face, of which >50% was counseling regarding the above plan       Delano Blake (Rob), BOONE    11/9/2018

## 2021-06-21 NOTE — PROGRESS NOTES
[facility] Hematology and Oncology Progress Note    Patient: Libertad Hernadez  MRN: [id number]  Date of Service: 10/17/2018        Reason for Visit    Follow-up essential thrombocytosis (ET).    Assessment and Plan:    1) JAK2 positive essential thrombocytosis:    78 y.o.     1990's history of stage II breast cancer with positive lymph nodes.  Completed chemotherapy and tamoxifen.    2018, January - diagnosed JAK2 positive essential thrombocytosis.      High risk for thrombotic complication based on age, AK2 V617F mutation and cardiovascular risk factors of hypertension and diabetes.      Unilateral bone marrow biopsy showed 30% cellularity, 1-2+/3 reticulin stain.  Cytogenetics- normal.    01/16/18 - Started Hydrea therapy 500 mg every other day.    10/17/18:    CBC - PLTS quite stable @ 449,000.  Otherwise WNL.    CMP - WNL     The patient looks and feels quite good.  Tolerating Hydrea therapy very well other than possibly her chronic arthritis and neuropathy (bottom of feet burn and ankle and shin pains) have worsened a bit since starting Hydrea.  No nausea.    Continue Hydrea 500 mg daily.     Not on ASA - intolerance with persistent nosebleeding.    01/17/19 - provider follow up with every 4-week CBC.    2) Pulmonary Embolism:    05/12/18 - took evening Trazodone in morning.  Caused drowsiness and she fell.  She injured her ankle.  3 days later noted (L) chest pain and was seen in the ED.    CTA chest - pulmonary emboli in the left lower lobe.  Small clot burden. Trace effusion without evidence of pulmonary infarct.  No RV strain.  Evidence of previous granulomatous disease.  Large simple liver cyst.    US (B) legs - No evidence of DVT in either leg.    Started and continues on Xarelto therapy.    No s/s bleeding.    With ET, duration of Xarelto controversial, minimum 6 months, perhaps lifelong.    3) History bilateral early stage breast cancers:    1992 Stage II breast cancer  - (L) mastectomy + adjuvant  "chemotherapy.   No adjuvant endocrine therapy due to (-) ER status    2010 stage I breast cancer - (R) mastectomy and EBRT.  No adjuvant endocrine therapy due to (-) ER status    ECOG Performance:     ECOG Performance Status: 0     Distress Assessment:    Distress Assessment Score: 3       TT > 25 minutes face to face with > 50% in coordination and counseling of care.    Olvin Villanueva, CNP  _________________________________________    Interim History:    The patient presents alone, looking and feeling quite good.  She has not had any syncopal episodes for \"a long time\".  She denies cough, fever, chills, chest pain, shortness of breath, palpitations, focal weakness, bowel or bladder issues, visual or mentation disturbances.  Her appetite, weight and PS remain very stable.  She is tolerating Hydrea therapy without incidence.  No nausea.    Pain Status:    Currently in Pain: No/denies    Review of Systems:    Constitutional  Constitutional (WDL): Exceptions to WDL  Fatigue: Fatigue relieved by rest  Fever: None  Chills: None  Weight Gain: None  Weight Loss: to <10% from baseline, intervention not indicated (2# 9/12/18)  Neurosensory  Neurosensory (WDL): Exceptions to WDL  Peripheral Motor Neuropathy: None  Ataxia: None  Peripheral Sensory Neuropathy: Asymptomatic, loss of deep tendon reflexes or paresthesia (intermittent hands and feet, when wakes up in the mornings)  Confusion: None  Syncope: None  Eye   Eye Disorder (WDL): Exceptions to WDL  Blurred Vision: Intervention not indicated  Dry Eye: None  Eye Pain: None  Watering Eyes: None  Ear  Ear Disorder (WDL): Exceptions to WDL  Ear Pain: None  Tinnitus: Mild symptoms, intervention not indicated (chronic)  Cardiovascular  Cardiovascular (WDL): Exceptions to WDL  Edema: No  Phlebitis: None  Superficial thrombophlebitis: None  Pulmonary  Cough: Mild symptoms, nonprescription intervention indicated (in the mornings, clear phlegm)  Dyspnea: None  Hypoxia: " None  Gastrointestinal  Gastrointestinal (WDL): Exceptions to WDL  Anorexia: None  Constipation: Occasional or intermittent symptoms, occasional use of stool softeners, laxatives, dietary modification, or enema (miralax prn)  Diarrhea: None  Dysphagia: None  Esophagitis: Asymptomatic, clinical or diagnostic observations only, intervention not indicated  Nausea: None  Pharyngitis: None  Vomiting: None  Dysgeusia: None  Dry Mouth: None  Genitourinary  Genitourinary (WDL): All genitourinary elements are within defined limits  Lymphatic  Lymph (WDL): All lymph disorder elements are within defined limits  Musculoskeletal and Connective Tissue  Musculoskeletal and Connetive Tissue Disorders (WDL): Exceptions to WDL  Arthralgia: Mild pain (ankles, feet)  Bone Pain: Mild pain (shins)  Muscle Weakness : None  Myalgia: None  Integumentary  Integumentary (WDL): All integumentary elements are within defined limits  Patient Coping  Patient Coping: Accepting  Distress Assessment  Distress Assessment Score: 3  Accompanied by  Accompanied by: Alone  Oral Chemo Adherence  Oral Chemo Adherence  What Oral Chemotherapy is the patient taking?: Hydroxyurea  Did the patient fill and  the prescription as written?: Yes  Did patient start taking oral chemo on time?: Yes, patient started taking oral chemo on time  Does the patient report missing any doses?: No    Past Histories:    Past Medical History:   Diagnosis Date     Breast Cancer     Created by [facility] Annotation: Oct 21 2010  9:56AM - Shirley Comer: stage 2;  10/18 lymph nodes positives/p chemo and [id number]      Hyperlipidemia     Created by Conversion      Hypertension     Created by Conversion      Osteopenia     Created by Conversion      Peripheral Neuropathy     Created by Conversion      Primary cancer of bone marrow (H)      Varicose Veins     Created by Conversion        Physical Exam:    Recent Vitals 10/17/2018   Height -   Weight 160 lbs 2 oz    BSA (m2) -   /67   Pulse 74   Temp 98.6   Temp src 1   SpO2 97   Some recent data might be hidden     General:  Alert.  Pleasant.  No acute distress.    HEENT:  Anicteric sclera.  CONSTANCE.  Normal buccal mucosa.   Lymphatics:  No abnormally enlarged lymph nodes.  Chest:  Lungs clear to auscultation.  Heart:   S1 S2 heard.  RRR.  No murmur heard.   Abdomen:  Soft.  No tenderness, masses, organomegaly or guarding  Extremities:  No edema.  Skin:   No rash noted.  CNS:   Non focal.    Lab Results:    Reviewed with patient.    Recent Results (from the past 24 hour(s))   Comprehensive Metabolic Panel   Result Value Ref Range    Sodium 141 136 - 145 mmol/L    Potassium 3.7 3.5 - 5.0 mmol/L    Chloride 106 98 - 107 mmol/L    CO2 27 22 - 31 mmol/L    Anion Gap, Calculation 8 5 - 18 mmol/L    Glucose 99 70 - 125 mg/dL    BUN 13 8 - 28 mg/dL    Creatinine 0.70 0.60 - 1.10 mg/dL    GFR MDRD Af Amer >60 >60 mL/min/1.73m2    GFR MDRD Non Af Amer >60 >60 mL/min/1.73m2    Bilirubin, Total 0.4 0.0 - 1.0 mg/dL    Calcium 10.0 8.5 - 10.5 mg/dL    Protein, Total 7.0 6.0 - 8.0 g/dL    Albumin 3.8 3.5 - 5.0 g/dL    Alkaline Phosphatase 45 45 - 120 U/L    AST 20 0 - 40 U/L    ALT 22 0 - 45 U/L   HM1 (CBC with Diff)   Result Value Ref Range    WBC 8.9 4.0 - 11.0 thou/uL    RBC 4.69 3.80 - 5.40 mill/uL    Hemoglobin 14.6 12.0 - 16.0 g/dL    Hematocrit 43.9 35.0 - 47.0 %    MCV 94 80 - 100 fL    MCH 31.1 27.0 - 34.0 pg    MCHC 33.3 32.0 - 36.0 g/dL    RDW 15.6 (H) 11.0 - 14.5 %    Platelets 449 (H) 140 - 440 thou/uL    MPV 8.9 8.5 - 12.5 fL    Neutrophils % 63 50 - 70 %    Lymphocytes % 24 20 - 40 %    Monocytes % 9 2 - 10 %    Eosinophils % 3 0 - 6 %    Basophils % 1 0 - 2 %    Neutrophils Absolute 5.6 2.0 - 7.7 thou/uL    Lymphocytes Absolute 2.1 0.8 - 4.4 thou/uL    Monocytes Absolute 0.8 0.0 - 0.9 thou/uL    Eosinophils Absolute 0.3 0.0 - 0.4 thou/uL    Basophils Absolute 0.1 0.0 - 0.2 thou/uL       Imaging:    No results found.

## 2021-06-22 NOTE — PROGRESS NOTES
Assessment:   Libertad Hernadez is a 78 y.o. y.o. female with past medical history significant for breast cancer, irritable bowel syndrome, chronic GERD, peripheral neuropathy, restless leg syndrome, hypertension, hyperlipidemia, osteoporosis (on Prolia), Reymundo 2+ essential thrombocytosis (on hydroxyurea), pulmonary emboli (on Xarelto) who presents today for follow-up regarding right greater than left low back pain with radiation into the right buttock, groin, and anterior thigh.  MRI lumbar spine showed a right lateral disc protrusion at L2-3 with moderate right foraminal stenosis.  Patient status post a right L2-3 transforaminal epidural steroid injection on November 30, 2018 which has provided 95% relief of her pain.  She has only mild residual low back pain and stiffness.  Patient does complain of intermittent right anterior shin throbbing.  Pain is infrequent and only occurs at night.  Patient does have moderate right L4-5 foraminal stenosis.         Plan:     A shared decision making plan was used.  The patient's values and choices were respected.  The following represents what was discussed and decided upon by the physician assistant and the patient.      1.  DIAGNOSTIC TESTS: I reviewed the MRI lumbar spine in detail with the patient with the help of the spine model.  No further diagnostic tests were ordered.    2.  PHYSICAL THERAPY: Patient attended 1 session of physical therapy earlier this year.  She states that she would have difficulty affording copayments for additional physical therapy sessions.  She is planning on beginning Silver sneakers.    3.  MEDICATIONS: No changes are made to the patient's medications.  She uses Tylenol thousand milligrams at bedtime.  Her primary care provider offered gabapentin yesterday.  Patient is not interested in trying gabapentin.    4.  INTERVENTIONS: No additional interventions were ordered.  -If right hip/groin/anterior thigh pain were to return, recommend repeating the  right L2-3 transforaminal epidural steroid injection.  -Right anterior shin pain becomes more persistent and concerning for radicular pain we could potentially try a right L4-5 transfemoral epidural steroid injection.    5.  PATIENT EDUCATION: Patient is in agreement the above plan.  All questions were answered.    6.  FOLLOW-UP: Patient can follow-up with me as needed.  If she has any questions or concerns, she should not hesitate to call.    Subjective:     Libertad Hernadez is a 78 y.o. female who presents today for follow-up regarding chronic right greater than left low back pain with radiation into the right buttock, lateral hip, groin, anterior thigh.  Patient status post a right L2-3 transforaminal epidural steroid injection on November 30, 2018.  Patient reports this injection has provided 95% relief of her pain.  She is very pleased with her outcome.    Patient states that she has only mild residual lower back stiffness.  She has occasional right upper buttock/lateral hip pain, but it is not nearly as severe as it was before the injection.  She reports that her groin and thigh pain have resolved completely.  Patient states that she has infrequent throbbing pain in the right anterior shin at night.  Sometimes this keeps her up.  She denies any numbness, tingling, or weakness down the leg.  She rates her pain today is a 0-10.  At its best it is a 0-10.  At its worst it is a 2 out of 10.  She denies any aggravating or alleviating factors for her pain.    Patient attended 1 session of physical therapy earlier this year.  She is planning on beginning Silver sneakers.  She uses Tylenol 1000 mill grams at bedtime.    Past medical history is reviewed and is unchanged in the interim.    Family history is reviewed and is unchanged in the interim.    Review of Systems:  Positive for numbness/tingling (throbbing pain in anterior shin).  Negative for loss of bowel/bladder control, footdrop, weakness, headache, dizziness,  nausea/vomiting, blurry vision, balance changes.     Objective:   CONSTITUTIONAL:  Vital signs as above.  No acute distress.  The patient is well nourished and well groomed.    PSYCHIATRIC:  The patient is awake, alert, oriented to person, place and time.  The patient is answering questions appropriately with clear speech.  Normal affect.  HEENT: Normocephalic, atraumatic.  Sclera clear.    SKIN:  Skin over the face, posterior torso, bilateral upper and lower extremities is clean, dry, intact without rashes.  MUSCULOSKELETAL:  Gait is non-antalgic.  The patient is able to heel and toe walk without any difficulty.  No tenderness over the bilateral lower lumbar paraspinal muscles.      The patient has 5/5 strength for the bilateral hip flexors, knee flexors/extensors, ankle dorsiflexors/plantar flexors, ankle evertors/invertors.    NEUROLOGICAL: 1+ patellar, trace Achilles reflexes which are symmetric bilaterally.  No ankle clonus bilaterally.  Sensation to light touch is intact in the bilateral L4, L5, and S1 dermatomes.       RESULTS: I reviewed the MRI lumbar spine from [facility] dated November 27, 2018.  There is moderate right L2-3 foraminal stenosis related to a broad-based right lateral disc protrusion.  There is also multilevel posterolateral disc disease and facet arthropathy with multilevel foraminal stenosis most pronounced at L4-5 and L5-S1 where there is moderate bilateral foraminal stenosis.  Please see report for further details.

## 2021-06-22 NOTE — PROGRESS NOTES
"Assessment/Plan:        1. Other pulmonary embolism without acute cor pulmonale, unspecified chronicity (H)     2. Urinary frequency     3. Midline low back pain with right-sided sciatica, unspecified chronicity       1. Low back and sciatica resolved, possible some symptoms of neuropathy in the right lower leg. She will start exercise program. She is not interested to take gabapentin.  2. H/o PE, on Xarelto, will discuss with Dr Bourne.  3. Urinary incontinence, she will let me know if wants to switch to a different medication.  This note has been dictated using voice recognition software. Any grammatical or context distortions are unintentional and inherent to the software.      Return in about 2 months (around 2/13/2019) for Annual physical.    There are no Patient Instructions on file for this visit.        Subjective:    Libertad Hernadez is a 78 y.o. female  here for    Chief Complaint   Patient presents with     Back Pain     1 month follow up, [facility] pain     Hip Pain     Medication Refill     Xarelto - wants to know if she can discontinue due to cost     Patient is here today for the follow-up of low back pain diagnosed a few weeks ago with some sciatica symptoms in the right leg.  She was seen in spine clinic he had a epidural spinal injection which was very helpful and she is very happy with the result.  She still has some mild stiffness in her right hip but now will go back to [employer] classes and continue with exercise.  Occasionally has feeling of \"some sharp pains in the right anterior leg, usually before falling asleep.  The pain is now persistent.  She does have a history superficial venous dilatation.  Patient also has a question about her medications.  She is taking her Xarelto since May 2018 when she was diagnosed with a PE.  Her hematologist recommended minimum of 6 months of treatment which she is completing now but most probably a lifetime anticoagulation " considering her diagnosis of essential thrombocytosis and increased risk.  Patient is very concerned about the cost of this medication and the fact that she has to take it for the lifetime.  We had a very long discussion about this today.  She has a visit scheduled with Dr. huffman in January.  She will also look up lobar tear off anticoagulation drugs on her medication list.    She is also concerned about the cost of Detrol and she will also look up the medication list with her insurance and see if we can switch to a lower cost medication.    Social History     Socioeconomic History     Marital status:      Spouse name: Not on file     Number of children: Not on file     Years of education: Not on file     Highest education level: Not on file   Social Needs     Financial resource strain: Not on file     Food insecurity - worry: Not on file     Food insecurity - inability: Not on file     Transportation needs - medical: Not on file     Transportation needs - non-medical: Not on file   Occupational History     Not on file   Tobacco Use     Smoking status: Never Smoker     Smokeless tobacco: Never Used   Substance and Sexual Activity     Alcohol use: Yes     Alcohol/week: 0.6 oz     Types: 1 Glasses of wine per week     Drug use: No     Sexual activity: No   Other Topics Concern     Not on file   Social History Narrative     Not on file       Family History   Problem Relation Age of Onset     Hypertension Father      Heart disease Father      Diabetes Sister      Prostate cancer Brother      Review of Systems:     A 12 point comprehensive review of systems was negative except as noted in HPI.            Objective:    Physical Exam   /72 (Patient Site: Right Arm, Patient Position: Sitting, Cuff Size: Adult Regular)   Pulse 68   Wt 161 lb 12.8 oz (73.4 kg)   BMI 26.92 kg/m      Constitutional: oriented to person, place, and time, appears well-nourished. No distress.   HENT:   Head: Normocephalic.    Neck:   Neck supple.   Pulmonary/Chest: Effort normal   Abdominal: Soft.  Musculoskeletal: Normal range of motion in lower back, no radiculopathy. No palpable bone mass and no tenderness on the pressure over the tibia and fibula, no redness or warmness, no swelling..   Neurological: alert and oriented to person, place, and time.  Psychiatric:  normal mood and affect.    Patient Active Problem List   Diagnosis     Breast cancer (H)     Irritable Bowel Syndrome     Chronic Reflux Esophagitis     Tuberculin PPD Induration Positive Interpretation     Peripheral neuropathy     Varicose Veins     Restless Legs Syndrome     Essential hypertension     Hyperlipidemia     Wrist fracture     Osteoporosis, senile     S/P bilateral mastectomy     Urinary frequency     Essential thrombocythemia (H)     Pulmonary emboli (H)       Current Outpatient Medications on File Prior to Visit   Medication Sig Dispense Refill     acetaminophen (TYLENOL) 500 MG tablet Take 500 mg by mouth every 6 (six) hours as needed for pain.       ascorbic acid, vitamin C, (VITAMIN C) 250 MG tablet Take 250 mg by mouth daily.       BIOTIN ORAL Take 5,000 mcg by mouth daily.        calcium carbonate-vitamin D3 (CALCIUM 600 + D,3,) 600 mg(1,500mg) -200 unit per tablet Take 1 tablet by mouth 2 (two) times a day.        hydroxyurea (HYDREA) 500 mg capsule Take 1 capsule (500 mg total) by mouth daily. 90 capsule 2     lisinopril (PRINIVIL,ZESTRIL) 5 MG tablet Take 5 mg by mouth daily.       polyethylene glycol (MIRALAX) 17 gram packet Take 17 g by mouth daily as needed.       polyvinyl alcohol (LIQUIFILM TEARS) 1.4 % ophthalmic solution Administer 1 drop to both eyes as needed for dry eyes.       propylene glycol/peg 400/PF (SYSTANE, PF, OPHT) Apply 1 drop to eye as needed.       ranitidine (ZANTAC) 150 MG tablet Take 1 tablet (150 mg total) by mouth 2 (two) times a day. 180 tablet 3     simvastatin (ZOCOR) 20 MG tablet Take 1 tablet (20 mg total) by mouth at bedtime.  90 tablet 3     tolterodine (DETROL LA) 4 MG ER capsule Take 1 capsule by mouth daily 90 capsule 2     traZODone (DESYREL) 100 MG tablet Take 100 mg by mouth at bedtime.       XARELTO 20 mg Tab Take 1 tablet by mouth once daily 30 tablet 1     [DISCONTINUED] traMADol (ULTRAM) 50 mg tablet Take 1 tablet (50 mg total) by mouth every 6 (six) hours as needed for pain. 8 tablet 0     Current Facility-Administered Medications on File Prior to Visit   Medication Dose Route Frequency Provider Last Rate Last Dose     denosumab 60 mg (PROLIA 60 mg/ml)  60 mg Subcutaneous Q6 Months Renetta Carrillo MD   60 mg at 09/12/18 1413               Renetta Carrillo  12/13/2018

## 2021-06-22 NOTE — PROGRESS NOTES
Please inform the normal platelet number and stable others. Continue current dose of Hydrea.   thanks

## 2021-06-23 NOTE — PROGRESS NOTES
Patient here today for labs and follow-up appointment with Dr. Bourne for essential thrombocytopenia/THAI Panchal RN

## 2021-06-23 NOTE — PROGRESS NOTES
Assessment and Plan:       1. Encounter for Medicare annual wellness exam      2. Health care maintenance  We discussed Freddie Manjeet vaccine.  She is going to call her insurance company to discuss coverage  - Vitamin D, Total (25-Hydroxy)    3. Hyperlipidemia  She is fasting today.  Lipid profile from 2017 was within normal limits Zocor 20 mg daily.  Recheck today.  - Lipid Profile    4. Essential hypertension  Less than 140/90.  Currently using lisinopril 5 mg.  CMP from 1 month ago was within normal limits.    5. Other pulmonary embolism without acute cor pulmonale, unspecified chronicity (H)  She met with her hematologist oncologist recently who recommended that she stay on full strength aspirin daily.  She does not want to use warfarin due to perception of rat poison and frequent lab testing    6. Malignant neoplasm of female breast, unspecified estrogen receptor status, unspecified laterality, unspecified site of breast (H)  Prior mastectomies.    7. Chronic Reflux Esophagitis  On Zantac 150 twice daily.  Encouraged her to add Maalox for nighttime symptoms.    8. Essential thrombocythemia (H)  Followed by hematology/oncology.    9. Osteoporosis, senile  On Prolia every 6 months    10. Urinary frequency  No longer on Detrol.  Stable.    11. Hair loss  She like to see a dermatologist for skin cancer screening but also because she has been losing a lot of hair recently  - Ambulatory referral to Dermatology    12. Age-related osteoporosis without current pathological fracture   Recheck vitamin D today  - Vitamin D, Total (25-Hydroxy)        The patient's current medical problems were reviewed.    Body Mass Index was not assessed due to patient refusal.  The following health maintenance schedule was reviewed with the patient and provided in printed form in the after visit summary:   Health Maintenance   Topic Date Due     ZOSTER VACCINES (2 of 3) 06/01/2009     FALL RISK ASSESSMENT  12/13/2019     DXA SCAN   06/18/2020     TD 18+ HE  11/09/2022     ADVANCE DIRECTIVES DISCUSSED WITH PATIENT  11/16/2022     PNEUMOCOCCAL POLYSACCHARIDE VACCINE AGE 65 AND OVER  Completed     INFLUENZA VACCINE RULE BASED  Completed     PNEUMOCOCCAL CONJUGATE VACCINE FOR ADULTS (PCV13 OR PREVNAR)  Completed     MAMMOGRAM  Discontinued        Subjective:   Chief Complaint: Libertad Hernadez is an 78 y.o. female here for an Annual Wellness visit.   HPI: Chronic medical conditions discussed.  She goes to the dentist every 6 months.  Eye doctor once per year.  Has not been exercising as much recently due to weather.  She continues to have some issues with right hip pain but this is improving.  Low back pain is improving.    Review of Systems:   Please see above.  The rest of the review of systems are negative for all systems.    Patient Care Team:  Renetta Carrillo MD as PCP - General     Patient Active Problem List   Diagnosis     Breast cancer (H)     Irritable Bowel Syndrome     Chronic Reflux Esophagitis     Tuberculin PPD Induration Positive Interpretation     Peripheral neuropathy     Varicose Veins     Restless Legs Syndrome     Essential hypertension     Hyperlipidemia     Wrist fracture     Osteoporosis, senile     S/P bilateral mastectomy     Urinary frequency     Essential thrombocythemia (H)     Pulmonary emboli (H)     Past Medical History:   Diagnosis Date     Breast Cancer     Created by [employer] [facility] Annotation: Oct 21 2010  9:56AM - Shirley Comer: stage 2;  10/18 lymph nodes positives/p chemo and [id number]      Hyperlipidemia     Created by Conversion      Hypertension     Created by Conversion      Osteopenia     Created by Conversion      Peripheral Neuropathy     Created by Conversion      Primary cancer of bone marrow (H)      Varicose Veins     Created by Conversion       Past Surgical History:   Procedure Laterality Date     FOOT SURGERY  2010     [place] INCISE FINGER TENDON SHEATH      Description: Hand Incision  Tendon Sheath Of A Finger;  Recorded: 04/04/2008;     MA MASTECTOMY, PARTIAL      Description: Right Breast Partial Mastectomy Inferior Lateral Quarter;  Recorded: 10/21/2010;     MA MASTECTOMY, RADICAL      Description: Radical Mastectomy Left Breast;  Recorded: 04/04/2008;     MA REMOVAL ADENOIDS,PRIMARY,<11 Y/O      Description: Adenoidectomy;  Recorded: 04/04/2008;     MA REMOVAL GALLBLADDER      Description: Cholecystectomy;  Recorded: 04/04/2008;     MA REMOVAL OF TONSILS,<11 Y/O      Description: Tonsillectomy;  Recorded: 04/04/2008;      Family History   Problem Relation Age of Onset     Hypertension Father      Heart disease Father      Diabetes Sister      Prostate cancer Brother       Social History     Socioeconomic History     Marital status:      Spouse name: Not on file     Number of children: Not on file     Years of education: Not on file     Highest education level: Not on file   Social Needs     Financial resource strain: Not on file     Food insecurity - worry: Not on file     Food insecurity - inability: Not on file     Transportation needs - medical: Not on file     Transportation needs - non-medical: Not on file   Occupational History     Not on file   Tobacco Use     Smoking status: Never Smoker     Smokeless tobacco: Never Used   Substance and Sexual Activity     Alcohol use: Yes     Alcohol/week: 0.6 oz     Types: 1 Glasses of wine per week     Drug use: No     Sexual activity: No   Other Topics Concern     Not on file   Social History Narrative     Not on file      Current Outpatient Medications   Medication Sig Dispense Refill     acetaminophen (TYLENOL) 500 MG tablet Take 1,000 mg by mouth every evening.              ascorbic acid, vitamin C, (VITAMIN C) 250 MG tablet Take 250 mg by mouth daily.       aspirin 325 MG tablet Take 325 mg by mouth daily.       BIOTIN ORAL Take 5,000 mcg by mouth daily.        calcium carbonate-vitamin D3 (CALCIUM 600 + D,3,) 600 mg(1,500mg) -200 unit  per tablet Take 1 tablet by mouth 2 (two) times a day.        hydroxyurea (HYDREA) 500 mg capsule Take 1 capsule (500 mg total) by mouth daily. 90 capsule 2     lisinopril (PRINIVIL,ZESTRIL) 5 MG tablet Take 5 mg by mouth daily.       polyethylene glycol (MIRALAX) 17 gram packet Take 17 g by mouth daily as needed.       polyvinyl alcohol (LIQUIFILM TEARS) 1.4 % ophthalmic solution Administer 1 drop to both eyes as needed for dry eyes.       ranitidine (ZANTAC) 150 MG tablet Take 1 tablet (150 mg total) by mouth 2 (two) times a day. 180 tablet 3     simvastatin (ZOCOR) 20 MG tablet Take 1 tablet (20 mg total) by mouth at bedtime. 90 tablet 3     traZODone (DESYREL) 100 MG tablet Take 100 mg by mouth at bedtime.       Current Facility-Administered Medications   Medication Dose Route Frequency Provider Last Rate Last Dose     denosumab 60 mg (PROLIA 60 mg/ml)  60 mg Subcutaneous Q6 Months Renetta Carrillo MD   60 mg at 09/12/18 1413      Objective:   Vital Signs:   Visit Vitals  /80   Pulse 66   Wt 161 lb 14.4 oz (73.4 kg)   BMI 26.94 kg/m         VisionScreening:  No exam data present     PHYSICAL EXAM  General: No apparent distress. Calm. Alert and Oriented X3. Pt behavior is appropriate.  Head:Atraumatic. Normocephalic, non-tender to palpation  Neck: Supple. No JVD. Full ROM.   Eyes: PERRL, No discharge. No strabismus. No nystagmus.  Ears: TMs pearly gray with landmarks visible.   Nose/Mouth/Throat: Patent nares, no oral lesions, pharynx clear and without exudate. Uvula mid-line. Nasal septum mid-line. Clear turbinates.   Lymph: No axillar or cervical adenopathy.   Chest/Lungs: Normal chest wall, clear to auscultation, normal respiratory effort and rate.   Heart/Pulses: Regular rate and rhythm, strong and equal radial pulses, no murmurs. Capillary refill <2 seconds. No edema.   Abdomen: Soft, no palpable masses. No hepatosplenomegaly, no tenderness with palpation noted. Bowel sounds active in all quadrants. No  increased tympany.   Genitalia: Not examined.   Musculoskeletal: No CVA tenderness with palpation. Good ROM with extremities.   Neurologic: Interactive, alert, no focal findings, CNs intact.   Skin: Warm, dry. Normal hair pattern. Free of lesions. Normal skin turgor.         Assessment Results 11/16/2017   Activities of Daily Living No help needed   Instrumental Activities of Daily Living No help needed   Mini Cog Total Score 5   Some recent data might be hidden     A Mini-Cog score of 0-2 suggests the possibility of dementia, score of 3-5 suggests no dementia    Identified Health Risks:     The patient was provided with written information regarding signs of hearing loss.  She is at risk for falling and has been provided with information to reduce the risk of falling at home.  Patient's advanced directive was discussed and I am comfortable with the patient's wishes.

## 2021-06-23 NOTE — PROGRESS NOTES
[facility] Hematology and Oncology Progress Note    Patient: Libertad Hernadez  MRN: [id number]  Date of Service: 1/17/2019        Reason for Visit    Follow-up essential thrombocytosis.    Assessment and Plan  Cancer Staging  No matching staging information was found for the patient.    ECOG Performance   ECOG Performance Status: 0     Distress Assessment  Distress Assessment Score: No distress    Pain  Currently in Pain: No/denies  Pain Score (Initial OR Reassessment): No/Denies Pain  Location: R hip/leg, R shin, ankle    #. JAK2 positive essential thrombocytosis, diagnosis in January 2018.  High risk for thrombotic complication based on age, JAK2 V617F mutation and cardiovascular risk factor of hypertension, diabetes.  Unilateral bone marrow biopsy showed 30% cellularity, 1-2+/3 reticulin stain.  Cytogenetics- normal.  She developed acute appeared to be provoked pulmonary emboli (small [place]) in May 2018.   Labs were reviewed.  Her platelets are continue Hydrea 500 mg daily.    Follow-up labs and provider visit in 6 months.    #.  Small burden pulmonary emboli in the left lower lobe in May 2018, presented with syncopal episode and chest pain   No DVT.  She was treated with Xarelto for about 7 months.  She stop in December 2018 and started aspirin 325 mg daily.  She did not have any bleeding or major side effects from Xarelto, however she could not afford any longer.  She knew the option of warfarin and I discussed with her again today.  She does not want to take warfarin because of needing lab monitoring and her perception of a rat poison.  At this point, I strongly encouraged her to remain on aspirin.  If she has a recurrent episode of any venous thromboembolism, she should be on anticoagulation for extended duration.    #.  History of stage II breast cancer with positive lymph nodes (10/18) in 1992. S/p left breast mastectomy, axillary lymph node dissection followed by adjuvant chemotherapy and tamoxifen.  She had a  "second breast cancer in the right breast in 2010, stage I, I do want lumpectomy followed by radiation.  Did not take adjuvant endocrine therapy.     Problem List    1. Essential thrombocythemia (H)  Comprehensive Metabolic Panel    HM1(CBC and Differential)    Lactate Dehydrogenase (LDH)      ______________________________________________________________________________  Diagnosis  January 2018-diagnosed with ANGELA 2 positive essential thrombocytosis platelet counts at high 500.    Treatment to date  January 2018-started Hydrea 500 mg every other day (due to patient's fear of side effects) and dose adjusted to 500 mg once a day in July 2018    History of Present Illness    Libertad presented today by herself.  She has occasional hot flashes.  She reported she had intermittent chest tightness last week, lasted about 3 days.  She took aspirin and it was resolved.  She also reported a couple mouth sores periodically in her mouth and she is doing baking soda mouthwash.  Reported right hip with walking and shin pain at nighttime and had appointment for physical therapy.    Pain Status  Currently in Pain: No/denies    Review of Systems    Constitutional  Constitutional (WDL): Exceptions to WDL  Fatigue: Fatigue relieved by rest  Fever: None  Chills: None  Weight Gain: None  Weight Loss: None  Neurosensory  Neurosensory (WDL): Exceptions to WDL  Peripheral Motor Neuropathy: None  Ataxia: None  Peripheral Sensory Neuropathy: Asymptomatic, loss of deep tendon reflexes or paresthesia(bilat feet)  Confusion: None  Syncope: None  Eye   Eye Disorder (WDL): Exceptions to WDL  Blurred Vision: Intervention not indicated(\"from time to time\")  Dry Eye: None  Eye Pain: None  Watering Eyes: None  Ear  Ear Disorder (WDL): Exceptions to WDL  Ear Pain: None  Tinnitus: Mild symptoms, intervention not indicated(constant, chronic-1987)  Cardiovascular  Cardiovascular (WDL): Exceptions to WDL  Palpitations: None  Edema: No  Phlebitis: " None  Superficial thrombophlebitis: None  Pulmonary  Respiratory (WDL): Exceptions to WDL  Cough: Mild symptoms, nonprescription intervention indicated(in the mornings-lot of phlegm-clear)  Dyspnea: None  Hypoxia: None  Gastrointestinal  Gastrointestinal (WDL): Exceptions to WDL  Anorexia: None  Constipation: None  Diarrhea: Increase of <4 stools per day over baseline, mild increase in ostomy output compared to baseline(loose)  Dysphagia: None  Esophagitis: Symptomatic, altered eating/swallowing, oral supplements indicated(at noc)  Nausea: None  Pharyngitis: None  Vomiting: None  Dysgeusia: None  Dry Mouth: None  Genitourinary  Genitourinary (WDL): All genitourinary elements are within defined limits  Lymphatic  Lymph (WDL): All lymph disorder elements are within defined limits  Musculoskeletal and Connective Tissue  Musculoskeletal and Connetive Tissue Disorders (WDL): Exceptions to WDL  Arthralgia: Severe pain, limiting self care ADL(R hip-with walking 7-8/10-no pain at rest/ R shin and ankle at noc)  Bone Pain: Severe pain, limiting self care ADL(R hip)  Muscle Weakness : None  Myalgia: Severe pain, limiting self care ADL(R leg)  Integumentary  Integumentary (WDL): All integumentary elements are within defined limits  Patient Coping  Patient Coping: Accepting;Open/discussion  Distress Assessment  Distress Assessment Score: No distress  Accompanied by  Accompanied by: Family Member( in lobby)  Oral Chemo Adherence  Oral Chemo Adherence  What Oral Chemotherapy is the patient taking?: Hydroxyurea  Did the patient fill and  the prescription as written?: Yes  Did patient start taking oral chemo on time?: Yes, patient started taking oral chemo on time  Does the patient report missing any doses?: No    Past History  Past Medical History:   Diagnosis Date     Breast Cancer     Created by [facility] Annotation: Oct 21 2010  9:56AM - Shirley Comer: stage 2;  10/18 lymph nodes positives/p chemo  and [id number]      Hyperlipidemia     Created by Conversion      Hypertension     Created by Conversion      Osteopenia     Created by Conversion      Peripheral Neuropathy     Created by Conversion      Primary cancer of bone marrow (H)      Varicose Veins     Created by Conversion        Physical Exam    Recent Vitals 1/17/2019   Height -   Weight 161 lbs 14 oz   BSA (m2) 1.83 m2   /70   Pulse 70   Temp 97.4   Temp src 1   SpO2 99   Some recent data might be hidden     General: alert, awake, not in acute distress  HEENT: Head: Normal, normocephalic, atraumatic.  Eye: Normal external eye, conjunctiva, lids cornea, CONSTANCE.  Ears:  Non-tender.  Nose: Normal external nose, mucus membranes and septum.  Pharynx: Dental Hygiene adequate.  One very small superficial aphthous ulcer in the left lower lip buccal mucosa.  Normal pharynx.  Neck / Thyroid: Supple, no masses, nodes, nodules or enlargement.  Lymphatics: No abnormally enlarged lymph nodes.  Chest: Normal chest wall and respirations. Clear to auscultation.  Heart: S1 S2 RRR, no murmur.   Abdomen: abdomen is soft without significant tenderness, masses, organomegaly or guarding  Extremities: normal strength, tone, and muscle mass  Skin: normal. no rash or abnormalities  CNS: non focal.      Lab Results    Recent Results (from the past 168 hour(s))   Comprehensive Metabolic Panel   Result Value Ref Range    Sodium 141 136 - 145 mmol/L    Potassium 3.9 3.5 - 5.0 mmol/L    Chloride 105 98 - 107 mmol/L    CO2 29 22 - 31 mmol/L    Anion Gap, Calculation 7 5 - 18 mmol/L    Glucose 109 70 - 125 mg/dL    BUN 14 8 - 28 mg/dL    Creatinine 0.79 0.60 - 1.10 mg/dL    GFR MDRD Af Amer >60 >60 mL/min/1.73m2    GFR MDRD Non Af Amer >60 >60 mL/min/1.73m2    Bilirubin, Total 0.4 0.0 - 1.0 mg/dL    Calcium 10.1 8.5 - 10.5 mg/dL    Protein, Total 7.1 6.0 - 8.0 g/dL    Albumin 3.9 3.5 - 5.0 g/dL    Alkaline Phosphatase 45 45 - 120 U/L    AST 20 0 - 40 U/L    ALT 23 0 - 45 U/L    HM1 (CBC with Diff)   Result Value Ref Range    WBC 8.2 4.0 - 11.0 thou/uL    RBC 4.62 3.80 - 5.40 mill/uL    Hemoglobin 14.7 12.0 - 16.0 g/dL    Hematocrit 45.0 35.0 - 47.0 %    MCV 97 80 - 100 fL    MCH 31.8 27.0 - 34.0 pg    MCHC 32.7 32.0 - 36.0 g/dL    RDW 14.9 (H) 11.0 - 14.5 %    Platelets 413 140 - 440 thou/uL    MPV 8.7 8.5 - 12.5 fL    Neutrophils % 67 50 - 70 %    Lymphocytes % 20 20 - 40 %    Monocytes % 8 2 - 10 %    Eosinophils % 5 0 - 6 %    Basophils % 1 0 - 2 %    Neutrophils Absolute 5.5 2.0 - 7.7 thou/uL    Lymphocytes Absolute 1.6 0.8 - 4.4 thou/uL    Monocytes Absolute 0.7 0.0 - 0.9 thou/uL    Eosinophils Absolute 0.4 0.0 - 0.4 thou/uL    Basophils Absolute 0.1 0.0 - 0.2 thou/uL       Imaging    No results found.    TT: 40 minutes and more than 50% was spent on coordination and counseling of care.    Signed by: Camden Bourne MD

## 2021-06-24 NOTE — TELEPHONE ENCOUNTER
Lorena    Prolia Injection appt has been made as:  Date: 3/19/2019 Status: Andrés   Time: 9:00 AM Length: 15   Visit Type: NURSE VISIT [[id number]] Copay: $0.00   Provider: HERBERT PITTMAN Department: [facility]   Referring Provider: MAREN CARRILLO CSN: [id number]   Notes: Dr Carrillo pt - Prolia Injection     Insurance for 2019 has been updated. Please review is PA/PP is needed.

## 2021-06-24 NOTE — PROGRESS NOTES
"Clinic Note    Assessment:     Assessment and Plan:  1. Impacted cerumen of right ear  We performed a lavage in her right ear with success.  Follow-up as needed.       Patient Instructions   We were able to remove all of the wax from your right ear.  You do not need to continue using drops.  You may sense water in your ear canal; this will gradually go away over the next couple of days.    If you continue having right lower extremity pain, call [facility] at [phone] to schedule an appointment.  Otherwise, continue with the Silver sneakers program.    Consider using melatonin 5 mg 30 minutes before bed to help with sleep.    Continue using Maalox 30 minutes before bedtime.    Return in about 6 months (around 8/22/2019).         Subjective:      Patient comes to clinic today for right-sided ear fullness.    Patient states that she has had a \"plugged up\" sensation in her right ear for the last few days.  She has experienced a popping and crackling sensation in her right ear that is made it difficult for her to sleep over the last couple of nights.    She has been using Debrox drops to try and remove wax from her ears.    No fevers.  Slightly decreased hearing sensation on the right side.    No ear pain.  No upper respiratory complaints.    The following portions of the patient's history were reviewed and updated as appropriate: Allergies, medications, problems, prior note.    Review of Systems:    Review is otherwise negative except for what is mentioned above.     Social Hx:    Social History     Tobacco Use   Smoking Status Never Smoker   Smokeless Tobacco Never Used         Objective:     Vitals:    02/22/19 1122   BP: 130/62   Pulse: 70   Weight: 161 lb (73 kg)       Exam:    General: No apparent distress. Calm. Alert and Oriented X3. Pt behavior is appropriate.  Ears: Pre-lavage: Right TM difficult to visualize.  Post lavage: Right TM and ear canal normal        Patient Active Problem List   Diagnosis "     Breast cancer (H)     Irritable Bowel Syndrome     Chronic Reflux Esophagitis     Tuberculin PPD Induration Positive Interpretation     Peripheral neuropathy     Varicose Veins     Restless Legs Syndrome     Essential hypertension     Hyperlipidemia     Wrist fracture     Osteoporosis, senile     S/P bilateral mastectomy     Urinary frequency     Essential thrombocythemia (H)     Pulmonary emboli (H)     Current Outpatient Medications   Medication Sig Dispense Refill     acetaminophen (TYLENOL) 500 MG tablet Take 1,000 mg by mouth every evening.              ascorbic acid, vitamin C, (VITAMIN C) 250 MG tablet Take 250 mg by mouth daily.       aspirin 325 MG tablet Take 325 mg by mouth daily.       BIOTIN ORAL Take 5,000 mcg by mouth daily.        calcium carbonate-vitamin D3 (CALCIUM 600 + D,3,) 600 mg(1,500mg) -200 unit per tablet Take 1 tablet by mouth 2 (two) times a day.        hydroxyurea (HYDREA) 500 mg capsule Take 1 capsule (500 mg total) by mouth daily. 90 capsule 2     lisinopril (PRINIVIL,ZESTRIL) 5 MG tablet Take 1 tablet (5 mg total) by mouth daily. 90 tablet 3     polyethylene glycol (MIRALAX) 17 gram packet Take 17 g by mouth daily as needed.       polyvinyl alcohol (LIQUIFILM TEARS) 1.4 % ophthalmic solution Administer 1 drop to both eyes as needed for dry eyes.       ranitidine (ZANTAC) 150 MG tablet Take 1 tablet (150 mg total) by mouth 2 (two) times a day. 180 tablet 3     simvastatin (ZOCOR) 20 MG tablet Take 1 tablet (20 mg total) by mouth at bedtime. 90 tablet 3     traZODone (DESYREL) 100 MG tablet Take 1 tablet (100 mg total) by mouth at bedtime. 90 tablet 3     Current Facility-Administered Medications   Medication Dose Route Frequency Provider Last Rate Last Dose     denosumab 60 mg (PROLIA 60 mg/ml)  60 mg Subcutaneous Q6 Months Renetta Carrillo MD   60 mg at 09/12/18 1413       I spent 15 minutes with patient face to face, of which >50% was counseling regarding the above plan        Delano Blake (Rob), CNP    2/22/2019

## 2021-06-24 NOTE — PATIENT INSTRUCTIONS - HE
We were able to remove all of the wax from your right ear.  You do not need to continue using drops.  You may sense water in your ear canal; this will gradually go away over the next couple of days.    If you continue having right lower extremity pain, call [facility] at [phone] to schedule an appointment.  Otherwise, continue with the Silver sneakers program.    Consider using melatonin 5 mg 30 minutes before bed to help with sleep.    Continue using Maalox 30 minutes before bedtime.

## 2021-06-25 NOTE — TELEPHONE ENCOUNTER
"Prolia Injection Phone Screen      Screening questions have been asked 2-3 days prior to administration visit for Prolia. If any questions are answered with \"Yes,\" this phone encounter were will routed to ordering provider for further evaluation.     1.  When was the last injection?  9/12/2018    2.  Has insurance for this injection been verified?  Yes    3.  Did you experience any new onset achiness or rashes that lasted for over a month with your previous Prolia injection?   No    4.  Do you have a fever over 101?F or a new deep cough that is unusual for you today? No    5.  Have you started any new medications in the last 6 months that you were told could affect your immune system? These may have been prescribed by oncologist, transplant, rheumatology, or dermatology.   No    6.  In the last 6 months have you have gastric bypass or parathyroid surgery?   No    7.  Do you plan dental work requiring drilling into the bone such as implants/extractions or oral surgery in the next 2-3 months?   No    8. Do you have new insurance since the last injection? Yes     Patient informed if symptoms discussed above present prior to their administration appointment, they are to notify clinic immediately.     Anne Palumbo CMA            "

## 2021-06-25 NOTE — TELEPHONE ENCOUNTER
She is not available to speak at this moment. Will call back around 11 as she should be then to ask the questions needed for prolia.

## 2021-06-25 NOTE — PROGRESS NOTES
The following steps were completed to comply with the REMS program for Prolia:  1. Ordering provider has previously reviewed information in the Medication Guide and Patient Counseling Chart, including the serious risks of Prolia  and the symptoms of each risk and have been advised to seek prompt medical attention if they have signs or symptoms of any of the serious risks.  2. Provided each patient a copy of the Medication Guide and Patient Brochure.  See MAR for administration details.   Indication: Prolia  (denosumab) is a prescription medicine used to treat osteoporosis in patients who:   Are at high risk for fracture, meaning patients who have had a fracture related to osteoporosis, or who have multiple risk factors for fracture; Cannot use another osteoporosis medicine or other osteoporosis medicines did not work well.   The timeline for early/late injections would be 4 weeks early and any time after the 6 month jaz. If a patient receives their injection late, then the subsequent injection would be 6 months from the date that they actually received the injection    Have the screening questions been asked prior to this administration? Yes

## 2021-06-26 NOTE — PROGRESS NOTES
Progress Notes by Omari Bhatti MD at 5/15/2018  3:50 PM     Author: Omari Bhatti MD Service: -- Author Type: Physician    Filed: 5/15/2018  4:28 PM Encounter Date: 5/15/2018 Status: Signed    : Omari Bhatti MD (Physician)           Click to link to [facility] HEART CARE NOTE    Thank you, Dr. Renetta Carrillo MD, for asking the [facility] Heart Care team to see Ms. Libertad Hernadez to evaluate Consult for chest pain.      Assessment/Recommendations   Assessment:    1. Chest pain from pulmonary embolism and fall (from syncope)  2. Pulmonary embolism, acute. Started on xarelto.  3. Syncope, likely due to medication error. This has happened in the past.     Plan:  1. Discussed stress testing with the patient, however due to likely cause of her chest discomfort to be from pulmonary embolism and recent fall the patient elects to wait to see if things improve before proceeding with further cardiac evaluation.  2. The syncope is likely due to medication error.  If she were to have recurrent presyncope or syncope without a medication error, then certainly further cardiac evaluation is warranted.  3. I am not going to schedule follow-up at this time.  Patient can follow-up with her primary care provider.  If additional questions or concerns arise I am happy to see the patient again in the future.           History of Present Illness   Ms. Libertad Hernadez is a 77 y.o. female with a significant past history of hypertension and hyperlipidemia who presents for evaluation of pulmonary embolism. She was recently seen in the [facility] emergency department on 5/12/2018 after having a syncopal episode 4 days prior.  She had accidentally taken her evening medications (trazodone, detrol and simvastatin) in the morning and not long after that passed out, causing her to fall to the floor. She came to not long after that. She did not hit her head but did have some chest pain on the left side  "of her thorax afterwards.  Four days later she presented to the emergency department.  EKG was unremarkable as was her opponent.  CT angiogram was positive for a small subsegmental pulmonary embolism.  She was started on Xarelto and discharged from the emergency department with follow-up in this clinic.    Today, Ms. Hernadez is feeling anxious, but a little \"woozy\". She has some mild dyspnea with climbing more than a flight of stairs. This has been present intermittently for the past several weeks and is not limiting. She has no chest pain, though. She also reports mild swelling in left ankle.     In general she walks regularly and has had no chest discomfort or dyspnea with walking.    Other than noted above, Ms. Hernadez denies any chest pain/pressure/tightness, shortness of breath at rest or with exertion, light headedness/dizziness, pre-syncope, syncope, lower extremity swelling, palpitations, paroxysmal nocturnal dyspnea (PND), or orthopnea.     Cardiac Problems and Cardiac Diagnostics     Most Recent Cardiac testing:  ECG dated 5/12/18 (personaly reviewed and interpreted): sinus rhythm with PACs. No acute ischemic changes.    ECHO (report reviewed):   Echo results:   Results for orders placed during the hospital encounter of 05/28/16   Echo Complete [ECH10] 05/29/2016    Narrative  Conclusions    Summary   1. Normal left ventricular size and systolic function. LVEF: 60-65%.   2. Normal right ventricular size and systolic function.   3. Mild mitral regurgitation is present.   4. There is mild tricuspid regurgitation with an estimated RVSP of 34 mm   Hg.    No previous echo for comparison.       Lexiscan nuclear stress test 6/22/16:  CONCLUSION:   1. Lexiscan stress nuclear study is negative for inducible myocardial ischemia.   2. LVEF: 71%       Medications  Allergies   Current Outpatient Prescriptions   Medication Sig Dispense Refill   ? ascorbic acid, vitamin C, (VITAMIN C) 250 MG tablet Take 250 mg by mouth " daily.     ? BIOTIN ORAL Take 5,000 mcg by mouth daily.      ? calcium carbonate-vitamin D3 (CALCIUM 600 + D,3,) 600 mg(1,500mg) -200 unit per tablet Take 1 tablet by mouth 2 (two) times a day.      ? hydroxyurea (HYDREA) 500 mg capsule Take 500 mg by mouth every other day.     ? lisinopril (PRINIVIL,ZESTRIL) 5 MG tablet Take 5 mg by mouth daily.     ? polyvinyl alcohol (LIQUIFILM TEARS) 1.4 % ophthalmic solution Administer 1 drop to both eyes as needed for dry eyes.     ? propylene glycol/peg 400/PF (SYSTANE, PF, OPHT) Apply 1 drop to eye as needed.     ? ranitidine (ZANTAC) 150 MG tablet Take 150 mg by mouth 2 (two) times a day.     ? rivaroxaban 15 mg (42)- 20 mg (9) DsPk Take 1 tablet (15mg) po bid for 21 days then take 1 tablet (20mg) daily 51 tablet 0   ? simvastatin (ZOCOR) 20 MG tablet Take 20 mg by mouth at bedtime.     ? tolterodine (DETROL LA) 4 MG ER capsule Take 4 mg by mouth daily.     ? traZODone (DESYREL) 100 MG tablet Take 100 mg by mouth at bedtime.       Current Facility-Administered Medications   Medication Dose Route Frequency Provider Last Rate Last Dose   ? denosumab 60 mg (PROLIA 60 mg/ml)  60 mg Subcutaneous Q6 Months Renetta Carrillo MD   60 mg at 03/08/18 1611      Allergies   Allergen Reactions   ? Adhesive Tape-Silicones Itching     States itchy rash with certain tapes   ? Penicillins Hives        Physical Examination Review of Systems   Vitals:    05/15/18 1557   BP: 166/84   Pulse: 64   Resp: 16     Body mass index is 25.82 kg/(m^2).  Wt Readings from Last 3 Encounters:   05/15/18 160 lb (72.6 kg)   05/14/18 164 lb 1.6 oz (74.4 kg)   05/12/18 160 lb (72.6 kg)       General Appearance:   Pleasant older woman, no distress, normal body habitus   ENT/Mouth: membranes moist, no apparent gingival bleeding.      EYES:  no scleral icterus, normal conjunctivae   Neck: no carotid bruits. No anterior cervical lymphadenopaty   Respiratory:   lungs are clear to auscultation, no rales or wheezing,  equal chest wall expansion    Cardiovascular:   Regular rhythm, normal rate. Normal first and second heart sounds with no murmurs, rubs, or gallops; the carotid, radial and posterior tibial pulses are intact, Jugular venous pressure normal, no edema bilaterally    Abdomen/GI:  no organomegaly, masses, bruits, or tenderness; bowel sounds are present   Extremities: no cyanosis or clubbing   Skin: no xanthelasma, warm.    Heme/lymph/ Immunology No apparent bleeding noted.   Neurologic: Alert and oriented. normal gait, no tremors     Psychiatric: Pleasant, calm, appropriate affect.    A complete 10 system review of systems was performed and is negative except as mentioned in the HPI or below:  General: WNL  Eyes: WNL  Ears/Nose/Throat: Hearing Loss  Lungs: WNL  Heart: Chest Pain, Shortness of Breath with activity  Stomach: Constipation, Heartburn, Nausea  Bladder: WNL  Muscle/Joints: WNL  Skin: WNL  Nervous System: Daytime Sleepiness, Dizziness  Mental Health: WNL     Blood: WNL       Past History   Past Medical History:   Past Medical History:   Diagnosis Date   ? Breast Cancer     Created by [facility] Annotation: Oct 21 2010  9:56AM - Shirley Comer: stage 2;  10/18 lymph nodes positives/p chemo and [id number]    ? Hyperlipidemia     Created by Conversion    ? Hypertension     Created by Conversion    ? Osteopenia     Created by Conversion    ? Peripheral Neuropathy     Created by Conversion    ? Primary cancer of bone marrow    ? Varicose Veins     Created by Conversion        Past Surgical History:   Past Surgical History:   Procedure Laterality Date   ? FOOT SURGERY  2010   ? DC INCISE FINGER TENDON SHEATH      Description: Hand Incision Tendon Sheath Of A Finger;  Recorded: 04/04/2008;   ? DC MASTECTOMY, PARTIAL      Description: Right Breast Partial Mastectomy Inferior Lateral Quarter;  Recorded: 10/21/2010;   ? DC MASTECTOMY, RADICAL      Description: Radical Mastectomy Left Breast;  Recorded:  04/04/2008;   ? HI REMOVAL ADENOIDS,PRIMARY,<13 Y/O      Description: Adenoidectomy;  Recorded: 04/04/2008;   ? HI REMOVAL GALLBLADDER      Description: Cholecystectomy;  Recorded: 04/04/2008;   ? HI REMOVAL OF TONSILS,<13 Y/O      Description: Tonsillectomy;  Recorded: 04/04/2008;       Family History:   Family History   Problem Relation Age of Onset   ? Hypertension Father    ? Heart disease Father    ? Diabetes Sister    ? Prostate cancer Brother      Social History:   Social History     Social History   ? Marital status:      Spouse name: N/A   ? Number of children: N/A   ? Years of education: N/A     Occupational History   ? Not on file.     Social History Main Topics   ? Smoking status: Never Smoker   ? Smokeless tobacco: Never Used   ? Alcohol use 0.6 oz/week     1 Glasses of wine per week   ? Drug use: No   ? Sexual activity: No     Other Topics Concern   ? Not on file     Social History Narrative              Lab Results    Chemistry/lipid CBC Cardiac Enzymes/BNP/TSH/INR   Lab Results   Component Value Date    CHOL 188 11/16/2017    HDL 63 11/16/2017    LDLCALC 110 11/16/2017    TRIG 75 11/16/2017    CREATININE 0.75 05/12/2018    BUN 12 05/12/2018    K 4.1 05/12/2018     05/12/2018     05/12/2018    CO2 27 05/12/2018    Lab Results   Component Value Date    WBC 10.9 05/12/2018    HGB 15.0 05/12/2018    HCT 45.1 05/12/2018    MCV 89 05/12/2018     (H) 05/12/2018    Lab Results   Component Value Date    TROPONINI <0.01 05/12/2018    BNP 74 05/12/2018    TSH 1.90 06/03/2016          Omari Bhatti MD  Non-invasive Cardiology  [facility]

## 2021-07-03 ENCOUNTER — HEALTH MAINTENANCE LETTER (OUTPATIENT)
Age: 81
End: 2021-07-03

## 2021-07-03 NOTE — ADDENDUM NOTE
Addendum Note by Bloch, Lisa M, CMA at 3/8/2018  4:12 PM     Author: Bloch, Lisa M, CMA Service: -- Author Type: Certified Medical Assistant    Filed: 3/8/2018  4:12 PM Encounter Date: 3/8/2018 Status: Signed    : Bloch, Lisa M, CMA (Certified Medical Assistant)    Addended by: BLOCH, LISA M on: 3/8/2018 04:12 PM        Modules accepted: Orders

## 2021-07-03 NOTE — ADDENDUM NOTE
Addendum Note by Maren Carrillo MD at 11/16/2017 12:32 PM     Author: Marne Carrillo MD Service: -- Author Type: Physician    Filed: 11/16/2017 12:32 PM Encounter Date: 11/16/2017 Status: Signed    : Maren Carrillo MD (Physician)    Addended by: MAREN CARRILLO on: 11/16/2017 12:32 PM        Modules accepted: Orders

## 2021-07-03 NOTE — ADDENDUM NOTE
Addendum Note by Camden Bourne MD at 2/13/2018 10:52 AM     Author: Camden Bourne MD Service: -- Author Type: Physician    Filed: 2/13/2018 10:52 AM Encounter Date: 2/13/2018 Status: Signed    : Camden Bourne MD (Physician)    Addended by: CAMDEN BOURNE on: 2/13/2018 10:52 AM        Modules accepted: Orders

## 2021-08-09 ENCOUNTER — TRANSFERRED RECORDS (OUTPATIENT)
Dept: HEALTH INFORMATION MANAGEMENT | Facility: CLINIC | Age: 81
End: 2021-08-09
Payer: MEDICARE

## 2021-10-23 ENCOUNTER — HEALTH MAINTENANCE LETTER (OUTPATIENT)
Age: 81
End: 2021-10-23

## 2021-11-29 ENCOUNTER — LAB REQUISITION (OUTPATIENT)
Dept: LAB | Facility: CLINIC | Age: 81
End: 2021-11-29
Payer: COMMERCIAL

## 2021-11-29 DIAGNOSIS — R25.1 TREMOR, UNSPECIFIED: ICD-10-CM

## 2021-11-29 LAB — TSH SERPL DL<=0.005 MIU/L-ACNC: 1.49 UIU/ML (ref 0.3–5)

## 2021-11-29 PROCEDURE — 84443 ASSAY THYROID STIM HORMONE: CPT | Mod: ORL | Performed by: FAMILY MEDICINE

## 2021-12-27 DIAGNOSIS — K21.00 REFLUX ESOPHAGITIS: ICD-10-CM

## 2021-12-29 RX ORDER — FAMOTIDINE 20 MG/1
TABLET, FILM COATED ORAL
Qty: 180 TABLET | Refills: 3 | Status: SHIPPED | OUTPATIENT
Start: 2021-12-29 | End: 2023-02-22

## 2021-12-29 NOTE — TELEPHONE ENCOUNTER
"Routing refill request to provider for review/approval because:  Patient needs to be seen because it has been more than 1 year since last office visit.    Last Written Prescription Date:  10/2/20  Last Fill Quantity: 180,  # refills: 3   Last office visit provider:  3/25/20     Requested Prescriptions   Pending Prescriptions Disp Refills     famotidine (PEPCID) 20 MG tablet [Pharmacy Med Name: FAMOTIDINE 20MG TABS] 180 tablet 3     Sig: TAKE ONE TABLET BY MOUTH TWICE A DAY       H2 Blockers Protocol Failed - 12/27/2021  9:59 AM        Failed - Recent (12 mo) or future (30 days) visit within the authorizing provider's specialty     Patient has had an office visit with the authorizing provider or a provider within the authorizing providers department within the previous 12 mos or has a future within next 30 days. See \"Patient Info\" tab in inbasket, or \"Choose Columns\" in Meds & Orders section of the refill encounter.              Passed - Patient is age 12 or older        Passed - Medication is active on med list             Olvin Means RN 12/29/21 11:14 AM  "

## 2022-02-10 DIAGNOSIS — Z11.59 ENCOUNTER FOR SCREENING FOR OTHER VIRAL DISEASES: Primary | ICD-10-CM

## 2022-02-11 RX ORDER — LOTEPREDNOL ETABONATE 5 MG/ML
1-2 SUSPENSION/ DROPS OPHTHALMIC 4 TIMES DAILY
Status: ON HOLD | COMMUNITY
End: 2022-03-04

## 2022-02-11 RX ORDER — TERBINAFINE HYDROCHLORIDE 250 MG/1
250 TABLET ORAL DAILY
COMMUNITY
End: 2022-03-02

## 2022-02-11 ASSESSMENT — MIFFLIN-ST. JEOR: SCORE: 1141.28

## 2022-02-21 ENCOUNTER — LAB REQUISITION (OUTPATIENT)
Dept: LAB | Facility: CLINIC | Age: 82
End: 2022-02-21

## 2022-02-21 DIAGNOSIS — R35.0 FREQUENCY OF MICTURITION: ICD-10-CM

## 2022-02-21 DIAGNOSIS — Z01.818 ENCOUNTER FOR OTHER PREPROCEDURAL EXAMINATION: ICD-10-CM

## 2022-02-21 LAB
ALBUMIN SERPL-MCNC: 3.8 G/DL (ref 3.5–5)
ALP SERPL-CCNC: 65 U/L (ref 45–120)
ALT SERPL W P-5'-P-CCNC: 12 U/L (ref 0–45)
ANION GAP SERPL CALCULATED.3IONS-SCNC: 9 MMOL/L (ref 5–18)
AST SERPL W P-5'-P-CCNC: 17 U/L (ref 0–40)
BILIRUB SERPL-MCNC: 0.5 MG/DL (ref 0–1)
BUN SERPL-MCNC: 11 MG/DL (ref 8–28)
CALCIUM SERPL-MCNC: 9.9 MG/DL (ref 8.5–10.5)
CHLORIDE BLD-SCNC: 106 MMOL/L (ref 98–107)
CO2 SERPL-SCNC: 27 MMOL/L (ref 22–31)
CREAT SERPL-MCNC: 0.67 MG/DL (ref 0.6–1.1)
GFR SERPL CREATININE-BSD FRML MDRD: 87 ML/MIN/1.73M2
GLUCOSE BLD-MCNC: 81 MG/DL (ref 70–125)
POTASSIUM BLD-SCNC: 4.2 MMOL/L (ref 3.5–5)
PROT SERPL-MCNC: 6.6 G/DL (ref 6–8)
SODIUM SERPL-SCNC: 142 MMOL/L (ref 136–145)

## 2022-02-21 PROCEDURE — 80053 COMPREHEN METABOLIC PANEL: CPT | Performed by: FAMILY MEDICINE

## 2022-02-21 PROCEDURE — 87086 URINE CULTURE/COLONY COUNT: CPT | Performed by: FAMILY MEDICINE

## 2022-02-24 LAB — BACTERIA UR CULT: NORMAL

## 2022-02-24 NOTE — PROVIDER NOTIFICATION
Discharge plan according to [facility]:       02/11/22 2589   Discharge Planning   Patient/Family Anticipates Transition to home   Concerns to be Addressed all concerns addressed in this encounter   Living Arrangements   People in Home spouse   Type of Residence Private Residence   Is your private residence a single family home or apartment? Single family home   Stair Railings, Within Home, Primary none   Once home, are you able to live on one level? Yes   Which level? Main Level   Bathroom Shower/Tub Walk-in shower   Equipment Currently Used at Home none   Support System   Support Systems Spouse/Significant Other   Do you have someone available to stay with you one or two nights once you are home? Yes

## 2022-03-01 ENCOUNTER — LAB (OUTPATIENT)
Dept: LAB | Facility: CLINIC | Age: 82
End: 2022-03-01
Attending: ORTHOPAEDIC SURGERY
Payer: COMMERCIAL

## 2022-03-01 DIAGNOSIS — D47.3 ESSENTIAL THROMBOCYTHEMIA (H): ICD-10-CM

## 2022-03-01 DIAGNOSIS — Z11.59 ENCOUNTER FOR SCREENING FOR OTHER VIRAL DISEASES: ICD-10-CM

## 2022-03-01 LAB — SARS-COV-2 RNA RESP QL NAA+PROBE: NEGATIVE

## 2022-03-01 PROCEDURE — U0003 INFECTIOUS AGENT DETECTION BY NUCLEIC ACID (DNA OR RNA); SEVERE ACUTE RESPIRATORY SYNDROME CORONAVIRUS 2 (SARS-COV-2) (CORONAVIRUS DISEASE [COVID-19]), AMPLIFIED PROBE TECHNIQUE, MAKING USE OF HIGH THROUGHPUT TECHNOLOGIES AS DESCRIBED BY CMS-2020-01-R: HCPCS

## 2022-03-01 PROCEDURE — U0005 INFEC AGEN DETEC AMPLI PROBE: HCPCS

## 2022-03-01 RX ORDER — HYDROXYUREA 500 MG/1
CAPSULE ORAL
Qty: 90 CAPSULE | Refills: 2 | Status: SHIPPED | OUTPATIENT
Start: 2022-03-01

## 2022-03-02 ENCOUNTER — TRANSFERRED RECORDS (OUTPATIENT)
Dept: HEALTH INFORMATION MANAGEMENT | Facility: CLINIC | Age: 82
End: 2022-03-02

## 2022-03-02 NOTE — OR NURSING
[facility] medical records will fax over telephone encounter from MD to patient stating UCx were negative.

## 2022-03-03 ENCOUNTER — ANESTHESIA EVENT (OUTPATIENT)
Dept: SURGERY | Facility: CLINIC | Age: 82
End: 2022-03-03
Payer: COMMERCIAL

## 2022-03-03 NOTE — PROGRESS NOTES
I am evaluating this patient for upcoming Right Total Hip Arthroplasty-Direct Anterior with Dr. Rowe at [facility] on 3/4/22:    - Reviewed H&P and labs: Patient was cleared for surgery by PCP but was having some urinary frequency and urgency symptoms last week so UA and UC were done. Urine culture came back negative for UTI with < 10,000 colonies of mixed urogenital erendira. However, since patient was having UTI symptoms, PCP started her on 10 days of treatment with cephalexin. Patient is currently on day 7 of treatment and now reports that her urgency/frequency symptoms have completely resolved. Message sent to Dr. Rowe to make sure he is ok proceeding with surgery tomorrow. Waiting for response. Call me if any questions.     - Update 3/3/22 at 10:10 am: Received message back from Dr. Soledad Brothers's PA-C, that he is ok proceeding with surgery as planned since UC was negative and patient's symptoms have resolved. Full Treatment Plan note to follow.       TRINY Guallpa, CNP   Advanced Practice Nurse Navigator- Orthopedics  [facility]   Office Phone: [phone]  Direct Fax: [phone]

## 2022-03-03 NOTE — OR NURSING
"Pt had a urine cx that per the PCP note was \"technically negative, but the patient was still having sxs\" so a 10 day course of abx was sent. Pt stated she has been taking the ordered abx and has no further sxs. She will complete the course of abx per her PCP instructions.     [facility] called to advise. Awaiting direction from him/Dr. Rowe.   "

## 2022-03-03 NOTE — TREATMENT PLAN
Orthopedic Surgery Pre-Op Plan: Libertad Hernadez  pre-op review. This is NOT an H&P   Surgeon: Dr. Rowe    Hospital: [facility]  Name of Surgery: Right Total Hip Arthroplasty- Direct Anterior  Date of Surgery: 3/4/22   H&P: Completed 2/21/22 by Dr. Dyan Giang at [facility].  History of ASA, NSAIDS, vitamin and/or herbal supplements within 10 days: Yes-  mg daily- has held ASA since 2/14/22.   History of blood thinners: No    Plan:   1) Discharge Plan: Home morning of POD 1 with assist of Spouse. Please see Discharge Planning section near bottom of this note for further details.     2) History of Pulmonary Embolism (PE): unclear if this was provoked. Does have history of cancer and was previously on tamoxifen which both increase VTE risk. Not on any chronic anticoagulation but does have slightly increased risk for post-op VTE given this history. Could consider more aggressive post-op VTE prophylaxis with warfarin, Lovenox or DOAC medication. Will defer to Dr. Rowe's team for final decision on post-op VTE prophylaxis.     3) Urinary Urgency and Frequency: last week patient complained of increased urinary urgency and frequency. UA and UC performed which were negative. Urine culture showed < 10,000 colonies of mixed urogenital erendira so not a UTI. However, patient continued to have symptoms so PCP started on 10 day course of cephalexin. Patient is now on day 7 of antibiotics and says the symptoms of urgency/frequency have resolved. Notified Dr. Rowe and he is ok proceeding with surgery as urine culture was negative, patient has been on several days of antibiotics and symptoms have resolved.     4) Hypercholesterolemia: On simvastatin.    5) Hypertension: Well-controlled on lisinopril.  Instructed to hold lisinopril on the morning of surgery.    6) Essential Thrombocytosis: on ASA (on hold since 2/14/22 for surgery) and hydroxyurea. No platelet count checked at pre-op physical exam. Will check CBC on day of surgery.      7) Peripheral Neuropathy: Recently saw neurology for this.  Plan is for her to start on Cymbalta for her neuropathy after recovery from hip surgery.  PCP advised that she will need to stop taking trazodone before she starts on Cymbalta due to drug interactions.    8) GERD: On famotidine.     9) History of Breast Cancer-S/P Left Mastectomy, lymph node dissection, chemotherapy and tamoxifen in 1992. S/P Right Mastectomy- S/P radiation therapy in 2010:     10) Insomnia: On trazodone.    Patient appears medically optimized for upcoming surgery. I would recommend Hospitalist Consult to assist with medical management. Please call me below with any questions on this patient.       Review of Systems Notable for: History of pulmonary embolism, urinary urgency and frequency-negative urine culture, hypercholesterolemia, hypertension, essential thrombocytosis, peripheral neuropathy, GERD, history of breast cancer-s/p left mastectomy in 1992, s/p right mastectomy in 2010.    Past Medical History:   Past Medical History:   Diagnosis Date     Arthritis      Asymptomatic varicose veins     Created by Conversion      Disorder of bone and cartilage, unspecified     Created by Conversion      Essential thrombocytosis (H)      Gastroesophageal reflux disease      Malignant neoplasm of breast (female), unspecified site     Created by Conversion [facility] Annotation: Oct 21 2010  9:56AM - Shirley Comer: stage 2;  10/18 lymph nodes positives/p chemo and [id number]      Other and unspecified hyperlipidemia     Created by Conversion      PE (pulmonary thromboembolism) (H)      Primary cancer of bone marrow (H)      Thrombosis      Unspecified essential hypertension     Created by Conversion      Unspecified hereditary and idiopathic peripheral neuropathy     Created by Conversion      Past Surgical History:   Procedure Laterality Date     FOOT SURGERY  2010     HC REMOVAL ADENOIDS,PRIMARY,<13 Y/O      Description:  Adenoidectomy;  Recorded: 04/04/2008;     HC REMOVAL GALLBLADDER      Description: Cholecystectomy;  Recorded: 04/04/2008;     HC REMOVAL OF TONSILS,<11 Y/O      Description: Tonsillectomy;  Recorded: 04/04/2008;     IL INCISE FINGER TENDON SHEATH      Description: Hand Incision Tendon Sheath Of A Finger;  Recorded: 04/04/2008;     IL MASTECTOMY, PARTIAL      Description: Right Breast Partial Mastectomy Inferior Lateral Quarter;  Recorded: 10/21/2010;     IL MASTECTOMY, RADICAL      Description: Radical Mastectomy Left Breast;  Recorded: 04/04/2008;       Current Medications:  Patient's Medications   New Prescriptions    No medications on file   Previous Medications    ASCORBIC ACID, VITAMIN C, (VITAMIN C) 250 MG TABLET    [ASCORBIC ACID, VITAMIN C, (VITAMIN C) 250 MG TABLET] Take 250 mg by mouth daily.    ASPIRIN 325 MG TABLET    [ASPIRIN 325 MG TABLET] Take 325 mg by mouth daily.    BIOTIN ORAL    [BIOTIN ORAL] Take 5,000 mcg by mouth daily.     CALCIUM CARBONATE-VITAMIN D3 (CALCIUM 600 + D,3,) 600 MG(1,500MG) -200 UNIT PER TABLET    [CALCIUM CARBONATE-VITAMIN D3 (CALCIUM 600 + D,3,) 600 MG(1,500MG) -200 UNIT PER TABLET] Take 1 tablet by mouth 2 (two) times a day.     FAMOTIDINE (PEPCID) 20 MG TABLET    TAKE ONE TABLET BY MOUTH TWICE A DAY    HYDROXYUREA (HYDREA) 500 MG CAPSULE    TAKE ONE CAPSULE BY MOUTH EVERY DAY FOR 4 DAYS A WEEK AND TAKE TWO CAPSULES BY MOUTH EVERY DAY FOR 3 DAYS A WEEK    LISINOPRIL (PRINIVIL,ZESTRIL) 5 MG TABLET    [LISINOPRIL (PRINIVIL,ZESTRIL) 5 MG TABLET] TAKE ONE TABLET BY MOUTH EVERY DAY    LOTEPREDNOL (LOTEMAX) 0.5 % OPHTHALMIC SUSPENSION    1-2 drops 4 times daily    POLYETHYLENE GLYCOL (MIRALAX) 17 GRAM PACKET    [POLYETHYLENE GLYCOL (MIRALAX) 17 GRAM PACKET] Take 17 g by mouth daily as needed.    POLYVINYL ALCOHOL (LIQUIFILM TEARS) 1.4 % OPHTHALMIC SOLUTION    [POLYVINYL ALCOHOL (LIQUIFILM TEARS) 1.4 % OPHTHALMIC SOLUTION] Administer 1 drop to both eyes as needed for dry eyes.     SIMVASTATIN (ZOCOR) 20 MG TABLET    [SIMVASTATIN (ZOCOR) 20 MG TABLET] TAKE ONE TABLET BY MOUTH AT BEDTIME    TRAZODONE (DESYREL) 100 MG TABLET    [TRAZODONE (DESYREL) 100 MG TABLET] Take 1 tablet (100 mg total) by mouth at bedtime.   Modified Medications    No medications on file   Discontinued Medications    ACETAMINOPHEN (TYLENOL) 500 MG TABLET    [ACETAMINOPHEN (TYLENOL) 500 MG TABLET] Take 1,000 mg by mouth every evening.           ALUM/MAG HYDROX-SIMETHICONE-DIPHENHYDRAMINE-LIDOCAINE (MAGIC MOUTHWASH) SUSPENSION    [ALUM/MAG HYDROX-SIMETHICONE-DIPHENHYDRAMINE-LIDOCAINE (MAGIC MOUTHWASH) SUSPENSION] Swish and spit 10 mL 4 (four) times a day as needed. Mix equal parts Maalox, diphenhydramine, viscous lidocaine.    KETOCONAZOLE (NIZORAL) 2 % CREAM    [KETOCONAZOLE (NIZORAL) 2 % CREAM] APPLY TO AFFECTED AREA(S) TWO TIMES A DAY    TERBINAFINE (LAMISIL) 250 MG TABLET    Take 250 mg by mouth daily       ALLERGIES:  Allergies   Allergen Reactions     Adhesive Tape-Silicones [Adhesive Tape] Itching     States itchy rash with certain tapes     Penicillins Hives       Social History  Social History     Tobacco Use     Smoking status: Never Smoker     Smokeless tobacco: Never Used   Substance Use Topics     Alcohol use: Yes     Alcohol/week: 1.0 standard drink     Comment: very rarely     Drug use: No       Any Abnormal Recent Diagnostics? No    Discharge Planning:   Discharge plan according to [facility]:        02/11/22 1325   Discharge Planning   Patient/Family Anticipates Transition to home   Concerns to be Addressed all concerns addressed in this encounter   Living Arrangements   People in Home spouse   Type of Residence Private Residence   Is your private residence a single family home or apartment? Single family home   Stair Railings, Within Home, Primary none   Once home, are you able to live on one level? Yes   Which level? Main Level   Bathroom Shower/Tub Walk-in shower   Equipment Currently Used at Home  none   Support System   Support Systems Spouse/Significant Other   Do you have someone available to stay with you one or two nights once you are home? Yes      TRINY Guallpa, CNP   Advanced Practice Nurse Navigator- Orthopedics  [facility]   Phone: [phone]

## 2022-03-04 ENCOUNTER — ANESTHESIA (OUTPATIENT)
Dept: SURGERY | Facility: CLINIC | Age: 82
End: 2022-03-04
Payer: COMMERCIAL

## 2022-03-04 ENCOUNTER — HOSPITAL ENCOUNTER (OUTPATIENT)
Facility: CLINIC | Age: 82
Discharge: HOME | End: 2022-03-05
Attending: ORTHOPAEDIC SURGERY | Admitting: ORTHOPAEDIC SURGERY
Payer: COMMERCIAL

## 2022-03-04 ENCOUNTER — APPOINTMENT (OUTPATIENT)
Dept: RADIOLOGY | Facility: CLINIC | Age: 82
End: 2022-03-04
Attending: PHYSICIAN ASSISTANT
Payer: COMMERCIAL

## 2022-03-04 ENCOUNTER — APPOINTMENT (OUTPATIENT)
Dept: PHYSICAL THERAPY | Facility: CLINIC | Age: 82
End: 2022-03-04
Attending: ORTHOPAEDIC SURGERY
Payer: COMMERCIAL

## 2022-03-04 DIAGNOSIS — Z96.641 STATUS POST TOTAL HIP REPLACEMENT, RIGHT: ICD-10-CM

## 2022-03-04 DIAGNOSIS — Z96.641 S/P TOTAL RIGHT HIP ARTHROPLASTY: Primary | ICD-10-CM

## 2022-03-04 LAB
CREAT SERPL-MCNC: 0.71 MG/DL (ref 0.6–1.1)
ERYTHROCYTE [DISTWIDTH] IN BLOOD BY AUTOMATED COUNT: 14.5 % (ref 10–15)
GFR SERPL CREATININE-BSD FRML MDRD: 84 ML/MIN/1.73M2
HCT VFR BLD AUTO: 45.5 % (ref 35–47)
HGB BLD-MCNC: 14.9 G/DL (ref 11.7–15.7)
INR PPP: 1.16 (ref 0.85–1.15)
MCH RBC QN AUTO: 33.3 PG (ref 26.5–33)
MCHC RBC AUTO-ENTMCNC: 32.7 G/DL (ref 31.5–36.5)
MCV RBC AUTO: 102 FL (ref 78–100)
PLATELET # BLD AUTO: 369 10E3/UL (ref 150–450)
POTASSIUM BLD-SCNC: 3.7 MMOL/L (ref 3.5–5)
RBC # BLD AUTO: 4.48 10E6/UL (ref 3.8–5.2)
WBC # BLD AUTO: 6.6 10E3/UL (ref 4–11)

## 2022-03-04 PROCEDURE — 250N000013 HC RX MED GY IP 250 OP 250 PS 637: Performed by: FAMILY MEDICINE

## 2022-03-04 PROCEDURE — 250N000011 HC RX IP 250 OP 636: Performed by: ANESTHESIOLOGY

## 2022-03-04 PROCEDURE — 99207 PR CDG-CODE CATEGORY CHANGED: CPT | Performed by: FAMILY MEDICINE

## 2022-03-04 PROCEDURE — 250N000009 HC RX 250: Performed by: PHYSICIAN ASSISTANT

## 2022-03-04 PROCEDURE — 258N000003 HC RX IP 258 OP 636: Performed by: ANESTHESIOLOGY

## 2022-03-04 PROCEDURE — 84132 ASSAY OF SERUM POTASSIUM: CPT | Performed by: PHYSICIAN ASSISTANT

## 2022-03-04 PROCEDURE — 250N000013 HC RX MED GY IP 250 OP 250 PS 637: Performed by: ANESTHESIOLOGY

## 2022-03-04 PROCEDURE — 250N000011 HC RX IP 250 OP 636: Performed by: NURSE ANESTHETIST, CERTIFIED REGISTERED

## 2022-03-04 PROCEDURE — C1776 JOINT DEVICE (IMPLANTABLE): HCPCS | Performed by: ORTHOPAEDIC SURGERY

## 2022-03-04 PROCEDURE — 250N000011 HC RX IP 250 OP 636: Performed by: PHYSICIAN ASSISTANT

## 2022-03-04 PROCEDURE — 97162 PT EVAL MOD COMPLEX 30 MIN: CPT | Mod: GP

## 2022-03-04 PROCEDURE — 82565 ASSAY OF CREATININE: CPT | Performed by: PHYSICIAN ASSISTANT

## 2022-03-04 PROCEDURE — 85610 PROTHROMBIN TIME: CPT | Performed by: PHYSICIAN ASSISTANT

## 2022-03-04 PROCEDURE — 97110 THERAPEUTIC EXERCISES: CPT | Mod: GP

## 2022-03-04 PROCEDURE — 278N000051 HC OR IMPLANT GENERAL: Performed by: ORTHOPAEDIC SURGERY

## 2022-03-04 PROCEDURE — 999N000141 HC STATISTIC PRE-PROCEDURE NURSING ASSESSMENT: Performed by: ORTHOPAEDIC SURGERY

## 2022-03-04 PROCEDURE — 360N000084 HC SURGERY LEVEL 4 W/ FLUORO, PER MIN: Performed by: ORTHOPAEDIC SURGERY

## 2022-03-04 PROCEDURE — 258N000003 HC RX IP 258 OP 636: Performed by: PHYSICIAN ASSISTANT

## 2022-03-04 PROCEDURE — 85027 COMPLETE CBC AUTOMATED: CPT | Performed by: PHYSICIAN ASSISTANT

## 2022-03-04 PROCEDURE — 999N000181 XR SURGERY CARM FLUORO GREATER THAN 5 MIN W STILLS

## 2022-03-04 PROCEDURE — P9041 ALBUMIN (HUMAN),5%, 50ML: HCPCS | Performed by: NURSE ANESTHETIST, CERTIFIED REGISTERED

## 2022-03-04 PROCEDURE — 710N000010 HC RECOVERY PHASE 1, LEVEL 2, PER MIN: Performed by: ORTHOPAEDIC SURGERY

## 2022-03-04 PROCEDURE — 370N000017 HC ANESTHESIA TECHNICAL FEE, PER MIN: Performed by: ORTHOPAEDIC SURGERY

## 2022-03-04 PROCEDURE — 99204 OFFICE O/P NEW MOD 45 MIN: CPT | Performed by: FAMILY MEDICINE

## 2022-03-04 PROCEDURE — 250N000013 HC RX MED GY IP 250 OP 250 PS 637: Performed by: PHYSICIAN ASSISTANT

## 2022-03-04 PROCEDURE — 272N000001 HC OR GENERAL SUPPLY STERILE: Performed by: ORTHOPAEDIC SURGERY

## 2022-03-04 PROCEDURE — 97116 GAIT TRAINING THERAPY: CPT | Mod: GP

## 2022-03-04 PROCEDURE — C1713 ANCHOR/SCREW BN/BN,TIS/BN: HCPCS | Performed by: ORTHOPAEDIC SURGERY

## 2022-03-04 PROCEDURE — 36415 COLL VENOUS BLD VENIPUNCTURE: CPT | Performed by: PHYSICIAN ASSISTANT

## 2022-03-04 PROCEDURE — 258N000001 HC RX 258: Performed by: PHYSICIAN ASSISTANT

## 2022-03-04 PROCEDURE — 258N000003 HC RX IP 258 OP 636: Performed by: NURSE ANESTHETIST, CERTIFIED REGISTERED

## 2022-03-04 PROCEDURE — 250N000009 HC RX 250: Performed by: NURSE ANESTHETIST, CERTIFIED REGISTERED

## 2022-03-04 DEVICE — TRIDENT X3 0 DEGREE POLYETHYLENE INSERT
Type: IMPLANTABLE DEVICE | Site: HIP | Status: FUNCTIONAL
Brand: TRIDENT X3 INSERT

## 2022-03-04 DEVICE — BONE CEMENT SIMPLEX W/TOBRAMYCIN 6197-9-001: Type: IMPLANTABLE DEVICE | Site: HIP | Status: FUNCTIONAL

## 2022-03-04 DEVICE — TRIDENT II TRITANIUM CLUSTER 54E
Type: IMPLANTABLE DEVICE | Site: HIP | Status: FUNCTIONAL
Brand: TRIDENT II

## 2022-03-04 DEVICE — 18.5MM BUCK CEMENT PLUG WITH                                    DISPOSABLE INSERTER
Type: IMPLANTABLE DEVICE | Site: HIP | Status: FUNCTIONAL
Brand: BUCK

## 2022-03-04 DEVICE — 132 DEGREE CEMENTED HIP STEM
Type: IMPLANTABLE DEVICE | Site: HIP | Status: FUNCTIONAL
Brand: ACCOLADE

## 2022-03-04 DEVICE — HEX DOME HOLE PLUG
Type: IMPLANTABLE DEVICE | Site: HIP | Status: FUNCTIONAL
Brand: TRIDENT II

## 2022-03-04 DEVICE — UNIVERSAL DISTAL CEMENT SPACER
Type: IMPLANTABLE DEVICE | Site: HIP | Status: FUNCTIONAL
Brand: OMNIFIT

## 2022-03-04 DEVICE — 6.5MM LOW PROFILE HEX SCREW 25MM
Type: IMPLANTABLE DEVICE | Site: HIP | Status: FUNCTIONAL
Brand: TRIDENT II

## 2022-03-04 DEVICE — 6.5MM LOW PROFILE HEX SCREW 20MM
Type: IMPLANTABLE DEVICE | Site: HIP | Status: FUNCTIONAL
Brand: TRIDENT II

## 2022-03-04 DEVICE — CERAMIC V40 FEMORAL HEAD
Type: IMPLANTABLE DEVICE | Site: HIP | Status: FUNCTIONAL
Brand: BIOLOX

## 2022-03-04 RX ORDER — POLYETHYLENE GLYCOL 3350 17 G/17G
1 POWDER, FOR SOLUTION ORAL DAILY
Qty: 7 PACKET | Refills: 0 | Status: SHIPPED | OUTPATIENT
Start: 2022-03-04 | End: 2022-05-04

## 2022-03-04 RX ORDER — MAGNESIUM SULFATE 4 G/50ML
4 INJECTION INTRAVENOUS ONCE
Status: COMPLETED | OUTPATIENT
Start: 2022-03-04 | End: 2022-03-04

## 2022-03-04 RX ORDER — POLYETHYLENE GLYCOL 3350 17 G/17G
17 POWDER, FOR SOLUTION ORAL DAILY
Status: DISCONTINUED | OUTPATIENT
Start: 2022-03-05 | End: 2022-03-05 | Stop reason: HOSPADM

## 2022-03-04 RX ORDER — OXYCODONE HYDROCHLORIDE 5 MG/1
5-10 TABLET ORAL EVERY 6 HOURS PRN
Qty: 30 TABLET | Refills: 0 | Status: SHIPPED | OUTPATIENT
Start: 2022-03-04 | End: 2022-05-04

## 2022-03-04 RX ORDER — SIMVASTATIN 20 MG
20 TABLET ORAL AT BEDTIME
Status: DISCONTINUED | OUTPATIENT
Start: 2022-03-04 | End: 2022-03-05 | Stop reason: HOSPADM

## 2022-03-04 RX ORDER — FAMOTIDINE 20 MG/1
20 TABLET, FILM COATED ORAL 2 TIMES DAILY
Status: DISCONTINUED | OUTPATIENT
Start: 2022-03-04 | End: 2022-03-05 | Stop reason: HOSPADM

## 2022-03-04 RX ORDER — ONDANSETRON 4 MG/1
4 TABLET, ORALLY DISINTEGRATING ORAL EVERY 6 HOURS PRN
Status: DISCONTINUED | OUTPATIENT
Start: 2022-03-04 | End: 2022-03-05 | Stop reason: HOSPADM

## 2022-03-04 RX ORDER — ONDANSETRON 2 MG/ML
4 INJECTION INTRAMUSCULAR; INTRAVENOUS EVERY 6 HOURS PRN
Status: DISCONTINUED | OUTPATIENT
Start: 2022-03-04 | End: 2022-03-05 | Stop reason: HOSPADM

## 2022-03-04 RX ORDER — ONDANSETRON 2 MG/ML
INJECTION INTRAMUSCULAR; INTRAVENOUS PRN
Status: DISCONTINUED | OUTPATIENT
Start: 2022-03-04 | End: 2022-03-04

## 2022-03-04 RX ORDER — AMOXICILLIN 250 MG
1-2 CAPSULE ORAL 2 TIMES DAILY
Qty: 30 TABLET | Refills: 0 | Status: SHIPPED | OUTPATIENT
Start: 2022-03-04 | End: 2022-05-04

## 2022-03-04 RX ORDER — FENTANYL CITRATE 50 UG/ML
25 INJECTION, SOLUTION INTRAMUSCULAR; INTRAVENOUS EVERY 5 MIN PRN
Status: DISCONTINUED | OUTPATIENT
Start: 2022-03-04 | End: 2022-03-04 | Stop reason: HOSPADM

## 2022-03-04 RX ORDER — ONDANSETRON 4 MG/1
4 TABLET, ORALLY DISINTEGRATING ORAL EVERY 30 MIN PRN
Status: DISCONTINUED | OUTPATIENT
Start: 2022-03-04 | End: 2022-03-04 | Stop reason: HOSPADM

## 2022-03-04 RX ORDER — HYDROMORPHONE HCL IN WATER/PF 6 MG/30 ML
0.2 PATIENT CONTROLLED ANALGESIA SYRINGE INTRAVENOUS EVERY 5 MIN PRN
Status: DISCONTINUED | OUTPATIENT
Start: 2022-03-04 | End: 2022-03-04 | Stop reason: HOSPADM

## 2022-03-04 RX ORDER — BUPIVACAINE HYDROCHLORIDE 7.5 MG/ML
INJECTION, SOLUTION INTRASPINAL
Status: DISCONTINUED | OUTPATIENT
Start: 2022-03-04 | End: 2022-03-04

## 2022-03-04 RX ORDER — TRAZODONE HYDROCHLORIDE 100 MG/1
100 TABLET ORAL AT BEDTIME
Status: DISCONTINUED | OUTPATIENT
Start: 2022-03-04 | End: 2022-03-05 | Stop reason: HOSPADM

## 2022-03-04 RX ORDER — ALBUMIN, HUMAN INJ 5% 5 %
SOLUTION INTRAVENOUS CONTINUOUS PRN
Status: DISCONTINUED | OUTPATIENT
Start: 2022-03-04 | End: 2022-03-04

## 2022-03-04 RX ORDER — CEFAZOLIN SODIUM/WATER 2 G/20 ML
2 SYRINGE (ML) INTRAVENOUS SEE ADMIN INSTRUCTIONS
Status: DISCONTINUED | OUTPATIENT
Start: 2022-03-04 | End: 2022-03-04 | Stop reason: HOSPADM

## 2022-03-04 RX ORDER — SODIUM CHLORIDE, SODIUM LACTATE, POTASSIUM CHLORIDE, CALCIUM CHLORIDE 600; 310; 30; 20 MG/100ML; MG/100ML; MG/100ML; MG/100ML
INJECTION, SOLUTION INTRAVENOUS CONTINUOUS
Status: DISCONTINUED | OUTPATIENT
Start: 2022-03-04 | End: 2022-03-05

## 2022-03-04 RX ORDER — MAGNESIUM HYDROXIDE/ALUMINUM HYDROXICE/SIMETHICONE 120; 1200; 1200 MG/30ML; MG/30ML; MG/30ML
30 SUSPENSION ORAL EVERY 4 HOURS PRN
Status: DISCONTINUED | OUTPATIENT
Start: 2022-03-04 | End: 2022-03-05 | Stop reason: HOSPADM

## 2022-03-04 RX ORDER — ACETAMINOPHEN 325 MG/1
975 TABLET ORAL ONCE
Status: DISCONTINUED | OUTPATIENT
Start: 2022-03-04 | End: 2022-03-04 | Stop reason: HOSPADM

## 2022-03-04 RX ORDER — NALOXONE HYDROCHLORIDE 0.4 MG/ML
0.2 INJECTION, SOLUTION INTRAMUSCULAR; INTRAVENOUS; SUBCUTANEOUS
Status: DISCONTINUED | OUTPATIENT
Start: 2022-03-04 | End: 2022-03-05 | Stop reason: HOSPADM

## 2022-03-04 RX ORDER — CEFAZOLIN SODIUM/WATER 2 G/20 ML
2 SYRINGE (ML) INTRAVENOUS
Status: COMPLETED | OUTPATIENT
Start: 2022-03-04 | End: 2022-03-04

## 2022-03-04 RX ORDER — BISACODYL 10 MG
10 SUPPOSITORY, RECTAL RECTAL DAILY PRN
Status: DISCONTINUED | OUTPATIENT
Start: 2022-03-04 | End: 2022-03-05 | Stop reason: HOSPADM

## 2022-03-04 RX ORDER — PROPOFOL 10 MG/ML
INJECTION, EMULSION INTRAVENOUS CONTINUOUS PRN
Status: DISCONTINUED | OUTPATIENT
Start: 2022-03-04 | End: 2022-03-04

## 2022-03-04 RX ORDER — ACETAMINOPHEN 325 MG/1
975 TABLET ORAL EVERY 8 HOURS
Status: DISCONTINUED | OUTPATIENT
Start: 2022-03-04 | End: 2022-03-05 | Stop reason: HOSPADM

## 2022-03-04 RX ORDER — CEFAZOLIN SODIUM 1 G/3ML
1 INJECTION, POWDER, FOR SOLUTION INTRAMUSCULAR; INTRAVENOUS EVERY 8 HOURS
Status: COMPLETED | OUTPATIENT
Start: 2022-03-04 | End: 2022-03-04

## 2022-03-04 RX ORDER — DEXAMETHASONE SODIUM PHOSPHATE 10 MG/ML
INJECTION, SOLUTION INTRAMUSCULAR; INTRAVENOUS PRN
Status: DISCONTINUED | OUTPATIENT
Start: 2022-03-04 | End: 2022-03-04

## 2022-03-04 RX ORDER — KETOROLAC TROMETHAMINE 30 MG/ML
15 INJECTION, SOLUTION INTRAMUSCULAR; INTRAVENOUS EVERY 6 HOURS
Status: COMPLETED | OUTPATIENT
Start: 2022-03-04 | End: 2022-03-05

## 2022-03-04 RX ORDER — HYDROMORPHONE HCL IN WATER/PF 6 MG/30 ML
0.2 PATIENT CONTROLLED ANALGESIA SYRINGE INTRAVENOUS
Status: DISCONTINUED | OUTPATIENT
Start: 2022-03-04 | End: 2022-03-05 | Stop reason: HOSPADM

## 2022-03-04 RX ORDER — HYDROMORPHONE HCL IN WATER/PF 6 MG/30 ML
0.4 PATIENT CONTROLLED ANALGESIA SYRINGE INTRAVENOUS
Status: DISCONTINUED | OUTPATIENT
Start: 2022-03-04 | End: 2022-03-05 | Stop reason: HOSPADM

## 2022-03-04 RX ORDER — CELECOXIB 200 MG/1
200 CAPSULE ORAL ONCE
Status: COMPLETED | OUTPATIENT
Start: 2022-03-04 | End: 2022-03-04

## 2022-03-04 RX ORDER — LISINOPRIL 5 MG/1
5 TABLET ORAL DAILY
Status: DISCONTINUED | OUTPATIENT
Start: 2022-03-05 | End: 2022-03-05 | Stop reason: HOSPADM

## 2022-03-04 RX ORDER — ACETAMINOPHEN 325 MG/1
650 TABLET ORAL EVERY 4 HOURS PRN
Qty: 100 TABLET | Refills: 0 | Status: SHIPPED | OUTPATIENT
Start: 2022-03-04 | End: 2022-03-05

## 2022-03-04 RX ORDER — HYDROXYZINE HYDROCHLORIDE 10 MG/1
10 TABLET, FILM COATED ORAL EVERY 6 HOURS PRN
Qty: 30 TABLET | Refills: 0 | Status: SHIPPED | OUTPATIENT
Start: 2022-03-04 | End: 2022-05-04

## 2022-03-04 RX ORDER — EPHEDRINE SULFATE 50 MG/ML
INJECTION, SOLUTION INTRAMUSCULAR; INTRAVENOUS; SUBCUTANEOUS PRN
Status: DISCONTINUED | OUTPATIENT
Start: 2022-03-04 | End: 2022-03-04

## 2022-03-04 RX ORDER — ACETAMINOPHEN 325 MG/1
975 TABLET ORAL ONCE
Status: COMPLETED | OUTPATIENT
Start: 2022-03-04 | End: 2022-03-04

## 2022-03-04 RX ORDER — AMOXICILLIN 250 MG
1 CAPSULE ORAL 2 TIMES DAILY
Status: DISCONTINUED | OUTPATIENT
Start: 2022-03-04 | End: 2022-03-05 | Stop reason: HOSPADM

## 2022-03-04 RX ORDER — NALOXONE HYDROCHLORIDE 0.4 MG/ML
0.4 INJECTION, SOLUTION INTRAMUSCULAR; INTRAVENOUS; SUBCUTANEOUS
Status: DISCONTINUED | OUTPATIENT
Start: 2022-03-04 | End: 2022-03-05 | Stop reason: HOSPADM

## 2022-03-04 RX ORDER — HYDROXYZINE HYDROCHLORIDE 10 MG/1
10 TABLET, FILM COATED ORAL EVERY 6 HOURS PRN
Status: DISCONTINUED | OUTPATIENT
Start: 2022-03-04 | End: 2022-03-05 | Stop reason: HOSPADM

## 2022-03-04 RX ORDER — SODIUM CHLORIDE, SODIUM LACTATE, POTASSIUM CHLORIDE, CALCIUM CHLORIDE 600; 310; 30; 20 MG/100ML; MG/100ML; MG/100ML; MG/100ML
INJECTION, SOLUTION INTRAVENOUS CONTINUOUS
Status: DISCONTINUED | OUTPATIENT
Start: 2022-03-04 | End: 2022-03-04 | Stop reason: HOSPADM

## 2022-03-04 RX ORDER — OXYCODONE HYDROCHLORIDE 5 MG/1
5 TABLET ORAL EVERY 4 HOURS PRN
Status: DISCONTINUED | OUTPATIENT
Start: 2022-03-04 | End: 2022-03-04 | Stop reason: HOSPADM

## 2022-03-04 RX ORDER — CEPHALEXIN 500 MG/1
500 CAPSULE ORAL 3 TIMES DAILY
COMMUNITY
Start: 2022-02-25 | End: 2022-05-04

## 2022-03-04 RX ORDER — FENTANYL CITRATE 50 UG/ML
INJECTION, SOLUTION INTRAMUSCULAR; INTRAVENOUS PRN
Status: DISCONTINUED | OUTPATIENT
Start: 2022-03-04 | End: 2022-03-04

## 2022-03-04 RX ORDER — CELECOXIB 100 MG/1
100 CAPSULE ORAL DAILY
Status: ON HOLD | COMMUNITY
End: 2022-03-05

## 2022-03-04 RX ORDER — TRANEXAMIC ACID 650 MG/1
1950 TABLET ORAL ONCE
Status: COMPLETED | OUTPATIENT
Start: 2022-03-04 | End: 2022-03-04

## 2022-03-04 RX ORDER — ONDANSETRON 2 MG/ML
4 INJECTION INTRAMUSCULAR; INTRAVENOUS EVERY 30 MIN PRN
Status: DISCONTINUED | OUTPATIENT
Start: 2022-03-04 | End: 2022-03-04 | Stop reason: HOSPADM

## 2022-03-04 RX ORDER — ACETAMINOPHEN 325 MG/1
650 TABLET ORAL EVERY 4 HOURS PRN
Status: DISCONTINUED | OUTPATIENT
Start: 2022-03-07 | End: 2022-03-05 | Stop reason: HOSPADM

## 2022-03-04 RX ORDER — OXYCODONE HYDROCHLORIDE 5 MG/1
5 TABLET ORAL EVERY 4 HOURS PRN
Status: DISCONTINUED | OUTPATIENT
Start: 2022-03-04 | End: 2022-03-05 | Stop reason: HOSPADM

## 2022-03-04 RX ORDER — LIDOCAINE 40 MG/G
CREAM TOPICAL
Status: DISCONTINUED | OUTPATIENT
Start: 2022-03-04 | End: 2022-03-05 | Stop reason: HOSPADM

## 2022-03-04 RX ORDER — OXYCODONE HYDROCHLORIDE 5 MG/1
10 TABLET ORAL EVERY 4 HOURS PRN
Status: DISCONTINUED | OUTPATIENT
Start: 2022-03-04 | End: 2022-03-05 | Stop reason: HOSPADM

## 2022-03-04 RX ORDER — LIDOCAINE 40 MG/G
CREAM TOPICAL
Status: DISCONTINUED | OUTPATIENT
Start: 2022-03-04 | End: 2022-03-04 | Stop reason: HOSPADM

## 2022-03-04 RX ADMIN — ACETAMINOPHEN 975 MG: 325 TABLET ORAL at 21:58

## 2022-03-04 RX ADMIN — BUPIVACAINE HYDROCHLORIDE IN DEXTROSE 2 ML: 7.5 INJECTION, SOLUTION SUBARACHNOID at 07:45

## 2022-03-04 RX ADMIN — FENTANYL CITRATE 50 MCG: 50 INJECTION, SOLUTION INTRAMUSCULAR; INTRAVENOUS at 07:38

## 2022-03-04 RX ADMIN — MAGNESIUM SULFATE HEPTAHYDRATE 4 G: 80 INJECTION, SOLUTION INTRAVENOUS at 06:35

## 2022-03-04 RX ADMIN — ACETAMINOPHEN 975 MG: 325 TABLET ORAL at 14:15

## 2022-03-04 RX ADMIN — ACETAMINOPHEN 975 MG: 325 TABLET ORAL at 06:13

## 2022-03-04 RX ADMIN — SENNOSIDES AND DOCUSATE SODIUM 1 TABLET: 50; 8.6 TABLET ORAL at 20:00

## 2022-03-04 RX ADMIN — KETOROLAC TROMETHAMINE 15 MG: 30 INJECTION, SOLUTION INTRAMUSCULAR at 23:48

## 2022-03-04 RX ADMIN — SODIUM CHLORIDE, POTASSIUM CHLORIDE, SODIUM LACTATE AND CALCIUM CHLORIDE: 600; 310; 30; 20 INJECTION, SOLUTION INTRAVENOUS at 14:18

## 2022-03-04 RX ADMIN — PHENYLEPHRINE HYDROCHLORIDE 100 MCG: 10 INJECTION INTRAVENOUS at 08:43

## 2022-03-04 RX ADMIN — SODIUM CHLORIDE, POTASSIUM CHLORIDE, SODIUM LACTATE AND CALCIUM CHLORIDE: 600; 310; 30; 20 INJECTION, SOLUTION INTRAVENOUS at 06:36

## 2022-03-04 RX ADMIN — PHENYLEPHRINE HYDROCHLORIDE 100 MCG: 10 INJECTION INTRAVENOUS at 08:18

## 2022-03-04 RX ADMIN — OXYCODONE HYDROCHLORIDE 5 MG: 5 TABLET ORAL at 15:51

## 2022-03-04 RX ADMIN — DEXAMETHASONE SODIUM PHOSPHATE 10 MG: 10 INJECTION, SOLUTION INTRAMUSCULAR; INTRAVENOUS at 07:41

## 2022-03-04 RX ADMIN — ALBUMIN (HUMAN): 12.5 SOLUTION INTRAVENOUS at 08:40

## 2022-03-04 RX ADMIN — SIMVASTATIN 20 MG: 20 TABLET, FILM COATED ORAL at 20:00

## 2022-03-04 RX ADMIN — Medication 2 G: at 07:36

## 2022-03-04 RX ADMIN — PROPOFOL 75 MCG/KG/MIN: 10 INJECTION, EMULSION INTRAVENOUS at 07:40

## 2022-03-04 RX ADMIN — CEFAZOLIN 1 G: 1 INJECTION, POWDER, FOR SOLUTION INTRAMUSCULAR; INTRAVENOUS at 14:16

## 2022-03-04 RX ADMIN — TRAZODONE HYDROCHLORIDE 100 MG: 100 TABLET ORAL at 22:48

## 2022-03-04 RX ADMIN — PHENYLEPHRINE HYDROCHLORIDE 0.4 MCG/KG/MIN: 10 INJECTION INTRAVENOUS at 08:18

## 2022-03-04 RX ADMIN — FENTANYL CITRATE 50 MCG: 50 INJECTION, SOLUTION INTRAMUSCULAR; INTRAVENOUS at 07:34

## 2022-03-04 RX ADMIN — CEFAZOLIN 1 G: 1 INJECTION, POWDER, FOR SOLUTION INTRAMUSCULAR; INTRAVENOUS at 22:00

## 2022-03-04 RX ADMIN — TRANEXAMIC ACID 1950 MG: 650 TABLET ORAL at 06:18

## 2022-03-04 RX ADMIN — SODIUM CHLORIDE, POTASSIUM CHLORIDE, SODIUM LACTATE AND CALCIUM CHLORIDE: 600; 310; 30; 20 INJECTION, SOLUTION INTRAVENOUS at 12:26

## 2022-03-04 RX ADMIN — Medication 10 MG: at 08:43

## 2022-03-04 RX ADMIN — ALBUMIN (HUMAN): 12.5 SOLUTION INTRAVENOUS at 08:57

## 2022-03-04 RX ADMIN — KETOROLAC TROMETHAMINE 15 MG: 30 INJECTION, SOLUTION INTRAMUSCULAR at 18:38

## 2022-03-04 RX ADMIN — SODIUM CHLORIDE, POTASSIUM CHLORIDE, SODIUM LACTATE AND CALCIUM CHLORIDE: 600; 310; 30; 20 INJECTION, SOLUTION INTRAVENOUS at 08:07

## 2022-03-04 RX ADMIN — FAMOTIDINE 20 MG: 20 TABLET ORAL at 20:00

## 2022-03-04 RX ADMIN — ONDANSETRON 4 MG: 2 INJECTION INTRAMUSCULAR; INTRAVENOUS at 07:51

## 2022-03-04 RX ADMIN — CELECOXIB 200 MG: 200 CAPSULE ORAL at 06:13

## 2022-03-04 ASSESSMENT — ACTIVITIES OF DAILY LIVING (ADL)
CHANGE_IN_FUNCTIONAL_STATUS_SINCE_ONSET_OF_CURRENT_ILLNESS/INJURY: NO
TOILETING_ISSUES: NO
DRESSING/BATHING_DIFFICULTY: NO
CONCENTRATING,_REMEMBERING_OR_MAKING_DECISIONS_DIFFICULTY: NO
EQUIPMENT_CURRENTLY_USED_AT_HOME: WALKER, ROLLING
VISION_MANAGEMENT: GLASSES
DOING_ERRANDS_INDEPENDENTLY_DIFFICULTY: NO
DIFFICULTY_EATING/SWALLOWING: NO
WALKING_OR_CLIMBING_STAIRS_DIFFICULTY: NO
WEAR_GLASSES_OR_BLIND: YES
FALL_HISTORY_WITHIN_LAST_SIX_MONTHS: NO

## 2022-03-04 NOTE — PHARMACY-ADMISSION MEDICATION HISTORY
Pharmacy Note - Admission Medication History    Pertinent Provider Information: n/a   ______________________________________________________________________    Prior To Admission (PTA) med list completed and updated in EMR.       PTA Med List   Medication Sig Note Last Dose     acetaminophen (TYLENOL) 325 MG tablet Take 2 tablets (650 mg) by mouth every 4 hours as needed for other (mild pain)       ascorbic acid, vitamin C, (VITAMIN C) 250 MG tablet [ASCORBIC ACID, VITAMIN C, (VITAMIN C) 250 MG TABLET] Take 250 mg by mouth daily.  3/4/2022 at Unknown time     aspirin 325 MG tablet [ASPIRIN 325 MG TABLET] Take 325 mg by mouth daily.  2/14/2022     BIOTIN ORAL [BIOTIN ORAL] Take 5,000 mcg by mouth daily.   3/4/2022 at Unknown time     calcium carbonate-vitamin D3 (CALCIUM 600 + D,3,) 600 mg(1,500mg) -200 unit per tablet [CALCIUM CARBONATE-VITAMIN D3 (CALCIUM 600 + D,3,) 600 MG(1,500MG) -200 UNIT PER TABLET] Take 1 tablet by mouth 2 (two) times a day.   3/4/2022 at am     celecoxib (CELEBREX) 100 MG capsule Take 100 mg by mouth daily  3/4/2022 at preop     cephALEXin (KEFLEX) 500 MG capsule Take 500 mg by mouth 3 times daily 3/4/2022: Started 2/25 x 10 days 3/4/2022 at x1     famotidine (PEPCID) 20 MG tablet TAKE ONE TABLET BY MOUTH TWICE A DAY  3/4/2022 at am     hydroxyurea (HYDREA) 500 MG capsule TAKE ONE CAPSULE BY MOUTH EVERY DAY FOR 4 DAYS A WEEK AND TAKE TWO CAPSULES BY MOUTH EVERY DAY FOR 3 DAYS A WEEK 3/4/2022: 500 mg Sun, Tues, Thur, Sat;  1000 mg MWF 3/3/2022 at HS     hydrOXYzine (ATARAX) 10 MG tablet Take 1 tablet (10 mg) by mouth every 6 hours as needed for itching or anxiety (with pain, moderate pain)       lisinopriL (PRINIVIL,ZESTRIL) 5 MG tablet [LISINOPRIL (PRINIVIL,ZESTRIL) 5 MG TABLET] TAKE ONE TABLET BY MOUTH EVERY DAY  3/3/2022 at Unknown time     oxyCODONE (ROXICODONE) 5 MG tablet Take 1-2 tablets (5-10 mg) by mouth every 6 hours as needed for pain (Moderate to Severe)       polyethylene glycol  (MIRALAX) 17 g packet Take 17 g by mouth daily       polyethylene glycol (MIRALAX) 17 gram packet [POLYETHYLENE GLYCOL (MIRALAX) 17 GRAM PACKET] Take 17 g by mouth daily as needed.  More than a month at Unknown time     polyvinyl alcohol (LIQUIFILM TEARS) 1.4 % ophthalmic solution [POLYVINYL ALCOHOL (LIQUIFILM TEARS) 1.4 % OPHTHALMIC SOLUTION] Administer 1 drop to both eyes as needed for dry eyes.  3/2/2022 at has with     [START ON 3/5/2022] rivaroxaban ANTICOAGULANT (XARELTO) 10 MG TABS tablet Take 1 tablet (10 mg) by mouth daily       senna-docusate (SENOKOT-S/PERICOLACE) 8.6-50 MG tablet Take 1-2 tablets by mouth 2 times daily Take while on oral narcotics to prevent or treat constipation.       simvastatin (ZOCOR) 20 MG tablet [SIMVASTATIN (ZOCOR) 20 MG TABLET] TAKE ONE TABLET BY MOUTH AT BEDTIME  3/3/2022 at Unknown time     traZODone (DESYREL) 100 MG tablet [TRAZODONE (DESYREL) 100 MG TABLET] Take 1 tablet (100 mg total) by mouth at bedtime.  3/3/2022 at Unknown time       [facility](s[facility]    Method of interview communication: in-person    Patient was asked about OTC/herbal products specifically.  PTA med list reflects this.    Based on the pharmacist's assessment, the PTA med list information appears reliable    Allergies were reviewed, assessed, and updated with the patient.      Medications available for use during hospital stay: artificial tears.      Thank you for the opportunity to participate in the care of this patient.      Leyla Valentine [facility]     3/4/2022     7:28 AM

## 2022-03-04 NOTE — INTERVAL H&P NOTE
I have reviewed the surgical (or preoperative) H&P that is linked to this encounter, and examined the patient. Noted changes include: History of PE.  Previous malignancy.  Will anticoagulate postoperatively.  Discussed with patient.  Reviewed risk

## 2022-03-04 NOTE — OR NURSING
Bone cuts made from total joint replacement were disposed of per hospital policy as surgeon did not request to be sent to pathology and patient did not request return.

## 2022-03-04 NOTE — OP NOTE
xOperative Note    Name:  Libertad Hernadez  PCP:  Renetta Carrillo  Procedure Date:  3/4/2022      Procedure(s):  Right total hip arthroplasty, anterior approach, hybrid cemented    Pre-Procedure Diagnosis:  Osteoarthritis of right hip [M16.11]  Severe right hip pain    Post-Procedure Diagnosis:    same    Surgeon(s):  Pineda Rowe MD    Assist:  Zev almanza PA-C PA-C assist was required for this operation due to the complexity of the procedure, for proper positioning of the limb during the operation, and for patient safety.    Anesthesia Type:  Regional    Antibiotics:  Ancef 2 g IVPB on induction  Ancef 3 g and 3 L for irrigation  Tobramycin PMMA    Condition on discharge from OR:  stable    Indications:  The patient is a 81-year-old with end-stage arthritis of the right hip and failed the necessary treatments required, meeting the guidelines for medical necessity for treatment with total hip replacement.  We discussed the surgical options as well as the nonoperative care in detail with the use of their history, physical examination, imaging studies, and joint model of a normal hip and a total hip replacement.  They have been provided with appropriate documentation and the hospital-based education guidebook.  After the patient and/or family member read the guidebook I answered their questions.  I instructed them to make an appointment to attend the free preoperative class on joint replacement at [facility].  They received a package containing antiseptic agents including chlorhexidine soap and washcloths for body bathing, and underwent nasal povidone iodine swabbing preoperatively.  The patient acknowledged they understood the risks, benefit and potential benefits, alternatives, shortcomings, limitations and complications of operative and nonoperative treatment and informed consent for the following surgical procedure was obtained.  Patient done aspirin preoperatively, current INR 1.16    Findings:  Significant bone  bleeding  Grade 4 change of the hip    Operative Report:    Upon informed consent after review of the procedure along with attendant risk of complication an  informed cooperative decision  was made to proceed.  The leg was marked, labs were checked, my notes were reviewed, consent obtained, the chart reviewed, and the patient was properly premedicated.  The patient had been cleared by primary care and anesthesia.        All standard protocols and regimens as established by  the [facility] [place] were followed.  In preparation, x-rays were templated and plan formulated.  This was communicated with the intraoperative team.      Patient was brought to the operating room and placed on the table in a supine position. Pt underwent  anesthetic induction and appropriate medication was provided including antibiotic. The Pt was transferred to the HANA table and properly positioned on the HANA table.  The abdomen was properly positioned given the patient's body habitus.  The right lower quadrant right, inguinal area, right hemipelvis, and right proximal thigh were 8-minute Betadine scrub, alcohol washed and DuraPrep, prepped and draped in the usual fashion and Ioban drapes placed on all exposed skin.       We paused for patient identification,  limb, and procedure confirmation.     A fluoroscopically directed x-ray was then obtained of the hip and printed on a transparency.    Starting 3 cm lateral and 2 cm distal to the anterior anterior superior iliac spine, a 11 cm oblique incision was made. The fascia was incised and the interval (Heuter's) between the tensor muscle, and the sartorius and rectus femoris muscle was developed.  A retractor was placed on the lateral femoral neck and the medial femoral shaft. The circumflex vessels ×3 were then isolated and ligated.  The aponeurosis confluent with the circumflex vessels was then incised distally, mobilizing the tensor muscle laterally allowing safe  retraction.  Meticulous attention to preserving the integrity of the tensor muscle was observed throughout the subsequent procedure.  The fatty interval overlying the capsule was taken down.  The quadriceps tendon was left intact    The capsule was then incised from the anterior inferior iliac spine to the trochanteric tubercle and then along the inter- trochanteric line from the anterior aspect of the medial trochanteric wall to the lesser trochanter, of which the corners were subsequently tagged with a 0 Ethibond suture and reflected for subsequent repair.  The hip was then distracted and externally rotated 70  and the inferior capsule was released under direct visualization. A threaded pin was inserted in the femoral head at 20  of rotation and then the hip was rotated back to 0  after which the femoral neck was osteotomized at the appropriate level, noting the position of the neck-trochanter junction, as indicated by preoperative templating.  Careful attention was directed to proper osteotomy distally and medially avoiding the greater trochanter profile posteriorly and laterally. Safe blade swath, kerf and excursion observed. The femoral head was  delivered vertex first to avoid   soft tissue injury and meticulous attention to protecting the pericapsular muscles was observed throughout the procedure.  Femoral head diameter 50 mm.  The medial and lateral capsule flaps were reflected with a self-retaining retractor and the acetabulum was exposed. The retractor was entirely intra-articular. The inferior capsule was left intact and could accommodate subsequent reamers and the acetabular component and contents of the cotyloid notch were extracted and the redundant labrum was removed anteriorly and posteriorly. All impinging fran-acetabular osteophytes, ossified labral overhang were removed, leaving a contoured acetabular portal.The posterior structures were left intact. Excellent direct visual, and subsequent  fluoroscopic visualization was obtained.      Under fluoroscopic control, in proper orientation after calibrating C-arm position / rotation, reaming commenced at 47 mm and progressed in 2 mm increments to 51 mm, then line to line 52 mm. Self-centering reamers were appropriately inclined and anteverted under fluoroscopic control to contour and create a  fully contained reamed concentric hemispheric acetabulum with total contact, per templated plan.  Acetabular reaming and subsequent acetabular component placement including position was performed under fluoroscopic control.  The 52 mm Trident 2 shell was then impacted in 40  of inclination and 20  of anteversion under fluoroscopic control seating optimally and fully.  The elliptical profile of the acetabular portal was appropriate fluoroscopically.  Satisfactory interference fit obtained and additional fixation was provided with 2 screws with good purchase.   The apical hole eliminator was placed. The shell was washed and dried and the neutral neutral polyliner for a 36 mm head was inserted.  Posterior capsular were left intact.      The proximal lateral femur was dissected and the femoral hook was brought around the proximal femoral shaft distal to the vastus ridge.  The lower extremity was externally   rotated 120  and then was subsequently hyperextended and adducted, simultaneously mechanically lifting the proximal femur obtaining excellent exposure clearing the acetabulum. Retractors were placed laterally and posteriorly on the proximal femur and the lateral capsule was released anterior to posterior along the medial aspect of the trochanteric wall.  The obturator externus, obturator internus, and piriformis tendons were left intact. A blunt file probe was sent down the canal for confirmation of orientation and integrity of the endosteal bone. Broaching commenced with a size 0 Accolade broach, gently and in proper anteversion relative to the posterior calcar.  Routine broaching was performed under direct visualization mindful of anteversion relative to the posterior cortex of the femoral neck.  Broaching progressed sequentially in order to a size 5.  Per templating, the appropriate neck configuration was applied along with the templated head and neck size.  A trial reduction was subsequently performed.     Fluoroscopically directed x-ray printed on a transparency for subsequent overlay was produced.  Anatomic maintenance of leg length and offset was obtained. Trial components were removed.  The canal was measured for a centralizer.  Cement restrictor was placed distally.  The canal was then meticulously cleaned washed and dried.  Simultaneously, 120 g of tobramycin PMMA was mixed for 90 seconds.  PMMA inserted into the canal at 3-1/2 minutes.  PMMA pressurized at 4-1/2 minutes.  At 5 minutes, occluding the medial IM portal,  the actual #5 x 132 degree Accolade C   stem with a 10 mm centralizer was inserted placing the collar on the calcar.  Extruded cement was removed.  The component was held in place until cement cured.  The trunion was cleaned and dried meticulously and the +0 mm x 36 mm ceramic head assembly affixed to the trunnion. The hip was reduced.    A final fluoroscopically directed x-ray printed on a transparency was then obtained    We confirmed anatomic maintenance of leg length +1 mm and offset +3-4 mm..  This was confirmed by superimposing a guide aurelia over the trans-ischial tuberosity line noting symmetric intersection at the medial femoral cortex relative to the lesser trochanter.    The hip was stable to 70  of external rotation and hyperextension to the floor.  The posterior capsule was left intact, except for the radial cut.    We thoroughly lavaged the hip with multiple liters of antibiotic laden irrigant. The capsule, previously left in it's entirety, was then closed with nonabsorbable suture. A drain was placed extracapsular and the tensor fascia was  repaired with a running #1 Vicryl suture. Skin and subcutaneous tissue was closed in layers and a Aquacel dressing was applied per routine.     Counts were correct. Bleeding was largely from reamed cancellus surfaces of the acetabulum as well as from the medullary canal of the femur: Excessive bleeding was encountered particularly from the reamed cancellous surfaces of the prepared acetabulum and backbleeding from the IM canal of the femur.. The patient tolerated the procedure well and was sent to the MO in stable condition.      Estimated Blood Loss:   1000 cc    Specimens:    Routine LAMBERT       Drains: X1  Closed/Suction Drain 1 Right Hip Accordion 10 [place] [id number] (Active)   Site Description UTV 03/04/22 0951   Drainage Appearance Bloody/Bright Red 03/04/22 0951   Status To bulb suction 03/04/22 0951        Complications:    None    Pineda Rowe MD     Date: 3/4/2022  Time: 10:08 AM      Implant Name Type Inv. Item Serial No.  Lot No. LRB No. Used Action   IMP PLUG STRK HEX DOME TRIDENT 7003-8222 - [id number] Metallic Hardware/[place] IMP PLUG STRK HEX DOME TRIDENT 0004-5354  [id number] Right 1 Implanted   TRIDENT II TRITANIUM CLUSTERHOLE 54E - [id number] Total Joint Component/Insert TRIDENT II TRITANIUM CLUSTERHOLE 54E  [employer] [id number] Right 1 Implanted   BONE CEMENT SIMPLEX W/TOBRAMYCIN 6197-9-001 - [id number] Cement, Bone BONE CEMENT SIMPLEX W/TOBRAMYCIN 6197-9-001  [employer] [id number] Right 2 Implanted   BONE CEMENT SIMPLEX W/TOBRAMYCIN 6197-9-001 - [id number] Cement, Bone BONE CEMENT SIMPLEX W/TOBRAMYCIN 6197-9-001  [employer] [id number] Right 1 Implanted   PLUG CEMENT LOUIE W/INSERT 18.5 [id number] - [id number] Total Joint Component/Insert PLUG CEMENT LOUIE W/INSERT 18.5 [id number]  [employer] [id number] Right 1 Implanted   IMP SCR STRK LOW PROFILE HEX 6.5X25MM 7684-2686 - [id number] Metallic Hardware/[place] IMP SCR STRK LOW PROFILE HEX 6.5X25MM  [id number]  [employer] [id number] Right 1 Implanted   6.5MM LOW PROFILE HEX SCR 20MM - [id number] Metallic Hardware/[place] 6.5MM LOW PROFILE HEX SCR 20MM  [employer] [id number] Right 1 Implanted   INSERT ACE 36MM 0DEG X3 723-00-36E - [id number] Total Joint Component/Insert INSERT ACE 36MM 0DEG X3 723-00-36E  [employer] [id number] Right 1 Implanted   IMP SPACER OSTEONICS DISTAL CEMENT 10MM 4565-2190 - [id number] Metallic Hardware/[place] IMP SPACER OSTEONICS DISTAL CEMENT 10MM 5695-2732  [employer] [id number] Right 1 Implanted   IMP STEM FEMORAL STRK ACCOLADE-C KOKO 132DEG #5 6058-0537D - [id number] Total Joint Component/Insert IMP STEM FEMORAL STRK ACCOLADE-C [place]OK[place] 132DEG #5 6058-0537D  [employer] [id number] Right 1 Implanted   IMP HEAD FEMORAL STRK BIOLOX DELTA CERAMIC 36MM +0MM - [id number] Total Joint Component/Insert IMP HEAD FEMORAL STRK BIOLOX DELTA CERAMIC 36MM +0MM  [employer] [id number] Right 1 Implanted

## 2022-03-04 NOTE — PROGRESS NOTES
[facility]      OUTPATIENT PHYSICAL THERAPY EVALUATION  PLAN OF TREATMENT FOR OUTPATIENT REHABILITATION  (COMPLETE FOR INITIAL CLAIMS ONLY)  Patient's Last Name, First Name, M.I.  YOB: 1940  Libertad Hernadez                        Provider's Name  [facility] Medical Record No.  [id number]                               Onset Date:  03/04/22   Start of Care Date:  (P) 03/04/22      Type:     _X_PT   ___OT   ___SLP Medical Diagnosis:  (P) OA                        PT Diagnosis:  impaired functional mobility   Visits from SOC:  1   _________________________________________________________________________________  Plan of Treatment/Functional Goals    Planned Interventions: gait training, patient/family education, ROM (range of motion), stair training, strengthening, transfer training     Goals: See Physical Therapy Goals on Care Plan in [employer] electronic health record.    Therapy Frequency: 2x/day  Predicted Duration of Therapy Intervention: 03/06/22  _________________________________________________________________________________    I CERTIFY THE NEED FOR THESE SERVICES FURNISHED UNDER        THIS PLAN OF TREATMENT AND WHILE UNDER MY CARE     (Physician co-signature of this document indicates review and certification of the therapy plan).                Certification date from: (P) 03/04/22, Certification date to: (P) 04/04/22    Referring Physician: Pineda Rowe MD            Initial Assessment        See Physical Therapy evaluation dated (P) 03/04/22 in Epic electronic health record.

## 2022-03-04 NOTE — CONSULTS
[facility] MEDICINE CONSULT NOTE   Physician requesting consult: Pineda Rowe MD    Reason for consult: Postoperative medical management of medical co-morbidities as below    Identification/Summary:   Ms. Libertad Hernadez is a 81 year old female with PMHx of breast cancer, hypertension, hyperlipidemia, GERD, essential thrombocytosis, peripheral neuropathy, history of pulmonary embolism, GERD and osteoarthritis. Underwent Procedure(s): RIGHT TOTAL HIP ARTHROPLASTY, DIRECT ANTERIOR on 3/4/2022 by Pineda Junior MD; anesthesia Choice,  , OR time 2 Hr 19 Min 36 Sec with no immediate complications.      Postoperatively, patient is doing well, alert, interactive.  Stable vitals.  Tolerating clear liquid diet without nausea.  Pain well controlled on current regimen.    Assessment and Plan:  Active Problems:    Status post total hip replacement, right    S/p right LAMBERT  -Postoperative management per Orthopedics    Hypertension  PTA meds: Lisinopril  -Continue from tomorrow  -Check BMP in a.m.    Hyperlipidemia  -Continue PTA meds: Simvastatin    Essential thrombocytosis  PTA meds: Hydroxyurea   -Hold hydroxyurea in the immediate postoperative period.  At discharge resume as previously    GERD  -Continue famotidine    COVID-19 PCR: Negative    Anticoagulation.  Ordered by surgery: Duloxetine  Fluids: LR at 125 mL/hour.  Saline lock once eating and drinking well  Pain meds: Acetaminophen, IV hydromorphone, oxycodone, hydroxyzine  Therapy: PT/OT  Stevenson:Not present  Current Diet  Orders Placed This Encounter      Advance Diet as Tolerated: Regular Diet Adult      Discharge Instruction - Regular Diet Adult    Supplements  None      -Disposition   per Orthopedics    Code status:Full Code   [facility] service was asked to evaluate patient for postoperative medical management as follows below. Please resume the home medications as reconciled and further noted with ordered hold parameters.  Thank you for this  consult; we will continue to follow this patient until discharge.    Procedure(s):  RIGHT TOTAL HIP ARTHROPLASTY, DIRECT ANTERIOR  Day of Surgery  Estimated Blood Loss:  1000 mL  Hospital Problem List   No problem-specific Assessment & Plan notes found for this encounter.    Active Problems:    Status post total hip replacement, right      -Reviewed the patient's preoperative H and P and updated missing elements.  -Home medication reconciliation has been reviewed.  Medications have been ordered as noted from the home list and changes are documented above       Past Medical History     Past Medical History:  No date: Arthritis  No date: Asymptomatic varicose veins      Comment:  Created by Conversion   No date: Disorder of bone and cartilage, unspecified      Comment:  Created by Conversion   No date: Essential thrombocytosis (H)  No date: Gastroesophageal reflux disease  No date: Malignant neoplasm of breast (female), unspecified site      Comment:  Created by Conversion [employer] Annotation: Oct 21                2010  9:Shirley Brito: stage 2;  10/18 lymph                nodes positives/p chemo and [id number]   No date: Motion sickness  No date: Other and unspecified hyperlipidemia      Comment:  Created by Conversion   No date: PE (pulmonary thromboembolism) (H)  No date: PONV (postoperative nausea and vomiting)  No date: Primary cancer of bone marrow (H)  No date: Thrombosis  No date: Unspecified essential hypertension      Comment:  Created by Conversion   No date: Unspecified hereditary and idiopathic peripheral neuropathy      Comment:  Created by Conversion      Patient Active Problem List    Diagnosis Date Noted     Status post total hip replacement, right 03/04/2022     Priority: Medium     History of pulmonary embolism 07/12/2019     Priority: Medium     Breast cancer (H)      Priority: Medium     Created by [facility] EHR [facility] Annotation: Oct 21 2010  9:56Shirley Nobles: stage  2;   10/18 lymph nodes positives/p chemo and [id number]  Replacement Utility updated for latest IMO load         Peripheral neuropathy      Priority: Medium     Created by Conversion  Replacement Utility updated for latest IMO load         Pulmonary emboli (H) 06/14/2018     Priority: Medium     Essential thrombocythemia (H) 01/16/2018     Priority: Medium     Urinary frequency 01/09/2018     Priority: Medium     S/P bilateral mastectomy 11/16/2017     Priority: Medium     Wrist fracture 12/20/2016     Priority: Medium     Osteoporosis, senile 12/20/2016     Priority: Medium     Essential hypertension      Priority: Medium     Hyperlipidemia      Priority: Medium     Irritable Bowel Syndrome      Priority: Medium     Created by Conversion         Chronic Reflux Esophagitis      Priority: Medium     Created by Conversion         Varicose Veins      Priority: Medium     Created by Conversion         Restless Legs Syndrome      Priority: Medium     Created by Conversion         Tuberculin PPD Induration Positive Interpretation      Priority: Medium     Created by Conversion  [facility] Annotation: Apr 4 2008 10:32AM Shirley Smith: never   treated   with INH         Surgical History     Past Surgical History:   Procedure Laterality Date     FOOT SURGERY  2010     HC REMOVAL ADENOIDS,PRIMARY,<13 Y/O      Description: Adenoidectomy;  Recorded: 04/04/2008;     HC REMOVAL GALLBLADDER      Description: Cholecystectomy;  Recorded: 04/04/2008;     HC REMOVAL OF TONSILS,<13 Y/O      Description: Tonsillectomy;  Recorded: 04/04/2008;     OR INCISE FINGER TENDON SHEATH      Description: Hand Incision Tendon Sheath Of A Finger;  Recorded: 04/04/2008;     OR MASTECTOMY, PARTIAL      Description: Right Breast Partial Mastectomy Inferior Lateral Quarter;  Recorded: 10/21/2010;     OR MASTECTOMY, RADICAL      Description: Radical Mastectomy Left Breast;  Recorded: 04/04/2008;     Family History      Family History   Problem  Relation Age of Onset     Hypertension Father      Heart Disease Father      Diabetes Sister      Prostate Cancer Brother       Social History      Social History     Tobacco Use     Smoking status: Never Smoker     Smokeless tobacco: Never Used   Substance Use Topics     Alcohol use: Yes     Alcohol/week: 1.0 standard drink     Comment: very rarely     Drug use: No      Allergies     Allergies   Allergen Reactions     Adhesive Tape-Silicones [Adhesive Tape] Itching     States itchy rash with certain tapes     Penicillins Hives       Prior to Admission Medications      Prior to Admission Medications   Prescriptions Last Dose Informant Patient Reported? Taking?   BIOTIN ORAL 3/4/2022 at Unknown time  Yes Yes   Sig: [BIOTIN ORAL] Take 5,000 mcg by mouth daily.    ascorbic acid, vitamin C, (VITAMIN C) 250 MG tablet 3/4/2022 at Unknown time  Yes Yes   Sig: [ASCORBIC ACID, VITAMIN C, (VITAMIN C) 250 MG TABLET] Take 250 mg by mouth daily.   aspirin 325 MG tablet 2/14/2022  Yes Yes   Sig: [ASPIRIN 325 MG TABLET] Take 325 mg by mouth daily.   calcium carbonate-vitamin D3 (CALCIUM 600 + D,3,) 600 mg(1,500mg) -200 unit per tablet 3/4/2022 at am  Yes Yes   Sig: [CALCIUM CARBONATE-VITAMIN D3 (CALCIUM 600 + D,3,) 600 MG(1,500MG) -200 UNIT PER TABLET] Take 1 tablet by mouth 2 (two) times a day.    celecoxib (CELEBREX) 100 MG capsule 3/4/2022 at preop  Yes Yes   Sig: Take 100 mg by mouth daily   cephALEXin (KEFLEX) 500 MG capsule 3/4/2022 at x1  Yes Yes   Sig: Take 500 mg by mouth 3 times daily   famotidine (PEPCID) 20 MG tablet 3/4/2022 at am  No Yes   Sig: TAKE ONE TABLET BY MOUTH TWICE A DAY   hydroxyurea (HYDREA) 500 MG capsule 3/3/2022 at HS  No Yes   Sig: TAKE ONE CAPSULE BY MOUTH EVERY DAY FOR 4 DAYS A WEEK AND TAKE TWO CAPSULES BY MOUTH EVERY DAY FOR 3 DAYS A WEEK   lisinopriL (PRINIVIL,ZESTRIL) 5 MG tablet 3/3/2022 at Unknown time  No Yes   Sig: [LISINOPRIL (PRINIVIL,ZESTRIL) 5 MG TABLET] TAKE ONE TABLET BY MOUTH EVERY  DAY   polyethylene glycol (MIRALAX) 17 gram packet More than a month at Unknown time  Yes Yes   Sig: [POLYETHYLENE GLYCOL (MIRALAX) 17 GRAM PACKET] Take 17 g by mouth daily as needed.   polyvinyl alcohol (LIQUIFILM TEARS) 1.4 % ophthalmic solution 3/2/2022 at has with  Yes Yes   Sig: [POLYVINYL ALCOHOL (LIQUIFILM TEARS) 1.4 % OPHTHALMIC SOLUTION] Administer 1 drop to both eyes as needed for dry eyes.   simvastatin (ZOCOR) 20 MG tablet 3/3/2022 at Unknown time  No Yes   Sig: [SIMVASTATIN (ZOCOR) 20 MG TABLET] TAKE ONE TABLET BY MOUTH AT BEDTIME   traZODone (DESYREL) 100 MG tablet 3/3/2022 at Unknown time  No Yes   Sig: [TRAZODONE (DESYREL) 100 MG TABLET] Take 1 tablet (100 mg total) by mouth at bedtime.      Facility-Administered Medications: None      Review of Systems     A 12 point comprehensive review of systems was negative except as noted above in HPI.    OBJECTIVE         Physical Exam   Temp:  [97.7  F (36.5  C)-98.4  F (36.9  C)] 97.7  F (36.5  C)  Pulse:  [74-80] 80  Resp:  [12-18] 18  BP: (111-179)/(56-88) 165/69  SpO2:  [98 %-100 %] 98 %  GENERAL: Alert, oriented, conversant, in no distress.   EYES: Normal conjunctiva. Sclera anicteric  NECK: Supple, no lymph adenopathy. JVP is not distended.   LUNGS: Clear to auscultation. No ronchi or crackles. Equal air entry bilaterally.   HEART: S1 S2, Rate and rhythm is regular. No murmurs  ABDOMEN: Soft, nontender, no distension. Bowel sounds are positive. No guarding or rebound.   EXTREMITIES: No pitting edema. No posterior calftenderness or swelling.  Right hip dressing dry  SKIN: No rash or ulcers.   NEUROLOGIC: Alert and oriented x3. Speech fluent and normal. Clear mentation. Motor, sensory and cranial exam is grossly intact andsymmetric.       Cardiographics Reviewed Personally By Myself   None    Imaging Reviewed Personally By Myself      Radiology Results:   Recent Results (from the past 24 hour(s))   XR Surgery BOUCHAR Fluoro G/T 5 Min w Stills    Narrative     EXAM: XR SURGERY BOUCHRA FLUORO GREATER THAN 5 MIN W STILLS  LOCATION: [facility]  DATE/TIME: 3/4/2022 7:31 AM    INDICATION: Intraoperative hip arthroplasty.   COMPARISON: 09/04/2019.    TECHNIQUE: Intraoperative fluoroscopic radiographs.    FINDINGS:   FLUOROSCOPIC TIME: 1.04 minutes  NUMBER OF IMAGES: 17     17 intraoperative fluoroscopic radiograph show an ongoing procedure presumably at the right hip. On the final images a new hip arthroplasty device is present. Alignment appears to be appropriate. No complications identified on these images.        Impression    IMPRESSION:  1.  Right hip arthroplasty. No complications identified on these images.  2.  See the operative report for details. A radiologist was not present at the time of the procedure.          Labs Reviewed Personally By Myself     Results for orders placed or performed during the hospital encounter of 03/04/22 (from the past 24 hour(s))   CBC with platelets   Result Value Ref Range    WBC Count 6.6 4.0 - 11.0 10e3/uL    RBC Count 4.48 3.80 - 5.20 10e6/uL    Hemoglobin 14.9 11.7 - 15.7 g/dL    Hematocrit 45.5 35.0 - 47.0 %     (H) 78 - 100 fL    MCH 33.3 (H) 26.5 - 33.0 pg    MCHC 32.7 31.5 - 36.5 g/dL    RDW 14.5 10.0 - 15.0 %    Platelet Count 369 150 - 450 10e3/uL   Potassium   Result Value Ref Range    Potassium 3.7 3.5 - 5.0 mmol/L   Creatinine   Result Value Ref Range    Creatinine 0.71 0.60 - 1.10 mg/dL    GFR Estimate 84 >60 mL/min/1.73m2   INR   Result Value Ref Range    INR 1.16 (H) 0.85 - 1.15   XR Surgery BOUCHRA Fluoro G/T 5 Min w Stills    Narrative    EXAM: XR SURGERY BOUCHRA FLUORO GREATER THAN 5 MIN W STILLS  LOCATION: [facility]  DATE/TIME: 3/4/2022 7:31 AM    INDICATION: Intraoperative hip arthroplasty.   COMPARISON: 09/04/2019.    TECHNIQUE: Intraoperative fluoroscopic radiographs.    FINDINGS:   FLUOROSCOPIC TIME: 1.04 minutes  NUMBER OF IMAGES: 17     17 intraoperative  fluoroscopic radiograph show an ongoing procedure presumably at the right hip. On the final images a new hip arthroplasty device is present. Alignment appears to be appropriate. No complications identified on these images.        Impression    IMPRESSION:  1.  Right hip arthroplasty. No complications identified on these images.  2.  See the operative report for details. A radiologist was not present at the time of the procedure.          Preoperative Labs Reviewed Personally By Myself   Labs from 2/21/2022.  Creatinine 0.67.   Urine culture 2/21/2022 grew mixed erendira.    Hemoglobin today 14.9.  Potassium 3.7.  Creatinine 0.71    COVID-19 PCR: Negative 3/1/2022    Thank you for this consultation.  Appreciate the opportunity to participate in the care of Libertad Hernadez, please feel free to contact us for any questions or concerns.    KEVIN CALDERON MD  [facility]  Phone: #[phone]

## 2022-03-04 NOTE — ANESTHESIA POSTPROCEDURE EVALUATION
Patient: Libertad Hernadez    Procedure: Procedure(s):  RIGHT TOTAL HIP ARTHROPLASTY, DIRECT ANTERIOR       Anesthesia Type:  Spinal    Note:  Disposition: Inpatient   Postop Pain Control: Uneventful            Sign Out: Well controlled pain   PONV: No   Neuro/Psych: Uneventful            Sign Out: Acceptable/Baseline neuro status   Airway/Respiratory: Uneventful            Sign Out: Acceptable/Baseline resp. status   CV/Hemodynamics: Uneventful            Sign Out: Acceptable CV status; No obvious hypovolemia; No obvious fluid overload   Other NRE: NONE   DID A NON-ROUTINE EVENT OCCUR? No           Last vitals:  Vitals Value Taken Time   /70 03/04/22 1130   Temp 36.5  C (97.7  F) 03/04/22 1020   Pulse 81 03/04/22 1152   Resp 18 03/04/22 1111   SpO2 96 % 03/04/22 1152   Vitals shown include unvalidated device data.    Electronically Signed By: Joseph Stoddard MD  March 4, 2022  1:15 PM

## 2022-03-04 NOTE — ANESTHESIA PROCEDURE NOTES
Intrathecal catheter Procedure Note    Pre-Procedure   Staff -        Anesthesiologist:  Joseph Stoddard MD       Performed By: anesthesiologist       Location: OR       Procedure Start/Stop Times: 3/4/2022 7:42 AM and 3/4/2022 7:46 AM       Pre-Anesthestic Checklist: patient identified, IV checked, risks and benefits discussed, informed consent, monitors and equipment checked, pre-op evaluation, at physician/surgeon's request and post-op pain management  Timeout:       Correct Patient: Yes        Correct Procedure: Yes        Correct Site: Yes        Correct Position: Yes   Procedure Documentation  Procedure: intrathecal catheter       Patient Position: sitting (midline approach).       Needle Gauge: 24.        Needle Length (Inches): 3.5        Spinal Needle Type: Pencan       Introducer used      # of attempts: 1 and  # of redirects:     Assessment/Narrative         Paresthesias: No.       CSF fluid: clear.    Medication(s) Administered   0.75% Hyperbaric Bupivacaine (Intrathecal), 2 mL  Medication Administration Time: 3/4/2022 7:45 AM

## 2022-03-04 NOTE — ANESTHESIA CARE TRANSFER NOTE
Patient: Libertad Hernadez    Procedure: Procedure(s):  RIGHT TOTAL HIP ARTHROPLASTY, DIRECT ANTERIOR       Diagnosis: Osteoarthritis of right hip [M16.11]  Diagnosis Additional Information: No value filed.    Anesthesia Type:   Spinal     Note:    Oropharynx: oropharynx clear of all foreign objects  Level of Consciousness: awake  Oxygen Supplementation: face mask  Level of Supplemental Oxygen (L/min / FiO2): 8  Independent Airway: airway patency satisfactory and stable  Dentition: dentition unchanged  Vital Signs Stable: post-procedure vital signs reviewed and stable  Report to RN Given: handoff report given  Patient transferred to: PACU    Handoff Report: Identifed the Patient, Identified the Reponsible Provider, Reviewed the pertinent medical history, Discussed the surgical course, Reviewed Intra-OP anesthesia mangement and issues during anesthesia, Set expectations for post-procedure period and Allowed opportunity for questions and acknowledgement of understanding      Vitals:  Vitals Value Taken Time   /56 03/04/22 0952   Temp 36.9  C (98.4  F) 03/04/22 0951   Pulse 77 03/04/22 0953   Resp 7 03/04/22 0953   SpO2 100 % 03/04/22 0953   Vitals shown include unvalidated device data.    Electronically Signed By: TRINY Krishnamurthy CRNA  March 4, 2022  9:54 AM

## 2022-03-04 NOTE — ANESTHESIA PREPROCEDURE EVALUATION
Anesthesia Pre-Procedure Evaluation    Patient: Libertad Hernadez   MRN: [id number] : 1940        Procedure : Procedure(s):  RIGHT TOTAL HIP ARTHROPLASTY, DIRECT ANTERIOR          Past Medical History:   Diagnosis Date     Arthritis      Asymptomatic varicose veins     Created by Conversion      Disorder of bone and cartilage, unspecified     Created by Conversion      Essential thrombocytosis (H)      Gastroesophageal reflux disease      Malignant neoplasm of breast (female), unspecified site     Created by Conversion [facility] Annotation: Oct 21 2010  9:56AM - Shirley Comer: stage 2;  10/18 lymph nodes positives/p chemo and [id number]      Motion sickness      Other and unspecified hyperlipidemia     Created by Conversion      PE (pulmonary thromboembolism) (H)      PONV (postoperative nausea and vomiting)      Primary cancer of bone marrow (H)      Thrombosis      Unspecified essential hypertension     Created by Conversion      Unspecified hereditary and idiopathic peripheral neuropathy     Created by Conversion       Past Surgical History:   Procedure Laterality Date     FOOT SURGERY       HC REMOVAL ADENOIDS,PRIMARY,<13 Y/O      Description: Adenoidectomy;  Recorded: 2008;     HC REMOVAL GALLBLADDER      Description: Cholecystectomy;  Recorded: 2008;     HC REMOVAL OF TONSILS,<12 Y/O      Description: Tonsillectomy;  Recorded: 2008;     IL INCISE FINGER TENDON SHEATH      Description: Hand Incision Tendon Sheath Of A Finger;  Recorded: 2008;     IL MASTECTOMY, PARTIAL      Description: Right Breast Partial Mastectomy Inferior Lateral Quarter;  Recorded: 10/21/2010;     IL MASTECTOMY, RADICAL      Description: Radical Mastectomy Left Breast;  Recorded: 2008;      Allergies   Allergen Reactions     Adhesive Tape-Silicones [Adhesive Tape] Itching     States itchy rash with certain tapes     Penicillins Hives      Social History     Tobacco Use     Smoking status:  Never Smoker     Smokeless tobacco: Never Used   Substance Use Topics     Alcohol use: Yes     Alcohol/week: 1.0 standard drink     Comment: very rarely      Wt Readings from Last 1 Encounters:   03/04/22 67.1 kg (147 lb 14.4 oz)        Anesthesia Evaluation   Pt has had prior anesthetic.         ROS/MED HX  ENT/Pulmonary:  - neg pulmonary ROS     Neurologic:  - neg neurologic ROS     Cardiovascular:     (+) hypertension-----    METS/Exercise Tolerance:     Hematologic:     (+) History of blood clots,     Musculoskeletal:       GI/Hepatic:     (+) GERD,     Renal/Genitourinary:  - neg Renal ROS     Endo:  - neg endo ROS     Psychiatric/Substance Use:  - neg psychiatric ROS     Infectious Disease:  - neg infectious disease ROS     Malignancy:  - neg malignancy ROS     Other:            Physical Exam    Airway        Mallampati: II   TM distance: > 3 FB   Neck ROM: full   Mouth opening: > 3 cm    Respiratory Devices and Support         Dental  no notable dental history         Cardiovascular   cardiovascular exam normal          Pulmonary   pulmonary exam normal                OUTSIDE LABS:  CBC:   Lab Results   Component Value Date    WBC 6.6 03/04/2022    WBC 7.4 01/26/2021    HGB 14.9 03/04/2022    HGB 14.7 01/26/2021    HCT 45.5 03/04/2022    HCT 44.8 01/26/2021     03/04/2022     (H) 01/26/2021     BMP:   Lab Results   Component Value Date     02/21/2022     06/16/2021    POTASSIUM 3.7 03/04/2022    POTASSIUM 4.2 02/21/2022    CHLORIDE 106 02/21/2022    CHLORIDE 105 06/16/2021    CO2 27 02/21/2022    CO2 25 06/16/2021    BUN 11 02/21/2022    BUN 11 06/16/2021    CR 0.71 03/04/2022    CR 0.67 02/21/2022    GLC 81 02/21/2022    GLC 84 06/16/2021     COAGS:   Lab Results   Component Value Date    INR 1.16 (H) 03/04/2022     POC: No results found for: BGM, HCG, HCGS  HEPATIC:   Lab Results   Component Value Date    ALBUMIN 3.8 02/21/2022    PROTTOTAL 6.6 02/21/2022    ALT 12 02/21/2022     AST 17 02/21/2022    ALKPHOS 65 02/21/2022    BILITOTAL 0.5 02/21/2022     OTHER:   Lab Results   Component Value Date    DARCI 9.9 02/21/2022    MAG 2.3 08/14/2018    LIPASE 292 (H) 05/12/2018    TSH 1.49 11/29/2021       Anesthesia Plan    ASA Status:  2      Anesthesia Type: Spinal.              Consents    Anesthesia Plan(s) and associated risks, benefits, and realistic alternatives discussed. Questions answered and patient/representative(s) expressed understanding.    - Discussed:     - Discussed with:  Patient         Postoperative Care    Pain management: IV analgesics, Oral pain medications.   PONV prophylaxis: Ondansetron (or other 5HT-3), Dexamethasone or Solumedrol     Comments:                Joseph Stoddard MD

## 2022-03-05 ENCOUNTER — APPOINTMENT (OUTPATIENT)
Dept: PHYSICAL THERAPY | Facility: CLINIC | Age: 82
End: 2022-03-05
Attending: PHYSICIAN ASSISTANT
Payer: COMMERCIAL

## 2022-03-05 ENCOUNTER — APPOINTMENT (OUTPATIENT)
Dept: OCCUPATIONAL THERAPY | Facility: CLINIC | Age: 82
End: 2022-03-05
Attending: PHYSICIAN ASSISTANT
Payer: COMMERCIAL

## 2022-03-05 VITALS
BODY MASS INDEX: 25.25 KG/M2 | SYSTOLIC BLOOD PRESSURE: 160 MMHG | HEART RATE: 75 BPM | TEMPERATURE: 97.3 F | WEIGHT: 147.9 LBS | DIASTOLIC BLOOD PRESSURE: 72 MMHG | OXYGEN SATURATION: 95 % | HEIGHT: 64 IN | RESPIRATION RATE: 18 BRPM

## 2022-03-05 LAB
ALBUMIN UR-MCNC: 50 MG/DL
ANION GAP SERPL CALCULATED.3IONS-SCNC: 5 MMOL/L (ref 5–18)
APPEARANCE UR: CLEAR
BILIRUB UR QL STRIP: NEGATIVE
BUN SERPL-MCNC: 11 MG/DL (ref 8–28)
CALCIUM SERPL-MCNC: 8 MG/DL (ref 8.5–10.5)
CHLORIDE BLD-SCNC: 109 MMOL/L (ref 98–107)
CO2 SERPL-SCNC: 26 MMOL/L (ref 22–31)
COLOR UR AUTO: YELLOW
CREAT SERPL-MCNC: 0.69 MG/DL (ref 0.6–1.1)
FASTING STATUS PATIENT QL REPORTED: YES
GFR SERPL CREATININE-BSD FRML MDRD: 86 ML/MIN/1.73M2
GLUCOSE BLD-MCNC: 93 MG/DL (ref 70–125)
GLUCOSE BLD-MCNC: 93 MG/DL (ref 70–125)
GLUCOSE UR STRIP-MCNC: NEGATIVE MG/DL
HGB BLD-MCNC: 10.1 G/DL (ref 11.7–15.7)
HGB UR QL STRIP: NEGATIVE
HYALINE CASTS: 8 /LPF
KETONES UR STRIP-MCNC: NEGATIVE MG/DL
LEUKOCYTE ESTERASE UR QL STRIP: ABNORMAL
MUCOUS THREADS #/AREA URNS LPF: PRESENT /LPF
NITRATE UR QL: NEGATIVE
PH UR STRIP: 5.5 [PH] (ref 5–7)
POTASSIUM BLD-SCNC: 4.1 MMOL/L (ref 3.5–5)
RBC URINE: 3 /HPF
SODIUM SERPL-SCNC: 140 MMOL/L (ref 136–145)
SP GR UR STRIP: 1.03 (ref 1–1.03)
SQUAMOUS EPITHELIAL: 4 /HPF
UROBILINOGEN UR STRIP-MCNC: <2 MG/DL
WBC URINE: 23 /HPF

## 2022-03-05 PROCEDURE — 97535 SELF CARE MNGMENT TRAINING: CPT | Mod: GO

## 2022-03-05 PROCEDURE — 250N000013 HC RX MED GY IP 250 OP 250 PS 637: Performed by: PHYSICIAN ASSISTANT

## 2022-03-05 PROCEDURE — 85018 HEMOGLOBIN: CPT | Performed by: PHYSICIAN ASSISTANT

## 2022-03-05 PROCEDURE — 250N000013 HC RX MED GY IP 250 OP 250 PS 637: Performed by: FAMILY MEDICINE

## 2022-03-05 PROCEDURE — 87086 URINE CULTURE/COLONY COUNT: CPT | Performed by: STUDENT IN AN ORGANIZED HEALTH CARE EDUCATION/TRAINING PROGRAM

## 2022-03-05 PROCEDURE — 99207 PR CDG-CODE CATEGORY CHANGED: CPT | Performed by: FAMILY MEDICINE

## 2022-03-05 PROCEDURE — 80048 BASIC METABOLIC PNL TOTAL CA: CPT | Performed by: FAMILY MEDICINE

## 2022-03-05 PROCEDURE — 81001 URINALYSIS AUTO W/SCOPE: CPT | Performed by: STUDENT IN AN ORGANIZED HEALTH CARE EDUCATION/TRAINING PROGRAM

## 2022-03-05 PROCEDURE — 36415 COLL VENOUS BLD VENIPUNCTURE: CPT | Performed by: PHYSICIAN ASSISTANT

## 2022-03-05 PROCEDURE — 97166 OT EVAL MOD COMPLEX 45 MIN: CPT | Mod: GO

## 2022-03-05 PROCEDURE — 99214 OFFICE O/P EST MOD 30 MIN: CPT | Performed by: FAMILY MEDICINE

## 2022-03-05 PROCEDURE — 250N000011 HC RX IP 250 OP 636: Performed by: PHYSICIAN ASSISTANT

## 2022-03-05 RX ORDER — ACETAMINOPHEN 325 MG/1
975 TABLET ORAL EVERY 8 HOURS
COMMUNITY
Start: 2022-03-05 | End: 2022-05-04

## 2022-03-05 RX ADMIN — RIVAROXABAN 10 MG: 10 TABLET, FILM COATED ORAL at 09:00

## 2022-03-05 RX ADMIN — LISINOPRIL 5 MG: 5 TABLET ORAL at 09:00

## 2022-03-05 RX ADMIN — SENNOSIDES AND DOCUSATE SODIUM 1 TABLET: 50; 8.6 TABLET ORAL at 09:00

## 2022-03-05 RX ADMIN — ACETAMINOPHEN 975 MG: 325 TABLET ORAL at 06:05

## 2022-03-05 RX ADMIN — POLYETHYLENE GLYCOL 3350 17 G: 17 POWDER, FOR SOLUTION ORAL at 09:00

## 2022-03-05 RX ADMIN — FAMOTIDINE 20 MG: 20 TABLET ORAL at 09:00

## 2022-03-05 RX ADMIN — KETOROLAC TROMETHAMINE 15 MG: 30 INJECTION, SOLUTION INTRAMUSCULAR at 06:05

## 2022-03-05 NOTE — PLAN OF CARE

## 2022-03-05 NOTE — PROGRESS NOTES
[facility]      OUTPATIENT OCCUPATIONAL THERAPY  EVALUATION  PLAN OF TREATMENT FOR OUTPATIENT REHABILITATION  (COMPLETE FOR INITIAL CLAIMS ONLY)  Patient's Last Name, First Name, M.I.  YOB: 1940  Libertad Hernadez                          Provider's Name  [facility] Medical Record No.  [id number]                               Onset Date:  03/04/22   Start of Care Date:  (P) 03/05/22     Type:     ___PT   _X_OT   ___SLP Medical Diagnosis:  (P) R LAMBERT                        OT Diagnosis:  (P) Deccreased ADL independence   Visits from SOC:  1   _________________________________________________________________________________  Plan of Treatment/Functional Goals    Planned Interventions: (P) ADL retraining   Goals: See Occupational Therapy Goals on Care Plan in [employer] electronic health record.    Therapy Frequency: (P) One time eval and treatment  Predicted Duration of Therapy Intervention: (P) 03/05/22  _________________________________________________________________________________    I CERTIFY THE NEED FOR THESE SERVICES FURNISHED UNDER        THIS PLAN OF TREATMENT AND WHILE UNDER MY CARE     (Physician co-signature of this document indicates review and certification of the therapy plan).                Certification date from: (P) 03/05/22, Certification date to: (P) 04/05/22    Referring Physician: Zev Damon PA-C             Initial Assessment        See Occupational Therapy evaluation dated (P) 03/05/22 in Epic electronic health record.

## 2022-03-05 NOTE — PLAN OF CARE
Occupational Therapy Discharge Summary    Reason for therapy discharge:    All goals and outcomes met, no further needs identified.    Progress towards therapy goal(s). See goals on Care Plan in [facility] electronic health record for goal details.  Goals met    Therapy recommendation(s):    Spouse to assist w/ADLs as needed.

## 2022-03-05 NOTE — PLAN OF CARE
Goal Outcome Evaluation:      Patient vital signs are at baseline: Yes - last /66  Patient able to ambulate as they were prior to admission or with assist devices provided by therapies during their stay:  Yes  Patient MUST void prior to discharge:  Yes  Patient able to tolerate oral intake:  Yes  Pain has adequate pain control using Oral analgesics:  Yes  Does patient have an identified :  Yes -   Has goal D/C date and time been discussed with patient:  Yes

## 2022-03-05 NOTE — PROGRESS NOTES
"Orthopedic Progress Note      Assessment: 1 Day Post-Op  S/P Procedure(s):  RIGHT TOTAL HIP ARTHROPLASTY, DIRECT ANTERIOR     Plan:   - Continue PT/OT  - Weightbearing status: WBAT  - Anticoagulation: Xarelto in addition to SCDs, vasquez stockings and early ambulation.  - Discharge planning: OK to discharge today from orthopedic standpoint pending physical therapy evaluation       Subjective:  Pain: mild  Nausea, Vomiting:  No  Lightheadedness, Dizziness:  No  Neuro:  Patient denies new onset numbness or paresthesias      Objective:  BP (!) 160/72 (BP Location: Right arm)   Pulse 75   Temp 97.3  F (36.3  C) (Oral)   Resp 18   Ht 1.626 m (5' 4\")   Wt 67.1 kg (147 lb 14.4 oz)   SpO2 95%   BMI 25.39 kg/m    The patient is A&Ox3. Appears comfortable.   SILT RLE L3-S1  Dorsiflexion and plantar flexion is intact.  Dorsalis pedis pulse intact.  Calves are soft and non-tender. Negative Luis Enrique's.  The incision is covered. Dressing C/D/I.    Drain in place, last output 50ml per nursing staff.     Patient is resting comfortably in her chair. She reports she is doing well, however she has some stomach \"burning\" which she states is typical for her when she is getting a \"bladder infection.\" UA with C/S ordered to confirm to UTI prior to discharge. States she tolerated Cephalexin well for this in the past. See labs below - last HGB 10.1 this morning.     Pertinent Labs   Lab Results: personally reviewed.   Lab Results   Component Value Date    INR 1.16 (H) 03/04/2022     Lab Results   Component Value Date    WBC 6.6 03/04/2022    HGB 10.1 (L) 03/05/2022    HCT 45.5 03/04/2022     (H) 03/04/2022     03/04/2022     Lab Results   Component Value Date     03/05/2022    CO2 26 03/05/2022         Report completed by:  MARGO AVINA PA-C, RACHEL  Date: 3/5/2022  Time: 8:08 AM  "

## 2022-03-05 NOTE — DISCHARGE INSTRUCTIONS
Pineda Rowe MD    Attending    Since 3/4/2022    [phone]     Renetta Carrillo MD    General - Internal Medicine    [phone]

## 2022-03-05 NOTE — PROVIDER NOTIFICATION
Lab called, hemoglobin decreased from 14.9 to 10.1. No action needed d/t orders are to notify only if less than 8.0 on POD #1.

## 2022-03-05 NOTE — PLAN OF CARE
Goal Outcome Evaluation:            Patient vital signs are at baseline: No,  Reason:  BP elevated, MD aware  Patient able to ambulate as they were prior to admission or with assist devices provided by therapies during their stay:  Yes  Patient MUST void prior to discharge:  Yes  Patient able to tolerate oral intake:  Yes  Pain has adequate pain control using Oral analgesics:  Yes  Does patient have an identified :  Yes  Has goal D/C date and time been discussed with patient:  Yes

## 2022-03-05 NOTE — PLAN OF CARE
Physical Therapy Discharge Summary    Reason for therapy discharge:    All goals and outcomes met, no further needs identified.    Progress towards therapy goal(s). See goals on Care Plan in [facility] electronic health record for goal details.  Goals met    Therapy recommendation(s):    Continued therapy is recommended.  Rationale/Recommendations:  op PT if needed.

## 2022-03-05 NOTE — PROGRESS NOTES
03/05/22 0950   Quick Adds   Quick Adds Certification   Type of Visit Initial Occupational Therapy Evaluation   Living Environment   People in Home spouse  (recent diagnosis of Parkinsons)   Current Living Arrangements   (Town home)   Living Environment Comments Able to stay on one level in home, has high and low toilets able to use both w/ease, sink on right side for both toilets, walkin shower, no grab bars,    Self-Care   Current Activity Tolerance good   Activity/Exercise/Self-Care Comment Hasn't been able to walk as far as she used to for exercise, limited w/winter   Instrumental Activities of Daily Living (IADL)   IADL Comments manages cooking, cleaning, laundry, medications-spouse does finances and driving    General Information   Onset of Illness/Injury or Date of Surgery 03/04/22   Referring Physician Zev Damon PA-C    Patient/Family Therapy Goal Statement (OT) I am looking forward to spring so I can walk further outside   Right Lower Extremity (Weight-bearing Status) weight-bearing as tolerated (WBAT)   General Observations and Info No flexion restrictions   Cognitive Status Examination   Orientation Status orientation to person, place and time   Cognitive Status Comments Asking questiona appropriately and advocating for self appropriately   Pain Assessment   Patient Currently in Pain Yes, see Vital Sign flowsheet   Strength Comprehensive (MMT)   Comment, General Manual Muscle Testing (MMT) Assessment WFL   Transfers   Transfers transfer safety analysis   Transfer Comments Pt Mod I/SBA w/all fx transfers   Clinical Impression   Criteria for Skilled Therapeutic Interventions Met (OT) Yes, treatment indicated   OT Diagnosis Deccreased ADL independence   OT Problem List-Impairments impacting ADL strength;post-surgical precautions   ADL comments/analysis Fx mobility   Assessment of Occupational Performance 1-3 Performance Deficits   Planned Therapy Interventions (OT) ADL retraining   Clinical  Decision Making Complexity (OT) low complexity   Risk & Benefits of therapy have been explained evaluation/treatment results reviewed   OT Discharge Planning   OT Discharge Recommendation (DC Rec) home with assist   OT Rationale for DC Rec  available to assist w/cares of TEDs socks and ADLs prn   Therapy Certification   Start of Care Date 03/05/22   Certification date from 03/05/22   Certification date to 04/05/22   Medical Diagnosis R LAMBERT   Total Evaluation Time (Minutes)   Total Evaluation Time (Minutes) 15   OT Goals   Therapy Frequency (OT) One time eval and treatment   OT Predicated Duration/Target Date for Goal Attainment 03/05/22   OT Goals Lower Body Dressing;Transfers   OT: Lower Body Dressing Modified independent  (Min A w/teds)   OT: Transfer Modified independent

## 2022-03-05 NOTE — PROGRESS NOTES
[facility] MEDICINE PROGRESS NOTE      Identification/Summary:   Ms. Libertad Hernadez is a 81 year old female with PMHx of breast cancer, hypertension, hyperlipidemia, GERD, essential thrombocytosis, peripheral neuropathy, history of pulmonary embolism, GERD and osteoarthritis. Underwent Procedure(s): RIGHT TOTAL HIP ARTHROPLASTY, DIRECT ANTERIOR on 3/4/2022 by Pineda Junior MD.Hospital course is unremarkable.    Assessment and Plan:     Poatop day #1 right LAMBERT  -Postoperative management per orthopedics. Plan: home today     Hypertension  PTA meds: Lisinopril     Hyperlipidemia  -Continue PTA meds: Simvastatin     Essential thrombocytosis  PTA meds: Hydroxyurea      GERD  -Continue famotidine     COVID-19 PCR: Negative     Anticoagulation.  Ordered by surgery     -Disposition   per Orthopedics home today         Kevin Gutierrez MD  [facility] Medicine  [facility]  Phone: #[phone]    Interval History/Subjective:  Doing well. No complaints.    Physical Exam/Objective:  Temp:  [97.3  F (36.3  C)-98.9  F (37.2  C)] 97.3  F (36.3  C)  Pulse:  [68-82] 75  Resp:  [12-18] 18  BP: (111-173)/(56-77) 160/72  SpO2:  [93 %-100 %] 95 %  Body mass index is 25.39 kg/m .    GENERAL:  Alert, appears comfortable, in no acute distress, appears stated age   HEAD:  Normocephalic, without obvious abnormality, atraumatic   LUNGS:   Clear to auscultation bilaterally, no rales, rhonchi, or wheezing, symmetric chest rise on inhalation, respirations unlabored   HEART:  Regular rate and rhythm, no murmur    ABDOMEN:   Soft, non-tender, no masses, no organomegaly, no rebound or guarding   EXTREMITIES: No ankle edema    SKIN: Dry to touch, no exanthems in the visualized areas   NEURO: Alert, moves all four extremities freely   PSYCH: Cooperative, behavior is appropriate      Data reviewed today: I personally reviewed all new medications, labs, imaging/diagnostics reports  over the past 24 hours. Pertinent findings include:    Imaging:   Recent Results (from the past 24 hour(s))   XR Surgery BOUCHRA Fluoro G/T 5 Min w Stills    Narrative    EXAM: XR SURGERY BOUCHRA FLUORO GREATER THAN 5 MIN W STILLS  LOCATION: [facility]  DATE/TIME: 3/4/2022 7:31 AM    INDICATION: Intraoperative hip arthroplasty.   COMPARISON: 09/04/2019.    TECHNIQUE: Intraoperative fluoroscopic radiographs.    FINDINGS:   FLUOROSCOPIC TIME: 1.04 minutes  NUMBER OF IMAGES: 17     17 intraoperative fluoroscopic radiograph show an ongoing procedure presumably at the right hip. On the final images a new hip arthroplasty device is present. Alignment appears to be appropriate. No complications identified on these images.        Impression    IMPRESSION:  1.  Right hip arthroplasty. No complications identified on these images.  2.  See the operative report for details. A radiologist was not present at the time of the procedure.          Labs:  Most Recent 3 CBC's:Recent Labs   Lab Test 03/05/22  0552 03/04/22  0628 01/26/21  1317 12/15/20  1315   WBC  --  6.6 7.4 7.1   HGB 10.1* 14.9 14.7 15.0   MCV  --  102* 96 97   PLT  --  369 454* 495*     Most Recent 3 BMP's:Recent Labs   Lab Test 03/05/22  0552 03/04/22  0628 02/21/22  1055 06/16/21  1327     --  142 141   POTASSIUM 4.1 3.7 4.2 4.6   CHLORIDE 109*  --  106 105   CO2 26  --  27 25   BUN 11  --  11 11   CR 0.69 0.71 0.67 0.68   ANIONGAP 5  --  9 11   DARCI 8.0*  --  9.9 9.8   GLC 93  93  --  81 84     Most Recent 2 LFT's:Recent Labs   Lab Test 02/21/22  1055 12/15/20  1315   AST 17 19   ALT 12 16   ALKPHOS 65 80   BILITOTAL 0.5 0.4       Medications:   Personally Reviewed.  Medications       acetaminophen  975 mg Oral Q8H     famotidine  20 mg Oral BID     lisinopril  5 mg Oral Daily     polyethylene glycol  17 g Oral Daily     rivaroxaban ANTICOAGULANT  10 mg Oral Daily     senna-docusate  1 tablet Oral BID     simvastatin  20 mg Oral At  Bedtime     sodium chloride (PF)  3 mL Intracatheter Q8H     traZODone  100 mg Oral At Bedtime

## 2022-03-05 NOTE — PLAN OF CARE
Patient vital signs are at baseline: Yes  Patient able to ambulate as they were prior to admission or with assist devices provided by therapies during their stay:  Yes  Patient MUST void prior to discharge:  Yes  Patient able to tolerate oral intake:  Yes  Pain has adequate pain control using Oral analgesics:  Yes  Does patient have an identified :  Yes  Has goal D/C date and time been discussed with patient:  Yes    Alert and oriented x4. Able to make needs known via call light. No requests of PRN oxycodone during shift. Scheduled tylenol and toradol able to manage pain. Sleeping well. Right hip Aquacell CDI and dressing over hemovac. Ambulating well, A1 with walker. Output of 50 mL for hemovac this shift, took out drain 630. VSS.     Problem: Plan of Care - These are the overarching goals to be used throughout the patient stay.    Goal: Readiness for Transition of Care  3/5/2022 0132 by Will Coles RN  Outcome: Ongoing, Progressing  3/4/2022 2233 by Will Coles RN  Outcome: Ongoing, Progressing     Problem: Pain (Hip Arthroplasty)  Goal: Acceptable Pain Control  3/5/2022 0132 by Will Coles RN  Outcome: Ongoing, Progressing  3/4/2022 2233 by Will Coles RN  Outcome: Ongoing, Progressing  Intervention: Prevent or Manage Pain  Recent Flowsheet Documentation  Taken 3/4/2022 2158 by Will Coles RN  Pain Management Interventions:    rest    medication (see MAR)  Taken 3/4/2022 2000 by Will Coles RN  Pain Management Interventions:    rest    repositioned    ambulation/increased activity

## 2022-03-05 NOTE — PROGRESS NOTES
Chart reviewed.  Pt will discharge home with assist from family.  Family will transport.    SYLVIA Cruz  3/5/2022  11:09 AM

## 2022-03-05 NOTE — PROGRESS NOTES
Patient cleared for discharge. Discharge instructions given to patient and family member. Patient verbalized understanding. Belongings returned to patient.

## 2022-03-06 LAB — BACTERIA UR CULT: NO GROWTH

## 2022-03-14 ENCOUNTER — DOCUMENTATION ONLY (OUTPATIENT)
Dept: OTHER | Facility: CLINIC | Age: 82
End: 2022-03-14
Payer: COMMERCIAL

## 2022-04-15 ENCOUNTER — APPOINTMENT (OUTPATIENT)
Dept: CT IMAGING | Facility: HOSPITAL | Age: 82
End: 2022-04-15
Attending: EMERGENCY MEDICINE
Payer: COMMERCIAL

## 2022-04-15 ENCOUNTER — HOSPITAL ENCOUNTER (EMERGENCY)
Facility: HOSPITAL | Age: 82
Discharge: HOME | End: 2022-04-15
Attending: EMERGENCY MEDICINE | Admitting: EMERGENCY MEDICINE
Payer: COMMERCIAL

## 2022-04-15 VITALS
DIASTOLIC BLOOD PRESSURE: 61 MMHG | OXYGEN SATURATION: 96 % | TEMPERATURE: 97.9 F | RESPIRATION RATE: 30 BRPM | SYSTOLIC BLOOD PRESSURE: 148 MMHG | WEIGHT: 147 LBS | HEART RATE: 78 BPM | BODY MASS INDEX: 25.23 KG/M2

## 2022-04-15 DIAGNOSIS — R07.81 PLEURITIC CHEST PAIN: ICD-10-CM

## 2022-04-15 DIAGNOSIS — I26.99 OTHER ACUTE PULMONARY EMBOLISM WITHOUT ACUTE COR PULMONALE (H): ICD-10-CM

## 2022-04-15 PROCEDURE — 250N000011 HC RX IP 250 OP 636: Performed by: EMERGENCY MEDICINE

## 2022-04-15 PROCEDURE — 999N000157 HC STATISTIC RCP TIME EA 10 MIN

## 2022-04-15 PROCEDURE — 99285 EMERGENCY DEPT VISIT HI MDM: CPT | Mod: 25

## 2022-04-15 PROCEDURE — 94640 AIRWAY INHALATION TREATMENT: CPT

## 2022-04-15 PROCEDURE — 250N000009 HC RX 250

## 2022-04-15 PROCEDURE — 250N000013 HC RX MED GY IP 250 OP 250 PS 637: Performed by: EMERGENCY MEDICINE

## 2022-04-15 PROCEDURE — 71275 CT ANGIOGRAPHY CHEST: CPT

## 2022-04-15 RX ORDER — OXYCODONE HYDROCHLORIDE 5 MG/1
5 TABLET ORAL EVERY 4 HOURS PRN
Qty: 12 TABLET | Refills: 0 | Status: SHIPPED | OUTPATIENT
Start: 2022-04-15 | End: 2022-04-18

## 2022-04-15 RX ORDER — PREDNISONE 20 MG/1
40 TABLET ORAL ONCE
Status: DISCONTINUED | OUTPATIENT
Start: 2022-04-15 | End: 2022-04-15

## 2022-04-15 RX ORDER — IPRATROPIUM BROMIDE AND ALBUTEROL SULFATE 2.5; .5 MG/3ML; MG/3ML
SOLUTION RESPIRATORY (INHALATION)
Status: COMPLETED
Start: 2022-04-15 | End: 2022-04-15

## 2022-04-15 RX ORDER — IOPAMIDOL 755 MG/ML
100 INJECTION, SOLUTION INTRAVASCULAR ONCE
Status: COMPLETED | OUTPATIENT
Start: 2022-04-15 | End: 2022-04-15

## 2022-04-15 RX ORDER — IPRATROPIUM BROMIDE AND ALBUTEROL SULFATE 2.5; .5 MG/3ML; MG/3ML
3 SOLUTION RESPIRATORY (INHALATION) ONCE
Status: DISCONTINUED | OUTPATIENT
Start: 2022-04-15 | End: 2022-04-15

## 2022-04-15 RX ORDER — ACETAMINOPHEN 325 MG/1
650 TABLET ORAL ONCE
Status: COMPLETED | OUTPATIENT
Start: 2022-04-15 | End: 2022-04-15

## 2022-04-15 RX ADMIN — IPRATROPIUM BROMIDE AND ALBUTEROL SULFATE 3 ML: .5; 3 SOLUTION RESPIRATORY (INHALATION) at 16:45

## 2022-04-15 RX ADMIN — IOPAMIDOL 100 ML: 755 INJECTION, SOLUTION INTRAVENOUS at 17:46

## 2022-04-15 RX ADMIN — ACETAMINOPHEN 650 MG: 325 TABLET ORAL at 18:31

## 2022-04-15 NOTE — ED PROVIDER NOTES
Expected Patient Referral to ED  3:39 PM    Referring Clinic/Provider:  [facility]    Reason for referral/Clinical facts:  Patient with recently diagnosed right-sided PE.  Presents with worsening pain and splinting significantly with respiration.  Vital signs are stable.  Reportedly off Xarelto for just a few days after being on it for a hip replacement surgery.  Provider is concerned about degree of pain may require readmission.  Also wondering about possibility of breakthrough clot given had been on Xarelto for most of the last few weeks and still had a new PE. EKG, troponin reassuring.    Recommendations provided:  We can see patient here.      Tung Aguayo MD  Emergency Medicine  [facility] EMERGENCY DEPARTMENT  [facility] [facility] [id number] [phone]            Tung Aguayo MD  04/15/22 8750

## 2022-04-15 NOTE — ED PROVIDER NOTES
EMERGENCY DEPARTMENT ENCOUNTER      NAME: Libertad Hernadez  AGE: 81 year old female  YOB: 1940  MRN: [id number]  EVALUATION DATE & TIME: 4/15/2022  4:21 PM    PCP: Renetta Carrillo    ED PROVIDER: Meka Reina M.D.    Chief Complaint   Patient presents with     Chest Pain     Shortness of Breath     FINAL IMPRESSION:  1. Pleuritic chest pain    2. Other acute pulmonary embolism without acute cor pulmonale (H)      ED COURSE & MEDICAL DECISION MAKING:    Pertinent Labs & Imaging studies reviewed. (See chart for details)  ED Course as of 04/15/22 2145   Fri Apr 15, 2022   1654 Patient is an 81-year-old female who comes in today for some right-sided chest pain and shortness of breath.  She was diagnosed with a PE 2 days ago and was restarted on Xarelto.  She was unable to get it initially so did not take her first dose until today.  She is coming in with worsening pain.  She does not want narcotics but was willing to have some Tylenol.  I am going to rescan her chest and see what the clot burden is today compared to previous.  She was only started on the Xarelto today so its not surprising that she could have some increased clot burden.  She is hemodynamically stable with normal oxygen saturation.  She is comfortable with the plan.   1825 Patient has had no significant change in her clot burden.  I will check in and see how she is feeling symptomatically.   1838 I spoke with Dr. Crisostomo with pulmonology just to get some more insight.  She agreed that the patient is declared herself as fairly stable over the last couple of days and now that she has her Xarelto would get her pain under control to try to be reasonable to have her go home and follow-up closely with her primary care doctor.   1858 I just went back and spoke to the patient.  She is feeling better after the Tylenol.  She would like to go home.  We discussed close follow-up and she already has an appointment scheduled for Monday.  She already has  her Xarelto.  I will give her written prescription for oxycodone but she is not sure if she wants to use it.  She is in agreement with the plan.        4:30 PM I met with the patient, obtained history, performed an initial exam, and discussed options and plan for diagnostics and treatment here in the ED. PPE worn: N95 mask, surgical cap, gloves  6:36 PM I spoke to Dr. Crisostomo, pulmonology.   7:03 PM We discussed the plan for discharge and the patient is agreeable. Reviewed supportive cares, symptomatic treatment, outpatient follow up, and reasons to return to the Emergency Department. Patient to be discharged by ED RN.     At the conclusion of the encounter I discussed  the results of all of the tests and the disposition with patient.   All questions were answered.  The patient acknowledged understanding and was involved in the decision making regarding the overall care plan.      I discussed with patient the utility, limitations and findings of the exam/interventions/studies done during this visit as well as the list of differential diagnosis and symptoms to monitor/return to ER for.  Additional verbal discharge instructions were provided.     MEDICATIONS GIVEN IN THE EMERGENCY:  Medications   ipratropium - albuterol 0.5 mg/2.5 mg/3 mL (DUONEB) 0.5-2.5 (3) MG/3ML neb solution (3 mLs  Given 4/15/22 1645)   acetaminophen (TYLENOL) tablet 650 mg (650 mg Oral Given 4/15/22 1831)   iopamidol (ISOVUE-370) solution 100 mL (100 mLs Intravenous Given 4/15/22 1746)       NEW PRESCRIPTIONS STARTED AT TODAY'S ER VISIT  Discharge Medication List as of 4/15/2022  7:04 PM      START taking these medications    Details   !! oxyCODONE (ROXICODONE) 5 MG tablet Take 1 tablet (5 mg) by mouth every 4 hours as needed for pain, Disp-12 tablet, R-0, Local Print       !! - Potential duplicate medications found. Please discuss with provider.             =================================================================    HPI    Triage Note:  Pt arrives with known right sided PE. Had hip replacement surgery in early March. Started back on Xarelto two days ago, but took first dose today. Here today for increase pain with taking deep breaths.      Patient information was obtained from: Patient     Use of : N/A          Libertad Hernadez is a 81 year old female who presents chest pain, shoulder pain, and back pain.     Per chart review, the patient was seen 4/13 (2 days ago) at the [facility] for pleuritic chest pain. She had a recent hip surgery and was placed on prophylactic Xarelto which she discontinued on 4/11. CT with PE protocol showed bilateral PEs, no signs of right heart strain. Troponin was negative, CBC BMP were unremarkable. Discharged with Xarelto.    The patient states that she was diagnosed with a right sided PE on 4/13 (2 days ago) and was restarted on Xarelto but was unable to get the medication until today. She adds that they had no DVT findings on the ultrasound of her legs.     The patient also reports that she developed chest pain, shoulder pain, and back pain last Saturday (6 days ago). No swelling and pain in her legs. Her pain has since worsened, prompting her to be present in the ED. The patient adds that her right side hurts when she takes deep breathes. She takes Tylenol at home. No other symptoms or complaints at this time. The patient had hip surgery on 3/4 (~1 month ago) and was taking Xaretlo up until the surgery.     Positive for chest pain, shoulder pain, back pain, and PE. Negative for leg swelling/pain.         REVIEW OF SYSTEMS   Except as stated in the HPI all other systems reviewed and are negative.    PAST MEDICAL HISTORY:  Past Medical History:   Diagnosis Date     Arthritis      Asymptomatic varicose veins     Created by Conversion      Disorder of bone and cartilage, unspecified     Created by Conversion      Essential thrombocytosis (H)      Gastroesophageal reflux disease      Malignant neoplasm of breast  (female), unspecified site     Created by Conversion [facility] Annotation: Oct 21 2010  9:56AM - Shirley Comer: stage 2;  10/18 lymph nodes positives/p chemo and [id number]      Motion sickness      Other and unspecified hyperlipidemia     Created by Conversion      PE (pulmonary thromboembolism) (H)      PONV (postoperative nausea and vomiting)      Primary cancer of bone marrow (H)      Thrombosis      Unspecified essential hypertension     Created by Conversion      Unspecified hereditary and idiopathic peripheral neuropathy     Created by Conversion        PAST SURGICAL HISTORY:  Past Surgical History:   Procedure Laterality Date     ARTHROPLASTY, HIP, TOTAL, DIRECT ANTERIOR APPROACH, USING HANA TABLE Right 3/4/2022    Procedure: RIGHT TOTAL HIP ARTHROPLASTY, DIRECT ANTERIOR;  Surgeon: Pineda Rowe MD;  Location: [facility] Main OR     FOOT SURGERY  2010     HC REMOVAL ADENOIDS,PRIMARY,<11 Y/O      Description: Adenoidectomy;  Recorded: 04/04/2008;     HC REMOVAL GALLBLADDER      Description: Cholecystectomy;  Recorded: 04/04/2008;     HC REMOVAL OF TONSILS,<11 Y/O      Description: Tonsillectomy;  Recorded: 04/04/2008;     MI INCISE FINGER TENDON SHEATH      Description: Hand Incision Tendon Sheath Of A Finger;  Recorded: 04/04/2008;     MI MASTECTOMY, PARTIAL      Description: Right Breast Partial Mastectomy Inferior Lateral Quarter;  Recorded: 10/21/2010;     MI MASTECTOMY, RADICAL      Description: Radical Mastectomy Left Breast;  Recorded: 04/04/2008;       CURRENT MEDICATIONS:    No current facility-administered medications for this encounter.    Current Outpatient Medications:      oxyCODONE (ROXICODONE) 5 MG tablet, Take 1 tablet (5 mg) by mouth every 4 hours as needed for pain, Disp: 12 tablet, Rfl: 0     acetaminophen (TYLENOL) 325 MG tablet, Take 3 tablets (975 mg) by mouth every 8 hours, Disp: , Rfl:      ascorbic acid, vitamin C, (VITAMIN C) 250 MG tablet, [ASCORBIC ACID, VITAMIN C, (VITAMIN  C) 250 MG TABLET] Take 250 mg by mouth daily., Disp: , Rfl:      BIOTIN ORAL, [BIOTIN ORAL] Take 5,000 mcg by mouth daily. , Disp: , Rfl:      calcium carbonate-vitamin D3 (CALCIUM 600 + D,3,) 600 mg(1,500mg) -200 unit per tablet, [CALCIUM CARBONATE-VITAMIN D3 (CALCIUM 600 + D,3,) 600 MG(1,500MG) -200 UNIT PER TABLET] Take 1 tablet by mouth 2 (two) times a day. , Disp: , Rfl:      cephALEXin (KEFLEX) 500 MG capsule, Take 500 mg by mouth 3 times daily, Disp: , Rfl:      famotidine (PEPCID) 20 MG tablet, TAKE ONE TABLET BY MOUTH TWICE A DAY, Disp: 180 tablet, Rfl: 3     hydroxyurea (HYDREA) 500 MG capsule, TAKE ONE CAPSULE BY MOUTH EVERY DAY FOR 4 DAYS A WEEK AND TAKE TWO CAPSULES BY MOUTH EVERY DAY FOR 3 DAYS A WEEK, Disp: 90 capsule, Rfl: 2     hydrOXYzine (ATARAX) 10 MG tablet, Take 1 tablet (10 mg) by mouth every 6 hours as needed for itching or anxiety (with pain, moderate pain), Disp: 30 tablet, Rfl: 0     lisinopriL (PRINIVIL,ZESTRIL) 5 MG tablet, [LISINOPRIL (PRINIVIL,ZESTRIL) 5 MG TABLET] TAKE ONE TABLET BY MOUTH EVERY DAY, Disp: 90 tablet, Rfl: 2     oxyCODONE (ROXICODONE) 5 MG tablet, Take 1-2 tablets (5-10 mg) by mouth every 6 hours as needed for pain (Moderate to Severe), Disp: 30 tablet, Rfl: 0     polyethylene glycol (MIRALAX) 17 g packet, Take 17 g by mouth daily, Disp: 7 packet, Rfl: 0     polyethylene glycol (MIRALAX) 17 gram packet, [POLYETHYLENE GLYCOL (MIRALAX) 17 GRAM PACKET] Take 17 g by mouth daily as needed., Disp: , Rfl:      polyvinyl alcohol (LIQUIFILM TEARS) 1.4 % ophthalmic solution, [POLYVINYL ALCOHOL (LIQUIFILM TEARS) 1.4 % OPHTHALMIC SOLUTION] Administer 1 drop to both eyes as needed for dry eyes., Disp: , Rfl:      rivaroxaban ANTICOAGULANT (XARELTO) 10 MG TABS tablet, Take 1 tablet (10 mg) by mouth daily, Disp: 35 tablet, Rfl: 0     senna-docusate (SENOKOT-S/PERICOLACE) 8.6-50 MG tablet, Take 1-2 tablets by mouth 2 times daily Take while on oral narcotics to prevent or treat  constipation., Disp: 30 tablet, Rfl: 0     simvastatin (ZOCOR) 20 MG tablet, [SIMVASTATIN (ZOCOR) 20 MG TABLET] TAKE ONE TABLET BY MOUTH AT BEDTIME, Disp: 90 tablet, Rfl: 1     traZODone (DESYREL) 100 MG tablet, [TRAZODONE (DESYREL) 100 MG TABLET] Take 1 tablet (100 mg total) by mouth at bedtime., Disp: 90 tablet, Rfl: 3    ALLERGIES:  Allergies   Allergen Reactions     Adhesive Tape-Silicones [Adhesive Tape] Itching     States itchy rash with certain tapes     Penicillins Hives       FAMILY HISTORY:  Family History   Problem Relation Age of Onset     Hypertension Father      Heart Disease Father      Diabetes Sister      Prostate Cancer Brother        SOCIAL HISTORY:   Social History     Socioeconomic History     Marital status:    Tobacco Use     Smoking status: Never Smoker     Smokeless tobacco: Never Used   Substance and Sexual Activity     Alcohol use: Yes     Alcohol/week: 1.0 standard drink     Comment: very rarely     Drug use: No     Sexual activity: Never       PHYSICAL EXAM    VITAL SIGNS: BP (!) 148/61   Pulse 78   Temp 97.9  F (36.6  C) (Temporal)   Resp 30   Wt 66.7 kg (147 lb)   SpO2 96%   BMI 25.23 kg/m     GENERAL: Awake, Alert, answering questions, No acute distress, Well nourished  HEENT: Normal cephalic, Atraumatic, bilateral external ears normal, No scleral icterus, mask in place  NECK: No obvious swelling or abnormality, No stridor  PULMONARY:Normal and symmetric breath sounds, No respiratory distress, Lungs clear to auscultation bilaterally. No wheezing  CARDIOVASCULAR: Regular rate and rhythm, Distal pulses present and normal.  ABDOMINAL: Soft, Nondistended, Nontender, No flank tenderness, No palpable masses  BACK: No bruising or tenderness.  EXTREMITIES: Moves all extremities spontaneously, warm, no edema, No major deformities  NEURO: No facial droop, normal motor function, Normal speech   PSYCH: Normal mood and affect  SKIN: No rashes on visualized skin, dry, warm        RADIOLOGY:  CT Chest Pulmonary Embolism w Contrast   Final Result   IMPRESSION:   1.  Bilateral pulmonary emboli are not significantly changed compared to 04/13/2022. Overall, mild embolic burden without evidence for elevated right heart pressure.   2.  Small right pleural effusion, slightly increased. Adjacent atelectasis in the right lower lobe also increased.          EKG:    Date and time: April 15, 2022 at 1629  Rate: 76 bpm  Rhythm: Normal sinus rhythm with sinus arrhythmia  MN interval: 154 ms  QRS interval: 90 ms  QT/QTc: 384/432 ms  ST changes or T wave changes: Nonspecific T wave abnormality  Change from prior ECG: No significant change from prior  I have independently reviewed and interpreted this EKG.     I, Carlos Osorio, am serving as a scribe to document services personally performed by Dr. Reina based on my observation and the provider's statements to me. I, Meka Reina MD attest that Carlos Osorio is acting in a scribe capacity, has observed my performance of the services and has documented them in accordance with my direction.    Meka Reina M.D.  Emergency Medicine  [facility] EMERGENCY DEPARTMENT  [address] [place] [id number] [phone]  Dept: [phone]     Meka Reina MD  04/15/22 1868

## 2022-04-15 NOTE — PROGRESS NOTES
Room air SpO2 95 %, RR 22, BS clear bilat, Duoneb tx given, tolerated well, no change BS after.     BP (!) 168/77   Pulse 75   Temp 97.9  F (36.6  C) (Temporal)   Resp 29   Wt 66.7 kg (147 lb)   SpO2 96%   BMI 25.23 kg/m

## 2022-04-15 NOTE — ED TRIAGE NOTES
Pt arrives with known right sided PE. Had hip replacement surgery in early March. Started back on Xarelto two days ago, but took first dose today. Here today for increase pain with taking deep breaths.

## 2022-04-16 NOTE — DISCHARGE INSTRUCTIONS
You were seen in the Emergency Department today for evaluation of chest pain.  Your imaging studies showed no change in your clot burden.   You were prescribed oxycodone for pain. You should take all medications as prescribed.  Follow up with your primary care physician to ensure resolution of symptoms. Return if you have new or worsening symptoms.

## 2022-04-28 ENCOUNTER — VIRTUAL VISIT (OUTPATIENT)
Dept: PHARMACY | Facility: PHYSICIAN GROUP | Age: 82
End: 2022-04-28
Payer: COMMERCIAL

## 2022-04-28 DIAGNOSIS — L40.9 SCALP PSORIASIS: ICD-10-CM

## 2022-04-28 DIAGNOSIS — D47.3 ESSENTIAL THROMBOCYTHEMIA (H): ICD-10-CM

## 2022-04-28 DIAGNOSIS — K59.00 CONSTIPATION, UNSPECIFIED CONSTIPATION TYPE: ICD-10-CM

## 2022-04-28 DIAGNOSIS — G47.00 INSOMNIA, UNSPECIFIED TYPE: ICD-10-CM

## 2022-04-28 DIAGNOSIS — Z78.9 TAKES DIETARY SUPPLEMENTS: ICD-10-CM

## 2022-04-28 DIAGNOSIS — E78.5 HYPERLIPIDEMIA, UNSPECIFIED HYPERLIPIDEMIA TYPE: ICD-10-CM

## 2022-04-28 DIAGNOSIS — I10 ESSENTIAL HYPERTENSION: ICD-10-CM

## 2022-04-28 DIAGNOSIS — K21.00 GASTROESOPHAGEAL REFLUX DISEASE WITH ESOPHAGITIS WITHOUT HEMORRHAGE: ICD-10-CM

## 2022-04-28 DIAGNOSIS — I26.99 ACUTE PULMONARY EMBOLISM, UNSPECIFIED PULMONARY EMBOLISM TYPE, UNSPECIFIED WHETHER ACUTE COR PULMONALE PRESENT (H): Primary | ICD-10-CM

## 2022-04-28 DIAGNOSIS — G62.9 PERIPHERAL POLYNEUROPATHY: ICD-10-CM

## 2022-04-28 PROCEDURE — 99607 MTMS BY PHARM ADDL 15 MIN: CPT | Performed by: PHARMACIST

## 2022-04-28 PROCEDURE — 99605 MTMS BY PHARM NP 15 MIN: CPT | Performed by: PHARMACIST

## 2022-04-28 RX ORDER — KETOCONAZOLE 20 MG/ML
SHAMPOO TOPICAL
COMMUNITY
Start: 2022-04-08 | End: 2022-06-22

## 2022-04-28 RX ORDER — BETAMETHASONE DIPROPIONATE 0.5 MG/G
LOTION TOPICAL PRN
COMMUNITY
Start: 2022-04-08

## 2022-04-28 NOTE — LETTER
"Recommended To-Do List      Prepared on: 5/4/2022     You can get the best results from your medications by completing the items on this \"To-Do List.\"      Bring your To-Do List when you go to your doctor. And, share it with your family or caregivers.    My To-Do List:  What we talked about: What I should do:   Your lisinopril medication may work better if you take it at bedtime.     Change when you are taking lisinopril (ZESTRIL) from morning to bedtime          What we talked about: What I should do:   The importance of taking your medication as intended    1. Finish Xarelto starter pack, 15 mg TWICE a day this week and next week, then 20 mg once daily for 1 week.   2. Once you are done with that stop Xarelto and start Eliquis 5 mg twice a day the next day.   Sent prescription for Eliquis 5 mg twice a day to [facility] with 1 month free trial card information. You will get first 30 days free, then we will see what the cost is with your insurance.           What we talked about: What I should do:   What my medicines are for, how to know if my medicines are working, made sure my medicines are safe for me and reviewed how to take my medicines.    Take my medicines every day                  "

## 2022-04-28 NOTE — PROGRESS NOTES
Medication Therapy Management (MTM) Encounter    ASSESSMENT:                            Medication Adherence/Access: Reviewed cost with insurance for brand medications. May need to apply for patient assistance program in the future if DOAC cost is unaffordable.     Pulmonary embolism: Reviewed correct Xarelto dosing with patient during visit, she would benefit from resuming 15 mg twice a day dosing for initial 21 days of therapy and finishing current supply. Then, she may benefit from switching to Eliquis to allow her to use 30-day free trial card and see what the cost will be with insurance. If patient has met deductible for prescription benefits cost for Eliquis may be only $47/month. Plan in place to discuss duration of anticoagulation with hematology in June.     Thrombocytosis: Stable. Plan in place to follow-up with hematology.     Hypertension: Patient is meeting blood pressure goal of < 150/90mmHg. She may benefit from moving lisinopril dose to evening as there is some evidence of better blood pressure lowering with ACE-I dosed in evening.     Hyperlipidemia: Stable. Patient is on moderate intensity statin which is indicated based on 2019 ACC/AHA guidelines for lipid management.      Neuropathy: Stable.     GERD: Stable.     Insomnia: Stable.     Constipation: Stable.     Scalp psoriasis: Stable.     Supplements: Stable. No concerns or interactions with other medications or conditions.     PLAN:                            1. Finish Xarelto starter pack, 15 mg TWICE a day this week and next week, then 20 mg once daily for 1 week.   2. Once you are done with that stop Xarelto and start Eliquis 5 mg twice a day the next day.   Sent prescription for Eliquis 5 mg twice a day to [facility] with 1 month free trial card information. You will get first 30 days free, then we will see what the cost is with your insurance.     3. Move lisinopril dose from morning to evening    Follow-up: Return in 18 days (on  5/16/2022) for medication therapy management, with me, using a phone visit at 1:00 PM.    SUBJECTIVE/OBJECTIVE:                          Libertad Herandez is a 81 year old female called for an initial visit. She was referred to me from Dr. Giang.      Reason for visit: Medication review, high cost of Xarelto.    Allergies/ADRs: Reviewed in chart  Past Medical History: Reviewed in chart  Tobacco: She reports that she has never smoked. She has never used smokeless tobacco.  Alcohol: Less than 1 beverage / month    Medication Adherence/Access: She self-manages her medications. Cost of Xarelto is going to be high through her insurance. She was able to get starter pack for free for first 30 days. She is wondering if Eliquis would be cheaper.     Pulmonary embolism:   Current medication: Xarelto 15 mg twice a day x21 days, then 20 mg once daily  She had hip replacement surgery recently and was put on Xarelto low dose for DVT prophylaxis. She stopped as planned on 4/11 and was seen in [facility] on 4/13 for chest pain and was found to have PE. Plan was to start Xarelto but there was some issues with getting new prescription so didn't start until 4/15. She had visit with Dr. Giang on 4/18 and discussed that she would likely need to be on anticoagulation for at least 6 months. She is concerned about being able to afford Xarelto long term. She has [facility] Medicare Advantage insurance plan.     Today she reports take Xarelto 15 mg once daily in the evening, she looked at starter pack and realized she should be taking twice a day. She thinks she only started taking it once a day this week.     Thrombocytosis:   Current medication: hydroxyurea 1000 mg Mon, Wed, Fri, 500 mg all other days  She follows with hematology and has been on hydroxyurea for a long time.     Platelet Count   Date Value Ref Range Status   03/04/2022 369 150 - 450 10e3/uL Final       Hypertension:   Current medications: lisinopril 5 mg once daily    Patient  does not self-monitor blood pressure.  Patient reports no current medication side effects.    Creatinine   Date Value Ref Range Status   03/05/2022 0.69 0.60 - 1.10 mg/dL Final     GFR Estimate   Date Value Ref Range Status   03/05/2022 86 >60 mL/min/1.73m2 Final       Hyperlipidemia:   Current medication(s): simvastatin 20 mg daily.    Patient reports no significant myalgias or other side effects.    Recent Labs   Lab Test 06/16/21  1327 02/12/19  0906   CHOL 183 200*   HDL 64 58   LDL 98 113   TRIG 107 146     Neuropathy:   Current medication: duloxetine 20 mg once daily   She thinks it has been helping some. She does have some restless legs symptoms has tried gabapentin but didn't tolerate.     GERD:   Current medications include: Pepcid (famotidine) 20 mg twice daily.  Patient feels that current regimen is effective. No side effects reported.    Insomnia:   Current medication: trazodone 100 mg at bedtime  She thinks trazodone does help some with sleep. She has trouble staying asleep. No side effects reported.     Constipation:   Current medication: Miralax 1 scoop daily as needed   She uses Miralax as needed for constipation. No issues with loose stools reported.     Scalp psoriasis:   Current medications:   betamethasone dipropionate 0.05% lotion nightly x2 weeks, then 2 weeks off  ketoconazole shampoo 2-3 times a week  Following with dermatology. Started stronger steroid lotion and this has helped a lot. She was also prescribed shampoo. No longer using triamcinolone cream.     Supplements:   Currently taking:  Vitamin C 500 mg daily  Biotin 5000 mcg daily  Calcium-D 600-20 mcg 1 tablet twice a day (spring [place])   Takes these for general health, bone health and hair. No issues reported.       Today's Vitals: There were no vitals taken for this visit.  ----------------  Post Discharge Medication Reconciliation Status: discharge medications reconciled and changed, per note/orders.    I spent 35 minutes with  this patient today. All changes were made via collaborative practice agreement with Dyan Giang MD. A copy of the visit note was provided to the patient's provider(s).    The patient was sent via [employer] a summary of these recommendations.     Varsha Modi, PharmD, BCACP  Medication Therapy Management Pharmacist  [facility]  Pager: [phone]      Telemedicine Visit Details  Type of service:  Telephone visit  Start Time: 11:02 AM  End Time: 11:37 AM  Originating Location (patient location): [place]  Distant Location (provider location):  [facility]     Medication Therapy Recommendations  Essential hypertension    Current Medication: lisinopril (ZESTRIL) 5 MG tablet   Rationale: Incorrect administration - Dosage too low - Effectiveness   Recommendation: Change Administration Time   Status: Patient Agreed - Adherence/Education         Pulmonary emboli (H)    Current Medication: XARELTO STARTER PACK ANTICOAGULANT 15 & 20 MG TBPK   Rationale: Does not understand instructions - Adherence - Adherence   Recommendation: Provide Education   Status: Patient Agreed - Adherence/Education

## 2022-04-28 NOTE — LETTER
_  Medication List        Prepared on: 5/4/2022     Bring your Medication List when you go to the doctor, hospital, or   emergency room. And, share it with your family or caregivers.     Note any changes to how you take your medications.  Cross out medications when you no longer use them.    Medication How I take it Why I use it Prescriber   betamethasone dipropionate (DIPROSONE) 0.05 % external lotion APPLY TO AFFECTED AREA ON SCALP ONCE NIGHTLY FOR UP TO 2 WEEKS AT A TIME THEN 2 WEEK BREAK. RESUME AS NEEDED Psoriasis Jonny Todd MD   Biotin 5000 MCG TABS Take 5,000 mcg by mouth daily General Health Self   Calcium Carb-Cholecalciferol (CALCIUM 600+D) 600-800 MG-UNIT TABS Take 1 tablet by mouth 2 times daily General Health Self   DULoxetine (CYMBALTA) 20 MG capsule Take 20 mg by mouth daily Disease of the Peripheral Nerves Dyan Giang MD   famotidine (PEPCID) 20 MG tablet TAKE ONE TABLET BY MOUTH TWICE A DAY Reflux Esophagitis Reentta Carrillo MD   hydroxyurea (HYDREA) 500 MG capsule TAKE ONE CAPSULE BY MOUTH EVERY DAY FOR 4 DAYS A WEEK AND TAKE TWO CAPSULES BY MOUTH EVERY DAY FOR 3 DAYS A WEEK Essential Thrombocythemia (H) Camden Bourne MD   ketoconazole (NIZORAL) 2 % external shampoo WASH SCALP 2-3X WEEKLY IN THE SHOWER LATHER AND LET SIT FEW MINUTES BEFORE RINSING Tinea Versicolor Jonny Todd MD   lisinopril (ZESTRIL) 5 MG tablet Take 5 mg by mouth daily High Blood Pressure Disorder Dyan Giang MD   polyethylene glycol (MIRALAX) 17 GM/Dose powder Take 1 capful by mouth daily as needed for constipation Constipation Self   simvastatin (ZOCOR) 20 MG tablet Take 20 mg by mouth At Bedtime High Cholesterol Dyan Giang MD   traZODone (DESYREL) 100 MG tablet Take 100 mg by mouth At Bedtime Trouble Sleeping Dyan Giang MD   vitamin C (ASCORBIC ACID) 500 MG tablet Take 500 mg by mouth daily General Health Self   XARELTO STARTER PACK ANTICOAGULANT 15 & 20 MG TBPK Take 15 mg by mouth  twice a day for 21 days, then take 20 mg once daily  Dyan Giang MD         Add new medications, over-the-counter drugs, herbals, vitamins, or  minerals in the blank rows below.    Medication How I take it Why I use it Prescriber                          Allergies:      adhesive tape-silicones [adhesive tape]; penicillins        Side effects I have had:               Other Information:              My notes and questions:

## 2022-04-28 NOTE — LETTER
May 4, 2022  Libertad Hernadez  [address] [address]    Dear Ms. Hernadez, [facility]        Thank you for talking with me on Apr 28, 2022 about your health and medications. As a follow-up to our conversation, I have included two documents:      1. Your Recommended To-Do List has steps you should take to get the best results from your medications.  2. Your Medication List will help you keep track of your medications and how to take them.    If you want to talk about these documents, please call Varsha Modi PharmD at phone: [phone], Monday-Friday 8-4:30pm.    I look forward to working with you and your doctors to make sure your medications work well for you.    Sincerely,    Varsha Modi, Yash  [facility] Pharmacist, [facility]

## 2022-05-04 VITALS — HEART RATE: 66 BPM | SYSTOLIC BLOOD PRESSURE: 144 MMHG | DIASTOLIC BLOOD PRESSURE: 68 MMHG

## 2022-05-04 RX ORDER — RIVAROXABAN 15 MG-20MG
KIT ORAL
COMMUNITY
Start: 2022-04-15 | End: 2022-05-19

## 2022-05-04 RX ORDER — DULOXETIN HYDROCHLORIDE 20 MG/1
20 CAPSULE, DELAYED RELEASE ORAL DAILY
COMMUNITY

## 2022-05-04 RX ORDER — TRAZODONE HYDROCHLORIDE 100 MG/1
100 TABLET ORAL AT BEDTIME
COMMUNITY

## 2022-05-04 RX ORDER — SIMVASTATIN 20 MG
20 TABLET ORAL AT BEDTIME
COMMUNITY

## 2022-05-04 RX ORDER — POLYETHYLENE GLYCOL 3350 17 G/17G
1 POWDER, FOR SOLUTION ORAL DAILY PRN
COMMUNITY

## 2022-05-04 RX ORDER — LISINOPRIL 5 MG/1
5 TABLET ORAL DAILY
COMMUNITY

## 2022-05-05 NOTE — PATIENT INSTRUCTIONS
"Recommendations from today's MTM visit:                                                    MTM (medication therapy management) is a service provided by a clinical pharmacist designed to help you get the most of out of your medicines.   Today we reviewed what your medicines are for, how to know if they are working, that your medicines are safe and how to make your medicine regimen as easy as possible.      1. Finish Xarelto starter pack, 15 mg twice a day this week and next week, then 20 mg once daily for 1 week.   2. Once you are done with that stop Xarelto and start Eliquis 5 mg twice a day the next day.   Sent prescription for Eliquis 5 mg twice a day to [facility] with 1 month free trial card information. You will get first 30 days free, then we will see what the cost is with your insurance.     3. Move lisinopril dose from morning to evening    Follow-up: Return in 18 days (on 5/16/2022) for medication therapy management, with me, using a phone visit at 1:00 PM.    It was great speaking with you today.  I value your experience and would be very thankful for your time in providing feedback in our clinic survey. In the next few days, you may receive an email or text message from [employer] with a link to a survey related to your  clinical pharmacist.\"     To schedule another MTM appointment, please call the MTM scheduling line at [phone].    My Clinical Pharmacist's contact information:                                                      Please feel free to contact me with any questions or concerns you have.      Varsha Modi, PharmD, BCACP  Medication Therapy Management Pharmacist  [facility]  Pager: [phone]         "

## 2022-05-16 ENCOUNTER — VIRTUAL VISIT (OUTPATIENT)
Dept: PHARMACY | Facility: PHYSICIAN GROUP | Age: 82
End: 2022-05-16
Payer: COMMERCIAL

## 2022-05-16 DIAGNOSIS — I26.99 ACUTE PULMONARY EMBOLISM, UNSPECIFIED PULMONARY EMBOLISM TYPE, UNSPECIFIED WHETHER ACUTE COR PULMONALE PRESENT (H): Primary | ICD-10-CM

## 2022-05-16 PROCEDURE — 99606 MTMS BY PHARM EST 15 MIN: CPT | Performed by: PHARMACIST

## 2022-05-16 PROCEDURE — 99607 MTMS BY PHARM ADDL 15 MIN: CPT | Performed by: PHARMACIST

## 2022-05-16 NOTE — PROGRESS NOTES
Medication Therapy Management (MTM) Encounter    ASSESSMENT:                            Medication Adherence/Access: No issues identified    Pulmonary embolism: Transition from Xarelto to Eliquis appears to be going well and is more affordable. If itching sensation she is experiencing was from Xarelto it may continue to improve with time. Hair loss has been reported as a side effect of anticoagulation therapy so there is the potential this would be contributing. All anticoagulants have this risk. She is eligible to use 30-day free Eliquis trial and call to pharmacy is needed to apply this for next fill.     PLAN:                            1. Continue Eliquis 5 mg twice a day.   2. I called [facility] and verified you will be able to use 30-day free Eliquis card on next refill. They are going to apply it now so you can pick it up later this week.     Follow-up: Return in 4 weeks (on 6/13/2022) for medication therapy management, with me, using a phone visit at 1:00 PM.    SUBJECTIVE/OBJECTIVE:                          Libertad Hernadez is a 81 year old female called for a follow-up visit.  Today's visit is a follow-up MTM visit from 4/28/2022.     Reason for visit: Anticoagulation medication management.    Allergies/ADRs: Reviewed in chart  Past Medical History: Reviewed in chart  Tobacco: She reports that she has never smoked. She has never used smokeless tobacco.  Alcohol: Less than 1 beverage / month    Medication Adherence/Access: no issues reported. Cost for Eliquis was lower than she paid for Xarelto.     Pulmonary embolism:   Current medication: Eliquis 5 mg twice a day   She finished Xarelto twice a day dose then switched to Eliquis about a week and half ago. Cost was $47 for 1 month, Alessandra did not apply the 30 day free trial that was sent on the prescription. Cost was still more affordable than Xarelto was. She is not experiencing any signs/symptoms of significant bruising or bleeding. Her breathing and chest tightness  remains improved. She still has itching feeling underneath skin, no rash present. She did see a neurologist today and they didn't think it was nerve related. It seems to come and go. Dermatologist through it was tinea versicolor and prescribed topical treatment and shampoo. She stopped using the shampoo because it was causing hair loss. She has a follow-up with hematology in June. Dr. Giang said she will need to continue anticoagulation for at least 6 months but also wants input from hematology.    Today's Vitals: There were no vitals taken for this visit.  ----------------      I spent 20 minutes with this patient today. All changes were made via collaborative practice agreement with Dyan Giang MD. A copy of the visit note was provided to the patient's provider(s).    The patient was sent via [employer] a summary of these recommendations.     Varsha Modi, PharmD, BCACP  Medication Therapy Management Pharmacist  [facility]  Pager: [phone]    Telemedicine Visit Details  Type of service:  Telephone visit  Start Time: 1:02 PM  End Time: 1:22 PM  Originating Location (patient location): Home  Distant Location (provider location):  [facility]     Medication Therapy Recommendations  No medication therapy recommendations to display

## 2022-05-19 NOTE — PATIENT INSTRUCTIONS
"Recommendations from today's MTM visit:                                                    MTM (medication therapy management) is a service provided by a clinical pharmacist designed to help you get the most of out of your medicines.      1. Continue Eliquis 5 mg twice a day.   2. I called [facility] and verified you will be able to use 30-day free Eliquis card on next refill. They are going to apply it now so you can pick it up later this week.     Follow-up: Return in 4 weeks (on 6/13/2022) for medication therapy management, with me, using a phone visit at 1:00 PM.    It was great speaking with you today.  I value your experience and would be very thankful for your time in providing feedback in our clinic survey. In the next few days, you may receive an email or text message from [employer] with a link to a survey related to your  clinical pharmacist.\"     To schedule another MTM appointment, please call the MTM scheduling line at [phone].    My Clinical Pharmacist's contact information:                                                      Please feel free to contact me with any questions or concerns you have.      Varsha Modi, PharmD, BCACP  Medication Therapy Management Pharmacist  [facility]  Pager: [phone]         "

## 2022-06-20 ENCOUNTER — VIRTUAL VISIT (OUTPATIENT)
Dept: PHARMACY | Facility: PHYSICIAN GROUP | Age: 82
End: 2022-06-20
Payer: COMMERCIAL

## 2022-06-20 DIAGNOSIS — I26.99 ACUTE PULMONARY EMBOLISM, UNSPECIFIED PULMONARY EMBOLISM TYPE, UNSPECIFIED WHETHER ACUTE COR PULMONALE PRESENT (H): Primary | ICD-10-CM

## 2022-06-20 DIAGNOSIS — B36.0 TINEA VERSICOLOR: ICD-10-CM

## 2022-06-20 PROCEDURE — 99606 MTMS BY PHARM EST 15 MIN: CPT | Performed by: PHARMACIST

## 2022-06-20 PROCEDURE — 99607 MTMS BY PHARM ADDL 15 MIN: CPT | Performed by: PHARMACIST

## 2022-06-20 NOTE — PROGRESS NOTES
Medication Therapy Management (MTM) Encounter    ASSESSMENT:                            Medication Adherence/Access: No issues identified    Pulmonary embolism: Stable. Continues on Eliquis for anticoagulation.     Tinea versicolor: If topical therapy is not effective, may consider course of oral azole antifungal therapy. Recommended dose for condition is 300 mg once weekly for 2 weeks.     PLAN:                            1. Continue current medications  2. Talk more with Dr. Giang at your next visit about if you would qualify for pills for your scalp condition    Follow-up: Return in 13 weeks (on 9/19/2022) for medication therapy management, with me, using a phone visit at 1:00 pm.    SUBJECTIVE/OBJECTIVE:                          Libertad Hernadez is a 81 year old female called for a follow-up visit.  Today's visit is a follow-up MTM visit from 5/16/2022.   Reason for visit: Anticoagulation follow-up.    Allergies/ADRs: Reviewed in chart  Past Medical History: Reviewed in chart  Tobacco: She reports that she has never smoked. She has never used smokeless tobacco.  Alcohol: Less than 1 beverage / month    Medication Adherence/Access: no issues reported    Pulmonary embolism:   Current medication: Eliquis 5 mg twice a day  She will continue taking for 6 months. Started it in March. No current issues with significant bruising or bleeding. Cost has been manageable although she does not think she was able to use free-trial card despite discussion with [facility] pharmacist after our last visit.     Tinea versicolor:   Current medication(s):   betamethasone 0.05% lotion   fluocinonide 0.05% solution  She had been seen by dermatology. She isn't able to continue ketoconazole shampoo because she was losing hair and having a hard time with managing her hair. Derm recommended laser treatments but the cost of this would be expensive. They did not discuss any oral treatments. She does think the topical steroids help with itching but  do not make it go away. She has a hard time using any of the topicals though to get them to her scalp.     Today's Vitals: There were no vitals taken for this visit.  ----------------    I spent 15 minutes with this patient today. All changes were made via collaborative practice agreement with Dyan Giang MD. A copy of the visit note was provided to the patient's provider(s).    The patient was sent via [employer] a summary of these recommendations.     Varsha Modi, PharmD, BCACP  Medication Therapy Management Pharmacist  [facility]  Pager: [phone]      Telemedicine Visit Details  Type of service:  Telephone visit  Start Time: 1:00 PM  End Time: 1:15 PM  Originating Location (patient location): [place]  Distant Location (provider location):  [facility]     Medication Therapy Recommendations  No medication therapy recommendations to display

## 2022-06-22 VITALS — HEART RATE: 96 BPM | SYSTOLIC BLOOD PRESSURE: 128 MMHG | DIASTOLIC BLOOD PRESSURE: 68 MMHG

## 2022-06-22 RX ORDER — FLUOCINONIDE TOPICAL SOLUTION USP, 0.05% 0.5 MG/ML
SOLUTION TOPICAL 2 TIMES DAILY PRN
COMMUNITY

## 2022-06-22 NOTE — PATIENT INSTRUCTIONS
"Recommendations from today's MTM visit:                                                    MTM (medication therapy management) is a service provided by a clinical pharmacist designed to help you get the most of out of your medicines.      1. Continue current medications  2. Talk more with Dr. Giang at your next visit about if you would qualify for pills for your scalp condition     Follow-up: Return in 13 weeks (on 9/19/2022) for medication therapy management, with me, using a phone visit at 1:00 pm.    It was great speaking with you today.  I value your experience and would be very thankful for your time in providing feedback in our clinic survey. In the next few days, you may receive an email or text message from [employer] with a link to a survey related to your  clinical pharmacist.\"     To schedule another MTM appointment, please call the MTM scheduling line at [phone].    My Clinical Pharmacist's contact information:                                                      Please feel free to contact me with any questions or concerns you have.      Varsha Modi, PharmD, BCACP  Medication Therapy Management Pharmacist  [facility]  Pager: [phone]         "

## 2022-06-25 ENCOUNTER — APPOINTMENT (OUTPATIENT)
Dept: CT IMAGING | Facility: CLINIC | Age: 82
End: 2022-06-25
Attending: EMERGENCY MEDICINE
Payer: COMMERCIAL

## 2022-06-25 ENCOUNTER — HOSPITAL ENCOUNTER (EMERGENCY)
Facility: CLINIC | Age: 82
Discharge: HOME | End: 2022-06-25
Attending: EMERGENCY MEDICINE | Admitting: EMERGENCY MEDICINE
Payer: COMMERCIAL

## 2022-06-25 VITALS
SYSTOLIC BLOOD PRESSURE: 124 MMHG | OXYGEN SATURATION: 95 % | RESPIRATION RATE: 18 BRPM | DIASTOLIC BLOOD PRESSURE: 89 MMHG | TEMPERATURE: 97.7 F | HEART RATE: 61 BPM

## 2022-06-25 DIAGNOSIS — R07.9 CHEST PAIN, UNSPECIFIED TYPE: ICD-10-CM

## 2022-06-25 LAB
ALBUMIN SERPL-MCNC: 3.6 G/DL (ref 3.5–5)
ALP SERPL-CCNC: 68 U/L (ref 45–120)
ALT SERPL W P-5'-P-CCNC: 12 U/L (ref 0–45)
ANION GAP SERPL CALCULATED.3IONS-SCNC: 7 MMOL/L (ref 5–18)
AST SERPL W P-5'-P-CCNC: 19 U/L (ref 0–40)
ATRIAL RATE - MUSE: 71 BPM
BASOPHILS # BLD AUTO: 0.1 10E3/UL (ref 0–0.2)
BASOPHILS NFR BLD AUTO: 2 %
BILIRUB SERPL-MCNC: 0.5 MG/DL (ref 0–1)
BUN SERPL-MCNC: 14 MG/DL (ref 8–28)
CALCIUM SERPL-MCNC: 9.5 MG/DL (ref 8.5–10.5)
CHLORIDE BLD-SCNC: 108 MMOL/L (ref 98–107)
CO2 SERPL-SCNC: 26 MMOL/L (ref 22–31)
CREAT SERPL-MCNC: 0.76 MG/DL (ref 0.6–1.1)
DIASTOLIC BLOOD PRESSURE - MUSE: NORMAL MMHG
EOSINOPHIL # BLD AUTO: 0.3 10E3/UL (ref 0–0.7)
EOSINOPHIL NFR BLD AUTO: 5 %
ERYTHROCYTE [DISTWIDTH] IN BLOOD BY AUTOMATED COUNT: 15.6 % (ref 10–15)
GFR SERPL CREATININE-BSD FRML MDRD: 78 ML/MIN/1.73M2
GLUCOSE BLD-MCNC: 86 MG/DL (ref 70–125)
HCT VFR BLD AUTO: 43.9 % (ref 35–47)
HGB BLD-MCNC: 14.4 G/DL (ref 11.7–15.7)
HOLD SPECIMEN: NORMAL
IMM GRANULOCYTES # BLD: 0 10E3/UL
IMM GRANULOCYTES NFR BLD: 0 %
INTERPRETATION ECG - MUSE: NORMAL
LIPASE SERPL-CCNC: 81 U/L (ref 0–52)
LYMPHOCYTES # BLD AUTO: 1.4 10E3/UL (ref 0.8–5.3)
LYMPHOCYTES NFR BLD AUTO: 20 %
MCH RBC QN AUTO: 28.9 PG (ref 26.5–33)
MCHC RBC AUTO-ENTMCNC: 32.8 G/DL (ref 31.5–36.5)
MCV RBC AUTO: 88 FL (ref 78–100)
MONOCYTES # BLD AUTO: 0.7 10E3/UL (ref 0–1.3)
MONOCYTES NFR BLD AUTO: 10 %
NEUTROPHILS # BLD AUTO: 4.4 10E3/UL (ref 1.6–8.3)
NEUTROPHILS NFR BLD AUTO: 63 %
NRBC # BLD AUTO: 0 10E3/UL
NRBC BLD AUTO-RTO: 0 /100
P AXIS - MUSE: 57 DEGREES
PLATELET # BLD AUTO: 352 10E3/UL (ref 150–450)
POTASSIUM BLD-SCNC: 4.2 MMOL/L (ref 3.5–5)
PR INTERVAL - MUSE: 154 MS
PROT SERPL-MCNC: 6.9 G/DL (ref 6–8)
QRS DURATION - MUSE: 86 MS
QT - MUSE: 388 MS
QTC - MUSE: 421 MS
R AXIS - MUSE: 7 DEGREES
RBC # BLD AUTO: 4.98 10E6/UL (ref 3.8–5.2)
SODIUM SERPL-SCNC: 141 MMOL/L (ref 136–145)
SYSTOLIC BLOOD PRESSURE - MUSE: NORMAL MMHG
T AXIS - MUSE: 76 DEGREES
TROPONIN I SERPL-MCNC: <0.01 NG/ML (ref 0–0.29)
TROPONIN I SERPL-MCNC: <0.01 NG/ML (ref 0–0.29)
VENTRICULAR RATE- MUSE: 71 BPM
WBC # BLD AUTO: 6.9 10E3/UL (ref 4–11)

## 2022-06-25 PROCEDURE — 85025 COMPLETE CBC W/AUTO DIFF WBC: CPT | Performed by: EMERGENCY MEDICINE

## 2022-06-25 PROCEDURE — 74174 CTA ABD&PLVS W/CONTRAST: CPT

## 2022-06-25 PROCEDURE — 250N000011 HC RX IP 250 OP 636: Performed by: EMERGENCY MEDICINE

## 2022-06-25 PROCEDURE — 99285 EMERGENCY DEPT VISIT HI MDM: CPT | Mod: 25

## 2022-06-25 PROCEDURE — 71275 CT ANGIOGRAPHY CHEST: CPT

## 2022-06-25 PROCEDURE — 80053 COMPREHEN METABOLIC PANEL: CPT | Performed by: EMERGENCY MEDICINE

## 2022-06-25 PROCEDURE — 84484 ASSAY OF TROPONIN QUANT: CPT | Performed by: EMERGENCY MEDICINE

## 2022-06-25 PROCEDURE — 83690 ASSAY OF LIPASE: CPT | Performed by: EMERGENCY MEDICINE

## 2022-06-25 PROCEDURE — 93005 ELECTROCARDIOGRAM TRACING: CPT | Performed by: EMERGENCY MEDICINE

## 2022-06-25 PROCEDURE — 36415 COLL VENOUS BLD VENIPUNCTURE: CPT | Performed by: EMERGENCY MEDICINE

## 2022-06-25 PROCEDURE — 250N000013 HC RX MED GY IP 250 OP 250 PS 637: Performed by: EMERGENCY MEDICINE

## 2022-06-25 RX ORDER — SENNOSIDES 8.6 MG
650 CAPSULE ORAL EVERY 8 HOURS PRN
Qty: 15 TABLET | Refills: 0 | Status: SHIPPED | OUTPATIENT
Start: 2022-06-25 | End: 2022-11-17

## 2022-06-25 RX ORDER — ACETAMINOPHEN 325 MG/1
650 TABLET ORAL ONCE
Status: COMPLETED | OUTPATIENT
Start: 2022-06-25 | End: 2022-06-25

## 2022-06-25 RX ORDER — MORPHINE SULFATE 4 MG/ML
4 INJECTION, SOLUTION INTRAMUSCULAR; INTRAVENOUS
Status: DISCONTINUED | OUTPATIENT
Start: 2022-06-25 | End: 2022-06-25 | Stop reason: HOSPADM

## 2022-06-25 RX ORDER — ONDANSETRON 2 MG/ML
4 INJECTION INTRAMUSCULAR; INTRAVENOUS EVERY 30 MIN PRN
Status: DISCONTINUED | OUTPATIENT
Start: 2022-06-25 | End: 2022-06-25 | Stop reason: HOSPADM

## 2022-06-25 RX ORDER — IOPAMIDOL 755 MG/ML
100 INJECTION, SOLUTION INTRAVASCULAR ONCE
Status: COMPLETED | OUTPATIENT
Start: 2022-06-25 | End: 2022-06-25

## 2022-06-25 RX ORDER — LIDOCAINE 50 MG/G
1 PATCH TOPICAL EVERY 24 HOURS
Qty: 15 PATCH | Refills: 0 | Status: ON HOLD | OUTPATIENT
Start: 2022-06-25 | End: 2022-11-22

## 2022-06-25 RX ORDER — NAPROXEN 500 MG/1
500 TABLET ORAL 2 TIMES DAILY WITH MEALS
Qty: 16 TABLET | Refills: 0 | Status: SHIPPED | OUTPATIENT
Start: 2022-06-25 | End: 2022-07-03

## 2022-06-25 RX ORDER — ASPIRIN 325 MG
325 TABLET ORAL ONCE
Status: COMPLETED | OUTPATIENT
Start: 2022-06-25 | End: 2022-06-25

## 2022-06-25 RX ORDER — NITROGLYCERIN 80 MG/1
1 PATCH TRANSDERMAL ONCE
Status: DISCONTINUED | OUTPATIENT
Start: 2022-06-25 | End: 2022-06-25 | Stop reason: HOSPADM

## 2022-06-25 RX ORDER — IBUPROFEN 600 MG/1
600 TABLET, FILM COATED ORAL ONCE
Status: COMPLETED | OUTPATIENT
Start: 2022-06-25 | End: 2022-06-25

## 2022-06-25 RX ADMIN — IOPAMIDOL 100 ML: 755 INJECTION, SOLUTION INTRAVENOUS at 11:46

## 2022-06-25 RX ADMIN — IBUPROFEN 600 MG: 600 TABLET ORAL at 14:24

## 2022-06-25 RX ADMIN — ASPIRIN 325 MG ORAL TABLET 325 MG: 325 PILL ORAL at 10:51

## 2022-06-25 RX ADMIN — NITROGLYCERIN 1 PATCH: 0.4 PATCH TRANSDERMAL at 12:41

## 2022-06-25 RX ADMIN — ACETAMINOPHEN 650 MG: 325 TABLET ORAL at 14:23

## 2022-06-25 ASSESSMENT — ENCOUNTER SYMPTOMS
ABDOMINAL PAIN: 0
SHORTNESS OF BREATH: 1
FEVER: 0

## 2022-06-25 NOTE — ED TRIAGE NOTES
pt c/o left sided chest pain for 2 weeks, got worse today and radiates into back today, pt was standing in closet when pain came on today.  Denies SOB, denies n/v, denies feeling dizzy or lightheaded. Denies cardiac Hx.

## 2022-06-25 NOTE — ED PROVIDER NOTES
EMERGENCY DEPARTMENT ENCOUNTER       ED Course & Medical Decision Making     I saw and examined the patient.  IV was established and she was placed on the monitor.  Diagnostics were ordered.    She was given aspirin and offered morphine but by the time this was ordered she states that her pain had completely resolved.  She declines morphine.    She was having chest pain radiating towards her back I did do a CTA chest abdomen pelvis which returned unremarkable.  Initial laboratory work-up including liver enzymes, lipase, troponin and CBC returns unremarkable.  Given her age I do feel that a delta troponin would be indicated and that is pending.  On reevaluation she is feeling better and she is now pinpointed that it does get worse with certain movements which would favor musculoskeletal.  Therefore I do not feel that she would require an observational admission for this but referral to rapid access for her age would be indicated.    She would like to be discharged and if her second troponin returns negative I will discharge her to follow-up with rapid access          Prior to making a final disposition on this patient the results of patient's tests and other diagnostic studies were discussed with the patient. All questions were answered. Patient expressed understanding of the plan and was amenable to it.    Medications   ondansetron (ZOFRAN) injection 4 mg (has no administration in time range)   morphine (PF) injection 4 mg (4 mg Intravenous Not Given 6/25/22 1222)   nitroGLYcerin (NITRODUR) 0.4 MG/HR 24 hr patch 1 patch (1 patch Transdermal Patch/Med Applied 6/25/22 1241)   nitroGLYcerin (NITRODUR) Patch in Place ( Transdermal Patch in Place 6/25/22 1334)   acetaminophen (TYLENOL) tablet 650 mg (has no administration in time range)   ibuprofen (ADVIL/MOTRIN) tablet 600 mg (has no administration in time range)   aspirin (ASA) tablet 325 mg (325 mg Oral Given 6/25/22 1051)   iopamidol (ISOVUE-370) solution 100 mL (100  mLs Intravenous Given 6/25/22 1146)       Final Impression     1. Chest pain, unspecified type            Chief Complaint     Chief Complaint   Patient presents with     Chest Pain       Pt holding chest in triage and moaning out. Taken back to ER bed.             pt c/o left sided chest pain for 2 weeks, got worse today and radiates into back today, pt was standing in closet when pain came on today.  Denies SOB, denies n/v, denies feeling dizzy or lightheaded. Denies cardiac Hx.       HPI       Libertad Hernadez is a 81 year old female who presents for evaluation of left-sided chest pain that she has had for a couple of weeks.  Its been much more severe in the last couple of hours.  It sharp and it radiates towards her back.  She knows of nothing that changes it but it seems to wax and wane spontaneously.  She has not take any medications for it.  No cough, chills or fever.  No abdominal pain.  No other complaints.        Past Medical History     Past Medical History:   Diagnosis Date     Arthritis      Asymptomatic varicose veins      Disorder of bone and cartilage, unspecified      Essential thrombocytosis (H)      Gastroesophageal reflux disease      Malignant neoplasm of breast (female), unspecified site      Motion sickness      Other and unspecified hyperlipidemia      PE (pulmonary thromboembolism) (H)      PONV (postoperative nausea and vomiting)      Primary cancer of bone marrow (H)      Thrombosis      Unspecified essential hypertension      Unspecified hereditary and idiopathic peripheral neuropathy      Past Surgical History:   Procedure Laterality Date     ARTHROPLASTY, HIP, TOTAL, DIRECT ANTERIOR APPROACH, USING HANA TABLE Right 3/4/2022    Procedure: RIGHT TOTAL HIP ARTHROPLASTY, DIRECT ANTERIOR;  Surgeon: Pineda Rowe MD;  Location: [facility] Main OR     FOOT SURGERY  2010     HC REMOVAL ADENOIDS,PRIMARY,<13 Y/O      Description: Adenoidectomy;  Recorded: 04/04/2008;     HC REMOVAL GALLBLADDER       Description: Cholecystectomy;  Recorded: 04/04/2008;     HC REMOVAL OF TONSILS,<11 Y/O      Description: Tonsillectomy;  Recorded: 04/04/2008;     PA INCISE FINGER TENDON SHEATH      Description: Hand Incision Tendon Sheath Of A Finger;  Recorded: 04/04/2008;     PA MASTECTOMY, PARTIAL      Description: Right Breast Partial Mastectomy Inferior Lateral Quarter;  Recorded: 10/21/2010;     PA MASTECTOMY, RADICAL      Description: Radical Mastectomy Left Breast;  Recorded: 04/04/2008;     Family History   Problem Relation Age of Onset     Hypertension Father      Heart Disease Father      Diabetes Sister      Prostate Cancer Brother       Social History     Tobacco Use     Smoking status: Never Smoker     Smokeless tobacco: Never Used   Substance Use Topics     Alcohol use: Yes     Alcohol/week: 1.0 standard drink     Comment: very rarely     Drug use: No       Relevant past medical, surgical, family and social history as documented above, has been reviewed and discussed with patient. No changes or additions, unless otherwise noted in the HPI.    Current Medications     acetaminophen (TYLENOL) 650 MG CR tablet  lidocaine (LIDODERM) 5 % patch  naproxen (NAPROSYN) 500 MG tablet  apixaban ANTICOAGULANT (ELIQUIS) 5 MG tablet  betamethasone dipropionate (DIPROSONE) 0.05 % external lotion  Biotin 5000 MCG TABS  Calcium Carb-Cholecalciferol (CALCIUM 600+D) 600-800 MG-UNIT TABS  DULoxetine (CYMBALTA) 20 MG capsule  famotidine (PEPCID) 20 MG tablet  fluocinonide (LIDEX) 0.05 % external solution  hydroxyurea (HYDREA) 500 MG capsule  lisinopril (ZESTRIL) 5 MG tablet  polyethylene glycol (MIRALAX) 17 GM/Dose powder  simvastatin (ZOCOR) 20 MG tablet  traZODone (DESYREL) 100 MG tablet  vitamin C (ASCORBIC ACID) 500 MG tablet        Allergies     Allergies   Allergen Reactions     Adhesive Tape-Silicones [Adhesive Tape] Itching     States itchy rash with certain tapes     Penicillins Hives       Review of Systems     Review of Systems    Constitutional: Negative for fever.   Respiratory: Positive for shortness of breath.    Cardiovascular: Positive for chest pain.   Gastrointestinal: Negative for abdominal pain.   All other systems reviewed and are negative.       Remainder of systems reviewed, unless noted in HPI all others negative.    Physical Exam     BP (!) 160/84   Pulse 65   Temp 97.7  F (36.5  C) (Temporal)   Resp 18   SpO2 96%     Physical Exam  Vitals and nursing note reviewed.   HENT:      Head: Normocephalic.      Nose: Nose normal.   Cardiovascular:      Rate and Rhythm: Normal rate.   Pulmonary:      Effort: Pulmonary effort is normal.   Neurological:      Mental Status: She is alert. Mental status is at baseline.   Psychiatric:         Mood and Affect: Mood normal.             Labs & Imaging         Labs Ordered and Resulted from Time of ED Arrival to Time of ED Departure   COMPREHENSIVE METABOLIC PANEL - Abnormal       Result Value    Sodium 141      Potassium 4.2      Chloride 108 (*)     Carbon Dioxide (CO2) 26      Anion Gap 7      Urea Nitrogen 14      Creatinine 0.76      Calcium 9.5      Glucose 86      Alkaline Phosphatase 68      AST 19      ALT 12      Protein Total 6.9      Albumin 3.6      Bilirubin Total 0.5      GFR Estimate 78     LIPASE - Abnormal    Lipase 81 (*)    CBC WITH PLATELETS AND DIFFERENTIAL - Abnormal    WBC Count 6.9      RBC Count 4.98      Hemoglobin 14.4      Hematocrit 43.9      MCV 88      MCH 28.9      MCHC 32.8      RDW 15.6 (*)     Platelet Count 352      % Neutrophils 63      % Lymphocytes 20      % Monocytes 10      % Eosinophils 5      % Basophils 2      % Immature Granulocytes 0      NRBCs per 100 WBC 0      Absolute Neutrophils 4.4      Absolute Lymphocytes 1.4      Absolute Monocytes 0.7      Absolute Eosinophils 0.3      Absolute Basophils 0.1      Absolute Immature Granulocytes 0.0      Absolute NRBCs 0.0     TROPONIN I - Normal    Troponin I <0.01     TROPONIN I         Results for  orders placed or performed during the hospital encounter of 06/25/22   CTA Chest Abdomen Pelvis w Contrast    Impression    IMPRESSION:  1.  No evidence of aortic dissection. No acute findings in the chest, abdomen, or pelvis.     Extra Blue Top Tube   Result Value Ref Range    Hold Specimen JIC    Extra Red Top Tube   Result Value Ref Range    Hold Specimen JIC    Extra Green Top (Lithium Heparin) Tube   Result Value Ref Range    Hold Specimen JIC    Extra Purple Top Tube   Result Value Ref Range    Hold Specimen JIC    Comprehensive metabolic panel   Result Value Ref Range    Sodium 141 136 - 145 mmol/L    Potassium 4.2 3.5 - 5.0 mmol/L    Chloride 108 (H) 98 - 107 mmol/L    Carbon Dioxide (CO2) 26 22 - 31 mmol/L    Anion Gap 7 5 - 18 mmol/L    Urea Nitrogen 14 8 - 28 mg/dL    Creatinine 0.76 0.60 - 1.10 mg/dL    Calcium 9.5 8.5 - 10.5 mg/dL    Glucose 86 70 - 125 mg/dL    Alkaline Phosphatase 68 45 - 120 U/L    AST 19 0 - 40 U/L    ALT 12 0 - 45 U/L    Protein Total 6.9 6.0 - 8.0 g/dL    Albumin 3.6 3.5 - 5.0 g/dL    Bilirubin Total 0.5 0.0 - 1.0 mg/dL    GFR Estimate 78 >60 mL/min/1.73m2   Result Value Ref Range    Lipase 81 (H) 0 - 52 U/L   Result Value Ref Range    Troponin I <0.01 0.00 - 0.29 ng/mL   CBC with platelets and differential   Result Value Ref Range    WBC Count 6.9 4.0 - 11.0 10e3/uL    RBC Count 4.98 3.80 - 5.20 10e6/uL    Hemoglobin 14.4 11.7 - 15.7 g/dL    Hematocrit 43.9 35.0 - 47.0 %    MCV 88 78 - 100 fL    MCH 28.9 26.5 - 33.0 pg    MCHC 32.8 31.5 - 36.5 g/dL    RDW 15.6 (H) 10.0 - 15.0 %    Platelet Count 352 150 - 450 10e3/uL    % Neutrophils 63 %    % Lymphocytes 20 %    % Monocytes 10 %    % Eosinophils 5 %    % Basophils 2 %    % Immature Granulocytes 0 %    NRBCs per 100 WBC 0 <1 /100    Absolute Neutrophils 4.4 1.6 - 8.3 10e3/uL    Absolute Lymphocytes 1.4 0.8 - 5.3 10e3/uL    Absolute Monocytes 0.7 0.0 - 1.3 10e3/uL    Absolute Eosinophils 0.3 0.0 - 0.7 10e3/uL    Absolute Basophils  0.1 0.0 - 0.2 10e3/uL    Absolute Immature Granulocytes 0.0 <=0.4 10e3/uL    Absolute NRBCs 0.0 10e3/uL   ECG 12-LEAD WITH MUSE (LHE)   Result Value Ref Range    Systolic Blood Pressure  mmHg    Diastolic Blood Pressure  mmHg    Ventricular Rate 71 BPM    Atrial Rate 71 BPM    GA Interval 154 ms    QRS Duration 86 ms     ms    QTc 421 ms    P Axis 57 degrees    R AXIS 7 degrees    T Axis 76 degrees    Interpretation ECG       Sinus rhythm with Premature supraventricular complexes  Nonspecific T wave abnormality  Abnormal ECG  When compared with ECG of 15-APR-2022 16:29,  Premature supraventricular complexes are now Present  Confirmed by SEE ED PROVIDER NOTE FOR, ECG INTERPRETATION ([id number]),  Terrance Hdz ([id number]) on 6/25/2022 1:48:21 PM         Juan Pablo Guardado MD  Emergency Medicine  [facility] EMERGENCY ROOM  [address] [facility] [id number]  [phone]  6/25/2022         Juan Pablo Guardado MD  06/25/22 [id number]

## 2022-07-11 ENCOUNTER — LAB REQUISITION (OUTPATIENT)
Dept: LAB | Facility: CLINIC | Age: 82
End: 2022-07-11

## 2022-07-11 DIAGNOSIS — R20.0 ANESTHESIA OF SKIN: ICD-10-CM

## 2022-07-11 DIAGNOSIS — E78.00 PURE HYPERCHOLESTEROLEMIA, UNSPECIFIED: ICD-10-CM

## 2022-07-11 LAB
CHOLEST SERPL-MCNC: 193 MG/DL
HDLC SERPL-MCNC: 81 MG/DL
LDLC SERPL CALC-MCNC: 89 MG/DL
NONHDLC SERPL-MCNC: 112 MG/DL
TRIGL SERPL-MCNC: 116 MG/DL
TSH SERPL DL<=0.005 MIU/L-ACNC: 1.7 UIU/ML (ref 0.3–4.2)
VIT B12 SERPL-MCNC: 429 PG/ML (ref 232–1245)

## 2022-07-11 PROCEDURE — 80061 LIPID PANEL: CPT | Performed by: FAMILY MEDICINE

## 2022-07-11 PROCEDURE — 84443 ASSAY THYROID STIM HORMONE: CPT | Performed by: FAMILY MEDICINE

## 2022-07-11 PROCEDURE — 82607 VITAMIN B-12: CPT | Performed by: FAMILY MEDICINE

## 2022-07-30 ENCOUNTER — HEALTH MAINTENANCE LETTER (OUTPATIENT)
Age: 82
End: 2022-07-30

## 2022-09-27 DIAGNOSIS — D47.3 ESSENTIAL THROMBOCYTHEMIA (H): ICD-10-CM

## 2022-09-27 RX ORDER — HYDROXYUREA 500 MG/1
CAPSULE ORAL
Refills: 2 | OUTPATIENT
Start: 2022-09-27

## 2022-09-27 NOTE — TELEPHONE ENCOUNTER
Last OV 1/26/21, note stated that she needed to switch providers due to [employer] insurance.   Called [facility] and informed.  Brisa Candelario RN

## 2022-10-09 ENCOUNTER — HEALTH MAINTENANCE LETTER (OUTPATIENT)
Age: 82
End: 2022-10-09

## 2022-10-31 ENCOUNTER — LAB REQUISITION (OUTPATIENT)
Dept: LAB | Facility: CLINIC | Age: 82
End: 2022-10-31

## 2022-10-31 DIAGNOSIS — R10.13 EPIGASTRIC PAIN: ICD-10-CM

## 2022-10-31 PROCEDURE — 83690 ASSAY OF LIPASE: CPT | Performed by: FAMILY MEDICINE

## 2022-10-31 PROCEDURE — 82150 ASSAY OF AMYLASE: CPT | Performed by: FAMILY MEDICINE

## 2022-11-01 LAB
AMYLASE SERPL-CCNC: 81 U/L (ref 28–100)
LIPASE SERPL-CCNC: 67 U/L (ref 13–60)

## 2022-11-09 ENCOUNTER — TRANSFERRED RECORDS (OUTPATIENT)
Dept: HEALTH INFORMATION MANAGEMENT | Facility: CLINIC | Age: 82
End: 2022-11-09

## 2022-11-15 ENCOUNTER — TRANSFERRED RECORDS (OUTPATIENT)
Dept: HEALTH INFORMATION MANAGEMENT | Facility: CLINIC | Age: 82
End: 2022-11-15

## 2022-11-15 LAB
EJECTION FRACTION: 66 %
EJECTION FRACTION: 66 %

## 2022-11-17 ENCOUNTER — LAB REQUISITION (OUTPATIENT)
Dept: LAB | Facility: CLINIC | Age: 82
End: 2022-11-17

## 2022-11-17 ENCOUNTER — TRANSFERRED RECORDS (OUTPATIENT)
Dept: HEALTH INFORMATION MANAGEMENT | Facility: CLINIC | Age: 82
End: 2022-11-17

## 2022-11-17 DIAGNOSIS — Z01.818 ENCOUNTER FOR OTHER PREPROCEDURAL EXAMINATION: ICD-10-CM

## 2022-11-17 PROCEDURE — 80048 BASIC METABOLIC PNL TOTAL CA: CPT | Performed by: NURSE PRACTITIONER

## 2022-11-17 RX ORDER — B-COMPLEX WITH VITAMIN C
250 TABLET ORAL DAILY
COMMUNITY

## 2022-11-18 LAB
ANION GAP SERPL CALCULATED.3IONS-SCNC: 15 MMOL/L (ref 7–15)
BUN SERPL-MCNC: 14.5 MG/DL (ref 8–23)
CALCIUM SERPL-MCNC: 9.4 MG/DL (ref 8.8–10.2)
CHLORIDE SERPL-SCNC: 102 MMOL/L (ref 98–107)
CREAT SERPL-MCNC: 0.74 MG/DL (ref 0.51–0.95)
DEPRECATED HCO3 PLAS-SCNC: 22 MMOL/L (ref 22–29)
GFR SERPL CREATININE-BSD FRML MDRD: 80 ML/MIN/1.73M2
GLUCOSE SERPL-MCNC: 97 MG/DL (ref 70–99)
POTASSIUM SERPL-SCNC: 3.9 MMOL/L (ref 3.4–5.3)
SODIUM SERPL-SCNC: 139 MMOL/L (ref 136–145)

## 2022-11-21 ENCOUNTER — ANESTHESIA EVENT (OUTPATIENT)
Dept: SURGERY | Facility: CLINIC | Age: 82
End: 2022-11-21
Payer: COMMERCIAL

## 2022-11-22 ENCOUNTER — ANESTHESIA (OUTPATIENT)
Dept: SURGERY | Facility: CLINIC | Age: 82
End: 2022-11-22
Payer: COMMERCIAL

## 2022-11-22 ENCOUNTER — HOSPITAL ENCOUNTER (OUTPATIENT)
Facility: CLINIC | Age: 82
Discharge: HOME | End: 2022-11-22
Attending: INTERNAL MEDICINE | Admitting: INTERNAL MEDICINE
Payer: COMMERCIAL

## 2022-11-22 VITALS
SYSTOLIC BLOOD PRESSURE: 122 MMHG | RESPIRATION RATE: 16 BRPM | OXYGEN SATURATION: 95 % | TEMPERATURE: 96.7 F | DIASTOLIC BLOOD PRESSURE: 58 MMHG | HEART RATE: 68 BPM

## 2022-11-22 LAB — UPPER GI ENDOSCOPY: NORMAL

## 2022-11-22 PROCEDURE — 999N000141 HC STATISTIC PRE-PROCEDURE NURSING ASSESSMENT: Performed by: INTERNAL MEDICINE

## 2022-11-22 PROCEDURE — 360N000075 HC SURGERY LEVEL 2, PER MIN: Performed by: INTERNAL MEDICINE

## 2022-11-22 PROCEDURE — 250N000011 HC RX IP 250 OP 636: Performed by: NURSE ANESTHETIST, CERTIFIED REGISTERED

## 2022-11-22 PROCEDURE — 250N000009 HC RX 250: Performed by: NURSE ANESTHETIST, CERTIFIED REGISTERED

## 2022-11-22 PROCEDURE — 370N000017 HC ANESTHESIA TECHNICAL FEE, PER MIN: Performed by: INTERNAL MEDICINE

## 2022-11-22 PROCEDURE — 88305 TISSUE EXAM BY PATHOLOGIST: CPT | Mod: TC | Performed by: INTERNAL MEDICINE

## 2022-11-22 PROCEDURE — 272N000001 HC OR GENERAL SUPPLY STERILE: Performed by: INTERNAL MEDICINE

## 2022-11-22 PROCEDURE — 258N000003 HC RX IP 258 OP 636: Performed by: ANESTHESIOLOGY

## 2022-11-22 PROCEDURE — 710N000012 HC RECOVERY PHASE 2, PER MINUTE: Performed by: INTERNAL MEDICINE

## 2022-11-22 PROCEDURE — 258N000003 HC RX IP 258 OP 636: Performed by: NURSE ANESTHETIST, CERTIFIED REGISTERED

## 2022-11-22 RX ORDER — LIDOCAINE HYDROCHLORIDE 10 MG/ML
INJECTION, SOLUTION INFILTRATION; PERINEURAL PRN
Status: DISCONTINUED | OUTPATIENT
Start: 2022-11-22 | End: 2022-11-22

## 2022-11-22 RX ORDER — FENTANYL CITRATE 50 UG/ML
25 INJECTION, SOLUTION INTRAMUSCULAR; INTRAVENOUS EVERY 5 MIN PRN
Status: CANCELLED | OUTPATIENT
Start: 2022-11-22

## 2022-11-22 RX ORDER — PROPOFOL 10 MG/ML
INJECTION, EMULSION INTRAVENOUS CONTINUOUS PRN
Status: DISCONTINUED | OUTPATIENT
Start: 2022-11-22 | End: 2022-11-22

## 2022-11-22 RX ORDER — NALOXONE HYDROCHLORIDE 0.4 MG/ML
0.4 INJECTION, SOLUTION INTRAMUSCULAR; INTRAVENOUS; SUBCUTANEOUS
Status: CANCELLED | OUTPATIENT
Start: 2022-11-22

## 2022-11-22 RX ORDER — ONDANSETRON 4 MG/1
4 TABLET, ORALLY DISINTEGRATING ORAL EVERY 30 MIN PRN
Status: DISCONTINUED | OUTPATIENT
Start: 2022-11-22 | End: 2022-11-22 | Stop reason: HOSPADM

## 2022-11-22 RX ORDER — NALOXONE HYDROCHLORIDE 0.4 MG/ML
0.2 INJECTION, SOLUTION INTRAMUSCULAR; INTRAVENOUS; SUBCUTANEOUS
Status: CANCELLED | OUTPATIENT
Start: 2022-11-22

## 2022-11-22 RX ORDER — LIDOCAINE 40 MG/G
CREAM TOPICAL
Status: DISCONTINUED | OUTPATIENT
Start: 2022-11-22 | End: 2022-11-22 | Stop reason: HOSPADM

## 2022-11-22 RX ORDER — PROPOFOL 10 MG/ML
INJECTION, EMULSION INTRAVENOUS PRN
Status: DISCONTINUED | OUTPATIENT
Start: 2022-11-22 | End: 2022-11-22

## 2022-11-22 RX ORDER — ONDANSETRON 2 MG/ML
4 INJECTION INTRAMUSCULAR; INTRAVENOUS EVERY 30 MIN PRN
Status: DISCONTINUED | OUTPATIENT
Start: 2022-11-22 | End: 2022-11-22 | Stop reason: HOSPADM

## 2022-11-22 RX ORDER — SODIUM CHLORIDE, SODIUM LACTATE, POTASSIUM CHLORIDE, CALCIUM CHLORIDE 600; 310; 30; 20 MG/100ML; MG/100ML; MG/100ML; MG/100ML
INJECTION, SOLUTION INTRAVENOUS CONTINUOUS
Status: DISCONTINUED | OUTPATIENT
Start: 2022-11-22 | End: 2022-11-22 | Stop reason: HOSPADM

## 2022-11-22 RX ORDER — HYDROMORPHONE HCL IN WATER/PF 6 MG/30 ML
0.2 PATIENT CONTROLLED ANALGESIA SYRINGE INTRAVENOUS EVERY 5 MIN PRN
Status: CANCELLED | OUTPATIENT
Start: 2022-11-22

## 2022-11-22 RX ORDER — ONDANSETRON 4 MG/1
4 TABLET, ORALLY DISINTEGRATING ORAL EVERY 6 HOURS PRN
Status: CANCELLED | OUTPATIENT
Start: 2022-11-22

## 2022-11-22 RX ORDER — HYDROMORPHONE HCL IN WATER/PF 6 MG/30 ML
0.4 PATIENT CONTROLLED ANALGESIA SYRINGE INTRAVENOUS EVERY 5 MIN PRN
Status: CANCELLED | OUTPATIENT
Start: 2022-11-22

## 2022-11-22 RX ORDER — FENTANYL CITRATE 50 UG/ML
50 INJECTION, SOLUTION INTRAMUSCULAR; INTRAVENOUS EVERY 5 MIN PRN
Status: CANCELLED | OUTPATIENT
Start: 2022-11-22

## 2022-11-22 RX ORDER — ONDANSETRON 2 MG/ML
4 INJECTION INTRAMUSCULAR; INTRAVENOUS EVERY 6 HOURS PRN
Status: CANCELLED | OUTPATIENT
Start: 2022-11-22

## 2022-11-22 RX ORDER — PROCHLORPERAZINE MALEATE 5 MG
5 TABLET ORAL EVERY 6 HOURS PRN
Status: CANCELLED | OUTPATIENT
Start: 2022-11-22

## 2022-11-22 RX ORDER — GLYCOPYRROLATE 0.2 MG/ML
INJECTION, SOLUTION INTRAMUSCULAR; INTRAVENOUS PRN
Status: DISCONTINUED | OUTPATIENT
Start: 2022-11-22 | End: 2022-11-22

## 2022-11-22 RX ORDER — FLUMAZENIL 0.1 MG/ML
0.2 INJECTION, SOLUTION INTRAVENOUS
Status: CANCELLED | OUTPATIENT
Start: 2022-11-22 | End: 2022-11-22

## 2022-11-22 RX ORDER — FENTANYL CITRATE 50 UG/ML
25 INJECTION, SOLUTION INTRAMUSCULAR; INTRAVENOUS
Status: CANCELLED | OUTPATIENT
Start: 2022-11-22

## 2022-11-22 RX ORDER — MEPERIDINE HYDROCHLORIDE 25 MG/ML
12.5 INJECTION INTRAMUSCULAR; INTRAVENOUS; SUBCUTANEOUS
Status: DISCONTINUED | OUTPATIENT
Start: 2022-11-22 | End: 2022-11-22 | Stop reason: HOSPADM

## 2022-11-22 RX ADMIN — SODIUM CHLORIDE, POTASSIUM CHLORIDE, SODIUM LACTATE AND CALCIUM CHLORIDE: 600; 310; 30; 20 INJECTION, SOLUTION INTRAVENOUS at 09:12

## 2022-11-22 RX ADMIN — PROPOFOL 150 MCG/KG/MIN: 10 INJECTION, EMULSION INTRAVENOUS at 09:14

## 2022-11-22 RX ADMIN — PROPOFOL 50 MG: 10 INJECTION, EMULSION INTRAVENOUS at 09:14

## 2022-11-22 RX ADMIN — LIDOCAINE HYDROCHLORIDE 20 MG: 10 INJECTION, SOLUTION INFILTRATION; PERINEURAL at 09:14

## 2022-11-22 RX ADMIN — GLYCOPYRROLATE 0.2 MG: 0.2 INJECTION, SOLUTION INTRAMUSCULAR; INTRAVENOUS at 09:12

## 2022-11-22 RX ADMIN — DEXMEDETOMIDINE HYDROCHLORIDE 12 MCG: 100 INJECTION, SOLUTION INTRAVENOUS at 09:12

## 2022-11-22 ASSESSMENT — ACTIVITIES OF DAILY LIVING (ADL): ADLS_ACUITY_SCORE: 35

## 2022-11-22 NOTE — ANESTHESIA CARE TRANSFER NOTE
Patient: Libertad Hernadez    Procedure: Procedure(s):  ESOPHAGOGASTRODUODENOSCOPY with biopsy       Diagnosis: Gastroesophageal reflux disease [K21.9]  Diagnosis Additional Information: No value filed.    Anesthesia Type:   MAC     Note:    Oropharynx: oropharynx clear of all foreign objects  Level of Consciousness: awake  Oxygen Supplementation: room air    Independent Airway: airway patency satisfactory and stable  Dentition: dentition unchanged  Vital Signs Stable: post-procedure vital signs reviewed and stable  Report to RN Given: handoff report given  Patient transferred to: Phase II    Handoff Report: Identifed the Patient, Identified the Reponsible Provider, Reviewed the pertinent medical history, Discussed the surgical course, Reviewed Intra-OP anesthesia mangement and issues during anesthesia, Set expectations for post-procedure period and Allowed opportunity for questions and acknowledgement of understanding      Vitals:  Vitals Value Taken Time   /58 11/22/22 0932   Temp 35.9  C (96.7  F) 11/22/22 0930   Pulse 68 11/22/22 0933   Resp 16 11/22/22 0930   SpO2 95 % 11/22/22 0933   Vitals shown include unvalidated device data.    Electronically Signed By: TRINY Parsons CRNA  November 22, 2022  9:35 AM

## 2022-11-22 NOTE — H&P
GENERAL PRE-PROCEDURE:   Procedure:  EGD  Date/Time:  11/22/2022 8:58 AM    Verbal consent obtained?: Yes    Written consent obtained?: Yes    Risks and benefits: Risks, benefits and alternatives were discussed    Consent given by:  Patient  Patient states understanding of procedure being performed: Yes    Patient's understanding of procedure matches consent: Yes    Procedure consent matches procedure scheduled: Yes    Expected level of sedation:  Deep  Appropriately NPO:  Yes  ASA Class:  2  Mallampati  :  Grade 2- soft palate, base of uvula, tonsillar pillars, and portion of posterior pharyngeal wall visible  Lungs:  Lungs clear with good breath sounds bilaterally  Heart:  Normal heart sounds and rate  History & Physical reviewed:  History and physical reviewed and no updates needed  Statement of review:  I have reviewed the lab findings, diagnostic data, medications, and the plan for sedation

## 2022-11-22 NOTE — CONSULTS
I have reviewed the surgical /preoperative H&P that is linked to this encounter, and examined the patient. There are no significant changes.    Nicolas Sifuentes MD

## 2022-11-22 NOTE — ANESTHESIA PREPROCEDURE EVALUATION
Anesthesia Pre-Procedure Evaluation    Patient: Libertad Hernadez   MRN: [id number] : 1940        Procedure : Procedure(s):  ESOPHAGOGASTRODUODENOSCOPY          Past Medical History:   Diagnosis Date     Arthritis      Asymptomatic varicose veins     Created by Conversion      Disorder of bone and cartilage, unspecified     Created by Conversion      Essential thrombocytosis (H)      Gastroesophageal reflux disease      Malignant neoplasm of breast (female), unspecified site     Created by Conversion [facility] Annotation: Oct 21 2010  9:56AM - Shirley Comer: stage 2;  10/18 lymph nodes positives/p chemo and [id number]      Motion sickness      Other and unspecified hyperlipidemia     Created by Conversion      PE (pulmonary thromboembolism) (H)      PONV (postoperative nausea and vomiting)      Primary cancer of bone marrow (H)      Thrombosis      Unspecified essential hypertension     Created by Conversion      Unspecified hereditary and idiopathic peripheral neuropathy     Created by Conversion       Past Surgical History:   Procedure Laterality Date     ARTHROPLASTY, HIP, TOTAL, DIRECT ANTERIOR APPROACH, USING HANA TABLE Right 3/4/2022    Procedure: RIGHT TOTAL HIP ARTHROPLASTY, DIRECT ANTERIOR;  Surgeon: Pineda Rowe MD;  Location: [facility] Main OR     FOOT SURGERY       HC REMOVAL ADENOIDS,PRIMARY,<13 Y/O      Description: Adenoidectomy;  Recorded: 2008;     HC REMOVAL GALLBLADDER      Description: Cholecystectomy;  Recorded: 2008;     HC REMOVAL OF TONSILS,<13 Y/O      Description: Tonsillectomy;  Recorded: 2008;     SC INCISE FINGER TENDON SHEATH      Description: Hand Incision Tendon Sheath Of A Finger;  Recorded: 2008;     SC MASTECTOMY, PARTIAL      Description: Right Breast Partial Mastectomy Inferior Lateral Quarter;  Recorded: 10/21/2010;     SC MASTECTOMY, RADICAL      Description: Radical Mastectomy Left Breast;  Recorded: 2008;      Allergies   Allergen  Reactions     Adhesive Tape-Silicones [Adhesive Tape] Itching     States itchy rash with certain tapes     Penicillins Hives      Social History     Tobacco Use     Smoking status: Never     Smokeless tobacco: Never   Substance Use Topics     Alcohol use: Yes     Alcohol/week: 1.0 standard drink     Comment: very rarely      Wt Readings from Last 1 Encounters:   04/15/22 66.7 kg (147 lb)        Anesthesia Evaluation            ROS/MED HX  ENT/Pulmonary:  - neg pulmonary ROS     Neurologic:  - neg neurologic ROS     Cardiovascular:  - neg cardiovascular ROS   (+) hypertension-----    METS/Exercise Tolerance:     Hematologic:  - neg hematologic  ROS   (+) History of blood clots,     Musculoskeletal:  - neg musculoskeletal ROS     GI/Hepatic:  - neg GI/hepatic ROS     Renal/Genitourinary:  - neg Renal ROS     Endo:  - neg endo ROS     Psychiatric/Substance Use:  - neg psychiatric ROS     Infectious Disease:  - neg infectious disease ROS     Malignancy:  - neg malignancy ROS     Other:  - neg other ROS          Physical Exam    Airway        Mallampati: II    Neck ROM: full     Respiratory Devices and Support         Dental           Cardiovascular   cardiovascular exam normal          Pulmonary   pulmonary exam normal                OUTSIDE LABS:  CBC:   Lab Results   Component Value Date    WBC 6.9 06/25/2022    WBC 6.6 03/04/2022    HGB 14.4 06/25/2022    HGB 10.1 (L) 03/05/2022    HCT 43.9 06/25/2022    HCT 45.5 03/04/2022     06/25/2022     03/04/2022     BMP:   Lab Results   Component Value Date     11/17/2022     06/25/2022    POTASSIUM 3.9 11/17/2022    POTASSIUM 4.2 06/25/2022    CHLORIDE 102 11/17/2022    CHLORIDE 108 (H) 06/25/2022    CO2 22 11/17/2022    CO2 26 06/25/2022    BUN 14.5 11/17/2022    BUN 14 06/25/2022    CR 0.74 11/17/2022    CR 0.76 06/25/2022    GLC 97 11/17/2022    GLC 86 06/25/2022     COAGS:   Lab Results   Component Value Date    INR 1.16 (H) 03/04/2022     POC:  No results found for: BGM, HCG, HCGS  HEPATIC:   Lab Results   Component Value Date    ALBUMIN 3.6 06/25/2022    PROTTOTAL 6.9 06/25/2022    ALT 12 06/25/2022    AST 19 06/25/2022    ALKPHOS 68 06/25/2022    BILITOTAL 0.5 06/25/2022     OTHER:   Lab Results   Component Value Date    DARCI 9.4 11/17/2022    MAG 2.3 08/14/2018    LIPASE 67 (H) 10/31/2022    AMYLASE 81 10/31/2022    TSH 1.70 07/11/2022       Anesthesia Plan    ASA Status:  2      Anesthesia Type: MAC.              Consents    Anesthesia Plan(s) and associated risks, benefits, and realistic alternatives discussed. Questions answered and patient/representative(s) expressed understanding.    - Discussed:     - Discussed with:  Patient         Postoperative Care            Comments:                Kevin Perez MD

## 2022-11-22 NOTE — ANESTHESIA POSTPROCEDURE EVALUATION
Patient: Libertad Hernadez    Procedure: Procedure(s):  ESOPHAGOGASTRODUODENOSCOPY with biopsy       Anesthesia Type:  MAC    Note:  Disposition: Outpatient   Postop Pain Control: Uneventful            Sign Out: Well controlled pain   PONV: No   Neuro/Psych: Uneventful            Sign Out: Acceptable/Baseline neuro status   Airway/Respiratory: Uneventful            Sign Out: Acceptable/Baseline resp. status   CV/Hemodynamics: Uneventful            Sign Out: Acceptable CV status; No obvious hypovolemia; No obvious fluid overload   Other NRE: NONE   DID A NON-ROUTINE EVENT OCCUR? No           Last vitals:  Vitals Value Taken Time   /64 11/22/22 0950   Temp 35.9  C (96.7  F) 11/22/22 0930   Pulse 66 11/22/22 0952   Resp 16 11/22/22 0930   SpO2 95 % 11/22/22 0952   Vitals shown include unvalidated device data.    Electronically Signed By: Kevin Perez MD  November 22, 2022  1:41 PM

## 2022-11-25 ENCOUNTER — TELEPHONE (OUTPATIENT)
Dept: PHARMACY | Facility: PHYSICIAN GROUP | Age: 82
End: 2022-11-25

## 2022-11-25 NOTE — TELEPHONE ENCOUNTER
Blood pressure updated from [facility] for quality measures.     Varsha Modi, PharmD, [facility] HospitalCP  Medication Therapy Management Pharmacist  [facility]  Pager: [phone]

## 2022-11-27 LAB
PATH REPORT.COMMENTS IMP SPEC: NORMAL
PATH REPORT.COMMENTS IMP SPEC: NORMAL
PATH REPORT.FINAL DX SPEC: NORMAL
PATH REPORT.GROSS SPEC: NORMAL
PATH REPORT.MICROSCOPIC SPEC OTHER STN: NORMAL
PATH REPORT.RELEVANT HX SPEC: NORMAL
PHOTO IMAGE: NORMAL

## 2022-11-27 PROCEDURE — 88305 TISSUE EXAM BY PATHOLOGIST: CPT | Mod: 26 | Performed by: PATHOLOGY

## 2022-11-27 PROCEDURE — 88342 IMHCHEM/IMCYTCHM 1ST ANTB: CPT | Mod: 26 | Performed by: PATHOLOGY

## 2023-02-07 ENCOUNTER — TRANSFERRED RECORDS (OUTPATIENT)
Dept: HEALTH INFORMATION MANAGEMENT | Facility: CLINIC | Age: 83
End: 2023-02-07
Payer: COMMERCIAL

## 2023-02-22 ENCOUNTER — APPOINTMENT (OUTPATIENT)
Dept: RADIOLOGY | Facility: CLINIC | Age: 83
End: 2023-02-22
Attending: FAMILY MEDICINE
Payer: COMMERCIAL

## 2023-02-22 ENCOUNTER — HOSPITAL ENCOUNTER (EMERGENCY)
Facility: CLINIC | Age: 83
Discharge: HOME | End: 2023-02-22
Attending: FAMILY MEDICINE | Admitting: FAMILY MEDICINE
Payer: COMMERCIAL

## 2023-02-22 ENCOUNTER — APPOINTMENT (OUTPATIENT)
Dept: CT IMAGING | Facility: CLINIC | Age: 83
End: 2023-02-22
Attending: FAMILY MEDICINE
Payer: COMMERCIAL

## 2023-02-22 VITALS
WEIGHT: 150 LBS | HEIGHT: 65 IN | OXYGEN SATURATION: 96 % | SYSTOLIC BLOOD PRESSURE: 134 MMHG | TEMPERATURE: 98.2 F | HEART RATE: 62 BPM | RESPIRATION RATE: 16 BRPM | DIASTOLIC BLOOD PRESSURE: 63 MMHG | BODY MASS INDEX: 24.99 KG/M2

## 2023-02-22 DIAGNOSIS — R07.9 CHEST PAIN, UNSPECIFIED TYPE: ICD-10-CM

## 2023-02-22 LAB
ANION GAP SERPL CALCULATED.3IONS-SCNC: 10 MMOL/L (ref 5–18)
ATRIAL RATE - MUSE: 74 BPM
BASOPHILS # BLD AUTO: 0.1 10E3/UL (ref 0–0.2)
BASOPHILS NFR BLD AUTO: 1 %
BUN SERPL-MCNC: 11 MG/DL (ref 8–28)
CALCIUM SERPL-MCNC: 9.9 MG/DL (ref 8.5–10.5)
CHLORIDE BLD-SCNC: 105 MMOL/L (ref 98–107)
CO2 SERPL-SCNC: 25 MMOL/L (ref 22–31)
CREAT SERPL-MCNC: 0.77 MG/DL (ref 0.6–1.1)
CV CALCIUM SCORE AGATSTON LM: 143
CV CALCIUM SCORING AGATSON LAD: 517
CV CALCIUM SCORING AGATSTON CX: 17
CV CALCIUM SCORING AGATSTON RCA: 267
CV CALCIUM SCORING AGATSTON TOTAL: 944
D DIMER PPP FEU-MCNC: 0.83 UG/ML FEU (ref 0–0.5)
DIASTOLIC BLOOD PRESSURE - MUSE: NORMAL MMHG
EOSINOPHIL # BLD AUTO: 0.2 10E3/UL (ref 0–0.7)
EOSINOPHIL NFR BLD AUTO: 2 %
ERYTHROCYTE [DISTWIDTH] IN BLOOD BY AUTOMATED COUNT: 14.9 % (ref 10–15)
GFR SERPL CREATININE-BSD FRML MDRD: 76 ML/MIN/1.73M2
GLUCOSE BLD-MCNC: 136 MG/DL (ref 70–125)
HCT VFR BLD AUTO: 48.4 % (ref 35–47)
HGB BLD-MCNC: 15.8 G/DL (ref 11.7–15.7)
HOLD SPECIMEN: NORMAL
HOLD SPECIMEN: NORMAL
IMM GRANULOCYTES # BLD: 0 10E3/UL
IMM GRANULOCYTES NFR BLD: 0 %
INTERPRETATION ECG - MUSE: NORMAL
LYMPHOCYTES # BLD AUTO: 1.5 10E3/UL (ref 0.8–5.3)
LYMPHOCYTES NFR BLD AUTO: 23 %
MCH RBC QN AUTO: 31.5 PG (ref 26.5–33)
MCHC RBC AUTO-ENTMCNC: 32.6 G/DL (ref 31.5–36.5)
MCV RBC AUTO: 97 FL (ref 78–100)
MONOCYTES # BLD AUTO: 0.4 10E3/UL (ref 0–1.3)
MONOCYTES NFR BLD AUTO: 6 %
NEUTROPHILS # BLD AUTO: 4.6 10E3/UL (ref 1.6–8.3)
NEUTROPHILS NFR BLD AUTO: 68 %
NRBC # BLD AUTO: 0 10E3/UL
NRBC BLD AUTO-RTO: 0 /100
P AXIS - MUSE: 75 DEGREES
PLATELET # BLD AUTO: 454 10E3/UL (ref 150–450)
POTASSIUM BLD-SCNC: 4.2 MMOL/L (ref 3.5–5)
PR INTERVAL - MUSE: 158 MS
QRS DURATION - MUSE: 92 MS
QT - MUSE: 388 MS
QTC - MUSE: 430 MS
R AXIS - MUSE: 47 DEGREES
RBC # BLD AUTO: 5.01 10E6/UL (ref 3.8–5.2)
SODIUM SERPL-SCNC: 140 MMOL/L (ref 136–145)
SYSTOLIC BLOOD PRESSURE - MUSE: NORMAL MMHG
T AXIS - MUSE: 68 DEGREES
TROPONIN I SERPL-MCNC: 0.01 NG/ML (ref 0–0.29)
TROPONIN I SERPL-MCNC: <0.01 NG/ML (ref 0–0.29)
TROPONIN T BLD-MCNC: 0.05 UG/L
VENTRICULAR RATE- MUSE: 74 BPM
WBC # BLD AUTO: 6.8 10E3/UL (ref 4–11)

## 2023-02-22 PROCEDURE — 93005 ELECTROCARDIOGRAM TRACING: CPT | Mod: XU | Performed by: FAMILY MEDICINE

## 2023-02-22 PROCEDURE — 75574 CT ANGIO HRT W/3D IMAGE: CPT

## 2023-02-22 PROCEDURE — 96374 THER/PROPH/DIAG INJ IV PUSH: CPT | Mod: 59

## 2023-02-22 PROCEDURE — 75574 CT ANGIO HRT W/3D IMAGE: CPT | Mod: 26 | Performed by: INTERNAL MEDICINE

## 2023-02-22 PROCEDURE — 36415 COLL VENOUS BLD VENIPUNCTURE: CPT | Performed by: EMERGENCY MEDICINE

## 2023-02-22 PROCEDURE — 84484 ASSAY OF TROPONIN QUANT: CPT | Mod: 91 | Performed by: FAMILY MEDICINE

## 2023-02-22 PROCEDURE — 250N000009 HC RX 250: Performed by: FAMILY MEDICINE

## 2023-02-22 PROCEDURE — 71046 X-RAY EXAM CHEST 2 VIEWS: CPT

## 2023-02-22 PROCEDURE — 84484 ASSAY OF TROPONIN QUANT: CPT | Mod: 91 | Performed by: EMERGENCY MEDICINE

## 2023-02-22 PROCEDURE — 250N000013 HC RX MED GY IP 250 OP 250 PS 637: Performed by: FAMILY MEDICINE

## 2023-02-22 PROCEDURE — 99285 EMERGENCY DEPT VISIT HI MDM: CPT | Mod: 25

## 2023-02-22 PROCEDURE — 84484 ASSAY OF TROPONIN QUANT: CPT | Performed by: EMERGENCY MEDICINE

## 2023-02-22 PROCEDURE — 85025 COMPLETE CBC W/AUTO DIFF WBC: CPT | Performed by: FAMILY MEDICINE

## 2023-02-22 PROCEDURE — 85025 COMPLETE CBC W/AUTO DIFF WBC: CPT | Performed by: EMERGENCY MEDICINE

## 2023-02-22 PROCEDURE — 36415 COLL VENOUS BLD VENIPUNCTURE: CPT | Performed by: FAMILY MEDICINE

## 2023-02-22 PROCEDURE — 80048 BASIC METABOLIC PNL TOTAL CA: CPT | Performed by: FAMILY MEDICINE

## 2023-02-22 PROCEDURE — 84484 ASSAY OF TROPONIN QUANT: CPT | Performed by: FAMILY MEDICINE

## 2023-02-22 PROCEDURE — 250N000011 HC RX IP 250 OP 636: Performed by: FAMILY MEDICINE

## 2023-02-22 PROCEDURE — 85379 FIBRIN DEGRADATION QUANT: CPT | Performed by: FAMILY MEDICINE

## 2023-02-22 RX ORDER — METOPROLOL TARTRATE 1 MG/ML
5 INJECTION, SOLUTION INTRAVENOUS
Status: DISCONTINUED | OUTPATIENT
Start: 2023-02-22 | End: 2023-02-22 | Stop reason: HOSPADM

## 2023-02-22 RX ORDER — NITROGLYCERIN 0.4 MG/1
0.4 TABLET SUBLINGUAL EVERY 5 MIN PRN
Status: DISCONTINUED | OUTPATIENT
Start: 2023-02-22 | End: 2023-02-22 | Stop reason: HOSPADM

## 2023-02-22 RX ORDER — IOPAMIDOL 755 MG/ML
100 INJECTION, SOLUTION INTRAVASCULAR ONCE
Status: COMPLETED | OUTPATIENT
Start: 2023-02-22 | End: 2023-02-22

## 2023-02-22 RX ORDER — ACETAMINOPHEN 325 MG/1
650 TABLET ORAL ONCE
Status: COMPLETED | OUTPATIENT
Start: 2023-02-22 | End: 2023-02-22

## 2023-02-22 RX ORDER — NITROGLYCERIN 0.4 MG/1
0.4 TABLET SUBLINGUAL ONCE
Status: COMPLETED | OUTPATIENT
Start: 2023-02-22 | End: 2023-02-22

## 2023-02-22 RX ADMIN — IOPAMIDOL 100 ML: 755 INJECTION, SOLUTION INTRAVENOUS at 12:21

## 2023-02-22 RX ADMIN — METOPROLOL TARTRATE 5 MG: 5 INJECTION INTRAVENOUS at 12:14

## 2023-02-22 RX ADMIN — NITROGLYCERIN 0.4 MG: 0.4 TABLET SUBLINGUAL at 12:16

## 2023-02-22 RX ADMIN — ACETAMINOPHEN 650 MG: 325 TABLET ORAL at 10:49

## 2023-02-22 ASSESSMENT — ENCOUNTER SYMPTOMS
DIARRHEA: 0
CHEST TIGHTNESS: 1
PALPITATIONS: 1
SHORTNESS OF BREATH: 0
NAUSEA: 1
NECK PAIN: 0
BACK PAIN: 1

## 2023-02-22 ASSESSMENT — ACTIVITIES OF DAILY LIVING (ADL)
ADLS_ACUITY_SCORE: 35
ADLS_ACUITY_SCORE: 35

## 2023-02-22 NOTE — Clinical Note
Appointment at [facility] for Emergency Room follow up is scheduled for Monday February 27, 2023  [facility]

## 2023-02-22 NOTE — PROGRESS NOTES
Spoke with Dr West pt cardiac stable troponins negative x2.  Agree with nitroglycerin per protocol.    Marisa Huerta RN

## 2023-02-22 NOTE — ED NOTES
Patient states her chest pain is feeling better. She also states that she is hungry and is requesting food.

## 2023-02-22 NOTE — ED NOTES
Patient is reporting increased frequency of pain in left arm. She would be okay with starting out with some acetaminophen for pain.

## 2023-02-22 NOTE — PROGRESS NOTES
CCTA completed as ordered.  Both metoprolol 5 mg IVP and SL nitroglycerin given per protocol.  Chest tightness gone.  interpretation pending.  Marisa Huerta RN

## 2023-02-22 NOTE — ED PROVIDER NOTES
EMERGENCY DEPARTMENT ENCOUNTER      NAME: Libertad Hernadez  AGE: 82 year old female  YOB: 1940  MRN: [id number]  EVALUATION DATE & TIME: No admission date for patient encounter.    PCP: Dyan Giang    ED PROVIDER: Mack West M.D.    Chief Complaint   Patient presents with     Chest Pain     Back Pain     Arm Pain     Nausea       FINAL IMPRESSION:  No diagnosis found.    ED COURSE & MEDICAL DECISION MAKING:    Pertinent Labs & Imaging studies independently interpreted by me. (See chart for details)    9:05 AM Patient seen and examined, prior records reviewed show history of pulmonary embolism after hip arthroplasty last year, initially diagnosed and April 2022.  Differential diagnosis includes but not limited to chest wall pain, esophagitis, myocardial infarction, pneumonia, rib fracture, pulmonary embolism, pneumothorax, aortic dissection, aortic aneurysm.  Patient presents with intermittent left chest pain rating to the left arm and left elbow for the last couple of days, not associated with activity, lasts about 10 minutes at a time.  Initial EKG is reassuring, labs ordered prior to evaluation and independently interpreted by me demonstrate negative troponin.  Chest x-ray is ordered along with D-dimer given history of pulmonary embolism.  10:57 AM age-adjusted D-dimer is negative, chest x-ray independently interpreted by me does not demonstrate any acute findings.  Repeat troponin is pending.  Patient continues to have intermittent chest pain.  History is concerning for acute coronary syndrome.  If repeat troponin is negative, plan to admit patient for further evaluation and treatment.  11:23 AM Mark troponin is still negative.  Patient continues to have intermittent symptoms of pain in the chest and left arm.  I do long conversation with patient and her spouse regarding findings so far, spectrum of acute coronary syndrome from angina to STEMI, benefits and limitations of emergency  department evaluation.  Discussed my concern that with ongoing pain, symptoms could still be cardiac in that some people will have anginal symptoms for days to weeks to months without developing ischemia.  Nitroglycerin will be ordered, repeat EKG is being performed now.  11:36 AM repeat EKG shows bradycardia with sinus dysrhythmia, previously seen ST depression in 2, V3, V4 is less prominent now.  11:48 AM Spoke with cardiology, Dr. Amato.  Reviewed EKGs, recommends CT coronary angio.  1:47 PM CT coronary angio discussed with Dr. Amato, diffuse mild disease, no obstructive lesions.  Patient is stable for discharge and follow-up with Dr. Amato in 2 months.    At the conclusion of the encounter I discussed the results of all of the tests and the disposition. The questions were answered. The patient or family acknowledged understanding and was agreeable with the care plan.     Medical Decision Making    History:    Supplemental history from: N/A    External Record(s) reviewed: Documented in chart, if applicable.    Work Up:    Chart documentation includes differential considered and any EKGs or imaging independently interpreted by provider, where specified.    In additional to work up documented, I considered the following work up: Documented in chart, if applicable.    External consultation:    Discussion of management with another provider: Cardiology    Complicating factors:    Care impacted by chronic illness: N/A    Care affected by social determinants of health: N/A    Disposition considerations: Discharge. I recommended the patient continue their current prescription strength medication(s): lisinopril 5mg daily. I considered admission, but discharged patient after significant clinical improvement and reassuring ED testing.    MEDICATIONS GIVEN IN THE EMERGENCY:  Medications   nitroGLYcerin (NITROSTAT) sublingual tablet 0.4 mg (0 mg Sublingual Hold 2/22/23 1200)   metoprolol (LOPRESSOR) injection 5 mg (5 mg Intravenous  $Given 2/22/23 1214)   acetaminophen (TYLENOL) tablet 650 mg (650 mg Oral $Given 2/22/23 1049)   nitroGLYcerin (NITROSTAT) sublingual tablet 0.4 mg (0.4 mg Sublingual $Given 2/22/23 1216)   iopamidol (ISOVUE-370) solution 100 mL (100 mLs Intravenous $Given 2/22/23 1221)       NEW PRESCRIPTIONS STARTED AT TODAY'S ER VISIT  New Prescriptions    No medications on file       =================================================================    HPI    Patient information was obtained from: Patient      Libertad Hernadez is a 82 year old female with a pertinent history of breast cancer, hypertension, PE, hyperlipidemia, and s/p bilateral mastectomy who presents to this ED by walk-in for evaluation of chest pain.    Patient endorses progressively worsening on and off chest pain for the past few days. She reports associated left arm pain, chest tightness, palpitations, upper back pain, and nausea. Patient reports the arm pain does not worsen with arm movement. She notes all the symptoms come and go together, and last around 5-10 minutes. She states nothing seems to bring it on or make it go away. Patient endorses taking ibuprofen this morning which seemed to lessen the pain slightly.     Of note, patient had a fall a few weeks ago where she fell on her right side, and hit her head quite hard. She denies any loss of consciousness during that fall. Patient denies having a cardiologist, just a PCP. Patient denies history of heart problems. Patient endorses history of breast cancer and bone marrow cancer, and has been on a pill for that for five years now. Patient denies shortness of breath, diarrhea, neck pain, or any other complaints at this time.    REVIEW OF SYSTEMS   Review of Systems   Respiratory: Positive for chest tightness. Negative for shortness of breath.    Cardiovascular: Positive for chest pain and palpitations.   Gastrointestinal: Positive for nausea. Negative for diarrhea.   Musculoskeletal: Positive for back pain  (upper). Negative for neck pain.        Positive for left arm pain   All other systems reviewed and are negative.      PAST MEDICAL HISTORY:  Past Medical History:   Diagnosis Date     Arthritis      Asymptomatic varicose veins     Created by Conversion      Disorder of bone and cartilage, unspecified     Created by Conversion      Essential thrombocytosis (H)      Gastroesophageal reflux disease      Malignant neoplasm of breast (female), unspecified site     Created by Conversion [facility] Annotation: Oct 21 2010  9:56AM - Shirley Comer: stage 2;  10/18 lymph nodes positives/p chemo and [id number]      Motion sickness      Other and unspecified hyperlipidemia     Created by Conversion      PE (pulmonary thromboembolism) (H)      PONV (postoperative nausea and vomiting)      Primary cancer of bone marrow (H)      Thrombosis      Unspecified essential hypertension     Created by Conversion      Unspecified hereditary and idiopathic peripheral neuropathy     Created by Conversion        PAST SURGICAL HISTORY:  Past Surgical History:   Procedure Laterality Date     ARTHROPLASTY, HIP, TOTAL, DIRECT ANTERIOR APPROACH, USING HANA TABLE Right 3/4/2022    Procedure: RIGHT TOTAL HIP ARTHROPLASTY, DIRECT ANTERIOR;  Surgeon: Pineda Rowe MD;  [facility]: [facility] OR     ESOPHAGOSCOPY, GASTROSCOPY, DUODENOSCOPY (EGD), COMBINED N/A 11/22/2022    Procedure: ESOPHAGOGASTRODUODENOSCOPY with biopsy;  Surgeon: Nicolas Sifuentes MD;  Location: [facility] Main OR     FOOT SURGERY  2010     HC REMOVAL ADENOIDS,PRIMARY,<11 Y/O      Description: Adenoidectomy;  Recorded: 04/04/2008;     HC REMOVAL GALLBLADDER      Description: Cholecystectomy;  Recorded: 04/04/2008;     HC REMOVAL OF TONSILS,<11 Y/O      Description: Tonsillectomy;  Recorded: 04/04/2008;     ID INCISE FINGER TENDON SHEATH      Description: Hand Incision Tendon Sheath Of A Finger;  Recorded: 04/04/2008;     ID MASTECTOMY, PARTIAL      Description: Right  "Breast Partial Mastectomy Inferior Lateral Quarter;  Recorded: 10/21/2010;     MN MASTECTOMY, RADICAL      Description: Radical Mastectomy Left Breast;  Recorded: 04/04/2008;       CURRENT MEDICATIONS:    Current Facility-Administered Medications   Medication     metoprolol (LOPRESSOR) injection 5 mg     nitroGLYcerin (NITROSTAT) sublingual tablet 0.4 mg     Current Outpatient Medications   Medication     betamethasone dipropionate (DIPROSONE) 0.05 % external lotion     Biotin 5000 MCG TABS     Calcium Carb-Cholecalciferol (CALCIUM 600+D) 600-800 MG-UNIT TABS     DULoxetine (CYMBALTA) 20 MG capsule     fluocinonide (LIDEX) 0.05 % external solution     hydroxyurea (HYDREA) 500 MG capsule     lisinopril (ZESTRIL) 5 MG tablet     omeprazole (PRILOSEC) 20 MG DR capsule     polyethylene glycol (MIRALAX) 17 GM/Dose powder     simvastatin (ZOCOR) 20 MG tablet     traZODone (DESYREL) 100 MG tablet     vitamin B1 (THIAMINE) 250 MG tablet     vitamin C (ASCORBIC ACID) 500 MG tablet       ALLERGIES:  Allergies   Allergen Reactions     Adhesive Tape-Silicones [Adhesive Tape] Itching     States itchy rash with certain tapes     Penicillins Hives       FAMILY HISTORY:  Family History   Problem Relation Age of Onset     Hypertension Father      Heart Disease Father      Diabetes Sister      Prostate Cancer Brother        SOCIAL HISTORY:   Social History     Socioeconomic History     Marital status:    Tobacco Use     Smoking status: Never     Smokeless tobacco: Never   Substance and Sexual Activity     Alcohol use: Yes     Alcohol/week: 1.0 standard drink     Comment: very rarely     Drug use: No     Sexual activity: Never       VITALS:  /70   Pulse 65   Temp 97.5  F (36.4  C) (Temporal)   Resp 19   Ht 1.651 m (5' 5\")   Wt 68 kg (150 lb)   SpO2 98%   BMI 24.96 kg/m      PHYSICAL EXAM:  Physical Exam  Vitals and nursing note reviewed.   Constitutional:       Appearance: Normal appearance.   HENT:      Head: " Normocephalic and atraumatic.      Right Ear: External ear normal.      Left Ear: External ear normal.      Nose: Nose normal.      Mouth/Throat:      Mouth: Mucous membranes are moist.   Eyes:      Extraocular Movements: Extraocular movements intact.      Conjunctiva/sclera: Conjunctivae normal.      Pupils: Pupils are equal, round, and reactive to light.   Cardiovascular:      Rate and Rhythm: Normal rate and regular rhythm.   Pulmonary:      Effort: Pulmonary effort is normal.      Breath sounds: Normal breath sounds. No wheezing or rales.   Abdominal:      General: Abdomen is flat. There is no distension.      Palpations: Abdomen is soft.      Tenderness: There is no abdominal tenderness. There is no guarding.   Musculoskeletal:         General: Normal range of motion.      Cervical back: Normal range of motion and neck supple.      Right lower leg: No edema.      Left lower leg: No edema.   Lymphadenopathy:      Cervical: No cervical adenopathy.   Skin:     General: Skin is warm and dry.   Neurological:      General: No focal deficit present.      Mental Status: She is alert and oriented to person, place, and time. Mental status is at baseline.      Comments: No gross focal neurologic deficits   Psychiatric:         Mood and Affect: Mood normal.         Behavior: Behavior normal.         Thought Content: Thought content normal.          LAB:  All pertinent labs reviewed and interpreted.  Results for orders placed or performed during the hospital encounter of 02/22/23   Chest XR,  PA & LAT    Impression    IMPRESSION: No acute findings. Benign calcified granuloma right lower lung. Benign calcified mediastinal nodes and left hilar nodes of no significance.   Radiologist Consult For Cardiology    Impression    IMPRESSION:    1.  No significant incidental extracardiac findings.  2.  Please refer to cardiologist's dictation for the cardiac CT report.   Basic metabolic panel   Result Value Ref Range    Sodium 140 136  - 145 mmol/L    Potassium 4.2 3.5 - 5.0 mmol/L    Chloride 105 98 - 107 mmol/L    Carbon Dioxide (CO2) 25 22 - 31 mmol/L    Anion Gap 10 5 - 18 mmol/L    Urea Nitrogen 11 8 - 28 mg/dL    Creatinine 0.77 0.60 - 1.10 mg/dL    Calcium 9.9 8.5 - 10.5 mg/dL    Glucose 136 (H) 70 - 125 mg/dL    GFR Estimate 76 >60 mL/min/1.73m2   Result Value Ref Range    Troponin I <0.01 0.00 - 0.29 ng/mL   Extra Blue Top Tube   Result Value Ref Range    Hold Specimen JIC    Extra Red Top Tube   Result Value Ref Range    Hold Specimen JIC    CBC with platelets and differential   Result Value Ref Range    WBC Count 6.8 4.0 - 11.0 10e3/uL    RBC Count 5.01 3.80 - 5.20 10e6/uL    Hemoglobin 15.8 (H) 11.7 - 15.7 g/dL    Hematocrit 48.4 (H) 35.0 - 47.0 %    MCV 97 78 - 100 fL    MCH 31.5 26.5 - 33.0 pg    MCHC 32.6 31.5 - 36.5 g/dL    RDW 14.9 10.0 - 15.0 %    Platelet Count 454 (H) 150 - 450 10e3/uL    % Neutrophils 68 %    % Lymphocytes 23 %    % Monocytes 6 %    % Eosinophils 2 %    % Basophils 1 %    % Immature Granulocytes 0 %    NRBCs per 100 WBC 0 <1 /100    Absolute Neutrophils 4.6 1.6 - 8.3 10e3/uL    Absolute Lymphocytes 1.5 0.8 - 5.3 10e3/uL    Absolute Monocytes 0.4 0.0 - 1.3 10e3/uL    Absolute Eosinophils 0.2 0.0 - 0.7 10e3/uL    Absolute Basophils 0.1 0.0 - 0.2 10e3/uL    Absolute Immature Granulocytes 0.0 <=0.4 10e3/uL    Absolute NRBCs 0.0 10e3/uL   iStat Troponin, POCT   Result Value Ref Range    TROPPC POCT 0.05 <=0.12 ug/L   Result Value Ref Range    Troponin I 0.01 0.00 - 0.29 ng/mL   D dimer quantitative   Result Value Ref Range    D-Dimer Quantitative 0.83 (H) 0.00 - 0.50 ug/mL FEU   ECG 12-LEAD WITH MUSE (LHE)   Result Value Ref Range    Systolic Blood Pressure  mmHg    Diastolic Blood Pressure  mmHg    Ventricular Rate 74 BPM    Atrial Rate 74 BPM    AK Interval 158 ms    QRS Duration 92 ms     ms    QTc 430 ms    P Axis 75 degrees    R AXIS 47 degrees    T Axis 68 degrees    Interpretation ECG       Sinus  rhythm  Possible Left atrial enlargement  RSR' or QR pattern in V1 suggests right ventricular conduction delay  Nonspecific ST and T wave abnormality  Abnormal ECG  When compared with ECG of 25-JUN-2022 10:40,  Premature supraventricular complexes are no longer Present  Confirmed by SEE ED PROVIDER NOTE FOR, ECG INTERPRETATION ([id number]),  MIMI TORRES ([id number]) on 2/22/2023 11:09:20 AM         RADIOLOGY:  Reviewed all pertinent imaging. Please see official radiology report.  Radiologist Consult For Cardiology   Final Result   IMPRESSION:     1.  No significant incidental extracardiac findings.   2.  Please refer to cardiologist's dictation for the cardiac CT report.      Chest XR,  PA & LAT   Final Result   IMPRESSION: No acute findings. Benign calcified granuloma right lower lung. Benign calcified mediastinal nodes and left hilar nodes of no significance.      CT Coronary Artery Angio w Calcium Score    (Results Pending)       EKG:    Performed at: 8:26 AM  Impression: Sinus rhythm, nonspecific ST changes with no acute ischemic changes  Rate: 74  Rhythm: Sinus  Axis: Normal  HI Interval: 158  QRS Interval: 92  QTc Interval: 430  ST Changes: Nonspecific ST changes, no acute elevations  Comparison: June 2022, no acute changes are seen    Performed at: 11:25 AM  Impression: Sinus with PACs, nonspecific ST change rate: 74  Rhythm: Sinus  Axis: Normal  HI Interval: 170  QRS Interval: 90  QTc Interval: 425  ST Changes: Nonspecific ST changes, no acute elevations, previously seen ST depression and V3 and V4 is less prominent now  Comparison: Earlier today, previously seen ST depressions are less prominent, PACs present    I have independently reviewed and interpreted the EKG(s) documented above.    I, Analia Eller, am serving as a scribe to document services personally performed by Dr. West based on my observation and the provider's statements to me. I, Mack West MD attest that Analia Eller is  acting in a scribe capacity, has observed my performance of the services and has documented them in accordance with my direction.    Mack West M.D.  Emergency Medicine  [facility] EMERGENCY ROOM  [address] [facility] [id number] [phone]  Dept: [phone]     Mack West MD  02/22/23 8627

## 2023-02-22 NOTE — DISCHARGE INSTRUCTIONS
Appointment at [facility] for Emergency Room follow up is scheduled for Monday February 27, 2023  11:10 am  [facility]  Bring a mask, co pay, any medications, insurance card and paperwork from Emergency Department visit.  Wilfred Alberto MD

## 2023-02-22 NOTE — PHARMACY-ADMISSION MEDICATION HISTORY
Pharmacy Note - Admission Medication History    Pertinent Provider Information:      ______________________________________________________________________    Prior To Admission (PTA) med list completed and updated in EMR.       PTA Med List   Medication Sig Last Dose     betamethasone dipropionate (DIPROSONE) 0.05 % external lotion as needed APPLY TO AFFECTED AREA ON SCALP ONCE NIGHTLY FOR UP TO 2 WEEKS AT A TIME THEN 2 WEEK BREAK. RESUME AS NEEDED Unknown     Biotin 5000 MCG TABS Take 5,000 mcg by mouth daily 2/22/2023     Calcium Carb-Cholecalciferol (CALCIUM 600+D) 600-800 MG-UNIT TABS Take 1 tablet by mouth 2 times daily 2/22/2023 at am     DULoxetine (CYMBALTA) 20 MG capsule Take 20 mg by mouth daily 2/22/2023     fluocinonide (LIDEX) 0.05 % external solution Apply topically 2 times daily as needed Unknown     hydroxyurea (HYDREA) 500 MG capsule TAKE ONE CAPSULE BY MOUTH EVERY DAY FOR 4 DAYS A WEEK AND TAKE TWO CAPSULES BY MOUTH EVERY DAY FOR 3 DAYS A WEEK (Patient taking differently: 500mg 4x/ week ( Tue,Thurs, Sat, Sunday). And 1000mg  3x/week (MWF) evening.) 2/21/2023 at evening     lisinopril (ZESTRIL) 5 MG tablet Take 5 mg by mouth daily 2/22/2023     omeprazole (PRILOSEC) 20 MG DR capsule Take 40 mg by mouth daily 2/22/2023     polyethylene glycol (MIRALAX) 17 GM/Dose powder Take 1 capful by mouth daily as needed for constipation Unknown     simvastatin (ZOCOR) 20 MG tablet Take 20 mg by mouth At Bedtime 2/21/2023     traZODone (DESYREL) 100 MG tablet Take 100 mg by mouth At Bedtime 2/21/2023     vitamin B1 (THIAMINE) 250 MG tablet Take 250 mg by mouth daily 2/22/2023     vitamin C (ASCORBIC ACID) 500 MG tablet Take 500 mg by mouth daily 2/22/2023       Information source(s): Patient  Method of interview communication: in-person    Summary of Changes to PTA Med List  New: Omeprazole   Discontinued:   Changed: pepcid     Patient was asked about OTC/herbal products specifically.  PTA med list reflects  this.    In the past week, patient estimated taking medication this percent of the time:  greater than 90%.    Medication Affordability:       Allergies were reviewed, assessed, and updated with the patient.      Patient does not anticipate needing any multi-use medications during admission.    The information provided in this note is only as accurate as the sources available at the time of the update(s).    Thank you for the opportunity to participate in the care of this patient.    Larisa Danielson PharmD  2/22/2023 11:39 AM

## 2023-02-22 NOTE — ED TRIAGE NOTES
Patient reports chest pain, upper back, left arm for a few days, intermittent in nature and lasts typically 15 minutes. Pain now 5/10.. Nausea. Fall 2 weeks ago. Did hit head, not on blood thinners.      Triage Assessment     Row Name 02/22/23 0823       Triage Assessment (Adult)    Airway WDL WDL       Respiratory WDL    Respiratory WDL WDL       Skin Circulation/Temperature WDL    Skin Circulation/Temperature WDL WDL       Cardiac WDL    Cardiac WDL X  chest pain, HTN       Peripheral/Neurovascular WDL    Peripheral Neurovascular WDL WDL       Cognitive/Neuro/Behavioral WDL    Cognitive/Neuro/Behavioral WDL WDL

## 2023-02-23 LAB
ATRIAL RATE - MUSE: 63 BPM
DIASTOLIC BLOOD PRESSURE - MUSE: 81 MMHG
INTERPRETATION ECG - MUSE: NORMAL
P AXIS - MUSE: 64 DEGREES
PR INTERVAL - MUSE: 170 MS
QRS DURATION - MUSE: 90 MS
QT - MUSE: 416 MS
QTC - MUSE: 425 MS
R AXIS - MUSE: 14 DEGREES
SYSTOLIC BLOOD PRESSURE - MUSE: 175 MMHG
T AXIS - MUSE: 71 DEGREES
VENTRICULAR RATE- MUSE: 63 BPM

## 2023-02-27 ENCOUNTER — TRANSFERRED RECORDS (OUTPATIENT)
Dept: HEALTH INFORMATION MANAGEMENT | Facility: CLINIC | Age: 83
End: 2023-02-27
Payer: COMMERCIAL

## 2023-03-07 ENCOUNTER — TRANSCRIBE ORDERS (OUTPATIENT)
Dept: OTHER | Age: 83
End: 2023-03-07

## 2023-03-07 ENCOUNTER — MEDICAL CORRESPONDENCE (OUTPATIENT)
Dept: HEALTH INFORMATION MANAGEMENT | Facility: CLINIC | Age: 83
End: 2023-03-07
Payer: COMMERCIAL

## 2023-03-07 DIAGNOSIS — R00.2 PALPITATIONS: Primary | ICD-10-CM

## 2023-03-08 ENCOUNTER — TELEPHONE (OUTPATIENT)
Dept: CARDIOLOGY | Facility: CLINIC | Age: 83
End: 2023-03-08

## 2023-03-08 NOTE — TELEPHONE ENCOUNTER
[facility] Call Center    Phone Message    May a detailed message be left on voicemail: yes     Reason for Call: Appointment Intake    Referring Provider Name: Litzy Goodman PA-C  Diagnosis and/or Symptoms: Palpitations, no available in 1-2 days. Please review and reach out to patient to coordinate.     Action Taken: Other: Cardio    Travel Screening: Not Applicable

## 2023-03-14 ENCOUNTER — TRANSFERRED RECORDS (OUTPATIENT)
Dept: HEALTH INFORMATION MANAGEMENT | Facility: CLINIC | Age: 83
End: 2023-03-14
Payer: COMMERCIAL

## 2023-03-15 ENCOUNTER — OFFICE VISIT (OUTPATIENT)
Dept: CARDIOLOGY | Facility: CLINIC | Age: 83
End: 2023-03-15
Payer: COMMERCIAL

## 2023-03-15 VITALS
SYSTOLIC BLOOD PRESSURE: 146 MMHG | DIASTOLIC BLOOD PRESSURE: 70 MMHG | BODY MASS INDEX: 29.61 KG/M2 | WEIGHT: 150.8 LBS | RESPIRATION RATE: 16 BRPM | OXYGEN SATURATION: 99 % | HEART RATE: 71 BPM | HEIGHT: 60 IN

## 2023-03-15 DIAGNOSIS — I47.10 PAROXYSMAL SUPRAVENTRICULAR TACHYCARDIA (H): Primary | ICD-10-CM

## 2023-03-15 DIAGNOSIS — R00.2 PALPITATIONS: ICD-10-CM

## 2023-03-15 LAB — TSH SERPL DL<=0.005 MIU/L-ACNC: 1.66 UIU/ML (ref 0.3–5)

## 2023-03-15 PROCEDURE — 99204 OFFICE O/P NEW MOD 45 MIN: CPT | Mod: 25 | Performed by: INTERNAL MEDICINE

## 2023-03-15 PROCEDURE — 84443 ASSAY THYROID STIM HORMONE: CPT | Performed by: INTERNAL MEDICINE

## 2023-03-15 PROCEDURE — 36415 COLL VENOUS BLD VENIPUNCTURE: CPT | Performed by: INTERNAL MEDICINE

## 2023-03-15 RX ORDER — METOPROLOL SUCCINATE 25 MG/1
25 TABLET, EXTENDED RELEASE ORAL DAILY
Qty: 90 TABLET | Refills: 3 | Status: SHIPPED | OUTPATIENT
Start: 2023-03-15

## 2023-03-15 NOTE — LETTER
3/15/2023    Wilfred Alberto MD  [address]  [place] [address]    RE: Libertad Hernadez       Dear Colleague,     I had the pleasure of seeing Libertad Hernadez in the [facility].    CARDIOLOGY RAPID ACCESS CLINIC CONSULT NOTE     Assessment/Plan:   1.  Palpitations, likely symptomatic PACs.  Short bursts of supraventricular tachycardia up to 16 seconds in duration.  We discussed potentially aggregating factors including inadequate blood pressure control, poor sleep, and physical deconditioning.  She is going to work on using her stationary bike for 150 minutes a week.  We will start metoprolol succinate 25 mg daily to see if this improves her symptoms.  She was reassured as to the generally benign nature of this condition.  TSH will be rechecked as well.       History of Present Illness:     It is my pleasure to see Libertad Hernadez at the [facility] RAPID ACCESS clinic for evaluation of palpitations.    Libertad Hernadez is a 82 year old female with a past medical history of irritable bowel syndrome, chronic reflux esophagitis, essential hypertension, and chronic sleep difficulties.    For the last month or so she has had recurrent bouts of palpitations described as a sensation of her heart pounding and racing.  This occurs most days and may be multiple times during the daytime.  Episodes can also occur at night.  They last for a few seconds up to 1 minute.  She is transiently short of breath following these episodes then feels fully recovered.  These are somewhat upsetting to her.  She feels that they lead to difficulties with sleep.  She has had no syncope or falls associated.  She has had no chest pain.    She has had an evaluation including an echocardiogram last fall which showed normal LV function, upper normal RV systolic pressures and no significant valvular abnormalities.  An event monitor showed short bursts of supraventricular tachycardia up to 16 seconds in  duration at a rate of 120 beats a minute.  She subsequently presented to the emergency room for evaluation.  Coronary CT angiography revealed no obstructive atherosclerotic plaques, with an elevated calcium score.    She does not monitor her blood pressure at home but notes that it is frequently elevated when she comes into providers offices.  She is reluctant to take additional medications.  TSH was normal last July.  She reports minimal alcohol intake    Past Medical History:     Patient Active Problem List   Diagnosis     Breast cancer (H)     Irritable Bowel Syndrome     Chronic Reflux Esophagitis     Tuberculin PPD Induration Positive Interpretation     Peripheral neuropathy     Varicose Veins     Restless Legs Syndrome     Essential hypertension     Hyperlipidemia     Wrist fracture     Osteoporosis, senile     S/P bilateral mastectomy     Urinary frequency     Essential thrombocythemia (H)     Pulmonary emboli (H)     History of pulmonary embolism     Status post total hip replacement, right     Infiltrating ductal carcinoma of breast, stage 1 (H)     Tendonitis       Past Surgical History:     Past Surgical History:   Procedure Laterality Date     ARTHROPLASTY, HIP, TOTAL, DIRECT ANTERIOR APPROACH, USING HANA TABLE Right 3/4/2022    Procedure: RIGHT TOTAL HIP ARTHROPLASTY, DIRECT ANTERIOR;  Surgeon: Pineda Rowe MD;  Location: [facility] OR     ESOPHAGOSCOPY, GASTROSCOPY, DUODENOSCOPY (EGD), COMBINED N/A 11/22/2022    Procedure: ESOPHAGOGASTRODUODENOSCOPY with biopsy;  Surgeon: Nicolas Sifuentes MD;  Location: [facility] OR     FOOT SURGERY  2010     HC REMOVAL ADENOIDS,PRIMARY,<11 Y/O      Description: Adenoidectomy;  Recorded: 04/04/2008;     HC REMOVAL GALLBLADDER      Description: Cholecystectomy;  Recorded: 04/04/2008;     HC REMOVAL OF TONSILS,<11 Y/O      Description: Tonsillectomy;  Recorded: 04/04/2008;     IL INCISE FINGER TENDON SHEATH      Description: Hand Incision Tendon Sheath Of A  Finger;  Recorded: 04/04/2008;     [place] MASTECTOMY, PARTIAL      Description: Right Breast Partial Mastectomy Inferior Lateral Quarter;  Recorded: 10/21/2010;     W[place] MASTECTOMY, RADICAL      Description: Radical Mastectomy Left Breast;  Recorded: 04/04/2008;       Family History:     Family History   Problem Relation Age of Onset     Hypertension Father      Heart Disease Father      Diabetes Sister      Prostate Cancer Brother      Family history reviewed and is not pertinent to the chief complaint or presenting problem    Social History:    reports that she has never smoked. She has never used smokeless tobacco. She reports current alcohol use of about 1.0 standard drink per week. She reports that she does not use drugs.    Exercise: Housework.  Has a stationary bike at home but does not use it.  Concerned about walking outside because of slipping on the ice previously  does not walk.    Sleep: Struggles to fall asleep.  Frequently is aware of heart racing while laying in bed awake but does not feel like this awakens her.  No snoring or apnea noted    Meds:     Current Outpatient Medications   Medication Sig Dispense Refill     betamethasone dipropionate (DIPROSONE) 0.05 % external lotion as needed APPLY TO AFFECTED AREA ON SCALP ONCE NIGHTLY FOR UP TO 2 WEEKS AT A TIME THEN 2 WEEK BREAK. RESUME AS NEEDED       Biotin 5000 MCG TABS Take 5,000 mcg by mouth daily       Calcium Carb-Cholecalciferol (CALCIUM 600+D) 600-800 MG-UNIT TABS Take 1 tablet by mouth 2 times daily       DULoxetine (CYMBALTA) 20 MG capsule Take 20 mg by mouth daily       fluocinonide (LIDEX) 0.05 % external solution Apply topically 2 times daily as needed       hydroxyurea (HYDREA) 500 MG capsule TAKE ONE CAPSULE BY MOUTH EVERY DAY FOR 4 DAYS A WEEK AND TAKE TWO CAPSULES BY MOUTH EVERY DAY FOR 3 DAYS A WEEK (Patient taking differently: 500mg 4x/ week ( Tue,Thurs, Sat, Sunday). And 1000mg  3x/week (MWF) evening.) 90 capsule 2     lisinopril  "(ZESTRIL) 5 MG tablet Take 5 mg by mouth daily       omeprazole (PRILOSEC) 20 MG DR capsule Take 40 mg by mouth daily       polyethylene glycol (MIRALAX) 17 GM/Dose powder Take 1 capful by mouth daily as needed for constipation       simvastatin (ZOCOR) 20 MG tablet Take 20 mg by mouth At Bedtime       traZODone (DESYREL) 100 MG tablet Take 100 mg by mouth At Bedtime       vitamin B1 (THIAMINE) 250 MG tablet Take 250 mg by mouth daily       vitamin C (ASCORBIC ACID) 500 MG tablet Take 500 mg by mouth daily         Allergies:   Adhesive tape-silicones [adhesive tape] and Penicillins    Review of Systems:     Positive for irregular heartbeat, chest pain, arm pain, nocturia, falls, constipation, heartburn, rash, muscle pain.  12 point review of systems otherwise negative     Please refer above for cardiac ROS details.       Objective:      Physical Exam  68.4 kg (150 lb 12.8 oz)  1.534 m (5' 0.4\")  Body mass index is 29.06 kg/m .  BP (!) 146/70 (BP Location: Right arm, Patient Position: Sitting, Cuff Size: Adult Regular)   Pulse 71   Resp 16   Ht 1.534 m (5' 0.4\")   Wt 68.4 kg (150 lb 12.8 oz)   SpO2 99%   BMI 29.06 kg/m          General Appearance : Awake, Alert, No acute distress.  HEENT: No Scleral icterus; the mucous membranes were pink and moist.  Conjunctivae not injected  Neck:  No cervical bruits, jugular venous distention, or thyromegaly   Chest: The spine was straight. Chest wall symmetric  Lungs: Respirations unlabored; the lungs are clear to auscultation.  No wheezing   Cardiovascular:   Normal point of maximal impulse.  Auscultation reveals normal first and second heart sounds with no murmurs, rubs, or gallops.  Occasional extrasystoles, without symptoms.  Carotid, radial, and dorsalis pedal pulses are intact and symmetric.    Abdomen: No organomegaly, masses, bruits, or tenderness. Bowels sounds are present  Extremities: No edema  Skin: No xanthelasma. Warm, Dry.  Musculoskeletal: No " tenderness.  Neurologic: Alert and oriented ×3. Speech is fluent.      EKG (personally reviewed):  2/22/2023: Sinus rhythm with frequent PACs 63 bpm.  No significant change versus previous studies    Cardiac Imaging Studies:  Coronary CT angiogram 2/22/2023:  1.  Total coronary calcium score 944 places the individual in the high risk for cardiac events when compared to an age and gender matched control group and implies a very high risk of cardiac events in the next ten years.  2.  Mild disease in distal left main, less than 20% stenosis.  3.  Mild disease in LAD and D1, less than 50% stenosis.  4.  Mild disease in ramus which is a big vessel, about 50% stenosis in proximal segment.  5.  Mild disease less than 40% stenosis in LCx which is small vessel.  6.  Mild disease in RCA, less than 50% stenosis.  Artifact is noticed in mid to distal RCA.  7.  Normal size of visualized aortic segments.  8.  No pericardial effusion.  No thrombus in left atrial appendage.    Event monitor 2/2023 at [facility]:  Paroxysmal supraventricular tachycardia up to 16 seconds in duration.    Echocardiogram at [facility] 11/2022, reported to show normal left ventricular size and function, ejection fraction 66%.  No significant valvular abnormalities.  Upper normal right heart pressures    Lab Review   Lab Results   Component Value Date     02/22/2023    CO2 25 02/22/2023    BUN 11 02/22/2023     Lab Results   Component Value Date    WBC 6.8 02/22/2023    HGB 15.8 02/22/2023    HCT 48.4 02/22/2023    MCV 97 02/22/2023     02/22/2023     Lab Results   Component Value Date    CHOL 193 07/11/2022    TRIG 116 07/11/2022    HDL 81 07/11/2022     Lab Results   Component Value Date    TROPONINI 0.01 02/22/2023     Lab Results   Component Value Date    BNP 74 05/12/2018     Lab Results   Component Value Date    TSH 1.70 07/11/2022    TSH 1.49 11/29/2021       Felix Ansari MD, MD [facility]      This note created using Dragon voice recognition  software. Sound alike errors may have escaped editing.     Thank you for allowing me to participate in the care of your patient.      Sincerely,     Felix Ansari MD     [facility]    cc:   Litzy Goodman PA-C  [address] [place] [address]

## 2023-03-15 NOTE — PATIENT INSTRUCTIONS
Try to get 150 minutes of moderate aerobic activities in each week.  Walking is excellent, so is a stationary bicycle for this purpose.  We will check a TSH today to make sure your thyroid function is normal.  Measure blood pressure at home and keep a record of your measurements, bring that into follow-up visits.  Start metoprolol succinate 25 mg daily in addition to your current medications.  This will help both with blood pressure control and may decrease the frequency of these palpitations.  Regarding getting a good night sleep as this may increase the frequency of extra beats

## 2023-03-15 NOTE — PROGRESS NOTES
CARDIOLOGY RAPID ACCESS CLINIC CONSULT NOTE     Assessment/Plan:   1.  Palpitations, likely symptomatic PACs.  Short bursts of supraventricular tachycardia up to 16 seconds in duration.  We discussed potentially aggregating factors including inadequate blood pressure control, poor sleep, and physical deconditioning.  She is going to work on using her stationary bike for 150 minutes a week.  We will start metoprolol succinate 25 mg daily to see if this improves her symptoms.  She was reassured as to the generally benign nature of this condition.  TSH will be rechecked as well.       History of Present Illness:     It is my pleasure to see Libertad Hernadez at the [facility] RAPID ACCESS clinic for evaluation of palpitations.    Libertad Hernadez is a 82 year old female with a past medical history of irritable bowel syndrome, chronic reflux esophagitis, essential hypertension, and chronic sleep difficulties.    For the last month or so she has had recurrent bouts of palpitations described as a sensation of her heart pounding and racing.  This occurs most days and may be multiple times during the daytime.  Episodes can also occur at night.  They last for a few seconds up to 1 minute.  She is transiently short of breath following these episodes then feels fully recovered.  These are somewhat upsetting to her.  She feels that they lead to difficulties with sleep.  She has had no syncope or falls associated.  She has had no chest pain.    She has had an evaluation including an echocardiogram last fall which showed normal LV function, upper normal RV systolic pressures and no significant valvular abnormalities.  An event monitor showed short bursts of supraventricular tachycardia up to 16 seconds in duration at a rate of 120 beats a minute.  She subsequently presented to the emergency room for evaluation.  Coronary CT angiography revealed no obstructive atherosclerotic plaques, with an elevated calcium  score.    She does not monitor her blood pressure at home but notes that it is frequently elevated when she comes into providers offices.  She is reluctant to take additional medications.  TSH was normal last July.  She reports minimal alcohol intake    Past Medical History:     Patient Active Problem List   Diagnosis     Breast cancer (H)     Irritable Bowel Syndrome     Chronic Reflux Esophagitis     Tuberculin PPD Induration Positive Interpretation     Peripheral neuropathy     Varicose Veins     Restless Legs Syndrome     Essential hypertension     Hyperlipidemia     Wrist fracture     Osteoporosis, senile     S/P bilateral mastectomy     Urinary frequency     Essential thrombocythemia (H)     Pulmonary emboli (H)     History of pulmonary embolism     Status post total hip replacement, right     Infiltrating ductal carcinoma of breast, stage 1 (H)     Tendonitis       Past Surgical History:     Past Surgical History:   Procedure Laterality Date     ARTHROPLASTY, HIP, TOTAL, DIRECT ANTERIOR APPROACH, USING HANA TABLE Right 3/4/2022    Procedure: RIGHT TOTAL HIP ARTHROPLASTY, DIRECT ANTERIOR;  Surgeon: Pineda Rowe MD;  Location: [facility]     ESOPHAGOSCOPY, GASTROSCOPY, DUODENOSCOPY (EGD), COMBINED N/A 11/22/2022    Procedure: ESOPHAGOGASTRODUODENOSCOPY with biopsy;  Surgeon: Nicolas Sifuentes MD;  Location: [facility] OR     FOOT SURGERY  2010     HC REMOVAL ADENOIDS,PRIMARY,<13 Y/O      Description: Adenoidectomy;  Recorded: 04/04/2008;     HC REMOVAL GALLBLADDER      Description: Cholecystectomy;  Recorded: 04/04/2008;     HC REMOVAL OF TONSILS,<13 Y/O      Description: Tonsillectomy;  Recorded: 04/04/2008;     MT INCISE FINGER TENDON SHEATH      Description: Hand Incision Tendon Sheath Of A Finger;  Recorded: 04/04/2008;     MT MASTECTOMY, PARTIAL      Description: Right Breast Partial Mastectomy Inferior Lateral Quarter;  Recorded: 10/21/2010;     MT MASTECTOMY, RADICAL      Description:  Radical Mastectomy Left Breast;  Recorded: 04/04/2008;       Family History:     Family History   Problem Relation Age of Onset     Hypertension Father      Heart Disease Father      Diabetes Sister      Prostate Cancer Brother      Family history reviewed and is not pertinent to the chief complaint or presenting problem    Social History:    reports that she has never smoked. She has never used smokeless tobacco. She reports current alcohol use of about 1.0 standard drink per week. She reports that she does not use drugs.    Exercise: Housework.  Has a stationary bike at home but does not use it.  Concerned about walking outside because of slipping on the ice previously  does not walk.    Sleep: Struggles to fall asleep.  Frequently is aware of heart racing while laying in bed awake but does not feel like this awakens her.  No snoring or apnea noted    Meds:     Current Outpatient Medications   Medication Sig Dispense Refill     betamethasone dipropionate (DIPROSONE) 0.05 % external lotion as needed APPLY TO AFFECTED AREA ON SCALP ONCE NIGHTLY FOR UP TO 2 WEEKS AT A TIME THEN 2 WEEK BREAK. RESUME AS NEEDED       Biotin 5000 MCG TABS Take 5,000 mcg by mouth daily       Calcium Carb-Cholecalciferol (CALCIUM 600+D) 600-800 MG-UNIT TABS Take 1 tablet by mouth 2 times daily       DULoxetine (CYMBALTA) 20 MG capsule Take 20 mg by mouth daily       fluocinonide (LIDEX) 0.05 % external solution Apply topically 2 times daily as needed       hydroxyurea (HYDREA) 500 MG capsule TAKE ONE CAPSULE BY MOUTH EVERY DAY FOR 4 DAYS A WEEK AND TAKE TWO CAPSULES BY MOUTH EVERY DAY FOR 3 DAYS A WEEK (Patient taking differently: 500mg 4x/ week ( Tue,Thurs, Sat, Sunday). And 1000mg  3x/week (MWF) evening.) 90 capsule 2     lisinopril (ZESTRIL) 5 MG tablet Take 5 mg by mouth daily       omeprazole (PRILOSEC) 20 MG DR capsule Take 40 mg by mouth daily       polyethylene glycol (MIRALAX) 17 GM/Dose powder Take 1 capful by mouth daily  "as needed for constipation       simvastatin (ZOCOR) 20 MG tablet Take 20 mg by mouth At Bedtime       traZODone (DESYREL) 100 MG tablet Take 100 mg by mouth At Bedtime       vitamin B1 (THIAMINE) 250 MG tablet Take 250 mg by mouth daily       vitamin C (ASCORBIC ACID) 500 MG tablet Take 500 mg by mouth daily         Allergies:   Adhesive tape-silicones [adhesive tape] and Penicillins    Review of Systems:     Positive for irregular heartbeat, chest pain, arm pain, nocturia, falls, constipation, heartburn, rash, muscle pain.  12 point review of systems otherwise negative     Please refer above for cardiac ROS details.       Objective:      Physical Exam  68.4 kg (150 lb 12.8 oz)  1.534 m (5' 0.4\")  Body mass index is 29.06 kg/m .  BP (!) 146/70 (BP Location: Right arm, Patient Position: Sitting, Cuff Size: Adult Regular)   Pulse 71   Resp 16   Ht 1.534 m (5' 0.4\")   Wt 68.4 kg (150 lb 12.8 oz)   SpO2 99%   BMI 29.06 kg/m          General Appearance : Awake, Alert, No acute distress.  HEENT: No Scleral icterus; the mucous membranes were pink and moist.  Conjunctivae not injected  Neck:  No cervical bruits, jugular venous distention, or thyromegaly   Chest: The spine was straight. Chest wall symmetric  Lungs: Respirations unlabored; the lungs are clear to auscultation.  No wheezing   Cardiovascular:   Normal point of maximal impulse.  Auscultation reveals normal first and second heart sounds with no murmurs, rubs, or gallops.  Occasional extrasystoles, without symptoms.  Carotid, radial, and dorsalis pedal pulses are intact and symmetric.    Abdomen: No organomegaly, masses, bruits, or tenderness. Bowels sounds are present  Extremities: No edema  Skin: No xanthelasma. Warm, Dry.  Musculoskeletal: No tenderness.  Neurologic: Alert and oriented ×3. Speech is fluent.      EKG (personally reviewed):  2/22/2023: Sinus rhythm with frequent PACs 63 bpm.  No significant change versus previous studies    Cardiac Imaging " Studies:  Coronary CT angiogram 2/22/2023:  1.  Total coronary calcium score 944 places the individual in the high risk for cardiac events when compared to an age and gender matched control group and implies a very high risk of cardiac events in the next ten years.  2.  Mild disease in distal left main, less than 20% stenosis.  3.  Mild disease in LAD and D1, less than 50% stenosis.  4.  Mild disease in ramus which is a big vessel, about 50% stenosis in proximal segment.  5.  Mild disease less than 40% stenosis in LCx which is small vessel.  6.  Mild disease in RCA, less than 50% stenosis.  Artifact is noticed in mid to distal RCA.  7.  Normal size of visualized aortic segments.  8.  No pericardial effusion.  No thrombus in left atrial appendage.    Event monitor 2/2023 at [facility]:  Paroxysmal supraventricular tachycardia up to 16 seconds in duration.    Echocardiogram at [facility] 11/2022, reported to show normal left ventricular size and function, ejection fraction 66%.  No significant valvular abnormalities.  Upper normal right heart pressures    Lab Review   Lab Results   Component Value Date     02/22/2023    CO2 25 02/22/2023    BUN 11 02/22/2023     Lab Results   Component Value Date    WBC 6.8 02/22/2023    HGB 15.8 02/22/2023    HCT 48.4 02/22/2023    MCV 97 02/22/2023     02/22/2023     Lab Results   Component Value Date    CHOL 193 07/11/2022    TRIG 116 07/11/2022    HDL 81 07/11/2022     Lab Results   Component Value Date    TROPONINI 0.01 02/22/2023     Lab Results   Component Value Date    BNP 74 05/12/2018     Lab Results   Component Value Date    TSH 1.70 07/11/2022    TSH 1.49 11/29/2021       Felix Ansari MD, MD [facility]      This note created using Dragon voice recognition software. Sound alike errors may have escaped editing.

## 2023-08-02 ENCOUNTER — HOSPITAL ENCOUNTER (EMERGENCY)
Facility: OTHER | Age: 83
Discharge: HOME | End: 2023-08-02
Attending: STUDENT IN AN ORGANIZED HEALTH CARE EDUCATION/TRAINING PROGRAM | Admitting: STUDENT IN AN ORGANIZED HEALTH CARE EDUCATION/TRAINING PROGRAM
Payer: COMMERCIAL

## 2023-08-02 VITALS
SYSTOLIC BLOOD PRESSURE: 152 MMHG | TEMPERATURE: 98.4 F | HEART RATE: 68 BPM | RESPIRATION RATE: 22 BRPM | HEIGHT: 64 IN | WEIGHT: 150 LBS | OXYGEN SATURATION: 99 % | DIASTOLIC BLOOD PRESSURE: 76 MMHG | BODY MASS INDEX: 25.61 KG/M2

## 2023-08-02 DIAGNOSIS — S90.569A INSECT BITE OF ANKLE, UNSPECIFIED LATERALITY, INITIAL ENCOUNTER: ICD-10-CM
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
## 2023-08-02 PROCEDURE — 99283 EMERGENCY DEPT VISIT LOW MDM: CPT | Performed by: STUDENT IN AN ORGANIZED HEALTH CARE EDUCATION/TRAINING PROGRAM

## 2023-08-02 PROCEDURE — 250N000009 HC RX 250: Performed by: STUDENT IN AN ORGANIZED HEALTH CARE EDUCATION/TRAINING PROGRAM

## 2023-08-02 PROCEDURE — 250N000013 HC RX MED GY IP 250 OP 250 PS 637: Performed by: STUDENT IN AN ORGANIZED HEALTH CARE EDUCATION/TRAINING PROGRAM

## 2023-08-02 RX ORDER — TRIAMCINOLONE ACETONIDE 1 MG/G
OINTMENT TOPICAL 2 TIMES DAILY
Status: DISCONTINUED | OUTPATIENT
Start: 2023-08-02 | End: 2023-08-02 | Stop reason: HOSPADM

## 2023-08-02 RX ADMIN — Medication: at 19:27

## 2023-08-02 RX ADMIN — TRIAMCINOLONE ACETONIDE: 1 OINTMENT TOPICAL at 19:29

## 2023-08-02 ASSESSMENT — ACTIVITIES OF DAILY LIVING (ADL): ADLS_ACUITY_SCORE: 33

## 2023-08-02 NOTE — ED TRIAGE NOTES
Patient stepped on a hive and was bit several times to her right and left ankle and foot at about 3:30PM.  At 4PM she took 2 tylenol, 2 benadryl, put baking soda on it and ice, no relief.  She is here due to the pain.       Triage Assessment       Row Name 08/02/23 [id number]       Triage Assessment (Adult)    Airway WDL WDL       Respiratory WDL    Respiratory WDL WDL       Skin Circulation/Temperature WDL    Skin Circulation/Temperature WDL WDL       Cardiac WDL    Cardiac WDL WDL       Peripheral/Neurovascular WDL    Peripheral Neurovascular WDL WDL       Cognitive/Neuro/Behavioral WDL    Cognitive/Neuro/Behavioral WDL WDL

## 2023-08-03 NOTE — DISCHARGE INSTRUCTIONS
Use steroid ointment twice daily as needed for up to 7 days.     Recommend picking up Voltaren gel and applying this to your painful ankle areas as needed.    We did not see any retained stingers in your skin.    Please review attached instructions including reasons to return to the emergency department.

## 2023-08-03 NOTE — ED PROVIDER NOTES
History     Chief Complaint   Patient presents with    Insect Bite       Libertad Hernadez is a 82 year old female who presents with bilateral foot pain after insect stings. 3 hours prior to presentation she accidentally stepped on a hive and was stung multiple time involving her left medial ankle and right lateral ankle. Home tylenol, benadryl, baking soda, and ice provided no relief. Denies dyspnea. No history of insect allergies.     Allergies   Allergen Reactions    Adhesive Tape-Silicones [Adhesive Tape] Itching     States itchy rash with certain tapes    Penicillins Hives       Patient Active Problem List    Diagnosis Date Noted    Status post total hip replacement, right 03/04/2022     Priority: Medium    History of pulmonary embolism 07/12/2019     Priority: Medium    Breast cancer (H)      Priority: Medium     Created by Conversion  [facility] Annotation: Oct 21 2010  9:56AM - Shirley Comer: stage 2;   10/18 lymph nodes positives/p chemo and [id number]  Replacement Utility updated for latest IMO load        Peripheral neuropathy      Priority: Medium     Created by Conversion  Replacement Utility updated for latest IMO load        Pulmonary emboli (H) 06/14/2018     Priority: Medium    Essential thrombocythemia (H) 01/16/2018     Priority: Medium    Urinary frequency 01/09/2018     Priority: Medium    S/P bilateral mastectomy 11/16/2017     Priority: Medium    Wrist fracture 12/20/2016     Priority: Medium    Osteoporosis, senile 12/20/2016     Priority: Medium    Essential hypertension      Priority: Medium    Hyperlipidemia      Priority: Medium    Irritable Bowel Syndrome      Priority: Medium     Created by Conversion        Chronic Reflux Esophagitis      Priority: Medium     Created by Conversion        Varicose Veins      Priority: Medium     Created by Conversion        Restless Legs Syndrome      Priority: Medium     Created by Conversion        Tuberculin PPD Induration Positive  Interpretation      Priority: Medium     Created by [facility] Annotation: Apr 4 2008 10:32AM - Shirley Comer: never   treated   with INH        Infiltrating ductal carcinoma of breast, stage 1 (H) 06/02/2010     Priority: Medium    Tendonitis 05/03/2010     Priority: Medium       Past Medical History:   Diagnosis Date    Arthritis     Asymptomatic varicose veins     Disorder of bone and cartilage, unspecified     Essential thrombocytosis (H)     Gastroesophageal reflux disease     Malignant neoplasm of breast (female), unspecified site     Motion sickness     Other and unspecified hyperlipidemia     PE (pulmonary thromboembolism) (H)     PONV (postoperative nausea and vomiting)     Primary cancer of bone marrow (H)     Thrombosis     Unspecified essential hypertension     Unspecified hereditary and idiopathic peripheral neuropathy        Past Surgical History:   Procedure Laterality Date    ARTHROPLASTY, HIP, TOTAL, DIRECT ANTERIOR APPROACH, USING HANA TABLE Right 3/4/2022    Procedure: RIGHT TOTAL HIP ARTHROPLASTY, DIRECT ANTERIOR;  Surgeon: Pineda Rowe MD;  Location: [facility]    ESOPHAGOSCOPY, GASTROSCOPY, DUODENOSCOPY (EGD), COMBINED N/A 11/22/2022    Procedure: ESOPHAGOGASTRODUODENOSCOPY with biopsy;  Surgeon: Nicolas Sifuentes MD;  Location: [facility] OR    FOOT SURGERY  2010    HC REMOVAL ADENOIDS,PRIMARY,<13 Y/O      Description: Adenoidectomy;  Recorded: 04/04/2008;    HC REMOVAL GALLBLADDER      Description: Cholecystectomy;  Recorded: 04/04/2008;    HC REMOVAL OF TONSILS,<13 Y/O      Description: Tonsillectomy;  Recorded: 04/04/2008;    AR INCISE FINGER TENDON SHEATH      Description: Hand Incision Tendon Sheath Of A Finger;  Recorded: 04/04/2008;    AR MASTECTOMY, PARTIAL      Description: Right Breast Partial Mastectomy Inferior Lateral Quarter;  Recorded: 10/21/2010;    AR MASTECTOMY, RADICAL      Description: Radical Mastectomy Left Breast;  Recorded: 04/04/2008;  "      Family History   Problem Relation Age of Onset    Hypertension Father     Heart Disease Father     Diabetes Sister     Prostate Cancer Brother        Social History     Tobacco Use    Smoking status: Never    Smokeless tobacco: Never   Substance Use Topics    Alcohol use: Yes     Alcohol/week: 1.0 standard drink of alcohol     Comment: very rarely    Drug use: No       Medications:    betamethasone dipropionate (DIPROSONE) 0.05 % external lotion  Biotin 5000 MCG TABS  Calcium Carb-Cholecalciferol (CALCIUM 600+D) 600-800 MG-UNIT TABS  DULoxetine (CYMBALTA) 20 MG capsule  fluocinonide (LIDEX) 0.05 % external solution  hydroxyurea (HYDREA) 500 MG capsule  lisinopril (ZESTRIL) 5 MG tablet  metoprolol succinate ER (TOPROL XL) 25 MG 24 hr tablet  omeprazole (PRILOSEC) 20 MG DR capsule  polyethylene glycol (MIRALAX) 17 GM/Dose powder  simvastatin (ZOCOR) 20 MG tablet  traZODone (DESYREL) 100 MG tablet  vitamin B1 (THIAMINE) 250 MG tablet  vitamin C (ASCORBIC ACID) 500 MG tablet        Review of Systems: See HPI for pertinent negatives and positives. All other systems reviewed and found to be negative.    Physical Exam   BP (!) 152/76   Pulse 68   Temp 98.4  F (36.9  C) (Tympanic)   Resp 22   Ht 1.626 m (5' 4\")   Wt 68 kg (150 lb)   SpO2 99%   BMI 25.75 kg/m       General: awake, uncomfortable  HEENT: atraumatic  Respiratory: normal effort  Cardiovascular: bilateral feet appear well perfused  Extremities: no deformities, edema  Skin: warm, dry, two approximately 1 cm erythematous areas over left medial ankle and one approximately 1 cm erythematous area over lateral right ankle  Neuro: alert, no focal deficits  Psych: appropriate mood and affect    ED Course      ED Course as of 08/03/23 0051   Wed Aug 02, 2023   1938 Feeling much better s/p LET applications.       No results found for this or any previous visit (from the past 24 hour(s)).    Medications   lido-EPINEPHrine-tetracaine (LET) topical gel GEL ( " Topical $Given 8/2/23 1927)       Assessments & Plan (with Medical Decision Making)     I have reviewed the nursing notes.    82 year old female evaluated for bilateral ankle bee/wasp/hornet stings. Evaluation remarkable for bilateral ankle erythematous areas and patient appearing quite uncomfortable. No respiratory symptoms. LET applied with good relief. Observed for period of time and remained comfortable. Discharged home with below plan and attached instructions on diagnosis provided including ED return precautions.     I have reviewed the findings, diagnosis, plan, and need for any follow up with the patient.    Patient instructions:   Use steroid ointment twice daily as needed for up to 7 days.     Recommend picking up Voltaren gel and applying this to your painful ankle areas as needed.    We did not see any retained stingers in your skin.    Please review attached instructions including reasons to return to the emergency department.     Discharge Medication List as of 8/2/2023  8:00 PM          Final diagnoses:   Insect bite of ankle, unspecified laterality, initial encounter - bilateral       8/2/2023   [facility]     Felix Triana MD  08/03/23 0052

## 2023-08-19 ENCOUNTER — HEALTH MAINTENANCE LETTER (OUTPATIENT)
Age: 83
End: 2023-08-19

## 2023-08-21 ENCOUNTER — LAB REQUISITION (OUTPATIENT)
Dept: LAB | Facility: CLINIC | Age: 83
End: 2023-08-21

## 2023-08-21 DIAGNOSIS — E78.00 PURE HYPERCHOLESTEROLEMIA, UNSPECIFIED: ICD-10-CM

## 2023-08-21 LAB
ALBUMIN SERPL BCG-MCNC: 4.4 G/DL (ref 3.5–5.2)
ALP SERPL-CCNC: 63 U/L (ref 35–104)
ALT SERPL W P-5'-P-CCNC: 18 U/L (ref 0–50)
ANION GAP SERPL CALCULATED.3IONS-SCNC: 11 MMOL/L (ref 7–15)
AST SERPL W P-5'-P-CCNC: 20 U/L (ref 0–45)
BILIRUB SERPL-MCNC: 0.6 MG/DL
BUN SERPL-MCNC: 14.5 MG/DL (ref 8–23)
CALCIUM SERPL-MCNC: 9.7 MG/DL (ref 8.8–10.2)
CHLORIDE SERPL-SCNC: 104 MMOL/L (ref 98–107)
CHOLEST SERPL-MCNC: 197 MG/DL
CREAT SERPL-MCNC: 0.83 MG/DL (ref 0.51–0.95)
DEPRECATED HCO3 PLAS-SCNC: 26 MMOL/L (ref 22–29)
GFR SERPL CREATININE-BSD FRML MDRD: 70 ML/MIN/1.73M2
GLUCOSE SERPL-MCNC: 95 MG/DL (ref 70–99)
HDLC SERPL-MCNC: 69 MG/DL
LDLC SERPL CALC-MCNC: 106 MG/DL
NONHDLC SERPL-MCNC: 128 MG/DL
POTASSIUM SERPL-SCNC: 4.1 MMOL/L (ref 3.4–5.3)
PROT SERPL-MCNC: 6.6 G/DL (ref 6.4–8.3)
SODIUM SERPL-SCNC: 141 MMOL/L (ref 136–145)
TRIGL SERPL-MCNC: 112 MG/DL

## 2023-08-21 PROCEDURE — 80061 LIPID PANEL: CPT | Performed by: FAMILY MEDICINE

## 2023-08-21 PROCEDURE — 80053 COMPREHEN METABOLIC PANEL: CPT | Performed by: FAMILY MEDICINE

## 2024-03-21 ENCOUNTER — TRANSFERRED RECORDS (OUTPATIENT)
Dept: HEALTH INFORMATION MANAGEMENT | Facility: CLINIC | Age: 84
End: 2024-03-21

## 2024-03-21 ENCOUNTER — MEDICAL CORRESPONDENCE (OUTPATIENT)
Dept: HEALTH INFORMATION MANAGEMENT | Facility: CLINIC | Age: 84
End: 2024-03-21

## 2024-10-12 ENCOUNTER — HEALTH MAINTENANCE LETTER (OUTPATIENT)
Age: 84
End: 2024-10-12

## 2024-11-04 ENCOUNTER — LAB REQUISITION (OUTPATIENT)
Dept: LAB | Facility: CLINIC | Age: 84
End: 2024-11-04

## 2024-11-04 ENCOUNTER — TRANSFERRED RECORDS (OUTPATIENT)
Dept: HEALTH INFORMATION MANAGEMENT | Facility: CLINIC | Age: 84
End: 2024-11-04
Payer: COMMERCIAL

## 2024-11-04 DIAGNOSIS — R07.9 CHEST PAIN, UNSPECIFIED: ICD-10-CM

## 2024-11-04 DIAGNOSIS — G62.9 POLYNEUROPATHY, UNSPECIFIED: ICD-10-CM

## 2024-11-04 DIAGNOSIS — M81.0 AGE-RELATED OSTEOPOROSIS WITHOUT CURRENT PATHOLOGICAL FRACTURE: ICD-10-CM

## 2024-11-04 DIAGNOSIS — E78.00 PURE HYPERCHOLESTEROLEMIA, UNSPECIFIED: ICD-10-CM

## 2024-11-04 LAB
BASOPHILS # BLD AUTO: 0.1 10E3/UL (ref 0–0.2)
BASOPHILS NFR BLD AUTO: 1 %
EOSINOPHIL # BLD AUTO: 0.2 10E3/UL (ref 0–0.7)
EOSINOPHIL NFR BLD AUTO: 3 %
ERYTHROCYTE [DISTWIDTH] IN BLOOD BY AUTOMATED COUNT: 15.2 % (ref 10–15)
HCT VFR BLD AUTO: 46.6 % (ref 35–47)
HGB BLD-MCNC: 15.1 G/DL (ref 11.7–15.7)
IMM GRANULOCYTES # BLD: 0 10E3/UL
IMM GRANULOCYTES NFR BLD: 0 %
LYMPHOCYTES # BLD AUTO: 1.9 10E3/UL (ref 0.8–5.3)
LYMPHOCYTES NFR BLD AUTO: 24 %
MCH RBC QN AUTO: 32.4 PG (ref 26.5–33)
MCHC RBC AUTO-ENTMCNC: 32.4 G/DL (ref 31.5–36.5)
MCV RBC AUTO: 100 FL (ref 78–100)
MONOCYTES # BLD AUTO: 0.8 10E3/UL (ref 0–1.3)
MONOCYTES NFR BLD AUTO: 10 %
NEUTROPHILS # BLD AUTO: 4.9 10E3/UL (ref 1.6–8.3)
NEUTROPHILS NFR BLD AUTO: 62 %
NRBC # BLD AUTO: 0 10E3/UL
NRBC BLD AUTO-RTO: 0 /100
PLATELET # BLD AUTO: 420 10E3/UL (ref 150–450)
RBC # BLD AUTO: 4.66 10E6/UL (ref 3.8–5.2)
WBC # BLD AUTO: 7.9 10E3/UL (ref 4–11)

## 2024-11-04 PROCEDURE — 82607 VITAMIN B-12: CPT | Performed by: FAMILY MEDICINE

## 2024-11-04 PROCEDURE — 80061 LIPID PANEL: CPT | Performed by: FAMILY MEDICINE

## 2024-11-04 PROCEDURE — 80053 COMPREHEN METABOLIC PANEL: CPT | Performed by: FAMILY MEDICINE

## 2024-11-04 PROCEDURE — 85004 AUTOMATED DIFF WBC COUNT: CPT | Performed by: FAMILY MEDICINE

## 2024-11-04 PROCEDURE — 82306 VITAMIN D 25 HYDROXY: CPT | Performed by: FAMILY MEDICINE

## 2024-11-04 PROCEDURE — 84443 ASSAY THYROID STIM HORMONE: CPT | Performed by: FAMILY MEDICINE

## 2024-11-05 LAB
ALBUMIN SERPL BCG-MCNC: 4.1 G/DL (ref 3.5–5.2)
ALP SERPL-CCNC: 49 U/L (ref 40–150)
ALT SERPL W P-5'-P-CCNC: 15 U/L (ref 0–50)
ANION GAP SERPL CALCULATED.3IONS-SCNC: 12 MMOL/L (ref 7–15)
AST SERPL W P-5'-P-CCNC: 21 U/L (ref 0–45)
BILIRUB SERPL-MCNC: 0.3 MG/DL
BUN SERPL-MCNC: 15.9 MG/DL (ref 8–23)
CALCIUM SERPL-MCNC: 9.7 MG/DL (ref 8.8–10.4)
CHLORIDE SERPL-SCNC: 103 MMOL/L (ref 98–107)
CHOLEST SERPL-MCNC: 173 MG/DL
CREAT SERPL-MCNC: 0.77 MG/DL (ref 0.51–0.95)
EGFRCR SERPLBLD CKD-EPI 2021: 75 ML/MIN/1.73M2
FASTING STATUS PATIENT QL REPORTED: NORMAL
FASTING STATUS PATIENT QL REPORTED: NORMAL
GLUCOSE SERPL-MCNC: 96 MG/DL (ref 70–99)
HCO3 SERPL-SCNC: 25 MMOL/L (ref 22–29)
HDLC SERPL-MCNC: 62 MG/DL
LDLC SERPL CALC-MCNC: 81 MG/DL
NONHDLC SERPL-MCNC: 111 MG/DL
POTASSIUM SERPL-SCNC: 3.9 MMOL/L (ref 3.4–5.3)
PROT SERPL-MCNC: 6.9 G/DL (ref 6.4–8.3)
SODIUM SERPL-SCNC: 140 MMOL/L (ref 135–145)
TRIGL SERPL-MCNC: 148 MG/DL
TSH SERPL DL<=0.005 MIU/L-ACNC: 2.1 UIU/ML (ref 0.3–4.2)
VIT B12 SERPL-MCNC: 413 PG/ML (ref 232–1245)
VIT D+METAB SERPL-MCNC: 53 NG/ML (ref 20–50)

## 2024-11-06 ENCOUNTER — TRANSCRIBE ORDERS (OUTPATIENT)
Dept: OTHER | Age: 84
End: 2024-11-06

## 2024-11-06 ENCOUNTER — MEDICAL CORRESPONDENCE (OUTPATIENT)
Dept: HEALTH INFORMATION MANAGEMENT | Facility: CLINIC | Age: 84
End: 2024-11-06
Payer: COMMERCIAL

## 2024-11-06 ENCOUNTER — LAB REQUISITION (OUTPATIENT)
Dept: LAB | Facility: CLINIC | Age: 84
End: 2024-11-06

## 2024-11-06 DIAGNOSIS — R32 UNSPECIFIED URINARY INCONTINENCE: ICD-10-CM

## 2024-11-06 DIAGNOSIS — R07.9 CHEST PAIN, UNSPECIFIED: Primary | ICD-10-CM

## 2024-11-06 PROCEDURE — 87086 URINE CULTURE/COLONY COUNT: CPT | Performed by: FAMILY MEDICINE

## 2024-11-08 LAB — BACTERIA UR CULT: NORMAL

## 2024-11-16 NOTE — PROGRESS NOTES
HEART CARE FOLLOW UP NOTE      [facility]  [phone]      Assessment/Recommendations   Assessment: 84 year old female with chest pain, palpitations     Plan:  Chest pain, arm pain  Unclear etiology, did appear to correlate to PACs and SVT on Zio, but also had CAD on CTA (50%) that may have progressed, also consider noncardiac causes      -Add Imdur 30mg      -Return to clinic 1 month, if still symptoms would pursue angiogram and if negative then Zio    PACs and SVT  Symptoms did correlate to PACs and SVT but overall low burden       -Continue Toprol 25mg every day       -Rx for CAD and if symptoms persist can repeat Zio     CAD  Nonocclusive on CTA 2/2023, but now with recurrent chest pain and arm pain       -Add Imdur 30mg      -Add ASA 81mg      -Continue Toprol 25mg       -Change Simvastatin 20mg to Lipitor 20mg, see below      -Return to clinic 1 month, if still symptoms would proceed with angiogram     HTN  A bit high       -Add Imdur 30mg      -Continue Lisinopril 5mg, Toprol 25mg     Hyperlipidemia   LDL = 81, goal < 70      -Change Simvastatin 20mg to Lipitor 20mg    The longitudinal plan of care for the diagnosis(es)/condition(s) as documented were addressed during this visit. Due to the added complexity in care, I will continue to support Libertad in the subsequent management and with ongoing continuity of care.          History of Present Illness/Subjective    Indication for visit:  Libertad Hernadez returns for follow up of chest pain, palpitations and was last seen on 3/15/2023 by Dr Ansari.      HPI: Libertad Hernadez is a 84 year old female with a history of breast cancer, PE, PACs, HTN, hyperlipidemia, nonocclusive CAD who saw Dr Ansari for chest pain and palpitations. A CTA was negative for flow-limiting CAD, Zio showed PACs correlating to her symptoms, Metoprolol was added.    She returns for follow up of and reports still random chest pain for a few seconds and pain in left elbow when  "laying down.  Couple weeks ago heavy chest and arm pains, symptoms are at rest or with exertion.  No dyspnea, orthopnea, PND.  Sometimes palpitations associated with chest pain.       I reviewed notes from PCP prior to this visit.         Physical Examination  Past Cardiac History   Vitals: BP (!) 142/68 (BP Location: Right arm, Patient Position: Sitting, Cuff Size: Adult Regular)   Pulse 67   Resp 14   Ht 1.651 m (5' 5\")   Wt 69.7 kg (153 lb 9.6 oz)   SpO2 98%   BMI 25.56 kg/m    BMI= Body mass index is 25.56 kg/m .  Wt Readings from Last 3 Encounters:   11/25/24 69.7 kg (153 lb 9.6 oz)   08/02/23 68 kg (150 lb)   03/15/23 68.4 kg (150 lb 12.8 oz)       General Appearance:   no distress, normal body habitus   ENT/Mouth: membranes moist, no oral lesions or bleeding gums.      EYES:  no scleral icterus, normal conjunctivae   Neck: no carotid bruits or thyromegaly   Chest/Lungs:   lungs are clear to auscultation, no rales or wheezing,  sternal scar, equal chest wall expansion    Cardiovascular:   Regular. Normal first and second heart sounds with no murmurs, rubs, or gallops; the carotid, radial and posterior tibial pulses are intact, Jugular venous pressure normal , no edema bilaterally    Abdomen:  no organomegaly, masses, bruits, or tenderness; bowel sounds are present   Extremities: no cyanosis or clubbing   Skin: no xanthelasma, warm.    Neurologic: normal  bilateral, no tremors           CAD, nonocclusive  PACs, SVT      Zio: 2/7/2023  1.8% PACs, 110 episodes of SVT    Most Recent Echocardiogram: 11/15/2022  , normal valves, RVSP 31mmHg + RAP    Most Recent Stress Test: None    CTA: 2/22/2023  1.  Total coronary calcium score 944 places the individual in the high risk for cardiac events when compared to an age and gender matched control group and implies a very high risk of cardiac events in the next ten years.  2.  Mild disease in distal left main, less than 20% stenosis.  3.  Mild disease in " LAD and D1, less than 50% stenosis.  4.  Mild disease in ramus which is a big vessel, about 50% stenosis in proximal segment.  5.  Mild disease less than 40% stenosis in LCx which is small vessel.  6.  Mild disease in RCA, less than 50% stenosis.  Artifact is noticed in mid to distal RCA.  7.  Normal size of visualized aortic segments.  8.  No pericardial effusion.  No thrombus in left atrial appendage.       Most Recent Angiogram: None    ECG (reviewed by myself): 2/22/2023 NSR 63 bpm, RsR', PACs           Medical History  Family History Social History   Past Medical History:   Diagnosis Date    Arthritis     Asymptomatic varicose veins     Created by Conversion     Disorder of bone and cartilage, unspecified     Created by Conversion     Essential thrombocytosis (H)     Gastroesophageal reflux disease     Malignant neoplasm of breast (female), unspecified site     Created by Conversion [facility] Annotation: Oct 21 2010  9:56AM - Shirley Comer: stage 2;  10/18 lymph nodes positives/p chemo and [id number]     Motion sickness     Other and unspecified hyperlipidemia     Created by Conversion     PE (pulmonary thromboembolism) (H)     PONV (postoperative nausea and vomiting)     Primary cancer of bone marrow (H)     Thrombosis     Unspecified essential hypertension     Created by Conversion     Unspecified hereditary and idiopathic peripheral neuropathy     Created by Conversion      Family History   Problem Relation Age of Onset    Hypertension Father     Heart Disease Father     Diabetes Sister     Prostate Cancer Brother         Social History     Socioeconomic History    Marital status:      Spouse name: Not on file    Number of children: Not on file    Years of education: Not on file    Highest education level: Not on file   Occupational History    Not on file   Tobacco Use    Smoking status: Never     Passive exposure: Past    Smokeless tobacco: Never   Substance and Sexual Activity    Alcohol  use: Yes     Alcohol/week: 1.0 standard drink of alcohol     Comment: very rarely    Drug use: Never    Sexual activity: Never   Other Topics Concern    Parent/sibling w/ CABG, MI or angioplasty before 65F 55M? Not Asked   Social History Narrative    Not on file     Social Drivers of Health     Financial Resource Strain: Not on file   Food Insecurity: Not on file   Transportation Needs: Not on file   Physical Activity: Not on file   Stress: Not on file   Social Connections: Not on file   Interpersonal Safety: Not on file   Housing Stability: Not on file           Medications  Allergies   Current Outpatient Medications   Medication Sig Dispense Refill    betamethasone dipropionate (DIPROSONE) 0.05 % external lotion as needed APPLY TO AFFECTED AREA ON SCALP ONCE NIGHTLY FOR UP TO 2 WEEKS AT A TIME THEN 2 WEEK BREAK. RESUME AS NEEDED      Biotin 5000 MCG TABS Take 5,000 mcg by mouth daily      Calcium Carb-Cholecalciferol (CALCIUM 600+D) 600-800 MG-UNIT TABS Take 1 tablet by mouth 2 times daily      DULoxetine (CYMBALTA) 20 MG capsule Take 20 mg by mouth daily      famotidine (PEPCID) 20 MG tablet Take 1 tablet by mouth 2 times daily.      fluocinonide (LIDEX) 0.05 % external solution Apply topically 2 times daily as needed      hydroxyurea (HYDREA) 500 MG capsule TAKE ONE CAPSULE BY MOUTH EVERY DAY FOR 4 DAYS A WEEK AND TAKE TWO CAPSULES BY MOUTH EVERY DAY FOR 3 DAYS A WEEK 90 capsule 2    lisinopril (ZESTRIL) 5 MG tablet Take 5 mg by mouth daily      metoprolol succinate ER (TOPROL XL) 25 MG 24 hr tablet Take 1 tablet (25 mg) by mouth daily 90 tablet 3    polyethylene glycol (MIRALAX) 17 GM/Dose powder Take 1 capful by mouth daily as needed for constipation      simvastatin (ZOCOR) 20 MG tablet Take 20 mg by mouth At Bedtime      traZODone (DESYREL) 100 MG tablet Take 100 mg by mouth At Bedtime      vitamin B1 (THIAMINE) 250 MG tablet Take 250 mg by mouth daily      vitamin C (ASCORBIC ACID) 500 MG tablet Take 500 mg  "by mouth daily         Allergies   Allergen Reactions    Adhesive Tape-Silicones [Adhesive Tape] Itching     States itchy rash with certain tapes    Penicillins Hives          Lab Results    Chemistry/lipid CBC Cardiac Enzymes/BNP/TSH/INR   Recent Labs   Lab Test 11/04/24  1722   CHOL 173   HDL 62   LDL 81   TRIG 148     Recent Labs   Lab Test 11/04/24  1722 08/21/23  0758 07/11/22  1052   LDL 81 106* 89     Recent Labs   Lab Test 11/04/24  1722      POTASSIUM 3.9   CHLORIDE 103   CO2 25   GLC 96   BUN 15.9   CR 0.77   GFRESTIMATED 75   DARCI 9.7     Recent Labs   Lab Test 11/04/24  1722 08/21/23  0758 02/22/23  0828   CR 0.77 0.83 0.77     No results for input(s): \"A1C\" in the last 12828 hours.       Recent Labs   Lab Test 11/04/24  1722   WBC 7.9   HGB 15.1   HCT 46.6           Recent Labs   Lab Test 11/04/24  1722 02/22/23  0828 06/25/22  1045   HGB 15.1 15.8* 14.4    Recent Labs   Lab Test 02/22/23  1038 02/22/23  0828 06/25/22  1404   TROPONINI 0.01 <0.01 <0.01     Recent Labs   Lab Test 05/12/18  0840   BNP 74     Recent Labs   Lab Test 11/04/24  1722   TSH 2.10     Recent Labs   Lab Test 03/04/22  0628   INR 1.16*        Victoria Aguero MD  Noninvasive Cardiologist   [facility]                                    "

## 2024-11-25 ENCOUNTER — OFFICE VISIT (OUTPATIENT)
Dept: CARDIOLOGY | Facility: CLINIC | Age: 84
End: 2024-11-25
Attending: FAMILY MEDICINE
Payer: COMMERCIAL

## 2024-11-25 VITALS
BODY MASS INDEX: 25.59 KG/M2 | OXYGEN SATURATION: 98 % | HEIGHT: 65 IN | DIASTOLIC BLOOD PRESSURE: 68 MMHG | WEIGHT: 153.6 LBS | RESPIRATION RATE: 14 BRPM | SYSTOLIC BLOOD PRESSURE: 142 MMHG | HEART RATE: 67 BPM

## 2024-11-25 DIAGNOSIS — I47.10 SVT (SUPRAVENTRICULAR TACHYCARDIA) (H): ICD-10-CM

## 2024-11-25 DIAGNOSIS — R07.89 ATYPICAL CHEST PAIN: Primary | ICD-10-CM

## 2024-11-25 DIAGNOSIS — I49.1 PREMATURE ATRIAL CONTRACTIONS: ICD-10-CM

## 2024-11-25 DIAGNOSIS — E78.5 HYPERLIPIDEMIA LDL GOAL <70: ICD-10-CM

## 2024-11-25 DIAGNOSIS — I25.118 CORONARY ARTERY DISEASE INVOLVING NATIVE CORONARY ARTERY OF NATIVE HEART WITH OTHER FORM OF ANGINA PECTORIS (H): ICD-10-CM

## 2024-11-25 DIAGNOSIS — I10 PRIMARY HYPERTENSION: ICD-10-CM

## 2024-11-25 PROCEDURE — G2211 COMPLEX E/M VISIT ADD ON: HCPCS | Performed by: INTERNAL MEDICINE

## 2024-11-25 PROCEDURE — 99214 OFFICE O/P EST MOD 30 MIN: CPT | Performed by: INTERNAL MEDICINE

## 2024-11-25 RX ORDER — FAMOTIDINE 20 MG/1
1 TABLET, FILM COATED ORAL
COMMUNITY
Start: 2024-11-19

## 2024-11-25 RX ORDER — ATORVASTATIN CALCIUM 20 MG/1
20 TABLET, FILM COATED ORAL DAILY
Qty: 90 TABLET | Refills: 3 | Status: SHIPPED | OUTPATIENT
Start: 2024-11-25

## 2024-11-25 RX ORDER — ISOSORBIDE MONONITRATE 30 MG/1
30 TABLET, EXTENDED RELEASE ORAL DAILY
Qty: 30 TABLET | Refills: 11 | Status: SHIPPED | OUTPATIENT
Start: 2024-11-25

## 2024-11-25 NOTE — LETTER
11/25/2024    Candy Huang MD  [address]  [place] [address]    RE: Libertad Henradez       Dear Colleague,     I had the pleasure of seeing Libertad Hernadez in the [facility].    HEART CARE FOLLOW UP NOTE      [facility]  [phone]      Assessment/Recommendations   Assessment: 84 year old female with chest pain, palpitations     Plan:  Chest pain, arm pain  Unclear etiology, did appear to correlate to PACs and SVT on Zio, but also had CAD on CTA (50%) that may have progressed, also consider noncardiac causes      -Add Imdur 30mg      -Return to clinic 1 month, if still symptoms would pursue angiogram and if negative then Zio    PACs and SVT  Symptoms did correlate to PACs and SVT but overall low burden       -Continue Toprol 25mg every day       -Rx for CAD and if symptoms persist can repeat Zio     CAD  Nonocclusive on CTA 2/2023, but now with recurrent chest pain and arm pain       -Add Imdur 30mg      -Add ASA 81mg      -Continue Toprol 25mg       -Change Simvastatin 20mg to Lipitor 20mg, see below      -Return to clinic 1 month, if still symptoms would proceed with angiogram     HTN  A bit high       -Add Imdur 30mg      -Continue Lisinopril 5mg, Toprol 25mg     Hyperlipidemia   LDL = 81, goal < 70      -Change Simvastatin 20mg to Lipitor 20mg    The longitudinal plan of care for the diagnosis(es)/condition(s) as documented were addressed during this visit. Due to the added complexity in care, I will continue to support Libertad in the subsequent management and with ongoing continuity of care.          History of Present Illness/Subjective    Indication for visit:  Libretad Hernadez returns for follow up of chest pain, palpitations and was last seen on 3/15/2023 by Dr Ansari.      HPI: Libertad Hernadez is a 84 year old female with a history of breast cancer, PE, PACs, HTN, hyperlipidemia, nonocclusive CAD who saw Dr Ansari for chest pain and palpitations. A CTA was negative  "for flow-limiting CAD, Zio showed PACs correlating to her symptoms, Metoprolol was added.    She returns for follow up of and reports still random chest pain for a few seconds and pain in left elbow when laying down.  Couple weeks ago heavy chest and arm pains, symptoms are at rest or with exertion.  No dyspnea, orthopnea, PND.  Sometimes palpitations associated with chest pain.       I reviewed notes from PCP prior to this visit.         Physical Examination  Past Cardiac History   Vitals: BP (!) 142/68 (BP Location: Right arm, Patient Position: Sitting, Cuff Size: Adult Regular)   Pulse 67   Resp 14   Ht 1.651 m (5' 5\")   Wt 69.7 kg (153 lb 9.6 oz)   SpO2 98%   BMI 25.56 kg/m    BMI= Body mass index is 25.56 kg/m .  Wt Readings from Last 3 Encounters:   11/25/24 69.7 kg (153 lb 9.6 oz)   08/02/23 68 kg (150 lb)   03/15/23 68.4 kg (150 lb 12.8 oz)       General Appearance:   no distress, normal body habitus   ENT/Mouth: membranes moist, no oral lesions or bleeding gums.      EYES:  no scleral icterus, normal conjunctivae   Neck: no carotid bruits or thyromegaly   Chest/Lungs:   lungs are clear to auscultation, no rales or wheezing,  sternal scar, equal chest wall expansion    Cardiovascular:   Regular. Normal first and second heart sounds with no murmurs, rubs, or gallops; the carotid, radial and posterior tibial pulses are intact, Jugular venous pressure normal , no edema bilaterally    Abdomen:  no organomegaly, masses, bruits, or tenderness; bowel sounds are present   Extremities: no cyanosis or clubbing   Skin: no xanthelasma, warm.    Neurologic: normal  bilateral, no tremors           CAD, nonocclusive  PACs, SVT      Zio: 2/7/2023  1.8% PACs, 110 episodes of SVT    Most Recent Echocardiogram: 11/15/2022  , normal valves, RVSP 31mmHg + RAP    Most Recent Stress Test: None    CTA: 2/22/2023  1.  Total coronary calcium score 944 places the individual in the high risk for cardiac events when " compared to an age and gender matched control group and implies a very high risk of cardiac events in the next ten years.  2.  Mild disease in distal left main, less than 20% stenosis.  3.  Mild disease in LAD and D1, less than 50% stenosis.  4.  Mild disease in ramus which is a big vessel, about 50% stenosis in proximal segment.  5.  Mild disease less than 40% stenosis in LCx which is small vessel.  6.  Mild disease in RCA, less than 50% stenosis.  Artifact is noticed in mid to distal RCA.  7.  Normal size of visualized aortic segments.  8.  No pericardial effusion.  No thrombus in left atrial appendage.       Most Recent Angiogram: None    ECG (reviewed by myself): 2/22/2023 NSR 63 bpm, RsR', PACs           Medical History  Family History Social History   Past Medical History:   Diagnosis Date     Arthritis      Asymptomatic varicose veins     Created by Conversion      Disorder of bone and cartilage, unspecified     Created by Conversion      Essential thrombocytosis (H)      Gastroesophageal reflux disease      Malignant neoplasm of breast (female), unspecified site     Created by Conversion [facility] Annotation: Oct 21 2010  [id number] - Shirley Comer: stage 2;  10/18 lymph nodes positives/p chemo and [id number]      Motion sickness      Other and unspecified hyperlipidemia     Created by Conversion      PE (pulmonary thromboembolism) (H)      PONV (postoperative nausea and vomiting)      Primary cancer of bone marrow (H)      Thrombosis      Unspecified essential hypertension     Created by Conversion      Unspecified hereditary and idiopathic peripheral neuropathy     Created by Conversion      Family History   Problem Relation Age of Onset     Hypertension Father      Heart Disease Father      Diabetes Sister      Prostate Cancer Brother         Social History     Socioeconomic History     Marital status:      Spouse name: Not on file     Number of children: Not on file     Years of education:  Not on file     Highest education level: Not on file   Occupational History     Not on file   Tobacco Use     Smoking status: Never     Passive exposure: Past     Smokeless tobacco: Never   Substance and Sexual Activity     Alcohol use: Yes     Alcohol/week: 1.0 standard drink of alcohol     Comment: very rarely     Drug use: Never     Sexual activity: Never   Other Topics Concern     Parent/sibling w/ CABG, MI or angioplasty before 65F 55M? Not Asked   Social History Narrative     Not on file     Social Drivers of Health     Financial Resource Strain: Not on file   Food Insecurity: Not on file   Transportation Needs: Not on file   Physical Activity: Not on file   Stress: Not on file   Social Connections: Not on file   Interpersonal Safety: Not on file   Housing Stability: Not on file           Medications  Allergies   Current Outpatient Medications   Medication Sig Dispense Refill     betamethasone dipropionate (DIPROSONE) 0.05 % external lotion as needed APPLY TO AFFECTED AREA ON SCALP ONCE NIGHTLY FOR UP TO 2 WEEKS AT A TIME THEN 2 WEEK BREAK. RESUME AS NEEDED       Biotin 5000 MCG TABS Take 5,000 mcg by mouth daily       Calcium Carb-Cholecalciferol (CALCIUM 600+D) 600-800 MG-UNIT TABS Take 1 tablet by mouth 2 times daily       DULoxetine (CYMBALTA) 20 MG capsule Take 20 mg by mouth daily       famotidine (PEPCID) 20 MG tablet Take 1 tablet by mouth 2 times daily.       fluocinonide (LIDEX) 0.05 % external solution Apply topically 2 times daily as needed       hydroxyurea (HYDREA) 500 MG capsule TAKE ONE CAPSULE BY MOUTH EVERY DAY FOR 4 DAYS A WEEK AND TAKE TWO CAPSULES BY MOUTH EVERY DAY FOR 3 DAYS A WEEK 90 capsule 2     lisinopril (ZESTRIL) 5 MG tablet Take 5 mg by mouth daily       metoprolol succinate ER (TOPROL XL) 25 MG 24 hr tablet Take 1 tablet (25 mg) by mouth daily 90 tablet 3     polyethylene glycol (MIRALAX) 17 GM/Dose powder Take 1 capful by mouth daily as needed for constipation       simvastatin  "(ZOCOR) 20 MG tablet Take 20 mg by mouth At Bedtime       traZODone (DESYREL) 100 MG tablet Take 100 mg by mouth At Bedtime       vitamin B1 (THIAMINE) 250 MG tablet Take 250 mg by mouth daily       vitamin C (ASCORBIC ACID) 500 MG tablet Take 500 mg by mouth daily         Allergies   Allergen Reactions     Adhesive Tape-Silicones [Adhesive Tape] Itching     States itchy rash with certain tapes     Penicillins Hives          Lab Results    Chemistry/lipid CBC Cardiac Enzymes/BNP/TSH/INR   Recent Labs   Lab Test 11/04/24  1722   CHOL 173   HDL 62   LDL 81   TRIG 148     Recent Labs   Lab Test 11/04/24  1722 08/21/23  0758 07/11/22  1052   LDL 81 106* 89     Recent Labs   Lab Test 11/04/24  1722      POTASSIUM 3.9   CHLORIDE 103   CO2 25   GLC 96   BUN 15.9   CR 0.77   GFRESTIMATED 75   DARCI 9.7     Recent Labs   Lab Test 11/04/24  1722 08/21/23  0758 02/22/23  0828   CR 0.77 0.83 0.77     No results for input(s): \"A1C\" in the last 98612 hours.       Recent Labs   Lab Test 11/04/24  1722   WBC 7.9   HGB 15.1   HCT 46.6           Recent Labs   Lab Test 11/04/24  1722 02/22/23  0828 06/25/22  1045   HGB 15.1 15.8* 14.4    Recent Labs   Lab Test 02/22/23  1038 02/22/23  0828 06/25/22  1404   TROPONINI 0.01 <0.01 <0.01     Recent Labs   Lab Test 05/12/18  0840   BNP 74     Recent Labs   Lab Test 11/04/24  1722   TSH 2.10     Recent Labs   Lab Test 03/04/22  0628   INR 1.16*        Victoria Aguero MD  Noninvasive Cardiologist   [facility]                                        Thank you for allowing me to participate in the care of your patient.      Sincerely,     Victoria Aguero MD     [facility]  cc:   Candy Huang MD  [address] [place] [address]      "

## 2024-11-25 NOTE — PATIENT INSTRUCTIONS
I am not sure what is causing your chest pain and arm pain.  In the past with Dr Ansari it appeared to be associated with extra heart beats called PACs and PVCs, that is why he added the Metoprolol.  Your symptoms could be from on-going PACs, could be from the blockages seen in the vessels on the CT scan getting worse since 2/2023, or could be unrelated to your heart.    Will try adding a medication called Isosorbide 30mg every morning.  If the symptoms do not improve then we should consider an angiogram to see if the blockages have gotten worse.      Stop the Simvastatin and start Atorvastatin 20mg.  The cholesterol level is 81, should be < 70, so we will try a stronger cholesterol medication.

## 2024-11-26 ENCOUNTER — TRANSFERRED RECORDS (OUTPATIENT)
Dept: HEALTH INFORMATION MANAGEMENT | Facility: CLINIC | Age: 84
End: 2024-11-26
Payer: COMMERCIAL

## 2024-12-17 ENCOUNTER — HOSPITAL ENCOUNTER (OUTPATIENT)
Facility: CLINIC | Age: 84
Setting detail: OBSERVATION
Discharge: HOME | End: 2024-12-18
Attending: EMERGENCY MEDICINE | Admitting: FAMILY MEDICINE
Payer: COMMERCIAL

## 2024-12-17 ENCOUNTER — APPOINTMENT (OUTPATIENT)
Dept: CT IMAGING | Facility: CLINIC | Age: 84
End: 2024-12-17
Attending: EMERGENCY MEDICINE
Payer: COMMERCIAL

## 2024-12-17 ENCOUNTER — APPOINTMENT (OUTPATIENT)
Dept: MRI IMAGING | Facility: CLINIC | Age: 84
End: 2024-12-17
Attending: EMERGENCY MEDICINE
Payer: COMMERCIAL

## 2024-12-17 DIAGNOSIS — G45.9 TIA (TRANSIENT ISCHEMIC ATTACK): ICD-10-CM

## 2024-12-17 DIAGNOSIS — I10 ESSENTIAL HYPERTENSION: ICD-10-CM

## 2024-12-17 DIAGNOSIS — E78.5 HYPERLIPIDEMIA LDL GOAL <70: ICD-10-CM

## 2024-12-17 DIAGNOSIS — K21.9 GASTROESOPHAGEAL REFLUX DISEASE, UNSPECIFIED WHETHER ESOPHAGITIS PRESENT: Primary | ICD-10-CM

## 2024-12-17 LAB
ALBUMIN UR-MCNC: NEGATIVE MG/DL
ANION GAP SERPL CALCULATED.3IONS-SCNC: 10 MMOL/L (ref 7–15)
APPEARANCE UR: ABNORMAL
BASOPHILS # BLD AUTO: 0.1 10E3/UL (ref 0–0.2)
BASOPHILS NFR BLD AUTO: 1 %
BILIRUB UR QL STRIP: NEGATIVE
BUN SERPL-MCNC: 17.4 MG/DL (ref 8–23)
CALCIUM SERPL-MCNC: 10.4 MG/DL (ref 8.8–10.4)
CHLORIDE SERPL-SCNC: 105 MMOL/L (ref 98–107)
COLOR UR AUTO: ABNORMAL
CREAT SERPL-MCNC: 0.82 MG/DL (ref 0.51–0.95)
EGFRCR SERPLBLD CKD-EPI 2021: 70 ML/MIN/1.73M2
EOSINOPHIL # BLD AUTO: 0.2 10E3/UL (ref 0–0.7)
EOSINOPHIL NFR BLD AUTO: 3 %
ERYTHROCYTE [DISTWIDTH] IN BLOOD BY AUTOMATED COUNT: 15 % (ref 10–15)
EST. AVERAGE GLUCOSE BLD GHB EST-MCNC: 117 MG/DL
GLUCOSE BLDC GLUCOMTR-MCNC: 128 MG/DL (ref 70–99)
GLUCOSE SERPL-MCNC: 111 MG/DL (ref 70–99)
GLUCOSE UR STRIP-MCNC: NEGATIVE MG/DL
HBA1C MFR BLD: 5.7 %
HCO3 SERPL-SCNC: 28 MMOL/L (ref 22–29)
HCT VFR BLD AUTO: 45.1 % (ref 35–47)
HGB BLD-MCNC: 15.1 G/DL (ref 11.7–15.7)
HGB UR QL STRIP: NEGATIVE
HYALINE CASTS: 1 /LPF
IMM GRANULOCYTES # BLD: 0 10E3/UL
IMM GRANULOCYTES NFR BLD: 0 %
KETONES UR STRIP-MCNC: NEGATIVE MG/DL
LEUKOCYTE ESTERASE UR QL STRIP: NEGATIVE
LYMPHOCYTES # BLD AUTO: 1.8 10E3/UL (ref 0.8–5.3)
LYMPHOCYTES NFR BLD AUTO: 24 %
MCH RBC QN AUTO: 32.5 PG (ref 26.5–33)
MCHC RBC AUTO-ENTMCNC: 33.5 G/DL (ref 31.5–36.5)
MCV RBC AUTO: 97 FL (ref 78–100)
MONOCYTES # BLD AUTO: 0.6 10E3/UL (ref 0–1.3)
MONOCYTES NFR BLD AUTO: 9 %
MUCOUS THREADS #/AREA URNS LPF: PRESENT /LPF
NEUTROPHILS # BLD AUTO: 4.7 10E3/UL (ref 1.6–8.3)
NEUTROPHILS NFR BLD AUTO: 64 %
NITRATE UR QL: NEGATIVE
NRBC # BLD AUTO: 0 10E3/UL
NRBC BLD AUTO-RTO: 0 /100
PH UR STRIP: 6 [PH] (ref 5–7)
PLATELET # BLD AUTO: 355 10E3/UL (ref 150–450)
POTASSIUM SERPL-SCNC: 4 MMOL/L (ref 3.4–5.3)
RBC # BLD AUTO: 4.64 10E6/UL (ref 3.8–5.2)
RBC URINE: 0 /HPF
SODIUM SERPL-SCNC: 143 MMOL/L (ref 135–145)
SP GR UR STRIP: 1.01 (ref 1–1.03)
SQUAMOUS EPITHELIAL: <1 /HPF
UROBILINOGEN UR STRIP-MCNC: <2 MG/DL
WBC # BLD AUTO: 7.4 10E3/UL (ref 4–11)
WBC URINE: 3 /HPF

## 2024-12-17 PROCEDURE — 81001 URINALYSIS AUTO W/SCOPE: CPT | Performed by: EMERGENCY MEDICINE

## 2024-12-17 PROCEDURE — 250N000011 HC RX IP 250 OP 636: Performed by: EMERGENCY MEDICINE

## 2024-12-17 PROCEDURE — A9585 GADOBUTROL INJECTION: HCPCS | Performed by: EMERGENCY MEDICINE

## 2024-12-17 PROCEDURE — 250N000013 HC RX MED GY IP 250 OP 250 PS 637: Performed by: FAMILY MEDICINE

## 2024-12-17 PROCEDURE — 82962 GLUCOSE BLOOD TEST: CPT

## 2024-12-17 PROCEDURE — 70553 MRI BRAIN STEM W/O & W/DYE: CPT

## 2024-12-17 PROCEDURE — 70496 CT ANGIOGRAPHY HEAD: CPT

## 2024-12-17 PROCEDURE — 82465 ASSAY BLD/SERUM CHOLESTEROL: CPT | Performed by: FAMILY MEDICINE

## 2024-12-17 PROCEDURE — 85041 AUTOMATED RBC COUNT: CPT | Performed by: EMERGENCY MEDICINE

## 2024-12-17 PROCEDURE — 255N000002 HC RX 255 OP 636: Performed by: EMERGENCY MEDICINE

## 2024-12-17 PROCEDURE — 99285 EMERGENCY DEPT VISIT HI MDM: CPT | Mod: 25

## 2024-12-17 PROCEDURE — 85004 AUTOMATED DIFF WBC COUNT: CPT | Performed by: EMERGENCY MEDICINE

## 2024-12-17 PROCEDURE — 80048 BASIC METABOLIC PNL TOTAL CA: CPT | Performed by: EMERGENCY MEDICINE

## 2024-12-17 PROCEDURE — 36415 COLL VENOUS BLD VENIPUNCTURE: CPT | Performed by: EMERGENCY MEDICINE

## 2024-12-17 PROCEDURE — 83718 ASSAY OF LIPOPROTEIN: CPT | Performed by: FAMILY MEDICINE

## 2024-12-17 PROCEDURE — 120N000001 HC R&B MED SURG/OB

## 2024-12-17 PROCEDURE — 83036 HEMOGLOBIN GLYCOSYLATED A1C: CPT | Performed by: FAMILY MEDICINE

## 2024-12-17 PROCEDURE — 99223 1ST HOSP IP/OBS HIGH 75: CPT | Performed by: FAMILY MEDICINE

## 2024-12-17 PROCEDURE — 250N000013 HC RX MED GY IP 250 OP 250 PS 637: Performed by: EMERGENCY MEDICINE

## 2024-12-17 PROCEDURE — 93005 ELECTROCARDIOGRAM TRACING: CPT | Performed by: FAMILY MEDICINE

## 2024-12-17 RX ORDER — HYDROXYUREA 500 MG/1
500 CAPSULE ORAL
Status: DISCONTINUED | OUTPATIENT
Start: 2024-12-17 | End: 2024-12-18 | Stop reason: HOSPADM

## 2024-12-17 RX ORDER — ASPIRIN 81 MG/1
81 TABLET ORAL DAILY
Status: DISCONTINUED | OUTPATIENT
Start: 2024-12-17 | End: 2024-12-18 | Stop reason: HOSPADM

## 2024-12-17 RX ORDER — AMOXICILLIN 250 MG
1 CAPSULE ORAL 2 TIMES DAILY PRN
Status: DISCONTINUED | OUTPATIENT
Start: 2024-12-17 | End: 2024-12-18 | Stop reason: HOSPADM

## 2024-12-17 RX ORDER — METOPROLOL SUCCINATE 25 MG/1
25 TABLET, EXTENDED RELEASE ORAL AT BEDTIME
Status: DISCONTINUED | OUTPATIENT
Start: 2024-12-17 | End: 2024-12-18 | Stop reason: HOSPADM

## 2024-12-17 RX ORDER — SIMVASTATIN 20 MG
20 TABLET ORAL AT BEDTIME
Status: DISCONTINUED | OUTPATIENT
Start: 2024-12-17 | End: 2024-12-17

## 2024-12-17 RX ORDER — LISINOPRIL 10 MG/1
10 TABLET ORAL DAILY
Status: DISCONTINUED | OUTPATIENT
Start: 2024-12-18 | End: 2024-12-18 | Stop reason: HOSPADM

## 2024-12-17 RX ORDER — ALCAFTADINE 2.5 MG/ML
1 SOLUTION/ DROPS OPHTHALMIC EVERY MORNING
COMMUNITY
Start: 2024-10-16

## 2024-12-17 RX ORDER — DULOXETIN HYDROCHLORIDE 20 MG/1
40 CAPSULE, DELAYED RELEASE ORAL AT BEDTIME
Status: DISCONTINUED | OUTPATIENT
Start: 2024-12-17 | End: 2024-12-18 | Stop reason: HOSPADM

## 2024-12-17 RX ORDER — IOPAMIDOL 755 MG/ML
75 INJECTION, SOLUTION INTRAVASCULAR ONCE
Status: COMPLETED | OUTPATIENT
Start: 2024-12-17 | End: 2024-12-17

## 2024-12-17 RX ORDER — HYDROXYUREA 500 MG/1
1000 CAPSULE ORAL
Status: DISCONTINUED | OUTPATIENT
Start: 2024-12-18 | End: 2024-12-18 | Stop reason: HOSPADM

## 2024-12-17 RX ORDER — SOLIFENACIN SUCCINATE 5 MG/1
5 TABLET, FILM COATED ORAL AT BEDTIME
COMMUNITY
Start: 2024-12-02

## 2024-12-17 RX ORDER — ONDANSETRON 2 MG/ML
4 INJECTION INTRAMUSCULAR; INTRAVENOUS EVERY 6 HOURS PRN
Status: DISCONTINUED | OUTPATIENT
Start: 2024-12-17 | End: 2024-12-18 | Stop reason: HOSPADM

## 2024-12-17 RX ORDER — ATORVASTATIN CALCIUM 40 MG/1
40 TABLET, FILM COATED ORAL
Status: DISCONTINUED | OUTPATIENT
Start: 2024-12-17 | End: 2024-12-18 | Stop reason: HOSPADM

## 2024-12-17 RX ORDER — ATORVASTATIN CALCIUM 10 MG/1
20 TABLET, FILM COATED ORAL DAILY
Status: DISCONTINUED | OUTPATIENT
Start: 2024-12-17 | End: 2024-12-17

## 2024-12-17 RX ORDER — ONDANSETRON 4 MG/1
4 TABLET, ORALLY DISINTEGRATING ORAL EVERY 6 HOURS PRN
Status: DISCONTINUED | OUTPATIENT
Start: 2024-12-17 | End: 2024-12-18 | Stop reason: HOSPADM

## 2024-12-17 RX ORDER — CLOPIDOGREL BISULFATE 75 MG/1
75 TABLET ORAL DAILY
Status: DISCONTINUED | OUTPATIENT
Start: 2024-12-18 | End: 2024-12-18

## 2024-12-17 RX ORDER — LIDOCAINE 40 MG/G
CREAM TOPICAL
Status: DISCONTINUED | OUTPATIENT
Start: 2024-12-17 | End: 2024-12-18 | Stop reason: HOSPADM

## 2024-12-17 RX ORDER — GADOBUTROL 604.72 MG/ML
6.8 INJECTION INTRAVENOUS ONCE
Status: COMPLETED | OUTPATIENT
Start: 2024-12-17 | End: 2024-12-17

## 2024-12-17 RX ORDER — HYDRALAZINE HYDROCHLORIDE 20 MG/ML
10 INJECTION INTRAMUSCULAR; INTRAVENOUS EVERY 6 HOURS PRN
Status: DISCONTINUED | OUTPATIENT
Start: 2024-12-17 | End: 2024-12-18

## 2024-12-17 RX ORDER — TRAZODONE HYDROCHLORIDE 50 MG/1
100 TABLET, FILM COATED ORAL AT BEDTIME
Status: DISCONTINUED | OUTPATIENT
Start: 2024-12-17 | End: 2024-12-18 | Stop reason: HOSPADM

## 2024-12-17 RX ORDER — ALENDRONATE SODIUM 70 MG/1
70 TABLET ORAL
COMMUNITY
Start: 2024-10-20

## 2024-12-17 RX ORDER — CLOPIDOGREL BISULFATE 75 MG/1
300 TABLET ORAL ONCE
Status: COMPLETED | OUTPATIENT
Start: 2024-12-17 | End: 2024-12-17

## 2024-12-17 RX ORDER — TOLTERODINE 2 MG/1
2 CAPSULE, EXTENDED RELEASE ORAL AT BEDTIME
Status: DISCONTINUED | OUTPATIENT
Start: 2024-12-17 | End: 2024-12-18 | Stop reason: HOSPADM

## 2024-12-17 RX ORDER — ACETAMINOPHEN 325 MG/1
650 TABLET ORAL EVERY 6 HOURS PRN
Status: DISCONTINUED | OUTPATIENT
Start: 2024-12-17 | End: 2024-12-18 | Stop reason: HOSPADM

## 2024-12-17 RX ORDER — ASPIRIN 81 MG/1
81 TABLET, CHEWABLE ORAL DAILY
Status: DISCONTINUED | OUTPATIENT
Start: 2024-12-17 | End: 2024-12-17

## 2024-12-17 RX ORDER — ISOSORBIDE MONONITRATE 30 MG/1
30 TABLET, EXTENDED RELEASE ORAL AT BEDTIME
Status: DISCONTINUED | OUTPATIENT
Start: 2024-12-17 | End: 2024-12-18 | Stop reason: HOSPADM

## 2024-12-17 RX ORDER — ASPIRIN 81 MG/1
81 TABLET, CHEWABLE ORAL ONCE
Status: COMPLETED | OUTPATIENT
Start: 2024-12-17 | End: 2024-12-17

## 2024-12-17 RX ORDER — ANTIARTHRITIC COMBINATION NO.2 900 MG
5000 TABLET ORAL DAILY
Status: DISCONTINUED | OUTPATIENT
Start: 2024-12-17 | End: 2024-12-17

## 2024-12-17 RX ORDER — FAMOTIDINE 20 MG/1
20 TABLET, FILM COATED ORAL DAILY
Status: DISCONTINUED | OUTPATIENT
Start: 2024-12-18 | End: 2024-12-18 | Stop reason: HOSPADM

## 2024-12-17 RX ORDER — AMOXICILLIN 250 MG
2 CAPSULE ORAL 2 TIMES DAILY PRN
Status: DISCONTINUED | OUTPATIENT
Start: 2024-12-17 | End: 2024-12-18 | Stop reason: HOSPADM

## 2024-12-17 RX ADMIN — TOLTERODINE TARTRATE 2 MG: 2 CAPSULE, EXTENDED RELEASE ORAL at 21:19

## 2024-12-17 RX ADMIN — GADOBUTROL 6.8 ML: 604.72 INJECTION INTRAVENOUS at 15:23

## 2024-12-17 RX ADMIN — DULOXETINE HYDROCHLORIDE 40 MG: 20 CAPSULE, DELAYED RELEASE PELLETS ORAL at 21:19

## 2024-12-17 RX ADMIN — CLOPIDOGREL BISULFATE 300 MG: 75 TABLET ORAL at 17:51

## 2024-12-17 RX ADMIN — ISOSORBIDE MONONITRATE 30 MG: 30 TABLET, EXTENDED RELEASE ORAL at 21:19

## 2024-12-17 RX ADMIN — HYDROXYUREA 500 MG: 500 CAPSULE ORAL at 21:18

## 2024-12-17 RX ADMIN — METOPROLOL SUCCINATE 25 MG: 25 TABLET, EXTENDED RELEASE ORAL at 21:19

## 2024-12-17 RX ADMIN — ASPIRIN 81 MG CHEWABLE TABLET 81 MG: 81 TABLET CHEWABLE at 17:51

## 2024-12-17 RX ADMIN — ATORVASTATIN CALCIUM 40 MG: 40 TABLET, FILM COATED ORAL at 20:03

## 2024-12-17 RX ADMIN — IOPAMIDOL 75 ML: 755 INJECTION, SOLUTION INTRAVENOUS at 17:32

## 2024-12-17 RX ADMIN — TRAZODONE HYDROCHLORIDE 100 MG: 50 TABLET ORAL at 21:19

## 2024-12-17 ASSESSMENT — COLUMBIA-SUICIDE SEVERITY RATING SCALE - C-SSRS
1. IN THE PAST MONTH, HAVE YOU WISHED YOU WERE DEAD OR WISHED YOU COULD GO TO SLEEP AND NOT WAKE UP?: NO
6. HAVE YOU EVER DONE ANYTHING, STARTED TO DO ANYTHING, OR PREPARED TO DO ANYTHING TO END YOUR LIFE?: NO
2. HAVE YOU ACTUALLY HAD ANY THOUGHTS OF KILLING YOURSELF IN THE PAST MONTH?: NO

## 2024-12-17 ASSESSMENT — ACTIVITIES OF DAILY LIVING (ADL)
ADLS_ACUITY_SCORE: 55
ADLS_ACUITY_SCORE: 55
ADLS_ACUITY_SCORE: 59
ADLS_ACUITY_SCORE: 55
ADLS_ACUITY_SCORE: 59
ADLS_ACUITY_SCORE: 55

## 2024-12-17 NOTE — H&P
"[facility] MEDICINE ADMISSION HISTORY AND PHYSICAL     Assessment & Plan       Libertad Hernadez is a 84 year old  female with history of essential hypertension, dyslipidemia, breast cancer, status post bilateral mastectomy, peripheral neuropathy, restless leg syndrome, thrombocytosis, pulmonary embolism, came with sudden onset of slurred speech for 1 to 2 minutes.  No recent illnesses.  No other focal deficit.  Brain MRI is unremarkable.  Stroke neurology is notified.  Started on Plavix loading dose.  CTA of head and neck will be obtained.       TIA  Patient had brief episodes of expressive aphasia and slurred speech without other focal neurological deficit  Brain MRI-no intracranial abnormalities  Head and neck CTA  Status post Plavix 300 mg once.  Resume 75 mg daily  Resume Aspirin 81 mg daily  Resume Lipitor 40 mg daily  Telemetry monitoring to rule out arrhythmia  Outpatient Zio patch  Echocardiogram  PT and OT evaluation  Hemoglobin A1c to rule out DM2  Fasting lipid panel  Stroke neurology consult    Hypertensive urgency  IV hydralazine 10 mg every 6 hours as needed  Lisinopril 10 mg daily, dose increased  Metoprolol 25 mg daily  Permissive hypertension    History of PVCs, resume beta-blocker    GERD  Resume H2 blocker    History of pulmonary embolism  DVT prophylaxis  Status post loading dose of Plavix.  Resume aspirin and Plavix    History of breast cancer  Status post bilateral mastectomy    JAK2 positive essential thrombocytosis.   Resume Hydrea    Osteoporosis  Fosamax 70 mg once a week at home  Calcium and vitamin D supplement    Observation admission    Medically Ready for Discharge: Anticipated Tomorrow    Clinically Significant Risk Factors Present on Admission                   # Hypertension: Noted on problem list           # Overweight: Estimated body mass index is 26.09 kg/m  as calculated from the following:    Height as of this encounter: 1.626 m (5' 4\").    Weight as of " this encounter: 68.9 kg (152 lb).              Chief Complaint Brief episode of slurred speech     HISTORY     Libertad Hernadez is a 84 year old old female with history of essential hypertension, dyslipidemia, breast cancer, status post bilateral mastectomy, peripheral neuropathy, restless leg syndrome, thrombocytosis, pulmonary embolism, came with sudden onset of slurred speech for 1 to 2 minutes.  No recent illnesses.  No other focal deficit.  Brain MRI is unremarkable.  Stroke neurology is notified.  Started on Plavix loading dose.  CTA of head and neck will be obtained.  Seen with her  independently.  Denies headache, chest pain, breathing difficulty, palpitation, nausea, vomiting, abdominal pain or urinary symptoms.  No history of TIA or stroke in the past.  History of pulmonary embolism and thrombocytosis.  Not on chronic anticoagulation treatment.  History is provided by the patient.  Spoke with ED physician.    Past Medical History     Past Medical History:  No date: Arthritis  No date: Asymptomatic varicose veins      Comment:  Created by Conversion   No date: Disorder of bone and cartilage, unspecified      Comment:  Created by Conversion   No date: Essential thrombocytosis (H)  No date: Gastroesophageal reflux disease  No date: Malignant neoplasm of breast (female), unspecified site      Comment:  Created by Conversion [facility] Annotation: Oct 21                2010  9:56AM - Shirley Comer: stage 2;  10/18 lymph                nodes positives/p chemo and [id number]   No date: Motion sickness  No date: Other and unspecified hyperlipidemia      Comment:  Created by Conversion   No date: PE (pulmonary thromboembolism) (H)  No date: PONV (postoperative nausea and vomiting)  No date: Primary cancer of bone marrow (H)  No date: Thrombosis  No date: Unspecified essential hypertension      Comment:  Created by Conversion   No date: Unspecified hereditary and idiopathic peripheral neuropathy       Comment:  Created by Conversion      Surgical History     Past Surgical History:   Procedure Laterality Date    ARTHROPLASTY, HIP, TOTAL, DIRECT ANTERIOR APPROACH, USING HANA TABLE Right 3/4/2022    Procedure: RIGHT TOTAL HIP ARTHROPLASTY, DIRECT ANTERIOR;  Surgeon: Pineda Rowe MD;  Location: [facility] OR    ESOPHAGOSCOPY, GASTROSCOPY, DUODENOSCOPY (EGD), COMBINED N/A 11/22/2022    Procedure: ESOPHAGOGASTRODUODENOSCOPY with biopsy;  Surgeon: Nicolas Sifuentes MD;  Location: [facility] OR    FOOT SURGERY  2010    HC REMOVAL ADENOIDS,PRIMARY,<11 Y/O      Description: Adenoidectomy;  Recorded: 04/04/2008;    HC REMOVAL GALLBLADDER      Description: Cholecystectomy;  Recorded: 04/04/2008;    HC REMOVAL OF TONSILS,<11 Y/O      Description: Tonsillectomy;  Recorded: 04/04/2008;    TN INCISE FINGER TENDON SHEATH      Description: Hand Incision Tendon Sheath Of A Finger;  Recorded: 04/04/2008;    TN MASTECTOMY, PARTIAL      Description: Right Breast Partial Mastectomy Inferior Lateral Quarter;  Recorded: 10/21/2010;    TN MASTECTOMY, RADICAL      Description: Radical Mastectomy Left Breast;  Recorded: 04/04/2008;     Family History    Reviewed, and   Family History   Problem Relation Age of Onset    Hypertension Father     Heart Disease Father     Diabetes Sister     Prostate Cancer Brother       Social History      Social History     Tobacco Use    Smoking status: Never     Passive exposure: Past    Smokeless tobacco: Never   Substance Use Topics    Alcohol use: Yes     Alcohol/week: 1.0 standard drink of alcohol     Comment: very rarely    Drug use: Never      Allergies     Allergies   Allergen Reactions    Adhesive Tape-Silicones [Adhesive Tape] Itching     States itchy rash with certain tapes    Penicillins Hives     Prior to Admission Medications      Prior to Admission Medications   Prescriptions Last Dose Informant Patient Reported? Taking?   Biotin 5000 MCG TABS  Self Yes No   Sig: Take 5,000 mcg by  mouth daily   Calcium Carb-Cholecalciferol (CALCIUM 600+D) 600-800 MG-UNIT TABS  Self Yes No   Sig: Take 1 tablet by mouth 2 times daily   DULoxetine (CYMBALTA) 20 MG capsule  Self Yes No   Sig: Take 20 mg by mouth daily   atorvastatin (LIPITOR) 20 MG tablet   No No   Sig: Take 1 tablet (20 mg) by mouth daily.   betamethasone dipropionate (DIPROSONE) 0.05 % external lotion  Self Yes No   Sig: as needed APPLY TO AFFECTED AREA ON SCALP ONCE NIGHTLY FOR UP TO 2 WEEKS AT A TIME THEN 2 WEEK BREAK. RESUME AS NEEDED   famotidine (PEPCID) 20 MG tablet   Yes No   Sig: Take 1 tablet by mouth 2 times daily.   fluocinonide (LIDEX) 0.05 % external solution  Self Yes No   Sig: Apply topically 2 times daily as needed   hydroxyurea (HYDREA) 500 MG capsule  Self No No   Sig: TAKE ONE CAPSULE BY MOUTH EVERY DAY FOR 4 DAYS A WEEK AND TAKE TWO CAPSULES BY MOUTH EVERY DAY FOR 3 DAYS A WEEK   isosorbide mononitrate (IMDUR) 30 MG 24 hr tablet   No No   Sig: Take 1 tablet (30 mg) by mouth daily.   lisinopril (ZESTRIL) 5 MG tablet  Self Yes No   Sig: Take 5 mg by mouth daily   metoprolol succinate ER (TOPROL XL) 25 MG 24 hr tablet   No No   Sig: Take 1 tablet (25 mg) by mouth daily   polyethylene glycol (MIRALAX) 17 GM/Dose powder  Self Yes No   Sig: Take 1 capful by mouth daily as needed for constipation   simvastatin (ZOCOR) 20 MG tablet  Self Yes No   Sig: Take 20 mg by mouth At Bedtime   traZODone (DESYREL) 100 MG tablet  Self Yes No   Sig: Take 100 mg by mouth At Bedtime   vitamin B1 (THIAMINE) 250 MG tablet  Self Yes No   Sig: Take 250 mg by mouth daily   vitamin C (ASCORBIC ACID) 500 MG tablet  Self Yes No   Sig: Take 500 mg by mouth daily      Facility-Administered Medications: None      Review of Systems     A 12 point comprehensive review of systems was negative except as noted above in HPI.    PHYSICAL EXAMINATION       Vitals      Temp:  [98.2  F (36.8  C)] 98.2  F (36.8  C)  Pulse:  [64-79] 79  Resp:  [16] 16  BP:  (193-209)/(87-91) 209/91  SpO2:  [98 %-99 %] 99 %    Examination     GENERAL:  Alert, appears comfortable, in no acute distress, appears stated age   HEAD:  Normocephalic, without obvious abnormality, atraumatic   EYES:  PERRL, conjunctiva  clear,   EOM's intact   NOSE: Nares normal,  mucosa normal, no drainage   THROAT: Lips, mucosa,   gums normal, mouth moist   NECK: Supple, symmetrical, trachea midline   BACK:   Symmetric, no curvature, ROM normal   LUNGS:   Clear to auscultation bilaterally, no rales, rhonchi, or wheezing, symmetric chest rise on inhalation, respirations unlabored   CHEST WALL:  No tenderness or deformity   HEART:  Regular rate and rhythm, S1 and S2 normal, no murmur, rub, or gallop    ABDOMEN:   Soft, non-tender, bowel sounds active  no masses, no organomegaly, no rebound or guarding   EXTREMITIES: No BLE edema    SKIN: No rashes in the visualized areas   NEURO: Alert, oriented x 3, moves all four extremities freely, non-focal   PSYCH: Cooperative, behavior is appropriate      Pertinent Lab   Most Recent 3 CBC's:  Recent Labs   Lab Test 12/17/24  1340 11/04/24  1722 02/22/23  0828   WBC 7.4 7.9 6.8   HGB 15.1 15.1 15.8*   MCV 97 100 97    420 454*     Most Recent 3 BMP's:  Recent Labs   Lab Test 12/17/24  1340 11/04/24  1722 08/21/23  0758    140 141   POTASSIUM 4.0 3.9 4.1   CHLORIDE 105 103 104   CO2 28 25 26   BUN 17.4 15.9 14.5   CR 0.82 0.77 0.83   ANIONGAP 10 12 11   DARCI 10.4 9.7 9.7   * 96 95     Most Recent 2 LFT's:  Recent Labs   Lab Test 11/04/24  1722 08/21/23  0758   AST 21 20   ALT 15 18   ALKPHOS 49 63   BILITOTAL 0.3 0.6     Pertinent Radiology     Radiology Results:   Brain MRI  1. No acute intracranial pathology.  2. Mild degree of age-related cerebral parenchymal volume loss.  3. A time sensitive CTA or MRA is recommended to further evaluate a non-ruptured possible basilar tip aneurysm    Total time spent 70 min  Yari Spence MD  [facility]  [facility]   Phone: #[phone]

## 2024-12-17 NOTE — CONSULTS
"  [facility]    Stroke Telephone Note    I was called by Zaida Rincon on 12/17/24 regarding patient Libertad Hernadez. The patient is a 84 year old female with PMHx significant for breast cancer s/p bilateral mastectomy, peripheral neuropathy, RLS, HTN, thrombocytopenia, PE, HLD. She presented to the ED today after an episode of transient expressive aphasia (lasted 1-2 minutes).     Vitals  BP: (!) 193/87   Pulse: 64   Resp: 16   Temp: 98.2  F (36.8  C)   Weight: 68.9 kg (152 lb)    Imaging Findings  MRI Brain:   1. No acute intracranial pathology.  2. Mild degree of age-related cerebral parenchymal volume loss.  3. A time sensitive CTA or MRA is recommended to further evaluate a non-ruptured possible basilar tip aneurysm    Impression  Transient ischemic attack (expressive aphasia)    Recommendations  - admit for stroke work up; place inpatient stroke consult order   - Neurochecks and Vital Signs every 4 hours   - CTA head/neck   - continue PTA aspirin 81 mg daily   - Plavix (clopidogrel) 300 mg PO loading dose x 1  - Statin: continue PTA statin   - TTE (with Bubble Study if age 60 yrs or less)  - 24-hour Telemetry  - ziopatch at discharge   - Bedside Glucose Monitoring  - Nutrition: per RN  - A1c, Lipid Panel  - PT/OT/SLP  - Stroke Education  - Smoking Cessation if applicable   - Depression Screen  - Apnea screening questions  - Euthermia, Euglycemia    Case discussed with vascular neurology attending Dr. Graham.    My recommendations are based on the information provided over the phone by Libertad Hernadez's in-person providers. They are not intended to replace the clinical judgment of her in-person providers. I was not requested to personally see or examine the patient at this time.     Elena Pena NP  Vascular Neurology    To page me or covering stroke neurology team member, click here: [facility]COM  Choose \"On Call\" tab at top, then select \"NEUROLOGY/ALL SITES\" from middle drop-down " "box, press Enter, then look for \"stroke\" or \"telestroke\" for your site.   "

## 2024-12-17 NOTE — MEDICATION SCRIBE - ADMISSION MEDICATION HISTORY
Medication Scribe Admission Medication History    Admission medication history is complete. The information provided in this note is only as accurate as the sources available at the time of the update.    Information Source(s): Patient and CareEverywhere/SureScripts via in-person    Pertinent Information: Patient took AM medications PTA. Not currently anticoagulated.     Taking hydroxyurea at bedtime. 1,000 mg on MWF and 500 mg all other days.     Changes made to PTA medication list:  Added:   Alcaftadine 0.25% ophthalmic solution   Alendronate 70 mg  Solifenacin 5 mg  Deleted:   Betamethasone dipropionate 0.05% lotion  Fluocinonide 0.05% solution  Changed:   Duloxetine 20 mg at bedtime --> 40 mg at bedtime     Allergies reviewed with patient and updates made in EHR: yes    Medication History Completed By: Hugo Roca 12/17/2024 5:56 PM    PTA Med List   Medication Sig Note Last Dose/Taking    alendronate (FOSAMAX) 70 MG tablet Take 70 mg by mouth every 7 days. On Saturday  Past Week Morning    atorvastatin (LIPITOR) 20 MG tablet Take 1 tablet (20 mg) by mouth daily. 12/17/2024: HS 12/16/2024 Bedtime    Biotin 5000 MCG TABS Take 5,000 mcg by mouth every morning.  12/17/2024 Morning    Calcium Carb-Cholecalciferol (CALCIUM 600+D) 600-800 MG-UNIT TABS Take 1 tablet by mouth 2 times daily  12/17/2024 Morning    DULoxetine (CYMBALTA) 20 MG capsule Take 40 mg by mouth at bedtime.  12/16/2024 Bedtime    famotidine (PEPCID) 20 MG tablet Take 1 tablet by mouth 2 times daily.  12/17/2024 Morning    hydroxyurea (HYDREA) 500 MG capsule TAKE ONE CAPSULE BY MOUTH EVERY DAY FOR 4 DAYS A WEEK AND TAKE TWO CAPSULES BY MOUTH EVERY DAY FOR 3 DAYS A WEEK  12/16/2024 Bedtime    isosorbide mononitrate (IMDUR) 30 MG 24 hr tablet Take 1 tablet (30 mg) by mouth daily. 12/17/2024: HS 12/16/2024 Bedtime    LASTACAFT 0.25 % ophthalmic solution Place 1 drop into both eyes every morning.  12/17/2024 Morning    lisinopril (ZESTRIL) 5 MG  tablet Take 5 mg by mouth every morning.  12/17/2024 Morning    metoprolol succinate ER (TOPROL XL) 25 MG 24 hr tablet Take 1 tablet (25 mg) by mouth daily 12/17/2024: HS 12/16/2024 Bedtime    polyethylene glycol (MIRALAX) 17 GM/Dose powder Take 1 capful by mouth daily as needed for constipation  Past Week Morning    simvastatin (ZOCOR) 20 MG tablet Take 20 mg by mouth At Bedtime  12/16/2024 Bedtime    solifenacin (VESICARE) 5 MG tablet Take 5 mg by mouth at bedtime.  12/16/2024 Bedtime    traZODone (DESYREL) 100 MG tablet Take 100 mg by mouth At Bedtime  12/16/2024 Bedtime    vitamin B1 (THIAMINE) 250 MG tablet Take 250 mg by mouth every morning.  12/17/2024 Morning    vitamin C (ASCORBIC ACID) 500 MG tablet Take 500 mg by mouth every morning.  12/17/2024 Morning

## 2024-12-17 NOTE — ED PROVIDER NOTES
EMERGENCY DEPARTMENT ENCOUNTER      NAME: Libertad Hernadez  AGE: 84 year old female  YOB: 1940  MRN: [id number]  EVALUATION DATE & TIME: No admission date for patient encounter.    PCP: Candy Huang    ED PROVIDER: Zaida Rincon MD      Chief Complaint   Patient presents with    Aphasia     Now normal         FINAL IMPRESSION:  1. TIA (transient ischemic attack)          ED COURSE & MEDICAL DECISION MAKING:    Pertinent Labs & Imaging studies reviewed. (See chart for details)  84 year old female presents to the Emergency Department for evaluation of transient aphasia.  Patient reports that at noon today, the patient had a 1 to 2-minute episode of difficulty with her words.  Patient with mild posterior headache which is a 1 out of 10.  Patient with no focal neurologic deficits.  Patient currently is at baseline, asymptomatic.  Patient otherwise with no constitutional symptoms, no recent infection. Will plan for MRI of the brain with and without contrast and TIA work-up.     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  I spoke with stroke neurology who would recommend giving the patient a loading dose of Plavix 300 mg and admitting for TIA workup.  Patient had reportedly been taking aspirin 81 mg, stroke neurology would not recommend a full dose of aspirin at this time.  When I discussed this with the patient to clarify, she states that she in fact is not taking aspirin.  I therefore gave her an aspirin 81 mg in the emergency department.  Patient will be admitted for TIA workup.    1:28 PM I evaluated the patient in triage.   5:00 PM I spoke with Stroke Neurology, recommend loading dose of Plavix 300 mg. Patient is already on aspirin 81 mg.   5:12 PM I updated the patient and the patient's .  She states she is not taking aspirin 81 mg.   At the conclusion of the encounter I discussed the results of all of the tests and the disposition. The  questions were answered. The patient or family acknowledged understanding and was agreeable with the care plan.       Medical Decision Making  Obtained supplemental history:Supplemental history obtained?: No  Reviewed external records: External records reviewed?: No  Care impacted by chronic illness:Documented in Chart  Did you consider but not order tests?: Work up considered but not performed and documented in chart, if applicable  Did you interpret images independently?: Independent interpretation of ECG and images noted in documentation, when applicable.  Consultation discussion with other provider:Did you involve another provider (consultant, , pharmacy, etc.)?: I discussed the care with another health care provider, see documentation for details.  Admit.    MIPS: Not Applicable        MEDICATIONS GIVEN IN THE EMERGENCY:  Medications   DULoxetine (CYMBALTA) DR capsule 40 mg (has no administration in time range)   famotidine (PEPCID) tablet 20 mg (has no administration in time range)   isosorbide mononitrate (IMDUR) 24 hr tablet 30 mg (has no administration in time range)   lisinopril (ZESTRIL) tablet 10 mg (has no administration in time range)   metoprolol succinate ER (TOPROL XL) 24 hr tablet 25 mg (has no administration in time range)   traZODone (DESYREL) tablet 100 mg (has no administration in time range)   hydrALAZINE (APRESOLINE) injection 10 mg (has no administration in time range)   aspirin EC tablet 81 mg (has no administration in time range)   atorvastatin (LIPITOR) tablet 40 mg (has no administration in time range)   clopidogrel (PLAVIX) tablet 75 mg (has no administration in time range)   tolterodine ER (DETROL LA) 24 hr capsule 2 mg (has no administration in time range)   alcaftadine (LASTACAFT) ophthalmic solution 1 drop (has no administration in time range)   hydroxyurea (HYDREA) capsule 500 mg (has no administration in time range)   hydroxyurea (HYDREA) capsule 1,000 mg (has no administration  in time range)   lidocaine 1 % 0.1-1 mL (has no administration in time range)   lidocaine (LMX4) cream (has no administration in time range)   sodium chloride (PF) 0.9% PF flush 3 mL (has no administration in time range)   sodium chloride (PF) 0.9% PF flush 3 mL (has no administration in time range)   senna-docusate (SENOKOT-S/PERICOLACE) 8.6-50 MG per tablet 1 tablet (has no administration in time range)     Or   senna-docusate (SENOKOT-S/PERICOLACE) 8.6-50 MG per tablet 2 tablet (has no administration in time range)   ondansetron (ZOFRAN ODT) ODT tab 4 mg (has no administration in time range)     Or   ondansetron (ZOFRAN) injection 4 mg (has no administration in time range)   acetaminophen (TYLENOL) tablet 650 mg (has no administration in time range)   gadobutrol (GADAVIST) injection 6.8 mL (6.8 mLs Intravenous $Given 12/17/24 1523)   clopidogrel (PLAVIX) tablet 300 mg (300 mg Oral $Given 12/17/24 1751)   aspirin (ASA) chewable tablet 81 mg (81 mg Oral $Given 12/17/24 1751)   iopamidol (ISOVUE-370) solution 75 mL (75 mLs Intravenous $Given 12/17/24 1732)       NEW PRESCRIPTIONS STARTED AT TODAY'S ER VISIT  New Prescriptions    No medications on file          =================================================================    HPI    Patient information was obtained from: Patient    Use of : N/A         Libertad Hernadez is a 84 year old female with a pertinent history of hyperlipidemia, hypertension, breast cancer, PE who presents to this ED for evaluation of a transient episode of aphasia.  History provided by the patient and the patient's .  The patient's  states that a little bit before noon, the patient was normal.  At noon, the patient appeared to start not making sense, this lasted for about 1 to 2 minutes.  Patient states that she did notice that she was having a hard time with her words.  This only lasted for 1 to 2 minutes.  She currently has a very minor headache in the back of her  head.  She denies any dizziness, blurred vision, numbness, weakness.  She denies any chest pain or shortness of breath.  Currently she is asymptomatic.  She has never had similar symptoms.      PAST MEDICAL HISTORY:  Past Medical History:   Diagnosis Date    Arthritis     Asymptomatic varicose veins     Created by Conversion     Disorder of bone and cartilage, unspecified     Created by Conversion     Essential thrombocytosis (H)     Gastroesophageal reflux disease     Malignant neoplasm of breast (female), unspecified site     Created by Conversion [facility] Annotation: Oct 21 2010  9:56AM - Shirley Comer: stage 2;  10/18 lymph nodes positives/p chemo and [id number]     Motion sickness     Other and unspecified hyperlipidemia     Created by Conversion     PE (pulmonary thromboembolism) (H)     PONV (postoperative nausea and vomiting)     Primary cancer of bone marrow (H)     Thrombosis     Unspecified essential hypertension     Created by Conversion     Unspecified hereditary and idiopathic peripheral neuropathy     Created by Conversion        PAST SURGICAL HISTORY:  Past Surgical History:   Procedure Laterality Date    ARTHROPLASTY, HIP, TOTAL, DIRECT ANTERIOR APPROACH, USING HANA TABLE Right 3/4/2022    Procedure: RIGHT TOTAL HIP ARTHROPLASTY, DIRECT ANTERIOR;  Surgeon: Pineda Rowe MD;  Location: [facility] OR    ESOPHAGOSCOPY, GASTROSCOPY, DUODENOSCOPY (EGD), COMBINED N/A 11/22/2022    Procedure: ESOPHAGOGASTRODUODENOSCOPY with biopsy;  Surgeon: Nicolas Sifuentes MD;  Location: [facility] OR    FOOT SURGERY  2010    HC REMOVAL ADENOIDS,PRIMARY,<13 Y/O      Description: Adenoidectomy;  Recorded: 04/04/2008;    HC REMOVAL GALLBLADDER      Description: Cholecystectomy;  Recorded: 04/04/2008;    HC REMOVAL OF TONSILS,<13 Y/O      Description: Tonsillectomy;  Recorded: 04/04/2008;    MA INCISE FINGER TENDON SHEATH      Description: Hand Incision Tendon Sheath Of A Finger;  Recorded: 04/04/2008;  "   RI MASTECTOMY, PARTIAL      Description: Right Breast Partial Mastectomy Inferior Lateral Quarter;  Recorded: 10/21/2010;    RI MASTECTOMY, RADICAL      Description: Radical Mastectomy Left Breast;  Recorded: 04/04/2008;           CURRENT MEDICATIONS:    alendronate (FOSAMAX) 70 MG tablet  atorvastatin (LIPITOR) 20 MG tablet  Biotin 5000 MCG TABS  Calcium Carb-Cholecalciferol (CALCIUM 600+D) 600-800 MG-UNIT TABS  DULoxetine (CYMBALTA) 20 MG capsule  famotidine (PEPCID) 20 MG tablet  hydroxyurea (HYDREA) 500 MG capsule  isosorbide mononitrate (IMDUR) 30 MG 24 hr tablet  LASTACAFT 0.25 % ophthalmic solution  lisinopril (ZESTRIL) 5 MG tablet  metoprolol succinate ER (TOPROL XL) 25 MG 24 hr tablet  polyethylene glycol (MIRALAX) 17 GM/Dose powder  simvastatin (ZOCOR) 20 MG tablet  solifenacin (VESICARE) 5 MG tablet  traZODone (DESYREL) 100 MG tablet  vitamin B1 (THIAMINE) 250 MG tablet  vitamin C (ASCORBIC ACID) 500 MG tablet        ALLERGIES:  Allergies   Allergen Reactions    Adhesive Tape-Silicones [Adhesive Tape] Itching     States itchy rash with certain tapes    Penicillins Hives       FAMILY HISTORY:  Family History   Problem Relation Age of Onset    Hypertension Father     Heart Disease Father     Diabetes Sister     Prostate Cancer Brother        SOCIAL HISTORY:   Social History     Socioeconomic History    Marital status:    Tobacco Use    Smoking status: Never     Passive exposure: Past    Smokeless tobacco: Never   Substance and Sexual Activity    Alcohol use: Yes     Alcohol/week: 1.0 standard drink of alcohol     Comment: very rarely    Drug use: Never    Sexual activity: Never       VITALS:  BP (!) 209/91   Pulse 79   Temp 98.2  F (36.8  C) (Oral)   Resp 16   Ht 1.626 m (5' 4\")   Wt 68.9 kg (152 lb)   SpO2 99%   BMI 26.09 kg/m      PHYSICAL EXAM    Gen:  Alert, awake, NAD  HENT:  Head atraumatic, PERRL, EOMI, no meningismus  Respiratory:  CTA, normal respiratory rate  Cardiovascular:  " Regular rate and rhythm, no murmur  Abdomen:  Soft, nontender, normoactive bowel sounds  Musculoskeletal:  FROM in all extremities with no tenderness  Integument:  No rash, warm, dry  Neuro:  GCS 15, A & O x 3, CN II - XII intact, no dysdiadochokinesia, negative Romberg, no pronator drift, no nystagmus, visual fields full to confrontation, normal gait, +5/5 strength in all extremities       LAB:  All pertinent labs reviewed and interpreted.  Results for orders placed or performed during the hospital encounter of 12/17/24   MR Brain w/o & w Contrast    Impression    Impression:     1. No acute intracranial pathology.  2. Mild degree of age-related cerebral parenchymal volume loss.  3. A time sensitive CTA or MRA is recommended to further evaluate a non-ruptured possible basilar tip aneurysm     CTA Head Neck with Contrast    Impression    IMPRESSION:   HEAD CT:  1.  No acute intracranial process.    HEAD CTA:   1.  No significant stenosis, aneurysm, or high flow vascular malformation identified.  2.  Specifically normal appearance of the basilar artery.    NECK CTA:  1.  No hemodynamically significant stenosis within the vessels of the neck. Unchanged right upper lobe groundglass opacity.   Basic metabolic panel   Result Value Ref Range    Sodium 143 135 - 145 mmol/L    Potassium 4.0 3.4 - 5.3 mmol/L    Chloride 105 98 - 107 mmol/L    Carbon Dioxide (CO2) 28 22 - 29 mmol/L    Anion Gap 10 7 - 15 mmol/L    Urea Nitrogen 17.4 8.0 - 23.0 mg/dL    Creatinine 0.82 0.51 - 0.95 mg/dL    GFR Estimate 70 >60 mL/min/1.73m2    Calcium 10.4 8.8 - 10.4 mg/dL    Glucose 111 (H) 70 - 99 mg/dL   UA with Microscopic reflex to Culture    Specimen: Urine, Midstream   Result Value Ref Range    Color Urine Light Yellow Colorless, Straw, Light Yellow, Yellow    Appearance Urine Turbid (A) Clear    Glucose Urine Negative Negative mg/dL    Bilirubin Urine Negative Negative    Ketones Urine Negative Negative mg/dL    Specific Gravity Urine  1.007 1.001 - 1.030    Blood Urine Negative Negative    pH Urine 6.0 5.0 - 7.0    Protein Albumin Urine Negative Negative mg/dL    Urobilinogen Urine <2.0 <2.0 mg/dL    Nitrite Urine Negative Negative    Leukocyte Esterase Urine Negative Negative    Mucus Urine Present (A) None Seen /LPF    RBC Urine 0 <=2 /HPF    WBC Urine 3 <=5 /HPF    Squamous Epithelials Urine <1 <=1 /HPF    Hyaline Casts Urine 1 <=2 /LPF   CBC with platelets and differential   Result Value Ref Range    WBC Count 7.4 4.0 - 11.0 10e3/uL    RBC Count 4.64 3.80 - 5.20 10e6/uL    Hemoglobin 15.1 11.7 - 15.7 g/dL    Hematocrit 45.1 35.0 - 47.0 %    MCV 97 78 - 100 fL    MCH 32.5 26.5 - 33.0 pg    MCHC 33.5 31.5 - 36.5 g/dL    RDW 15.0 10.0 - 15.0 %    Platelet Count 355 150 - 450 10e3/uL    % Neutrophils 64 %    % Lymphocytes 24 %    % Monocytes 9 %    % Eosinophils 3 %    % Basophils 1 %    % Immature Granulocytes 0 %    NRBCs per 100 WBC 0 <1 /100    Absolute Neutrophils 4.7 1.6 - 8.3 10e3/uL    Absolute Lymphocytes 1.8 0.8 - 5.3 10e3/uL    Absolute Monocytes 0.6 0.0 - 1.3 10e3/uL    Absolute Eosinophils 0.2 0.0 - 0.7 10e3/uL    Absolute Basophils 0.1 0.0 - 0.2 10e3/uL    Absolute Immature Granulocytes 0.0 <=0.4 10e3/uL    Absolute NRBCs 0.0 10e3/uL   Hemoglobin A1c   Result Value Ref Range    Estimated Average Glucose 117 (H) <117 mg/dL    Hemoglobin A1C 5.7 (H) <5.7 %       RADIOLOGY:  Reviewed all pertinent imaging. Please see official radiology report.  CTA Head Neck with Contrast   Final Result   IMPRESSION:    HEAD CT:   1.  No acute intracranial process.      HEAD CTA:    1.  No significant stenosis, aneurysm, or high flow vascular malformation identified.   2.  Specifically normal appearance of the basilar artery.      NECK CTA:   1.  No hemodynamically significant stenosis within the vessels of the neck. Unchanged right upper lobe groundglass opacity.      MR Brain w/o & w Contrast   Final Result   Impression:       1. No acute  intracranial pathology.   2. Mild degree of age-related cerebral parenchymal volume loss.   3. 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    Zaida Rincon MD  Emergency Medicine  [facility] EMERGENCY ROOM  [address] [facility] [address] [phone]       Zaida Rincon MD  12/17/24 1915

## 2024-12-17 NOTE — ED TRIAGE NOTES
Pt was at lunch and she had a brief period of speaking gibberish.  Occurred at 1230 lasted approx 1-2 minutes.  Pt is not diabetic.  Now she is speaking normally.  No other deficits.     Triage Assessment (Adult)       Row Name 12/17/24 1318          Triage Assessment    Airway WDL WDL        Respiratory WDL    Respiratory WDL WDL        Skin Circulation/Temperature WDL    Skin Circulation/Temperature WDL WDL        Cardiac WDL    Cardiac WDL WDL        Peripheral/Neurovascular WDL    Peripheral Neurovascular WDL WDL        Cognitive/Neuro/Behavioral WDL    Cognitive/Neuro/Behavioral WDL WDL

## 2024-12-18 ENCOUNTER — ORDERS ONLY (AUTO-RELEASED) (OUTPATIENT)
Dept: EMERGENCY MEDICINE | Facility: CLINIC | Age: 84
End: 2024-12-18

## 2024-12-18 ENCOUNTER — APPOINTMENT (OUTPATIENT)
Dept: CARDIOLOGY | Facility: CLINIC | Age: 84
End: 2024-12-18
Attending: FAMILY MEDICINE
Payer: COMMERCIAL

## 2024-12-18 VITALS
OXYGEN SATURATION: 96 % | WEIGHT: 150.3 LBS | SYSTOLIC BLOOD PRESSURE: 147 MMHG | RESPIRATION RATE: 18 BRPM | HEART RATE: 77 BPM | BODY MASS INDEX: 25.66 KG/M2 | HEIGHT: 64 IN | TEMPERATURE: 98.6 F | DIASTOLIC BLOOD PRESSURE: 70 MMHG

## 2024-12-18 DIAGNOSIS — G45.9 TIA (TRANSIENT ISCHEMIC ATTACK): ICD-10-CM

## 2024-12-18 LAB
ANION GAP SERPL CALCULATED.3IONS-SCNC: 12 MMOL/L (ref 7–15)
ATRIAL RATE - MUSE: 66 BPM
ATRIAL RATE - MUSE: 68 BPM
BUN SERPL-MCNC: 16.7 MG/DL (ref 8–23)
CALCIUM SERPL-MCNC: 9.2 MG/DL (ref 8.8–10.4)
CHLORIDE SERPL-SCNC: 108 MMOL/L (ref 98–107)
CHOLEST SERPL-MCNC: 181 MG/DL
CREAT SERPL-MCNC: 0.78 MG/DL (ref 0.51–0.95)
DIASTOLIC BLOOD PRESSURE - MUSE: 82 MMHG
DIASTOLIC BLOOD PRESSURE - MUSE: 91 MMHG
EGFRCR SERPLBLD CKD-EPI 2021: 74 ML/MIN/1.73M2
ERYTHROCYTE [DISTWIDTH] IN BLOOD BY AUTOMATED COUNT: 14.9 % (ref 10–15)
FLUAV RNA SPEC QL NAA+PROBE: NEGATIVE
FLUBV RNA RESP QL NAA+PROBE: NEGATIVE
GLUCOSE BLDC GLUCOMTR-MCNC: 100 MG/DL (ref 70–99)
GLUCOSE BLDC GLUCOMTR-MCNC: 98 MG/DL (ref 70–99)
GLUCOSE SERPL-MCNC: 98 MG/DL (ref 70–99)
HCO3 SERPL-SCNC: 22 MMOL/L (ref 22–29)
HCT VFR BLD AUTO: 42.7 % (ref 35–47)
HDLC SERPL-MCNC: 61 MG/DL
HGB BLD-MCNC: 14.4 G/DL (ref 11.7–15.7)
INTERPRETATION ECG - MUSE: NORMAL
INTERPRETATION ECG - MUSE: NORMAL
LDLC SERPL CALC-MCNC: 97 MG/DL
LVEF ECHO: NORMAL
MAGNESIUM SERPL-MCNC: 2 MG/DL (ref 1.7–2.3)
MCH RBC QN AUTO: 32.2 PG (ref 26.5–33)
MCHC RBC AUTO-ENTMCNC: 33.7 G/DL (ref 31.5–36.5)
MCV RBC AUTO: 96 FL (ref 78–100)
NONHDLC SERPL-MCNC: 120 MG/DL
P AXIS - MUSE: 72 DEGREES
P AXIS - MUSE: 77 DEGREES
PHOSPHATE SERPL-MCNC: 3.2 MG/DL (ref 2.5–4.5)
PLATELET # BLD AUTO: 363 10E3/UL (ref 150–450)
POTASSIUM SERPL-SCNC: 3.6 MMOL/L (ref 3.4–5.3)
PR INTERVAL - MUSE: 170 MS
PR INTERVAL - MUSE: 184 MS
QRS DURATION - MUSE: 92 MS
QRS DURATION - MUSE: 94 MS
QT - MUSE: 394 MS
QT - MUSE: 420 MS
QTC - MUSE: 413 MS
QTC - MUSE: 446 MS
R AXIS - MUSE: -12 DEGREES
R AXIS - MUSE: 50 DEGREES
RBC # BLD AUTO: 4.47 10E6/UL (ref 3.8–5.2)
RSV RNA SPEC NAA+PROBE: NEGATIVE
SARS-COV-2 RNA RESP QL NAA+PROBE: NEGATIVE
SODIUM SERPL-SCNC: 142 MMOL/L (ref 135–145)
SYSTOLIC BLOOD PRESSURE - MUSE: 157 MMHG
SYSTOLIC BLOOD PRESSURE - MUSE: 209 MMHG
T AXIS - MUSE: -35 DEGREES
T AXIS - MUSE: 112 DEGREES
TRIGL SERPL-MCNC: 117 MG/DL
TROPONIN T SERPL HS-MCNC: 11 NG/L
TROPONIN T SERPL HS-MCNC: 11 NG/L
VENTRICULAR RATE- MUSE: 66 BPM
VENTRICULAR RATE- MUSE: 68 BPM
WBC # BLD AUTO: 7 10E3/UL (ref 4–11)

## 2024-12-18 PROCEDURE — 36415 COLL VENOUS BLD VENIPUNCTURE: CPT | Performed by: FAMILY MEDICINE

## 2024-12-18 PROCEDURE — 85027 COMPLETE CBC AUTOMATED: CPT | Performed by: FAMILY MEDICINE

## 2024-12-18 PROCEDURE — 250N000011 HC RX IP 250 OP 636: Performed by: FAMILY MEDICINE

## 2024-12-18 PROCEDURE — G0378 HOSPITAL OBSERVATION PER HR: HCPCS

## 2024-12-18 PROCEDURE — 93306 TTE W/DOPPLER COMPLETE: CPT

## 2024-12-18 PROCEDURE — 84484 ASSAY OF TROPONIN QUANT: CPT | Performed by: INTERNAL MEDICINE

## 2024-12-18 PROCEDURE — 83735 ASSAY OF MAGNESIUM: CPT | Performed by: INTERNAL MEDICINE

## 2024-12-18 PROCEDURE — 87637 SARSCOV2&INF A&B&RSV AMP PRB: CPT | Performed by: HOSPITALIST

## 2024-12-18 PROCEDURE — 93306 TTE W/DOPPLER COMPLETE: CPT | Mod: 26 | Performed by: INTERNAL MEDICINE

## 2024-12-18 PROCEDURE — 80048 BASIC METABOLIC PNL TOTAL CA: CPT | Performed by: FAMILY MEDICINE

## 2024-12-18 PROCEDURE — 36415 COLL VENOUS BLD VENIPUNCTURE: CPT | Performed by: INTERNAL MEDICINE

## 2024-12-18 PROCEDURE — 99238 HOSP IP/OBS DSCHRG MGMT 30/<: CPT | Performed by: HOSPITALIST

## 2024-12-18 PROCEDURE — 93005 ELECTROCARDIOGRAM TRACING: CPT | Performed by: INTERNAL MEDICINE

## 2024-12-18 PROCEDURE — 84100 ASSAY OF PHOSPHORUS: CPT | Performed by: INTERNAL MEDICINE

## 2024-12-18 PROCEDURE — 250N000013 HC RX MED GY IP 250 OP 250 PS 637: Performed by: FAMILY MEDICINE

## 2024-12-18 PROCEDURE — 82962 GLUCOSE BLOOD TEST: CPT

## 2024-12-18 RX ORDER — CLOPIDOGREL BISULFATE 75 MG/1
75 TABLET ORAL DAILY
Status: DISCONTINUED | OUTPATIENT
Start: 2024-12-19 | End: 2024-12-18 | Stop reason: HOSPADM

## 2024-12-18 RX ORDER — FAMOTIDINE 20 MG/1
20 TABLET, FILM COATED ORAL DAILY
Status: SHIPPED
Start: 2024-12-18

## 2024-12-18 RX ORDER — ATORVASTATIN CALCIUM 40 MG/1
40 TABLET, FILM COATED ORAL DAILY
Qty: 30 TABLET | Refills: 0 | Status: SHIPPED | OUTPATIENT
Start: 2024-12-18

## 2024-12-18 RX ORDER — CLOPIDOGREL BISULFATE 75 MG/1
75 TABLET ORAL DAILY
Qty: 30 TABLET | Refills: 0 | Status: SHIPPED | OUTPATIENT
Start: 2024-12-19

## 2024-12-18 RX ORDER — ASPIRIN 81 MG/1
81 TABLET ORAL DAILY
Qty: 19 TABLET | Refills: 0 | Status: SHIPPED | OUTPATIENT
Start: 2024-12-19 | End: 2025-01-07

## 2024-12-18 RX ORDER — LISINOPRIL 10 MG/1
10 TABLET ORAL EVERY MORNING
Qty: 30 TABLET | Refills: 0 | Status: SHIPPED | OUTPATIENT
Start: 2024-12-18

## 2024-12-18 RX ORDER — ASPIRIN 81 MG/1
81 TABLET ORAL DAILY
COMMUNITY
Start: 2024-12-19 | End: 2024-12-18

## 2024-12-18 RX ADMIN — HYDRALAZINE HYDROCHLORIDE 10 MG: 20 INJECTION INTRAMUSCULAR; INTRAVENOUS at 12:00

## 2024-12-18 RX ADMIN — LISINOPRIL 10 MG: 10 TABLET ORAL at 08:14

## 2024-12-18 RX ADMIN — ASPIRIN 81 MG: 81 TABLET, COATED ORAL at 08:13

## 2024-12-18 RX ADMIN — FAMOTIDINE 20 MG: 20 TABLET, FILM COATED ORAL at 08:14

## 2024-12-18 RX ADMIN — CLOPIDOGREL BISULFATE 75 MG: 75 TABLET ORAL at 08:14

## 2024-12-18 ASSESSMENT — ACTIVITIES OF DAILY LIVING (ADL)
ADLS_ACUITY_SCORE: 60
ADLS_ACUITY_SCORE: 59
ADLS_ACUITY_SCORE: 60
ADLS_ACUITY_SCORE: 60
ADLS_ACUITY_SCORE: 59
ADLS_ACUITY_SCORE: 60
ADLS_ACUITY_SCORE: 59
ADLS_ACUITY_SCORE: 60
ADLS_ACUITY_SCORE: 59
ADLS_ACUITY_SCORE: 59
ADLS_ACUITY_SCORE: 60

## 2024-12-18 NOTE — PROGRESS NOTES
Nursing staff reports that patient is telemetry rhythm shows depressed ST segments.  Patient denied any symptoms.  EKG obtained showed no ST amplitude in V2-V4.  Also showed elevated T waves in V5 and V6.

## 2024-12-18 NOTE — PROGRESS NOTES
"PRIMARY DIAGNOSIS: \"GENERIC\" NURSING  OUTPATIENT/OBSERVATION GOALS TO BE MET BEFORE DISCHARGE:  ADLs back to baseline: Yes    Activity and level of assistance: Ambulating independently.    Pain status: Pain free.    Return to near baseline physical activity: Yes     Discharge Planner Nurse   Safe discharge environment identified: Yes  Barriers to discharge: No       Entered by: Zoe Ceballos RN 12/18/2024 12:42 AM     Please review provider order for any additional goals.   Nurse to notify provider when observation goals have been met and patient is ready for discharge.  "

## 2024-12-18 NOTE — PLAN OF CARE
Goal Outcome Evaluation:     Patient is A&O X 4. VSS; on room air. Tele; NSR. Denies headache, pain, nausea, chest pain, dizziness,no report of shortness of breath and no vomiting this shift. Q 4 neuro: CMS intact.Tolerating oral intake, voiding spontaneously, No BM this shift. Call light within reach. Discharge pending.    Problem: Adult Inpatient Plan of Care  Goal: Optimal Comfort and Wellbeing  Outcome: Progressing  Intervention: Monitor Pain and Promote Comfort  Recent Flowsheet Documentation  Taken 12/17/2024 2302 by Zoe Ceballos RN  Pain Management Interventions: rest     Problem: Stroke, Ischemic (Includes Transient Ischemic Attack)  Goal: Optimal Functional Ability  Outcome: Progressing  Intervention: Optimize Functional Ability  Recent Flowsheet Documentation  Taken 12/17/2024 2302 by Zoe Ceballos, RN  Activity Management:   activity adjusted per tolerance   ambulated to bathroom   back to bed  Taken 12/17/2024 2000 by Zoe Ceballos, RN  Activity Management:   activity adjusted per tolerance   activity encouraged

## 2024-12-18 NOTE — PROGRESS NOTES
"[facility]    Medicine Progress Note - Hospitalist Service    Date of Admission:  12/17/2024    Assessment & Plan   Libertad Hernadez is a 84 year old female admitted for a TIA.  She is clinically stable.  No further inpatient workup per neurology.  Reviewed telemetry, did not appreciate any significant tachyarrhythmias.  Some PACs noted.  There was no specific event recorded around 1200, when the patient received IV hydralazine.  Patient to have an outpatient ziopatch as part of her workup.      Given report of sick contact and nasal congestion, will send for viral testing.  This will not affect her disposition and she is otherwise medically ready for discharge.      # TIA  - outpatient ziopatch    # Nasal congestion  - viral testing    # HTN    # Hx breast cancer    # Thrombocytosis    # GERD    # Prediabetes    # Hx PE    Addendum:  Covid/influenza/rsv negative.  Discharge patient.          Diet: 2 Gram Sodium Diet      Stevenson Catheter: Not present  Lines: None     Cardiac Monitoring: ACTIVE order. Indication: Stroke, acute (48 hours)  Code Status: Full Code      Clinically Significant Risk Factors Present on Admission          # Hyperchloremia: Highest Cl = 108 mmol/L in last 2 days, will monitor as appropriate              # Hypertension: Noted on problem list           # Overweight: Estimated body mass index is 25.8 kg/m  as calculated from the following:    Height as of this encounter: 1.626 m (5' 4\").    Weight as of this encounter: 68.2 kg (150 lb 4.8 oz).              Social Drivers of Health            Disposition Plan     Medically Ready for Discharge: Anticipated Today             Yamil Reeves MD  Hospitalist Service  [facility]  Securely message with [employer] (more info)  Text page via [employer] Paging/Directory   ______________________________________________________________________    Interval History   Reports her speech is normal.  Denies vision changes, " chest pain, chest pressure, or dyspnea.  Has had occasional heart palpitations for months, one episode happened when she was receiving IV blood pressure medication.    Feels that nose is stuffy.   has had a cough.    Physical Exam   Vital Signs: Temp: 98.6  F (37  C) Temp src: Oral BP: (!) 152/80 Pulse: 68   Resp: 18 SpO2: 96 % O2 Device: None (Room air)    Weight: 150 lbs 4.8 oz    Gen:  sitting in bed in no extremis  Neuro:  alert, conversant; normal speech  CV:  nl rate, regular rhythm  Pulm:  no acute resp distress, ctab anteriorly  GI:  abdomen NTTP      Medical Decision Making             Data   Reviewed:    Na 142  K 3.6  BUN 17  Cr 0.78    WBC 7  Hgb 14  Plts 363    TTE  The left ventricle is normal in size.  The visual ejection fraction is 60-65%.  No regional wall motion abnormalities noted.  Diastolic Doppler findings (E/E' ratio and/or other parameters) suggest left  ventricular filling pressures are increased.  Normal right ventricle size and systolic function.  The right ventricular systolic pressure is approximated at 24mmHg plus the  right atrial pressure.  IVC diameter <2.1 cm collapsing >50% with sniff suggests a normal RA pressure  of 3 mmHg.  Trivial pericardial effusion  Sinus rhythm was noted.  No obvious cardiac source of emboli was seen within the limits of a  transthoracic echocardiogram.

## 2024-12-18 NOTE — DISCHARGE SUMMARY
"[facility]  Hospitalist Discharge Summary      Date of Admission:  12/17/2024  Date of Discharge:  12/18/2024  Discharging Provider: Yamil Reeves MD  Discharge Service: Hospitalist Service    Discharge Diagnoses   TIA  Hypertension    Clinically Significant Risk Factors     # Overweight: Estimated body mass index is 25.8 kg/m  as calculated from the following:    Height as of this encounter: 1.626 m (5' 4\").    Weight as of this encounter: 68.2 kg (150 lb 4.8 oz).       Follow-ups Needed After Discharge   Follow-up Appointments       Follow-up and recommended labs and tests       Follow up with primary care provider, Candy Huang, within 7 days for hospital follow- up.  The following labs/tests are recommended:  - recommend repeat lipid panel as an outpatient (atorvastatin dose increased due to TIA)  - followup ziopatch  - followup prediabetes  - followup hypertension and titrated medication as needed                Unresulted Labs Ordered in the Past 30 Days of this Admission       Date and Time Order Name Status Description    12/17/2024  6:00 PM Lipid panel In process         These results will be followed up by PCP    Discharge Disposition   Discharged to home  Condition at discharge: Stable    Hospital Course   The patient was admitted with evaluation of a TIA.  She was seen in remote neurology consultation.  MRI and CTA head were unremarkable.  Telemetry and TTE were also unrevealing.  She was therefore discharged home with plan for ziopatch monitoring and outpatient neurology followup.    Consultations This Hospital Stay   SMOKING CESSATION PROGRAM IP CONSULT  NEUROLOGY IP STROKE CONSULT    Code Status   Full Code    Time Spent on this Encounter   I, Yamil Reeves MD, personally saw the patient today and spent less than or equal to 30 minutes discharging this patient.       Yamil Reeves MD  [facility] EMERGENCY ROOM  [address] " "[place]  [facility] [address]  Phone: [phone]  Fax: [phone]  ______________________________________________________________________    Physical Exam   Vital Signs: Temp: 98.6  F (37  C) Temp src: Oral BP: (!) 147/70 Pulse: 77   Resp: 18 SpO2: 96 % O2 Device: None (Room air)    Weight: 150 lbs 4.8 oz    See progress note       Primary Care Physician   Candy Huang    Discharge Orders      Reason for your hospital stay    You were admitted for evaluation of a TIA (\"mini-stroke\").  You had remote evaluation by neurology.  Testing did not reveal a specific reason for the TIA.     Activity    Your activity upon discharge: activity as tolerated     When to contact your care team    Seek medical attention if you have any of the following: new weakness, new numbness or tingling, vision changes, confusion, or change in speech.     Follow-up and recommended labs and tests     Follow up with primary care provider, Candy Huang, within 7 days for hospital follow- up.  The following labs/tests are recommended:  - recommend repeat lipid panel as an outpatient (atorvastatin dose increased due to TIA)  - followup ziopatch  - followup prediabetes  - followup hypertension and titrated medication as needed     Diet    Follow this diet upon discharge:  low salt diet     Stroke Hospital Follow Up (for neurologist use only)    [employer] will call you to coordinate care as prescribed by your provider. If you don t hear from a representative within 2 business days, please call [phone].       TIPO PATCH MAIL OUT       Significant Results and Procedures   Most Recent 3 CBC's:  Recent Labs   Lab Test 12/18/24  0443 12/17/24  1340 11/04/24  1722   WBC 7.0 7.4 7.9   HGB 14.4 15.1 15.1   MCV 96 97 100    355 420     Most Recent 3 BMP's:  Recent Labs   Lab Test 12/18/24  1229 12/18/24  0757 12/18/24  0443 12/17/24  2138 12/17/24  1340 11/04/24  1722   NA  --   --  142  --  143 140   POTASSIUM  --   --  3.6  --  4.0 " 3.9   CHLORIDE  --   --  108*  --  105 103   CO2  --   --  22  --  28 25   BUN  --   --  16.7  --  17.4 15.9   CR  --   --  0.78  --  0.82 0.77   ANIONGAP  --   --  12  --  10 12   DARCI  --   --  9.2  --  10.4 9.7   GLC 98 100* 98   < > 111* 96    < > = values in this interval not displayed.   ,   Results for orders placed or performed during the hospital encounter of 12/17/24   MR Brain w/o & w Contrast    Narrative    EXAM: MR BRAIN W/O and W CONTRAST  LOCATION: [facility]  DATE: 12/17/2024    INDICATION: transient aphasia  COMPARISON: None.  CONTRAST: 6.8 mL IV Gadavist   TECHNIQUE: Multiplanar T1-weighted, axial FLAIR, and susceptibility images were obtained without intravenous contrast. Following intravenous gadolinium-based contrast administration, axial T2-weighted, diffusion, and T1-weighted images (in multiple   planes) were obtained.    Findings:    No mass effect, midline shift, or evidence of intracranial hemorrhage. The ventricles are proportionate to the cerebral sulci for the patients age. No evidence of acute infarction on the diffusion weight sequence. 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     Postcontrast images demonstrate no abnormal intracranial enhancement.    No abnormality of the skull marrow signal. The visualized portions of paranasal sinuses, and mastoid air cells are relatively clear. The orbits are grossly unremarkable. The cerebellar tonsils and sella are unremarkable.      Impression    Impression:     1. No acute intracranial pathology.  2. Mild degree of age-related cerebral parenchymal volume loss.  3. 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 Noncontrast head CT followed by axial helical CT images of the head and neck vessels obtained during the arterial phase of intravenous contrast administration. Axial 2D reconstructed images and   multiplanar 3D MIP reconstructed images of the head and neck vessels were performed by the technologist. Dose reduction techniques were used. All stenosis measurements made according to NASCET criteria unless otherwise specified.    FINDINGS:   NONCONTRAST HEAD CT:   INTRACRANIAL CONTENTS: No intracranial hemorrhage, extraaxial collection, or mass effect.  No CT evidence of acute infarct. Mild presumed chronic small vessel ischemic changes. Normal ventricles and sulci for age.     VISUALIZED ORBITS/SINUSES/MASTOIDS: Prior bilateral cataract surgery. Visualized portions of the orbits are otherwise unremarkable. No paranasal sinus mucosal disease. No middle ear or mastoid effusion.    BONES/SOFT TISSUES: No acute abnormality.    HEAD CTA:  ANTERIOR CIRCULATION: No stenosis/occlusion, aneurysm, or high flow vascular malformation. Standard [place] anatomy.    POSTERIOR CIRCULATION: No stenosis/occlusion, aneurysm, or high flow vascular malformation. Balanced vertebral arteries supply a normal basilar artery. No basilar tip aneurysm. Mild prominence on MRI with secondary to vessel branching pattern    DURAL VENOUS SINUSES: Expected enhancement of the major dural venous sinuses.    NECK CTA:  RIGHT CAROTID: Atherosclerotic plaque results in less than 50% stenosis in the right ICA. No dissection.    LEFT CAROTID: Nonflow limiting calcification of the left carotid bifurcation No measurable stenosis or dissection.    VERTEBRAL ARTERIES: No focal stenosis or dissection. Balanced vertebral arteries.    AORTIC ARCH: Incompletely visualized on this  exam. Imaged great vessels are patent without hemodynamically significant stenosis.    NONVASCULAR STRUCTURES: A calcified granuloma is present in the right upper lobe. Unchanged nonspecific groundglass opacity in the right upper lobe.      Impression    IMPRESSION:   HEAD CT:  1.  No acute intracranial process.    HEAD CTA:   1.  No significant stenosis, aneurysm, or high flow vascular malformation identified.  2.  Specifically normal appearance of the basilar artery.    NECK CTA:  1.  No hemodynamically significant stenosis within the vessels of the neck. Unchanged right upper lobe groundglass opacity.   Echocardiogram Complete     Value    LVEF  60-65%    [facility]    [id number]  [id number]  [id number]  [id number]^OLYA^[place] [address]     Name: PETRA ULRICH  MRN: [id number]  : 1940  Study Date: 2024 07:49 AM  Age: 84 yrs  Gender: Female  Patient Location: [facility]  Reason For Study: TIA  Ordering Physician: TREASURE DOBSON  Performed By:      BSA: 1.7 m2  Height: 64 in  Weight: 152 lb  HR: 63  BP: 167/78 mmHg  ______________________________________________________________________________  Procedure  Echocardiogram with two-dimensional, color and spectral Doppler.  ______________________________________________________________________________  Interpretation Summary     The left ventricle is normal in size.  The visual ejection fraction is 60-65%.  No regional wall motion abnormalities noted.  Diastolic Doppler findings (E/E' ratio and/or other parameters) suggest left  ventricular filling pressures are increased.  Normal right ventricle size and systolic function.  The right ventricular systolic pressure is approximated at 24mmHg plus the  right atrial pressure.  IVC diameter <2.1 cm collapsing >50% with sniff suggests a normal RA pressure  of 3 mmHg.  Trivial pericardial effusion  Sinus rhythm was noted.  No obvious cardiac source of emboli was seen  within the limits of a  transthoracic echocardiogram.  ______________________________________________________________________________  Left Ventricle  The left ventricle is normal in size. There is normal left ventricular wall  thickness. The visual ejection fraction is 60-65%. Diastolic Doppler findings  (E/E' ratio and/or other parameters) suggest left ventricular filling  pressures are increased. No regional wall motion abnormalities noted.     Right Ventricle  Normal right ventricle size and systolic function. TAPSE is normal, which is  consistent with normal right ventricular systolic function.     Atria  The left atrium is moderately dilated. The right atrium is moderately dilated.  Intact atrial septum.     Mitral Valve  Mitral valve leaflets appear normal. There is trace mitral regurgitation.  There is no mitral valve stenosis.     Tricuspid Valve  Tricuspid valve leaflets appear normal. There is mild (1+) tricuspid  regurgitation. The right ventricular systolic pressure is approximated at  24mmHg plus the right atrial pressure. There is no tricuspid stenosis.     Aortic Valve  There is mild trileaflet aortic sclerosis. There is trace aortic  regurgitation. No aortic stenosis is present.     Pulmonic Valve  Normal pulmonic valve. There is trace pulmonic valvular regurgitation. Right  ventricular diastolic pressure is approximated at 4mmHg plus the right atrial  pressure. There is no pulmonic valvular stenosis.     Vessels  The aorta root is normal. Normal size ascending aorta. IVC diameter <2.1 cm  collapsing >50% with sniff suggests a normal RA pressure of 3 mmHg.     Pericardium  Trivial pericardial effusion.     Rhythm  Sinus rhythm was noted.  ______________________________________________________________________________  MMode/2D Measurements & Calculations     IVSd: 1.00 cm  LVIDd: 4.2 cm  LVIDs: 2.5 cm  LVPWd: 0.95 cm  FS: 39.7 %  LV mass(C)d: 130.4 grams  LV mass(C)dI: 74.9 grams/m2  Ao root diam:  3.2 cm  asc Aorta Diam: 3.2 cm  LVOT diam: 2.0 cm  LVOT area: 3.3 cm2  Ao root diam index Ht(cm/m): 1.9  Ao root diam index BSA (cm/m2): 1.8  Asc Ao diam index BSA (cm/m2): 1.8  Asc Ao diam index Ht(cm/m): 2.0  EF Biplane: 70.4 %  LA Volume (BP): 57.1 ml     LA Volume Index (BP): 32.8 ml/m2  LA Volume Indexed (AL/bp): 34.8 ml/m2  RV Base: 4.3 cm  RWT: 0.45  TAPSE: 2.8 cm     Doppler Measurements & Calculations  MV E max byron: 85.7 cm/sec  MV A max byron: 74.6 cm/sec  MV E/A: 1.1  MV max PG: 3.5 mmHg  MV mean P.6 mmHg  MV V2 VTI: 28.8 cm  MVA(VTI): 2.2 cm2  MV dec slope: 450.7 cm/sec2  MV dec time: 0.19 sec  Ao V2 max: 119.5 cm/sec  Ao max P.0 mmHg  Ao V2 mean: 88.8 cm/sec  Ao mean PG: 3.4 mmHg  Ao V2 VTI: 28.8 cm  ELOY(I,D): 2.2 cm2  ELOY(V,D): 2.3 cm2  LV V1 max P.9 mmHg  LV V1 max: 85.3 cm/sec  LV V1 VTI: 19.1 cm  SV(LVOT): 62.3 ml  SI(LVOT): 35.8 ml/m2  PA V2 max: 71.9 cm/sec  PA max P.1 mmHg  PA acc time: 0.14 sec  PI end-d byron: 95.0 cm/sec  TR max byron: 247.3 cm/sec  TR max P.5 mmHg  AV Byron Ratio (DI): 0.71  ELOY Index (cm2/m2): 1.2  E/E' avg: 15.8  Lateral E/e': 18.3  Medial E/e': 13.4  RV S Byron: 12.6 cm/sec     ______________________________________________________________________________  Report approved by: Phoenix Huddleston MD on 2024 11:38 AM             Discharge Medications   Current Discharge Medication List        START taking these medications    Details   aspirin 81 MG EC tablet Take 1 tablet (81 mg) by mouth daily for 19 days. STOP taking on 2025  Qty: 19 tablet, Refills: 0    Associated Diagnoses: TIA (transient ischemic attack)      clopidogrel (PLAVIX) 75 MG tablet Take 1 tablet (75 mg) by mouth daily.  Qty: 30 tablet, Refills: 0    Associated Diagnoses: TIA (transient ischemic attack)           CONTINUE these medications which have CHANGED    Details   atorvastatin (LIPITOR) 40 MG tablet Take 1 tablet (40 mg) by mouth daily.  Qty: 30 tablet, Refills: 0     Associated Diagnoses: Hyperlipidemia LDL goal <70      famotidine (PEPCID) 20 MG tablet Take 1 tablet (20 mg) by mouth daily.    Associated Diagnoses: Gastroesophageal reflux disease, unspecified whether esophagitis present      lisinopril (ZESTRIL) 10 MG tablet Take 1 tablet (10 mg) by mouth every morning.  Qty: 30 tablet, Refills: 0    Associated Diagnoses: Essential hypertension           CONTINUE these medications which have NOT CHANGED    Details   alendronate (FOSAMAX) 70 MG tablet Take 70 mg by mouth every 7 days. On Saturday      Biotin 5000 MCG TABS Take 5,000 mcg by mouth every morning.      Calcium Carb-Cholecalciferol (CALCIUM 600+D) 600-800 MG-UNIT TABS Take 1 tablet by mouth 2 times daily      DULoxetine (CYMBALTA) 20 MG capsule Take 40 mg by mouth at bedtime.      hydroxyurea (HYDREA) 500 MG capsule TAKE ONE CAPSULE BY MOUTH EVERY DAY FOR 4 DAYS A WEEK AND TAKE TWO CAPSULES BY MOUTH EVERY DAY FOR 3 DAYS A WEEK  Qty: 90 capsule, Refills: 2    Associated Diagnoses: Essential thrombocythemia (H)      isosorbide mononitrate (IMDUR) 30 MG 24 hr tablet Take 1 tablet (30 mg) by mouth daily.  Qty: 30 tablet, Refills: 11    Associated Diagnoses: Atypical chest pain      LASTACAFT 0.25 % ophthalmic solution Place 1 drop into both eyes every morning.      metoprolol succinate ER (TOPROL XL) 25 MG 24 hr tablet Take 1 tablet (25 mg) by mouth daily  Qty: 90 tablet, Refills: 3    Associated Diagnoses: Palpitations; Paroxysmal supraventricular tachycardia (H)      polyethylene glycol (MIRALAX) 17 GM/Dose powder Take 1 capful by mouth daily as needed for constipation      solifenacin (VESICARE) 5 MG tablet Take 5 mg by mouth at bedtime.      traZODone (DESYREL) 100 MG tablet Take 100 mg by mouth At Bedtime      vitamin B1 (THIAMINE) 250 MG tablet Take 250 mg by mouth every morning.      vitamin C (ASCORBIC ACID) 500 MG tablet Take 500 mg by mouth every morning.           STOP taking these medications       simvastatin  (ZOCOR) 20 MG tablet Comments:   Reason for Stopping:             Allergies   Allergies   Allergen Reactions    Adhesive Tape-Silicones [Adhesive Tape] Itching     States itchy rash with certain tapes    Penicillins Hives

## 2024-12-18 NOTE — PLAN OF CARE
Goal Outcome Evaluation:      Plan of Care Reviewed With: patient    Overall Patient Progress: no changeOverall Patient Progress: no change    Patient doing well this am, A&Ox4, no new neurological changes noted. CMS intact. Hypertensive--given PRN IV hydralazine 10mg x1. On RA, tele monitor-NSR. Pt denies pain or nausea. Up independently, denies dizziness or light headedness. Tolerating regular diet and drinking adequate fluids.

## 2024-12-18 NOTE — CONSULTS
[facility]    Stroke Consult Note    Reason for Consult:  TIA    Chief Complaint: Aphasia (Now normal)       HPI  Libertad Hernadez is a 84 year old female with PMHx significant for breast cancer s/p bilateral mastectomy, peripheral neuropathy, RLS, HTN, thrombocytopenia, PE, HLD. She presented to the ED 12/17/24 after a 1-2 minute episode of expressive aphasia.   She describes that they were saying a prayer before a meal and she was unable to get her words out correctly. She knew what she wanted to say but what came out was gibberish. She didn't have any other associated symptoms.     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   Intracranial Vasculature No LVO, no high grade stenosis, normal basilar artery    Cervical Vasculature No high grade stenosis, no dissection      Echocardiogram EF 60-65%, no regional WMA, sinus rhythm, no obvious emboli, moderately dilated atria    EKG/Telemetry Sinus rhythm   RSR' or QR pattern in V1 suggests right ventricular conduction delay   T wave abnormality, consider lateral ischemia    Other Testing Not Applicable      LDL No lab value available in past 30 days   A1C 12/17/2024: 5.7 %       ABCD2 Patients Score   Age >= 60 years 1 point 1   Blood Pressure    SBP >= 140 or DBP >=  90    1 point 1   Clinical Features    - Unilateral weakness    - Speech disturbance w/o weakness    - Other    2 points  1 point    0 points 1   Duration of symptoms    >= 60 minutes    10-59 minutes    < 10 minutes   2 points  1 point  0 points 0   Diabetes  1 point 0   Patient s ABCD2 Score (0-7) = 3       Impression  Transient ischemic attack (expressive aphasia)    Recommendations   - Neurochecks and Vital Signs every 4 hours    - aspirin 81 mg daily x 21 days    - pt has had nosebleeds on baby aspirin previously, will try 21 days of DAPT, if nosebleeds recur,  "may need to stop aspirin early   - Plavix (clopidogrel) 75 mg PO daily indefinitely for secondary stroke prevention   - continue PTA atorvastatin 40 mg daily   - 24-hour Telemetry  - diamond loomis    - Bedside Glucose Monitoring  - Nutrition: Mediterranean diet recommended   - PT/OT/SLP not required, symptoms resolved   - Stroke Education  - Depression Screen  - Apnea screening questions  - Euthermia, Euglycemia    Patient Follow-up    - in the next 1-2 week(s) with PCP  - in 6-8 weeks with general neurology or stroke JOSSELIN ([phone]), ordered     Thank you for this consult. No further stroke evaluation is recommended, so we will sign off. Please contact us with any additional questions.    Elena Pena NP  Vascular Neurology    To page me or covering stroke neurology team member, click here: AMCOM  Choose \"On Call\" tab at top, then select \"NEUROLOGY/ALL SITES\" from middle drop-down box, press Enter, then look for \"stroke\" or \"telestroke\" for your site.  _____________________________________________________    Clinically Significant Risk Factors Present on Admission          # Hyperchloremia: Highest Cl = 108 mmol/L in last 2 days, will monitor as appropriate              # Hypertension: Noted on problem list           # Overweight: Estimated body mass index is 25.8 kg/m  as calculated from the following:    Height as of this encounter: 1.626 m (5' 4\").    Weight as of this encounter: 68.2 kg (150 lb 4.8 oz).              Past Medical History    Past Medical History:   Diagnosis Date    Arthritis     Asymptomatic varicose veins     Created by Conversion     Disorder of bone and cartilage, unspecified     Created by Conversion     Essential thrombocytosis (H)     Gastroesophageal reflux disease     Malignant neoplasm of breast (female), unspecified site     Created by Conversion [facility] Annotation: Oct 21 2010  9:56AM - Shirley Comer: stage 2;  10/18 lymph nodes positives/p chemo and [id number]  "    Motion sickness     Other and unspecified hyperlipidemia     Created by Conversion     PE (pulmonary thromboembolism) (H)     PONV (postoperative nausea and vomiting)     Primary cancer of bone marrow (H)     Thrombosis     Unspecified essential hypertension     Created by Conversion     Unspecified hereditary and idiopathic peripheral neuropathy     Created by Conversion      Medications   Home Meds  Prior to Admission medications    Medication Sig Start Date End Date Taking? Authorizing Provider   alendronate (FOSAMAX) 70 MG tablet Take 70 mg by mouth every 7 days. On Saturday 10/20/24  Yes Reported, Patient   atorvastatin (LIPITOR) 20 MG tablet Take 1 tablet (20 mg) by mouth daily. 11/25/24  Yes Victoria Aguero MD   Biotin 5000 MCG TABS Take 5,000 mcg by mouth every morning. 6/6/08  Yes Self, Referred   Calcium Carb-Cholecalciferol (CALCIUM 600+D) 600-800 MG-UNIT TABS Take 1 tablet by mouth 2 times daily   Yes Self, Referred   DULoxetine (CYMBALTA) 20 MG capsule Take 40 mg by mouth at bedtime.   Yes Dyan Giang MD   famotidine (PEPCID) 20 MG tablet Take 1 tablet by mouth 2 times daily. 11/19/24  Yes Reported, Patient   hydroxyurea (HYDREA) 500 MG capsule TAKE ONE CAPSULE BY MOUTH EVERY DAY FOR 4 DAYS A WEEK AND TAKE TWO CAPSULES BY MOUTH EVERY DAY FOR 3 DAYS A WEEK 3/1/22  Yes Camden Bourne MD   isosorbide mononitrate (IMDUR) 30 MG 24 hr tablet Take 1 tablet (30 mg) by mouth daily. 11/25/24  Yes Victoria Aguero MD   LASTACAFT 0.25 % ophthalmic solution Place 1 drop into both eyes every morning. 10/16/24  Yes Reported, Patient   lisinopril (ZESTRIL) 5 MG tablet Take 5 mg by mouth every morning.   Yes Dyan Giang MD   metoprolol succinate ER (TOPROL XL) 25 MG 24 hr tablet Take 1 tablet (25 mg) by mouth daily 3/15/23  Yes Felix Ansari MD   polyethylene glycol (MIRALAX) 17 GM/Dose powder Take 1 capful by mouth daily as needed for constipation   Yes Self, Referred   simvastatin (ZOCOR) 20  MG tablet Take 20 mg by mouth At Bedtime   Yes Dyan Giang MD   solifenacin (VESICARE) 5 MG tablet Take 5 mg by mouth at bedtime. 12/2/24  Yes Reported, Patient   traZODone (DESYREL) 100 MG tablet Take 100 mg by mouth At Bedtime   Yes Dyan Giang MD   vitamin B1 (THIAMINE) 250 MG tablet Take 250 mg by mouth every morning.   Yes Reported, Patient   vitamin C (ASCORBIC ACID) 500 MG tablet Take 500 mg by mouth every morning. 5/12/18  Yes Self, Referred       Scheduled Meds  Current Facility-Administered Medications   Medication Dose Route Frequency Provider Last Rate Last Admin    alcaftadine (LASTACAFT) ophthalmic solution 1 drop  1 drop Both Eyes QAM Yari Spence MD        aspirin EC tablet 81 mg  81 mg Oral Daily Yari Spence MD   81 mg at 12/18/24 0813    atorvastatin (LIPITOR) tablet 40 mg  40 mg Oral or NG Tube Daily at 8 pm Yari Spence MD   40 mg at 12/17/24 2003    [START ON 12/19/2024] clopidogrel (PLAVIX) tablet 75 mg  75 mg Oral Daily Elena Pena, NP        DULoxetine (CYMBALTA) DR capsule 40 mg  40 mg Oral At Bedtime Yari Spence MD   40 mg at 12/17/24 2119    famotidine (PEPCID) tablet 20 mg  20 mg Oral Daily Yari Spence MD   20 mg at 12/18/24 0814    hydroxyurea (HYDREA) capsule 1,000 mg  1,000 mg Oral Once per day on Monday Wednesday Friday Yari Spence MD        hydroxyurea (HYDREA) capsule 500 mg  500 mg Oral Once per day on Sunday Tuesday Thursday Saturday Yari Spence MD   500 mg at 12/17/24 2118    isosorbide mononitrate (IMDUR) 24 hr tablet 30 mg  30 mg Oral At Bedtime Yari Spence MD   30 mg at 12/17/24 2119    lisinopril (ZESTRIL) tablet 10 mg  10 mg Oral Daily Yari Spence MD   10 mg at 12/18/24 0814    metoprolol succinate ER (TOPROL XL) 24 hr tablet 25 mg  25 mg Oral At Bedtime Yari Spence MD   25 mg at 12/17/24 4069    sodium chloride (PF) 0.9% PF flush 3 mL  3 mL Intracatheter Q8H Yari Spence MD        tolterodine ER (DETROL LA) 24 hr  capsule 2 mg  2 mg Oral At Bedtime Yari Spence MD   2 mg at 12/17/24 2119    traZODone (DESYREL) tablet 100 mg  100 mg Oral At Bedtime Yari Spence MD   100 mg at 12/17/24 2119       Infusion Meds  Current Facility-Administered Medications   Medication Dose Route Frequency Provider Last Rate Last Admin       Allergies   Allergies   Allergen Reactions    Adhesive Tape-Silicones [Adhesive Tape] Itching     States itchy rash with certain tapes    Penicillins Hives          PHYSICAL EXAMINATION   Temp:  [98  F (36.7  C)-99.3  F (37.4  C)] 99.3  F (37.4  C)  Pulse:  [64-79] 66  Resp:  [13-19] 16  BP: (131-209)/() 167/78  SpO2:  [93 %-99 %] 96 %    Neuro Exam  Mental Status:  alert, oriented x 3, follows commands, speech clear and fluent, naming and repetition normal  Cranial Nerves:  visual fields intact (tested by nurse), EOMI with normal smooth pursuit, facial sensation intact and symmetric (tested by nurse), facial movements symmetric, hearing not formally tested but intact to conversation, no dysarthria, shoulder shrug equal bilaterally, tongue protrusion midline  Motor:  no abnormal movements, able to move all limbs antigravity spontaneously with no signs of hemiparesis observed, no pronator drift  Reflexes:  unable to test (telestroke)  Sensory:  light touch sensation intact and symmetric throughout upper and lower extremities (assessed by nurse), no extinction on double simultaneous stimulation (assessed by nurse)  Coordination:  normal finger-to-nose and heel-to-shin bilaterally without dysmetria  Station/Gait:  unable to test due to telestroke    Stroke Scales    NIHSS  1a. Level of Consciousness 0-->Alert, keenly responsive   1b. LOC Questions 0-->Answers both questions correctly   1c. LOC Commands 0-->Performs both tasks correctly   2.   Best Gaze 0-->Normal   3.   Visual 0-->No visual loss   4.   Facial Palsy 0-->Normal symmetrical movements   5a. Motor Arm, Left 0-->No drift, limb holds 90 (or  "45) degrees for full 10 secs   5b. Motor Arm, Right 0-->No drift, limb holds 90 (or 45) degrees for full 10 secs   6a. Motor Leg, Left 0-->No drift, leg holds 30 degree position for full 5 secs   6b. Motor Leg, right 0-->No drift, leg holds 30 degree position for full 5 secs   7.   Limb Ataxia 0-->Absent   8.   Sensory 0-->Normal, no sensory loss   9.   Best Language 0-->No aphasia, normal   10. Dysarthria 0-->Normal   11. Extinction and Inattention  0-->No abnormality   Total 0 (12/18/24 1001)       Imaging  I personally reviewed all imaging; relevant findings per HPI.    Labs Data   CBC  Recent Labs   Lab 12/18/24  0443 12/17/24  1340   WBC 7.0 7.4   RBC 4.47 4.64   HGB 14.4 15.1   HCT 42.7 45.1    355     Basic Metabolic Panel   Recent Labs   Lab 12/18/24  0757 12/18/24  0443 12/17/24  2138 12/17/24  1340   NA  --  142  --  143   POTASSIUM  --  3.6  --  4.0   CHLORIDE  --  108*  --  105   CO2  --  22  --  28   BUN  --  16.7  --  17.4   CR  --  0.78  --  0.82   * 98 128* 111*   DARCI  --  9.2  --  10.4     Liver Panel  No results for input(s): \"PROTTOTAL\", \"ALBUMIN\", \"BILITOTAL\", \"ALKPHOS\", \"AST\", \"ALT\", \"BILIDIRECT\" in the last 168 hours.  INR    Recent Labs   Lab Test 03/04/22  0628   INR 1.16*           Stroke Consult Data Data   Telestroke Service Details  (for non-emergent stroke consult with tele)  Video start time 12/18/24   1000   Video end time 12/18/24   1025   Type of service telemedicine diagnostic assessment of acute neurological changes   Reason telemedicine is appropriate patient requires assessment with a specialist for diagnosis and treatment of neurological symptoms   Mode of transmission secure interactive audio and video communication per Randy   Originating site (patient location) [facility]    Distant site (provider location) [facility]       I have personally spent a total of 35 minutes providing care today, time " spent in reviewing medical records and devising the plan as recorded above.

## 2024-12-18 NOTE — PROGRESS NOTES
Pt discharged to home. Discharge instructions provided to patient, all questions and concerns answered. Pt called her  who sent their grandson to pick her up.

## 2024-12-20 ENCOUNTER — MEDICAL CORRESPONDENCE (OUTPATIENT)
Dept: HEALTH INFORMATION MANAGEMENT | Facility: CLINIC | Age: 84
End: 2024-12-20
Payer: COMMERCIAL

## 2024-12-20 ENCOUNTER — TRANSFERRED RECORDS (OUTPATIENT)
Dept: HEALTH INFORMATION MANAGEMENT | Facility: CLINIC | Age: 84
End: 2024-12-20
Payer: COMMERCIAL

## 2024-12-30 NOTE — PROGRESS NOTES
HEART CARE FOLLOW UP NOTE      [facility]  [phone]      Assessment/Recommendations   Assessment: 84 year old female with  with chest pain, palpitations      Plan:  Chest pain, arm pain  Unclear etiology, did appear to correlate to PACs and SVT on Zio, but also had CAD on CTA (50%) that may have progressed, also consider noncardiac causes.  With Imdur she did have one more episode of chest pain but at rest and BP still quite high.      -Increase Imdur to 60mg      -Return to clinic 1 month, once BP controlled if still having symptoms would pursue angiogram      TIA  In ER 12/18/2024 with slurred speech, CT and MRI negative, felt to be TIA.  She was advised Zio.  Suspect due to poorly controlled HTN      -Continue ASA 81mg and Plavix 75mg per neurology       -Zio pending, if atrial fibrillation noted will need anticoagulation       -As below for HTN    PACs and SVT  Symptoms did correlate to PACs and SVT but overall low burden       -Continue Toprol 25mg every day       4.  CAD  Nonocclusive on CTA 2/2023, had episodes of chest pain and arm pain, after adding Imdur had one additional episode       -Continue ASA 81mg, Toprol 25mg, Lipitor 40mg       -Increase Imdur to 60mg       -Return to clinic 1 month, if still symptoms once BP controlled would proceed with angiogram      5.  HTN  Quite high       -Increase Imdur to 60mg      -Continue Lisinopril 10mg, Toprol 25mg      6.  Hyperlipidemia   LDL = 81 on Simvastatin, now on Lipitor, goal < 70      -Continue Lipitor 40mg      -Recheck lipids, can increase Lipitor if LDL still > 70     The longitudinal plan of care for the diagnosis(es)/condition(s) as documented were addressed during this visit. Due to the added complexity in care, I will continue to support Libertad in the subsequent management and with ongoing continuity of care.             History of Present Illness/Subjective    Indication for visit:  Libertad Hernadez returns for follow up of chest  "pain and was last seen on 11/25/2024 by myself.      HPI: Libertad Hernadez is a 84 year old female with a history of a history of breast cancer, PE, PACs, HTN, hyperlipidemia, nonocclusive CAD who saw Dr Ansari for chest pain and palpitations. A CTA was negative for flow-limiting CAD, Zio showed PACs correlating to her symptoms, Metoprolol was added.  At follow up with me she reported on-going chest pain, I advised adding Imdur, change Simvastatin to Lipitor, and if symptoms persisted consider angiogram.    On 12/17/2024 she was admitted with slurred speech, negative CT and MRI, Plavix added and Lipitor increased for presumed TIA.    She returns for follow up after adding Imdur and reports she had one episode of chest pain and arm pain while in bed at night, nothing exertional.  She has recovered from the TIA, no dyspnea, orthopnea, no palpitations, still wearing Zio.    I reviewed notes from PCP prior to this visit.         Physical Examination  Past Cardiac History   BP (!) 172/74 (BP Location: Right arm, Patient Position: Sitting, Cuff Size: Adult Regular)   Pulse 58   Resp 18   Ht 1.638 m (5' 4.5\")   Wt 70.3 kg (155 lb)   SpO2 98%   BMI 26.19 kg/m       Wt Readings from Last 3 Encounters:   11/25/24 69.7 kg (153 lb 9.6 oz)   08/02/23 68 kg (150 lb)   03/15/23 68.4 kg (150 lb 12.8 oz)       General Appearance:   no distress, normal body habitus   ENT/Mouth: membranes moist, no oral lesions or bleeding gums.      EYES:  no scleral icterus, normal conjunctivae   Neck: no carotid bruits or thyromegaly   Chest/Lungs:   lungs are clear to auscultation, no rales or wheezing,  sternal scar, equal chest wall expansion    Cardiovascular:   Regular. Normal first and second heart sounds with no murmurs, rubs, or gallops; the carotid, radial and posterior tibial pulses are intact, Jugular venous pressure normal, no edema bilaterally    Abdomen:  no organomegaly, masses, bruits, or tenderness; bowel sounds are present "   Extremities: no cyanosis or clubbing   Skin: no xanthelasma, warm.    Neurologic: normal  bilateral, no tremors         CAD, nonocclusive  PACs, SVT        Zio: 2/7/2023  1.8% PACs, 110 episodes of SVT     Most Recent Echocardiogram: 12/18/2024  The left ventricle is normal in size.  The visual ejection fraction is 60-65%.  No regional wall motion abnormalities noted.  Diastolic Doppler findings (E/E' ratio and/or other parameters) suggest left  ventricular filling pressures are increased.  Normal right ventricle size and systolic function.  The right ventricular systolic pressure is approximated at 24mmHg plus the  right atrial pressure.  IVC diameter <2.1 cm collapsing >50% with sniff suggests a normal RA pressure  of 3 mmHg.  Trivial pericardial effusion  Sinus rhythm was noted.  No obvious cardiac source of emboli was seen within the limits of a  transthoracic echocardiogram.     Most Recent Stress Test: None     CTA: 2/22/2023  1.  Total coronary calcium score 944 places the individual in the high risk for cardiac events when compared to an age and gender matched control group and implies a very high risk of cardiac events in the next ten years.  2.  Mild disease in distal left main, less than 20% stenosis.  3.  Mild disease in LAD and D1, less than 50% stenosis.  4.  Mild disease in ramus which is a big vessel, about 50% stenosis in proximal segment.  5.  Mild disease less than 40% stenosis in LCx which is small vessel.  6.  Mild disease in RCA, less than 50% stenosis.  Artifact is noticed in mid to distal RCA.  7.  Normal size of visualized aortic segments.  8.  No pericardial effusion.  No thrombus in left atrial appendage.        Most Recent Angiogram: None     ECG (reviewed by myself): 2/22/2023 NSR 63 bpm, RsR', PACs           Medical History  Family History Social History   Past Medical History:   Diagnosis Date    Arthritis     Asymptomatic varicose veins     Created by Conversion     Disorder of  bone and cartilage, unspecified     Created by Conversion     Essential thrombocytosis (H)     Gastroesophageal reflux disease     Malignant neoplasm of breast (female), unspecified site     Created by Conversion [facility] Annotation: Oct 21 2010  9:56AM - Shirley Comer: stage 2;  10/18 lymph nodes positives/p chemo and [id number]     Motion sickness     Other and unspecified hyperlipidemia     Created by Conversion     PE (pulmonary thromboembolism) (H)     PONV (postoperative nausea and vomiting)     Primary cancer of bone marrow (H)     Thrombosis     Unspecified essential hypertension     Created by Conversion     Unspecified hereditary and idiopathic peripheral neuropathy     Created by Conversion      Family History   Problem Relation Age of Onset    Hypertension Father     Heart Disease Father     Diabetes Sister     Prostate Cancer Brother         Social History     Socioeconomic History    Marital status:      Spouse name: Not on file    Number of children: Not on file    Years of education: Not on file    Highest education level: Not on file   Occupational History    Not on file   Tobacco Use    Smoking status: Never     Passive exposure: Past    Smokeless tobacco: Never   Substance and Sexual Activity    Alcohol use: Yes     Alcohol/week: 1.0 standard drink of alcohol     Comment: very rarely    Drug use: Never    Sexual activity: Never   Other Topics Concern    Parent/sibling w/ CABG, MI or angioplasty before 65F 55M? Not Asked   Social History Narrative    Not on file     Social Drivers of Health     Financial Resource Strain: Not on file   Food Insecurity: Not on file   Transportation Needs: Not on file   Physical Activity: Not on file   Stress: Not on file   Social Connections: Not on file   Interpersonal Safety: Not on file   Housing Stability: Not on file           Medications  Allergies   Current Outpatient Medications   Medication Sig Dispense Refill    atorvastatin (LIPITOR) 20 MG  tablet Take 1 tablet (20 mg) by mouth daily. 90 tablet 3    betamethasone dipropionate (DIPROSONE) 0.05 % external lotion as needed APPLY TO AFFECTED AREA ON SCALP ONCE NIGHTLY FOR UP TO 2 WEEKS AT A TIME THEN 2 WEEK BREAK. RESUME AS NEEDED      Biotin 5000 MCG TABS Take 5,000 mcg by mouth daily      Calcium Carb-Cholecalciferol (CALCIUM 600+D) 600-800 MG-UNIT TABS Take 1 tablet by mouth 2 times daily      DULoxetine (CYMBALTA) 20 MG capsule Take 20 mg by mouth daily      famotidine (PEPCID) 20 MG tablet Take 1 tablet by mouth 2 times daily.      fluocinonide (LIDEX) 0.05 % external solution Apply topically 2 times daily as needed      hydroxyurea (HYDREA) 500 MG capsule TAKE ONE CAPSULE BY MOUTH EVERY DAY FOR 4 DAYS A WEEK AND TAKE TWO CAPSULES BY MOUTH EVERY DAY FOR 3 DAYS A WEEK 90 capsule 2    isosorbide mononitrate (IMDUR) 30 MG 24 hr tablet Take 1 tablet (30 mg) by mouth daily. 30 tablet 11    lisinopril (ZESTRIL) 5 MG tablet Take 5 mg by mouth daily      metoprolol succinate ER (TOPROL XL) 25 MG 24 hr tablet Take 1 tablet (25 mg) by mouth daily 90 tablet 3    polyethylene glycol (MIRALAX) 17 GM/Dose powder Take 1 capful by mouth daily as needed for constipation      simvastatin (ZOCOR) 20 MG tablet Take 20 mg by mouth At Bedtime      traZODone (DESYREL) 100 MG tablet Take 100 mg by mouth At Bedtime      vitamin B1 (THIAMINE) 250 MG tablet Take 250 mg by mouth daily      vitamin C (ASCORBIC ACID) 500 MG tablet Take 500 mg by mouth daily         Allergies   Allergen Reactions    Adhesive Tape-Silicones [Adhesive Tape] Itching     States itchy rash with certain tapes    Penicillins Hives          Lab Results    Chemistry/lipid CBC Cardiac Enzymes/BNP/TSH/INR   Recent Labs   Lab Test 11/04/24  1722   CHOL 173   HDL 62   LDL 81   TRIG 148     Recent Labs   Lab Test 11/04/24  1722 08/21/23  0758 07/11/22  1052   LDL 81 106* 89     Recent Labs   Lab Test 11/04/24  1722      POTASSIUM 3.9   CHLORIDE 103   CO2  "25   GLC 96   BUN 15.9   CR 0.77   GFRESTIMATED 75   DARCI 9.7     Recent Labs   Lab Test 11/04/24  1722 08/21/23  0758 02/22/23  0828   CR 0.77 0.83 0.77     No results for input(s): \"A1C\" in the last 24397 hours.       Recent Labs   Lab Test 11/04/24  1722   WBC 7.9   HGB 15.1   HCT 46.6           Recent Labs   Lab Test 11/04/24  1722 02/22/23  0828 06/25/22  1045   HGB 15.1 15.8* 14.4    Recent Labs   Lab Test 02/22/23  1038 02/22/23  0828 06/25/22  1404   TROPONINI 0.01 <0.01 <0.01     Recent Labs   Lab Test 05/12/18  0840   BNP 74     Recent Labs   Lab Test 11/04/24  1722   TSH 2.10     Recent Labs   Lab Test 03/04/22  0628   INR 1.16*        Victoria Aguero MD  Noninvasive Cardiologist   [facility]              "

## 2025-01-02 ENCOUNTER — OFFICE VISIT (OUTPATIENT)
Dept: CARDIOLOGY | Facility: CLINIC | Age: 85
End: 2025-01-02
Payer: COMMERCIAL

## 2025-01-02 VITALS
HEIGHT: 65 IN | SYSTOLIC BLOOD PRESSURE: 172 MMHG | BODY MASS INDEX: 25.83 KG/M2 | OXYGEN SATURATION: 98 % | HEART RATE: 58 BPM | RESPIRATION RATE: 18 BRPM | WEIGHT: 155 LBS | DIASTOLIC BLOOD PRESSURE: 74 MMHG

## 2025-01-02 DIAGNOSIS — I10 PRIMARY HYPERTENSION: ICD-10-CM

## 2025-01-02 DIAGNOSIS — E78.5 HYPERLIPIDEMIA LDL GOAL <70: ICD-10-CM

## 2025-01-02 DIAGNOSIS — Z86.73 PERSONAL HISTORY OF TIA (TRANSIENT ISCHEMIC ATTACK): ICD-10-CM

## 2025-01-02 DIAGNOSIS — R07.89 ATYPICAL CHEST PAIN: Primary | ICD-10-CM

## 2025-01-02 NOTE — LETTER
1/2/2025    Candy Huang MD  [address]  [place] [address]    RE: Libertad Hernadez       Dear Colleague,     I had the pleasure of seeing Libertad Hernadez in the [facility].  HEART CARE FOLLOW UP NOTE      [facility]  [phone]      Assessment/Recommendations   Assessment: 84 year old female with  with chest pain, palpitations      Plan:  Chest pain, arm pain  Unclear etiology, did appear to correlate to PACs and SVT on Zio, but also had CAD on CTA (50%) that may have progressed, also consider noncardiac causes.  With Imdur she did have one more episode of chest pain but at rest and BP still quite high.      -Increase Imdur to 60mg      -Return to clinic 1 month, once BP controlled if still having symptoms would pursue angiogram      TIA  In ER 12/18/2024 with slurred speech, CT and MRI negative, felt to be TIA.  She was advised Zio.  Suspect due to poorly controlled HTN      -Continue ASA 81mg and Plavix 75mg per neurology       -Zio pending, if atrial fibrillation noted will need anticoagulation       -As below for HTN    PACs and SVT  Symptoms did correlate to PACs and SVT but overall low burden       -Continue Toprol 25mg every day       4.  CAD  Nonocclusive on CTA 2/2023, had episodes of chest pain and arm pain, after adding Imdur had one additional episode       -Continue ASA 81mg, Toprol 25mg, Lipitor 40mg       -Increase Imdur to 60mg       -Return to clinic 1 month, if still symptoms once BP controlled would proceed with angiogram      5.  HTN  Quite high       -Increase Imdur to 60mg      -Continue Lisinopril 10mg, Toprol 25mg      6.  Hyperlipidemia   LDL = 81 on Simvastatin, now on Lipitor, goal < 70      -Continue Lipitor 40mg      -Recheck lipids, can increase Lipitor if LDL still > 70     The longitudinal plan of care for the diagnosis(es)/condition(s) as documented were addressed during this visit. Due to the added complexity in care, I will  "continue to support Libertad in the subsequent management and with ongoing continuity of care.             History of Present Illness/Subjective    Indication for visit:  Libertad Hernadez returns for follow up of chest pain and was last seen on 11/25/2024 by myself.      HPI: Libertad Hernadez is a 84 year old female with a history of a history of breast cancer, PE, PACs, HTN, hyperlipidemia, nonocclusive CAD who saw Dr Ansari for chest pain and palpitations. A CTA was negative for flow-limiting CAD, Zio showed PACs correlating to her symptoms, Metoprolol was added.  At follow up with me she reported on-going chest pain, I advised adding Imdur, change Simvastatin to Lipitor, and if symptoms persisted consider angiogram.    On 12/17/2024 she was admitted with slurred speech, negative CT and MRI, Plavix added and Lipitor increased for presumed TIA.    She returns for follow up after adding Imdur and reports she had one episode of chest pain and arm pain while in bed at night, nothing exertional.  She has recovered from the TIA, no dyspnea, orthopnea, no palpitations, still wearing Zio.    I reviewed notes from PCP prior to this visit.         Physical Examination  Past Cardiac History   BP (!) 172/74 (BP Location: Right arm, Patient Position: Sitting, Cuff Size: Adult Regular)   Pulse 58   Resp 18   Ht 1.638 m (5' 4.5\")   Wt 70.3 kg (155 lb)   SpO2 98%   BMI 26.19 kg/m       Wt Readings from Last 3 Encounters:   11/25/24 69.7 kg (153 lb 9.6 oz)   08/02/23 68 kg (150 lb)   03/15/23 68.4 kg (150 lb 12.8 oz)       General Appearance:   no distress, normal body habitus   ENT/Mouth: membranes moist, no oral lesions or bleeding gums.      EYES:  no scleral icterus, normal conjunctivae   Neck: no carotid bruits or thyromegaly   Chest/Lungs:   lungs are clear to auscultation, no rales or wheezing,  sternal scar, equal chest wall expansion    Cardiovascular:   Regular. Normal first and second heart sounds with no murmurs, rubs, or " gallops; the carotid, radial and posterior tibial pulses are intact, Jugular venous pressure normal, no edema bilaterally    Abdomen:  no organomegaly, masses, bruits, or tenderness; bowel sounds are present   Extremities: no cyanosis or clubbing   Skin: no xanthelasma, warm.    Neurologic: normal  bilateral, no tremors         CAD, nonocclusive  PACs, SVT        Zio: 2/7/2023  1.8% PACs, 110 episodes of SVT     Most Recent Echocardiogram: 12/18/2024  The left ventricle is normal in size.  The visual ejection fraction is 60-65%.  No regional wall motion abnormalities noted.  Diastolic Doppler findings (E/E' ratio and/or other parameters) suggest left  ventricular filling pressures are increased.  Normal right ventricle size and systolic function.  The right ventricular systolic pressure is approximated at 24mmHg plus the  right atrial pressure.  IVC diameter <2.1 cm collapsing >50% with sniff suggests a normal RA pressure  of 3 mmHg.  Trivial pericardial effusion  Sinus rhythm was noted.  No obvious cardiac source of emboli was seen within the limits of a  transthoracic echocardiogram.     Most Recent Stress Test: None     CTA: 2/22/2023  1.  Total coronary calcium score 944 places the individual in the high risk for cardiac events when compared to an age and gender matched control group and implies a very high risk of cardiac events in the next ten years.  2.  Mild disease in distal left main, less than 20% stenosis.  3.  Mild disease in LAD and D1, less than 50% stenosis.  4.  Mild disease in ramus which is a big vessel, about 50% stenosis in proximal segment.  5.  Mild disease less than 40% stenosis in LCx which is small vessel.  6.  Mild disease in RCA, less than 50% stenosis.  Artifact is noticed in mid to distal RCA.  7.  Normal size of visualized aortic segments.  8.  No pericardial effusion.  No thrombus in left atrial appendage.        Most Recent Angiogram: None     ECG (reviewed by myself): 2/22/2023  NSR 63 bpm, RsR', PACs           Medical History  Family History Social History   Past Medical History:   Diagnosis Date     Arthritis      Asymptomatic varicose veins     Created by Conversion      Disorder of bone and cartilage, unspecified     Created by Conversion      Essential thrombocytosis (H)      Gastroesophageal reflux disease      Malignant neoplasm of breast (female), unspecified site     Created by Conversion [facility] Annotation: Oct 21 2010  9:56AM - Souleymane Shirley: stage 2;  10/18 lymph nodes positives/p chemo and [id number]      Motion sickness      Other and unspecified hyperlipidemia     Created by Conversion      PE (pulmonary thromboembolism) (H)      PONV (postoperative nausea and vomiting)      Primary cancer of bone marrow (H)      Thrombosis      Unspecified essential hypertension     Created by Conversion      Unspecified hereditary and idiopathic peripheral neuropathy     Created by Conversion      Family History   Problem Relation Age of Onset     Hypertension Father      Heart Disease Father      Diabetes Sister      Prostate Cancer Brother         Social History     Socioeconomic History     Marital status:      Spouse name: Not on file     Number of children: Not on file     Years of education: Not on file     Highest education level: Not on file   Occupational History     Not on file   Tobacco Use     Smoking status: Never     Passive exposure: Past     Smokeless tobacco: Never   Substance and Sexual Activity     Alcohol use: Yes     Alcohol/week: 1.0 standard drink of alcohol     Comment: very rarely     Drug use: Never     Sexual activity: Never   Other Topics Concern     Parent/sibling w/ CABG, MI or angioplasty before 65F 55M? Not Asked   Social History Narrative     Not on file     Social Drivers of Health     Financial Resource Strain: Not on file   Food Insecurity: Not on file   Transportation Needs: Not on file   Physical Activity: Not on file   Stress: Not on  file   Social Connections: Not on file   Interpersonal Safety: Not on file   Housing Stability: Not on file           Medications  Allergies   Current Outpatient Medications   Medication Sig Dispense Refill     atorvastatin (LIPITOR) 20 MG tablet Take 1 tablet (20 mg) by mouth daily. 90 tablet 3     betamethasone dipropionate (DIPROSONE) 0.05 % external lotion as needed APPLY TO AFFECTED AREA ON SCALP ONCE NIGHTLY FOR UP TO 2 WEEKS AT A TIME THEN 2 WEEK BREAK. RESUME AS NEEDED       Biotin 5000 MCG TABS Take 5,000 mcg by mouth daily       Calcium Carb-Cholecalciferol (CALCIUM 600+D) 600-800 MG-UNIT TABS Take 1 tablet by mouth 2 times daily       DULoxetine (CYMBALTA) 20 MG capsule Take 20 mg by mouth daily       famotidine (PEPCID) 20 MG tablet Take 1 tablet by mouth 2 times daily.       fluocinonide (LIDEX) 0.05 % external solution Apply topically 2 times daily as needed       hydroxyurea (HYDREA) 500 MG capsule TAKE ONE CAPSULE BY MOUTH EVERY DAY FOR 4 DAYS A WEEK AND TAKE TWO CAPSULES BY MOUTH EVERY DAY FOR 3 DAYS A WEEK 90 capsule 2     isosorbide mononitrate (IMDUR) 30 MG 24 hr tablet Take 1 tablet (30 mg) by mouth daily. 30 tablet 11     lisinopril (ZESTRIL) 5 MG tablet Take 5 mg by mouth daily       metoprolol succinate ER (TOPROL XL) 25 MG 24 hr tablet Take 1 tablet (25 mg) by mouth daily 90 tablet 3     polyethylene glycol (MIRALAX) 17 GM/Dose powder Take 1 capful by mouth daily as needed for constipation       simvastatin (ZOCOR) 20 MG tablet Take 20 mg by mouth At Bedtime       traZODone (DESYREL) 100 MG tablet Take 100 mg by mouth At Bedtime       vitamin B1 (THIAMINE) 250 MG tablet Take 250 mg by mouth daily       vitamin C (ASCORBIC ACID) 500 MG tablet Take 500 mg by mouth daily         Allergies   Allergen Reactions     Adhesive Tape-Silicones [Adhesive Tape] Itching     States itchy rash with certain tapes     Penicillins Hives          Lab Results    Chemistry/lipid CBC Cardiac Enzymes/BNP/TSH/INR  "  Recent Labs   Lab Test 11/04/24  1722   CHOL 173   HDL 62   LDL 81   TRIG 148     Recent Labs   Lab Test 11/04/24  1722 08/21/23  0758 07/11/22  1052   LDL 81 106* 89     Recent Labs   Lab Test 11/04/24  1722      POTASSIUM 3.9   CHLORIDE 103   CO2 25   GLC 96   BUN 15.9   CR 0.77   GFRESTIMATED 75   DARCI 9.7     Recent Labs   Lab Test 11/04/24  1722 08/21/23  0758 02/22/23  0828   CR 0.77 0.83 0.77     No results for input(s): \"A1C\" in the last 66761 hours.       Recent Labs   Lab Test 11/04/24  1722   WBC 7.9   HGB 15.1   HCT 46.6           Recent Labs   Lab Test 11/04/24  1722 02/22/23  0828 06/25/22  1045   HGB 15.1 15.8* 14.4    Recent Labs   Lab Test 02/22/23  1038 02/22/23  0828 06/25/22  1404   TROPONINI 0.01 <0.01 <0.01     Recent Labs   Lab Test 05/12/18  0840   BNP 74     Recent Labs   Lab Test 11/04/24  1722   TSH 2.10     Recent Labs   Lab Test 03/04/22  0628   INR 1.16*        Victoria Aguero MD  Noninvasive Cardiologist   [facility]                Thank you for allowing me to participate in the care of your patient.      Sincerely,     Victoria Aguero MD     [facility]  cc:   Dimitrios Sandoval MD  [address] [place] [address]      "

## 2025-01-02 NOTE — PATIENT INSTRUCTIONS
Increase the Isosorbide to 60mg.  Follow-up with Dr Aguero on 1 month.  If still having chest pain and arm pain once blood pressure controlled we can consider an angiogram

## 2025-01-09 LAB — CV ZIO PRELIM RESULTS: NORMAL

## 2025-01-29 NOTE — PROGRESS NOTES
__________________________________________________________    ___________________________________  ESTABLISHED PATIENT NEUROLOGY NOTE    DATE OF VISIT: 1/30/2025  MRN: [id number]  PATIENT NAME: Libertad Hernadez  YOB: 1940    Chief Complaint: Patient presents with:  Stroke: Patient states she does get short of breathe every now and then. Her BP is high, she is concerned about that.  New patient.    SUBJECTIVE:                                                      History of Present Illness: Libertad Hernadez is a 84 year old female presenting for follow-up for TIA.     She was hospitalized at [facility] on 12/18/24. Prior to the hospital stay, she had a past medical history of breast cancer s/p bilateral mastectomy, peripheral neuropathy, RLS, HTN, thrombocytopenia, PE, HLD. .    She presented to the hospital with 1-2 minute episode of expressive aphasia .     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   Intracranial Vasculature No LVO, no high grade stenosis, normal basilar artery    Cervical Vasculature No high grade stenosis, no dissection       Echocardiogram EF 60-65%, no regional WMA, sinus rhythm, no obvious emboli, moderately dilated atria    EKG/Telemetry Sinus rhythm   RSR' or QR pattern in V1 suggests right ventricular conduction delay   T wave abnormality, consider lateral ischemia    Other Testing Not Applicable       LDL 97   A1C 12/17/2024: 5.7 %          ABCD2 Patients Score   Age >= 60 years 1 point 1   Blood Pressure    SBP >= 140 or DBP >=  90     1 point 1   Clinical Features    - Unilateral weakness    - Speech disturbance w/o weakness    - Other    2 points  1 point     0 points 1   Duration of symptoms    >= 60 minutes    10-59 minutes    < 10 minutes    2 points  1 point  0 points 0   Diabetes  1 point 0   Patient s ABCD2 Score (0-7) = 3      Impression  Transient ischemic attack (expressive aphasia)     Recommendations   - aspirin 81 mg daily x 21 days               - pt has had nosebleeds on baby aspirin previously, will try 21 days of DAPT, if nosebleeds recur, may need to stop aspirin early   - Plavix (clopidogrel) 75 mg PO daily indefinitely for secondary stroke prevention     01/30/25: Libertad presents to the clinic for post TIA follow-up.  She reports that she had difficulty getting her words out for a few minutes before returning to her baseline.  Denies any new or return strokelike symptoms since her hospitalization.    Zio patch did not show atrial fibrillation.  She has an appointment with cardiology to 02/03/25.  Libertad continues with Plavix daily.  Medication refilled.  Blood pressure remains elevated at 154/78.  Patient states that she is working with her primary care provider who increased her lisinopril recently.  LDL is 97, on atorvastatin.  No history of diabetes, smoking or LAWRENCE.  Libertad tells me she does have a history of neuropathy that she follows a neurologist with.  She would like to transfer care to this clinic.  I will have her establish with a neurologist.  She also tells me that she struggles with memory.  She has a difficult time with word finding and remembering names.  Her primary care provider completed a MoCA and stated she was within normal range.  She will follow with her primary care at this time.  If memory becomes more of an issue she will make an appointment to discuss further.       Current Prevention Medications:    Plavix, aspirin, atorvastatin, lisinopril, Imdur, metoprolol    Perceived Side Effects: No     Psycho-Social History: Libertad Hernadez currently lives [place] with  Rodrigue. Highest level of education 4 years college. Employment status:retired, teacher and .    Patient does not smoke, no alcohol use, no recreational drug use.  Currently, patient denies feeling depressed, denies feeling  anhedonia, denies suicidal  thoughts, and denies having feelings of excessive guilt/worthlessness.  We reviewed importance of mental and emotional wellbeing and impact on health.      Current Medications:   Current Outpatient Medications   Medication Sig Dispense Refill    alendronate (FOSAMAX) 70 MG tablet Take 70 mg by mouth every 7 days. On Saturday      atorvastatin (LIPITOR) 40 MG tablet Take 1 tablet (40 mg) by mouth daily. 30 tablet 0    Biotin 5000 MCG TABS Take 5,000 mcg by mouth every morning.      Calcium Carb-Cholecalciferol (CALCIUM 600+D) 600-800 MG-UNIT TABS Take 1 tablet by mouth 2 times daily      clopidogrel (PLAVIX) 75 MG tablet Take 1 tablet (75 mg) by mouth daily. 30 tablet 0    DULoxetine (CYMBALTA) 20 MG capsule Take 40 mg by mouth at bedtime.      famotidine (PEPCID) 20 MG tablet Take 1 tablet (20 mg) by mouth daily.      hydroxyurea (HYDREA) 500 MG capsule TAKE ONE CAPSULE BY MOUTH EVERY DAY FOR 4 DAYS A WEEK AND TAKE TWO CAPSULES BY MOUTH EVERY DAY FOR 3 DAYS A WEEK 90 capsule 2    isosorbide mononitrate (IMDUR) 30 MG 24 hr tablet Take 1 tablet (30 mg) by mouth daily. 30 tablet 11    LASTACAFT 0.25 % ophthalmic solution Place 1 drop into both eyes every morning.      lisinopril (ZESTRIL) 10 MG tablet Take 1 tablet (10 mg) by mouth every morning. 30 tablet 0    metoprolol succinate ER (TOPROL XL) 25 MG 24 hr tablet Take 1 tablet (25 mg) by mouth daily 90 tablet 3    polyethylene glycol (MIRALAX) 17 GM/Dose powder Take 1 capful by mouth daily as needed for constipation      solifenacin (VESICARE) 5 MG tablet Take 5 mg by mouth at bedtime.      traZODone (DESYREL) 100 MG tablet Take 100 mg by mouth At Bedtime      vitamin B1 (THIAMINE) 250 MG tablet Take 250 mg by mouth every morning.      vitamin C (ASCORBIC ACID) 500 MG tablet Take 500 mg by mouth every morning.       No current facility-administered medications for this visit.     Past Medical History:   Patient  has a past medical history  of Arthritis, Asymptomatic varicose veins, Disorder of bone and cartilage, unspecified, Essential thrombocytosis (H), Gastroesophageal reflux disease, Malignant neoplasm of breast (female), unspecified site, Motion sickness, Other and unspecified hyperlipidemia, PE (pulmonary thromboembolism) (H), PONV (postoperative nausea and vomiting), Primary cancer of bone marrow (H), Thrombosis, Unspecified essential hypertension, and Unspecified hereditary and idiopathic peripheral neuropathy.  Surgical History:  She  has a past surgical history that includes Pr Mastectomy, Radical; REMOVAL OF TONSILS,<11 Y/O; REMOVAL ADENOIDS,PRIMARY,<11 Y/O; REMOVAL GALLBLADDER; Pr Incise Finger Tendon Sheath; Pr Mastectomy, Partial; Foot surgery (2010); Arthroplasty, Hip, Total, Direct Anterior Approach, Using [place] Table (Right, 3/4/2022); and Esophagoscopy, gastroscopy, duodenoscopy (EGD), combined (N/A, 11/22/2022).  Family and Social History:  Reviewed, and she  reports that she has never smoked. She has been exposed to tobacco smoke. She has never used smokeless tobacco. She reports current alcohol use of about 1.0 standard drink of alcohol per week. She reports that she does not use drugs.  Reviewed, and family history includes Diabetes in her sister; Heart Disease in her father; Hypertension in her father; Prostate Cancer in her brother.    RECENT DIAGNOSTIC STUDIES:   Labs:    Coagulation studies:  Recent Labs   Lab Test 03/04/22  0628   INR 1.16*        Lipid panel:  Recent Labs   Lab Test 12/17/24  1340 11/04/24  1722   CHOL 181 173   HDL 61 62   LDL 97 81   TRIG 117 148       HbA1C:  Recent Labs   Lab Test 12/17/24  1340   A1C 5.7*       Troponin:  No lab results found.  Recent Labs   Lab Test 02/22/23  1038 02/22/23  0828 06/25/22  1404   TROPONINI 0.01 <0.01 <0.01     No lab results found.    Imaging: See [facility]    Zio monitoring from 12/24/2024 to 1/7/2025 (duration 13d 21h)  Predominant rhythm was sinus rhythm, 47 to 111bpm,  "average 66bpm.  1 recorded instance of apparent accelerated junctional rhythm 85bpm competing with sinus rhythm.  52 episode(s) of nonsustained supraventricular tachycardia - longest 13 beats, fastest 146bpm.  No sustained tachyarrhythmias.  No atrial fibrillation.  There were no pauses of greater than 3 seconds.  Frequent supraventricular ectopic beats (isolated 15.7%).  Rare premature ventricular contractions (isolated <1%).  Symptom triggers and diary entries (12) correlated to sinus rhythm 59-85bpm, competing junctional rhythm, PACs, PVCs.     REVIEW OF SYSTEMS:                                                      10-point review of systems is negative except as mentioned above in HPI.    EXAM:                                                      Physical Exam:   Vitals: BP (!) 154/78   Pulse 68   Ht 1.638 m (5' 4.5\")   Wt 70.3 kg (155 lb)   BMI 26.19 kg/m    BMI= Body mass index is 26.19 kg/m .  GENERAL: NAD.  HEENT: NC/AT.  PULM: Non-labored breathing.   Neurologic:  MENTAL STATUS: Alert, attentive. Speech is fluent. Normal comprehension. Normal concentration. Adequate fund of knowledge.   CRANIAL NERVES: Visual fields intact to confrontation. Pupils equally, round and reactive to light. Facial sensation and movement normal. EOM full. Hearing intact to conversation. Trapezius strength intact. Palate moves symmetrically. Tongue midline.  MOTOR: 5/5 in proximal and distal muscle groups of upper and lower extremities. Tone and bulk normal.   SENSATION: Normal light touch throughout.   COORDINATION: Normal finger nose finger. Finger tapping normal. Knee heel shin normal.  STATION AND GAIT: Romberg Negative. Good postural reflexes. Casual gait and tandem Normal  left hand-dominant.      ASSESSMENT and PLAN:                                                      Assessment:    ICD-10-CM    1. TIA (transient ischemic attack)  G45.9 Stroke Hospital Follow Up (for neurologist use only)        Libertad Hernadez is a 84 " year old female presenting for follow-up for TIA. She was hospitalized at [facility] on 12/18/24. Prior to the hospital stay, she had a past medical history of breast cancer s/p bilateral mastectomy, peripheral neuropathy, RLS, HTN, thrombocytopenia, PE, HLD. She presented to the hospital with 1-2 minute episode of expressive aphasia.  Libertad reports that she is back to her baseline and has not had any additional strokelike symptoms since her hospitalization.  We discussed interventions outlined below and I will plan to see the patient back in 6 months to establish care with a neurologist for neuropathy.  Libertad understands and agrees with this plan.    Plan:     Secondary prevention  -Continue Plavix 75 mg tablet daily     Lifestyle  - mediterranean diet  - exercise    Risk Factors   Elevated blood Pressure  BP: 154/78  - Goal <130/80  - Continue preventive therapy: Lisinopril, Imdur and metoprolol as prescribed  -Continue to follow with your primary care doctor on management of your blood pressure     Elevated cholesterol levels   LDL: 97  - Goal < 70 mg/dl  - Continue preventive therapy: Atorvastatin as prescribed    --- Plan to follow-up with cardiology this week as scheduled  --- Plan to follow-up with your primary care provider to manage your vascular risk factors  --- Plan on follow up in the Neurology Clinic in 6 months with a [facility] neurologist to discuss neuropathy.  --- Please feel free to reach out if you have any further questions or concerns.  --- Seek immediate medical attention if an emergency arises or if your health becomes progressively worse.     For any acute neurologic deficits she was advised to  go directly to the hospital rather than call the clinic.    It was a pleasure to meet you today!     Total Time: Total time spent for face to face visit, reviewing labs/imaging studies, counseling and coordination of care was: 40 Minutes spent on the date of the encounter doing chart  review, review of outside records, review of test results, patient visit, and documentation     This note was dictated using voice recognition software.  Any grammatical or context distortions are unintentional and inherent to the software.    Iris New, DNP, APRN, CNP  [facility] Neu[facility]

## 2025-01-30 ENCOUNTER — OFFICE VISIT (OUTPATIENT)
Dept: NEUROLOGY | Facility: CLINIC | Age: 85
End: 2025-01-30
Attending: NURSE PRACTITIONER
Payer: COMMERCIAL

## 2025-01-30 VITALS
HEART RATE: 68 BPM | SYSTOLIC BLOOD PRESSURE: 154 MMHG | WEIGHT: 155 LBS | DIASTOLIC BLOOD PRESSURE: 78 MMHG | HEIGHT: 65 IN | BODY MASS INDEX: 25.83 KG/M2

## 2025-01-30 DIAGNOSIS — G45.9 TIA (TRANSIENT ISCHEMIC ATTACK): ICD-10-CM

## 2025-01-30 RX ORDER — CLOPIDOGREL BISULFATE 75 MG/1
75 TABLET ORAL DAILY
Qty: 90 TABLET | Refills: 3 | Status: SHIPPED | OUTPATIENT
Start: 2025-01-30

## 2025-01-30 NOTE — PATIENT INSTRUCTIONS
Plan:     Secondary prevention  -Continue Plavix 75 mg tablet daily     Lifestyle  - mediterranean diet  - exercise    Risk Factors   Elevated blood Pressure  BP: 154/78  - Goal <130/80  - Continue preventive therapy: Lisinopril, Imdur and metoprolol as prescribed  -Continue to follow with your primary care doctor on management of your blood pressure     Elevated cholesterol levels   LDL: 97  - Goal < 70 mg/dl  - Continue preventive therapy: Atorvastatin as prescribed    --- Plan to follow-up with cardiology next week as scheduled  --- Plan to follow-up with your primary care provider to manage your vascular risk factors  --- Plan on follow up in the Neurology Clinic in 6 months with a [facility] neurologist to discuss neuropathy.  --- Please feel free to reach out if you have any further questions or concerns.  --- Seek immediate medical attention if an emergency arises or if your health becomes progressively worse.     For any acute neurologic deficits she was advised to  go directly to the hospital rather than call the clinic.    It was a pleasure to meet you today!

## 2025-01-30 NOTE — NURSING NOTE
Chief Complaint   Patient presents with    Stroke     Patient states she does get short of breathe every now and then. Her BP is high, she is concerned about that.  New patient.     Clarita Arreola on 1/30/2025 at 9:31 AM

## 2025-01-30 NOTE — LETTER
1/30/2025      Libertad Hernadez  [address] [facility] [address]      Dear Colleague,    Thank you for referring your patient, Libertad Hernadez, to the [facility]. Please see a copy of my visit note below.    __________________________________________________________    ___________________________________  ESTABLISHED PATIENT NEUROLOGY NOTE    DATE OF VISIT: 1/30/2025  MRN: [id number]  PATIENT NAME: Libertad Hernadez  YOB: 1940    Chief Complaint: Patient presents with:  Stroke: Patient states she does get short of breathe every now and then. Her BP is high, she is concerned about that.  New patient.    SUBJECTIVE:                                                      History of Present Illness: Libertad Hernadez is a 84 year old female presenting for follow-up for TIA.     She was hospitalized at [facility] on 12/18/24. Prior to the hospital stay, she had a past medical history of breast cancer s/p bilateral mastectomy, peripheral neuropathy, RLS, HTN, thrombocytopenia, PE, HLD. .    She presented to the hospital with 1-2 minute episode of expressive aphasia .     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   Intracranial Vasculature No LVO, no high grade stenosis, normal basilar artery    Cervical Vasculature No high grade stenosis, no dissection       Echocardiogram EF 60-65%, no regional WMA, sinus rhythm, no obvious emboli, moderately dilated atria    EKG/Telemetry Sinus rhythm   RSR' or QR pattern in V1 suggests right ventricular conduction delay   T wave abnormality, consider lateral ischemia    Other Testing Not Applicable       LDL 97   A1C 12/17/2024: 5.7 %          ABCD2 Patients Score   Age >= 60 years 1 point 1   Blood Pressure    SBP >= 140 or DBP >=  90     1 point 1   Clinical Features    - Unilateral weakness    - Speech  disturbance w/o weakness    - Other    2 points  1 point     0 points 1   Duration of symptoms    >= 60 minutes    10-59 minutes    < 10 minutes    2 points  1 point  0 points 0   Diabetes  1 point 0   Patient s ABCD2 Score (0-7) = 3     Impression  Transient ischemic attack (expressive aphasia)     Recommendations   - aspirin 81 mg daily x 21 days               - pt has had nosebleeds on baby aspirin previously, will try 21 days of DAPT, if nosebleeds recur, may need to stop aspirin early   - Plavix (clopidogrel) 75 mg PO daily indefinitely for secondary stroke prevention     01/30/25: Libertad presents to the clinic for post TIA follow-up.  She reports that she had difficulty getting her words out for a few minutes before returning to her baseline.  Denies any new or return strokelike symptoms since her hospitalization.    Zio patch did not show atrial fibrillation.  She has an appointment with cardiology to 02/03/25.  Libertad continues with Plavix daily.  Medication refilled.  Blood pressure remains elevated at 154/78.  Patient states that she is working with her primary care provider who increased her lisinopril recently.  LDL is 97, on atorvastatin.  No history of diabetes, smoking or LAWRENCE.  Lbiertad tells me she does have a history of neuropathy that she follows a neurologist with.  She would like to transfer care to this clinic.  I will have her establish with a neurologist.  She also tells me that she struggles with memory.  She has a difficult time with word finding and remembering names.  Her primary care provider completed a MoCA and stated she was within normal range.  She will follow with her primary care at this time.  If memory becomes more of an issue she will make an appointment to discuss further.       Current Prevention Medications:    Plavix, aspirin, atorvastatin, lisinopril, Imdur, metoprolol    Perceived Side Effects: No     Psycho-Social History: Libertad Hernadez currently lives [place] with  Rodrigue.  Highest level of education 4 years college. Employment status:retired, teacher and .    Patient does not smoke, no alcohol use, no recreational drug use.  Currently, patient denies feeling depressed, denies feeling anhedonia, denies suicidal  thoughts, and denies having feelings of excessive guilt/worthlessness.  We reviewed importance of mental and emotional wellbeing and impact on health.      Current Medications:   Current Outpatient Medications   Medication Sig Dispense Refill     alendronate (FOSAMAX) 70 MG tablet Take 70 mg by mouth every 7 days. On Saturday       atorvastatin (LIPITOR) 40 MG tablet Take 1 tablet (40 mg) by mouth daily. 30 tablet 0     Biotin 5000 MCG TABS Take 5,000 mcg by mouth every morning.       Calcium Carb-Cholecalciferol (CALCIUM 600+D) 600-800 MG-UNIT TABS Take 1 tablet by mouth 2 times daily       clopidogrel (PLAVIX) 75 MG tablet Take 1 tablet (75 mg) by mouth daily. 30 tablet 0     DULoxetine (CYMBALTA) 20 MG capsule Take 40 mg by mouth at bedtime.       famotidine (PEPCID) 20 MG tablet Take 1 tablet (20 mg) by mouth daily.       hydroxyurea (HYDREA) 500 MG capsule TAKE ONE CAPSULE BY MOUTH EVERY DAY FOR 4 DAYS A WEEK AND TAKE TWO CAPSULES BY MOUTH EVERY DAY FOR 3 DAYS A WEEK 90 capsule 2     isosorbide mononitrate (IMDUR) 30 MG 24 hr tablet Take 1 tablet (30 mg) by mouth daily. 30 tablet 11     LASTACAFT 0.25 % ophthalmic solution Place 1 drop into both eyes every morning.       lisinopril (ZESTRIL) 10 MG tablet Take 1 tablet (10 mg) by mouth every morning. 30 tablet 0     metoprolol succinate ER (TOPROL XL) 25 MG 24 hr tablet Take 1 tablet (25 mg) by mouth daily 90 tablet 3     polyethylene glycol (MIRALAX) 17 GM/Dose powder Take 1 capful by mouth daily as needed for constipation       solifenacin (VESICARE) 5 MG tablet Take 5 mg by mouth at bedtime.       traZODone (DESYREL) 100 MG tablet Take 100 mg by mouth At Bedtime       vitamin B1 (THIAMINE) 250 MG tablet Take  250 mg by mouth every morning.       vitamin C (ASCORBIC ACID) 500 MG tablet Take 500 mg by mouth every morning.       No current facility-administered medications for this visit.     Past Medical History:   Patient  has a past medical history of Arthritis, Asymptomatic varicose veins, Disorder of bone and cartilage, unspecified, Essential thrombocytosis (H), Gastroesophageal reflux disease, Malignant neoplasm of breast (female), unspecified site, Motion sickness, Other and unspecified hyperlipidemia, PE (pulmonary thromboembolism) (H), PONV (postoperative nausea and vomiting), Primary cancer of bone marrow (H), Thrombosis, Unspecified essential hypertension, and Unspecified hereditary and idiopathic peripheral neuropathy.  Surgical History:  She  has a past surgical history that includes Pr Mastectomy, Radical; REMOVAL OF TONSILS,<13 Y/O; REMOVAL ADENOIDS,PRIMARY,<13 Y/O; REMOVAL GALLBLADDER; Pr Incise Finger Tendon Sheath; Pr Mastectomy, Partial; Foot surgery (2010); Arthroplasty, Hip, Total, Direct Anterior Approach, Using [place] Table (Right, 3/4/2022); and Esophagoscopy, gastroscopy, duodenoscopy (EGD), combined (N/A, 11/22/2022).  Family and Social History:  Reviewed, and she  reports that she has never smoked. She has been exposed to tobacco smoke. She has never used smokeless tobacco. She reports current alcohol use of about 1.0 standard drink of alcohol per week. She reports that she does not use drugs.  Reviewed, and family history includes Diabetes in her sister; Heart Disease in her father; Hypertension in her father; Prostate Cancer in her brother.    RECENT DIAGNOSTIC STUDIES:   Labs:    Coagulation studies:  Recent Labs   Lab Test 03/04/22  0628   INR 1.16*        Lipid panel:  Recent Labs   Lab Test 12/17/24  1340 11/04/24  1722   CHOL 181 173   HDL 61 62   LDL 97 81   TRIG 117 148       HbA1C:  Recent Labs   Lab Test 12/17/24  1340   A1C 5.7*       Troponin:  No lab results found.  Recent Labs   Lab  "Test 02/22/23  1038 02/22/23  0828 06/25/22  1404   TROPONINI 0.01 <0.01 <0.01     No lab results found.    Imaging: See HPI    Zio monitoring from 12/24/2024 to 1/7/2025 (duration 13d 21h)  Predominant rhythm was sinus rhythm, 47 to 111bpm, average 66bpm.  1 recorded instance of apparent accelerated junctional rhythm 85bpm competing with sinus rhythm.  52 episode(s) of nonsustained supraventricular tachycardia - longest 13 beats, fastest 146bpm.  No sustained tachyarrhythmias.  No atrial fibrillation.  There were no pauses of greater than 3 seconds.  Frequent supraventricular ectopic beats (isolated 15.7%).  Rare premature ventricular contractions (isolated <1%).  Symptom triggers and diary entries (12) correlated to sinus rhythm 59-85bpm, competing junctional rhythm, PACs, PVCs.     REVIEW OF SYSTEMS:                                                      10-point review of systems is negative except as mentioned above in HPI.    EXAM:                                                      Physical Exam:   Vitals: BP (!) 154/78   Pulse 68   Ht 1.638 m (5' 4.5\")   Wt 70.3 kg (155 lb)   BMI 26.19 kg/m    BMI= Body mass index is 26.19 kg/m .  GENERAL: NAD.  HEENT: NC/AT.  PULM: Non-labored breathing.   Neurologic:  MENTAL STATUS: Alert, attentive. Speech is fluent. Normal comprehension. Normal concentration. Adequate fund of knowledge.   CRANIAL NERVES: Visual fields intact to confrontation. Pupils equally, round and reactive to light. Facial sensation and movement normal. EOM full. Hearing intact to conversation. Trapezius strength intact. Palate moves symmetrically. Tongue midline.  MOTOR: 5/5 in proximal and distal muscle groups of upper and lower extremities. Tone and bulk normal.   SENSATION: Normal light touch throughout.   COORDINATION: Normal finger nose finger. Finger tapping normal. Knee heel shin normal.  STATION AND GAIT: Romberg Negative. Good postural reflexes. Casual gait and tandem Normal  left " hand-dominant.      ASSESSMENT and PLAN:                                                      Assessment:    ICD-10-CM    1. TIA (transient ischemic attack)  G45.9 Stroke Hospital Follow Up (for neurologist use only)        Libertad Hernadez is a 84 year old female presenting for follow-up for TIA. She was hospitalized at [facility] on 12/18/24. Prior to the hospital stay, she had a past medical history of breast cancer s/p bilateral mastectomy, peripheral neuropathy, RLS, HTN, thrombocytopenia, PE, HLD. She presented to the hospital with 1-2 minute episode of expressive aphasia.  Libertad reports that she is back to her baseline and has not had any additional strokelike symptoms since her hospitalization.  We discussed interventions outlined below and I will plan to see the patient back in 6 months to establish care with a neurologist for neuropathy.  Libertad understands and agrees with this plan.    Plan:     Secondary prevention  -Continue Plavix 75 mg tablet daily     Lifestyle  - mediterranean diet  - exercise    Risk Factors   Elevated blood Pressure  BP: 154/78  - Goal <130/80  - Continue preventive therapy: Lisinopril, Imdur and metoprolol as prescribed  -Continue to follow with your primary care doctor on management of your blood pressure     Elevated cholesterol levels   LDL: 97  - Goal < 70 mg/dl  - Continue preventive therapy: Atorvastatin as prescribed    --- Plan to follow-up with cardiology this week as scheduled  --- Plan to follow-up with your primary care provider to manage your vascular risk factors  --- Plan on follow up in the Neurology Clinic in 6 months with a [facility] neurologist to discuss neuropathy.  --- Please feel free to reach out if you have any further questions or concerns.  --- Seek immediate medical attention if an emergency arises or if your health becomes progressively worse.     For any acute neurologic deficits she was advised to  go directly to the hospital rather than call  the clinic.    It was a pleasure to meet you today!     Total Time: Total time spent for face to face visit, reviewing labs/imaging studies, counseling and coordination of care was: 40 Minutes spent on the date of the encounter doing chart review, review of outside records, review of test results, patient visit, and documentation     This note was dictated using voice recognition software.  Any grammatical or context distortions are unintentional and inherent to the software.    Iris New DNP, APRN, CNP  [facility]         Again, thank you for allowing me to participate in the care of your patient.        Sincerely,        TRINY Barnard CNP    Electronically signed

## 2025-03-05 NOTE — PROGRESS NOTES
HEART CARE FOLLOW UP NOTE      [facility]  [phone]      Assessment/Recommendations   Assessment: 84 year old female with chest pain, PACs, SVT    Plan:  Chest pain, arm pain, dyspnea on exertion   Unclear etiology, did appear to correlate to PACs and SVT on Zio, but also had CAD on CTA (50%) that may have progressed, also consider noncardiac causes.  Will try increasing Imdur       -Increase Imdur to 60mg      -Return to clinic 1 month       -Once BP controlled if still having symptoms would pursue angiogram or stress test      TIA  In ER 12/18/2024 with slurred speech, CT and MRI negative, felt to be TIA.  She was advised Zio that was negative for atrial fibrillation.  Suspect due to poorly controlled HTN, though cannot fully exclude atrial fibrillation       -Continue ASA 81mg and Plavix 75mg per neurology       -Discussed ILR will hold off for now, she seems very confused and overwhelmed today        -As below for HTN     PACs and SVT  Symptoms did correlate to PACs and SVT but overall low burden       -Continue Toprol 25mg every day, if symptoms bothersome can increase dose        4.  CAD  Nonocclusive on CTA 2/2023, had episodes of chest pain and arm pain, will continue medical Rx for now by  increasing Imdur       -Continue ASA 81mg, Toprol 25mg, Lipitor 40mg      -Increase Imdur to 60mg      -Return to clinic 1 month       -If still symptoms once BP controlled would proceed with angiogram or stress test       5.  HTN  Well controlled currently but 150s at home.      -Increase Imdur to 60mg      -Continue Lisinopril 10mg, Toprol 25mg      6.  Hyperlipidemia   LDL = 81 on Simvastatin, now on Lipitor, goal < 70      -Continue Lipitor 40mg      -Recheck lipids, can increase Lipitor if LDL still > 70     The longitudinal plan of care for the diagnosis(es)/condition(s) as documented were addressed during this visit. Due to the added complexity in care, I will continue to support Libertad in  "the subsequent management and with ongoing continuity of care.          History of Present Illness/Subjective    Indication for visit:  Libertad Hernadez returns for follow up of chest pain, palpitations and was last seen on 1/2/2025 by myself.      HPI: Libertad Hernadez is a 84 year old female with a history of breast cancer, PE, PACs, HTN, hyperlipidemia, nonocclusive CAD who saw Dr Ansari for chest pain and palpitations. A CTA was negative for flow-limiting CAD, Zio showed PACs correlating to her symptoms, Metoprolol was added.  At follow up with me she reported on-going chest pain, I advised adding Imdur, change Simvastatin to Lipitor, and if symptoms persisted consider angiogram.     On 12/17/2024 she was admitted with slurred speech, negative CT and MRI, Plavix added and Lipitor increased for presumed TIA.  I saw her after and she was still wearing Zio, had additional chest pain and significant HTN so we increased Imdur.     She returns for follow up after adding Imdur and reports she had one episode of chest pain and arm pain while in bed at night, nothing exertional.  She has recovered from the TIA, no dyspnea, orthopnea, no palpitations, still wearing Zio    She returns for follow up and reports she never increased the dose, she is confused about medications and follow up.  Does not recall seeing me in Jan.  She denies any further chest pain.  Does note she gets woozy and \"punky\" in the morning after taking her pills for several weeks, most mornings but not every morning, wondering if due to Imdur but she has been on this since last fall and symptoms appear to be new.  Zio with 52 brief SVT, 15.7% PACs, < 1% PVCs and symptoms correlating to ectopic beats.  BP usually 150s at home.  No palpitations.  Also feeling more short of breath since CVA, no orthopnea or PND.    I reviewed notes from neurology prior to this visit.         Physical Examination  Past Cardiac History   Vitals: /76 (BP Location: Right arm, " "Patient Position: Sitting, Cuff Size: Adult Regular)   Pulse 66   Resp 16   Ht 1.638 m (5' 4.5\")   Wt 70.9 kg (156 lb 3.2 oz)   BMI 26.40 kg/m    BMI= Body mass index is 26.4 kg/m .  Wt Readings from Last 3 Encounters:   04/01/25 70.9 kg (156 lb 3.2 oz)   01/30/25 70.3 kg (155 lb)   01/02/25 70.3 kg (155 lb)       General Appearance:   no distress, normal body habitus   ENT/Mouth: membranes moist, no oral lesions or bleeding gums.      EYES:  no scleral icterus, normal conjunctivae   Neck: no carotid bruits or thyromegaly   Chest/Lungs:   lungs are clear to auscultation, no rales or wheezing,  sternal scar, equal chest wall expansion    Cardiovascular:   Regular. Normal first and second heart sounds with no murmurs, rubs, or gallops; the carotid, radial and posterior tibial pulses are intact, Jugular venous pressure normal , no edema bilaterally    Abdomen:  no organomegaly, masses, bruits, or tenderness; bowel sounds are present   Extremities: no cyanosis or clubbing   Skin: no xanthelasma, warm.    Neurologic: normal  bilateral, no tremors         CAD, nonocclusive  PACs, SVT        Zio: 1/9/2025  Zio monitoring from 12/24/2024 to 1/7/2025 (duration 13d 21h)  Predominant rhythm was sinus rhythm, 47 to 111bpm, average 66bpm.  1 recorded instance of apparent accelerated junctional rhythm 85bpm competing with sinus rhythm.  52 episode(s) of nonsustained supraventricular tachycardia - longest 13 beats, fastest 146bpm.  No sustained tachyarrhythmias.  No atrial fibrillation.  There were no pauses of greater than 3 seconds.  Frequent supraventricular ectopic beats (isolated 15.7%).  Rare premature ventricular contractions (isolated <1%).  Symptom triggers and diary entries (12) correlated to sinus rhythm 59-85bpm, competing junctional rhythm, PACs, PVCs.        Most Recent Echocardiogram: 12/18/2024  The left ventricle is normal in size.  The visual ejection fraction is 60-65%.  No regional wall motion " abnormalities noted.  Diastolic Doppler findings (E/E' ratio and/or other parameters) suggest left  ventricular filling pressures are increased.  Normal right ventricle size and systolic function.  The right ventricular systolic pressure is approximated at 24mmHg plus the  right atrial pressure.  IVC diameter <2.1 cm collapsing >50% with sniff suggests a normal RA pressure  of 3 mmHg.  Trivial pericardial effusion  Sinus rhythm was noted.  No obvious cardiac source of emboli was seen within the limits of a  transthoracic echocardiogram.     Most Recent Stress Test: None     CTA: 2/22/2023  1.  Total coronary calcium score 944 places the individual in the high risk for cardiac events when compared to an age and gender matched control group and implies a very high risk of cardiac events in the next ten years.  2.  Mild disease in distal left main, less than 20% stenosis.  3.  Mild disease in LAD and D1, less than 50% stenosis.  4.  Mild disease in ramus which is a big vessel, about 50% stenosis in proximal segment.  5.  Mild disease less than 40% stenosis in LCx which is small vessel.  6.  Mild disease in RCA, less than 50% stenosis.  Artifact is noticed in mid to distal RCA.  7.  Normal size of visualized aortic segments.  8.  No pericardial effusion.  No thrombus in left atrial appendage.        Most Recent Angiogram: None     ECG (reviewed by myself): 2/22/2023 NSR 63 bpm, RsR', PACs         Medical History  Family History Social History   Past Medical History:   Diagnosis Date    Arthritis     Asymptomatic varicose veins     Created by Conversion     Disorder of bone and cartilage, unspecified     Created by Conversion     Essential thrombocytosis (H)     Gastroesophageal reflux disease     Malignant neoplasm of breast (female), unspecified site     Created by Conversion [facility] Annotation: Oct 21 2010  9:56AM - Shirley Comer: stage 2;  10/18 lymph nodes positives/p chemo and [id number]     Motion  sickness     Other and unspecified hyperlipidemia     Created by Conversion     PE (pulmonary thromboembolism) (H)     PONV (postoperative nausea and vomiting)     Primary cancer of bone marrow (H)     Thrombosis     Unspecified essential hypertension     Created by Conversion     Unspecified hereditary and idiopathic peripheral neuropathy     Created by Conversion      Family History   Problem Relation Age of Onset    Hypertension Father     Heart Disease Father     Diabetes Sister     Prostate Cancer Brother         Social History     Socioeconomic History    Marital status:      Spouse name: Not on file    Number of children: Not on file    Years of education: Not on file    Highest education level: Not on file   Occupational History    Not on file   Tobacco Use    Smoking status: Never     Passive exposure: Past    Smokeless tobacco: Never   Substance and Sexual Activity    Alcohol use: Yes     Alcohol/week: 1.0 standard drink of alcohol     Comment: very rarely    Drug use: Never    Sexual activity: Never   Other Topics Concern    Parent/sibling w/ CABG, MI or angioplasty before 65F 55M? Not Asked   Social History Narrative    Not on file     Social Drivers of Health     Financial Resource Strain: Not on file   Food Insecurity: Not on file   Transportation Needs: Not on file   Physical Activity: Not on file   Stress: Not on file   Social Connections: Not on file   Interpersonal Safety: Not on file   Housing Stability: Not on file           Medications  Allergies   Current Outpatient Medications   Medication Sig Dispense Refill    alendronate (FOSAMAX) 70 MG tablet Take 70 mg by mouth every 7 days. On Saturday      atorvastatin (LIPITOR) 40 MG tablet Take 1 tablet (40 mg) by mouth daily. 30 tablet 0    Biotin 5000 MCG TABS Take 5,000 mcg by mouth every morning.      Calcium Carb-Cholecalciferol (CALCIUM 600+D) 600-800 MG-UNIT TABS Take 1 tablet by mouth 2 times daily      clopidogrel (PLAVIX) 75 MG tablet  Take 1 tablet (75 mg) by mouth daily. 90 tablet 3    DULoxetine (CYMBALTA) 20 MG capsule Take 40 mg by mouth at bedtime.      famotidine (PEPCID) 20 MG tablet Take 1 tablet (20 mg) by mouth daily.      hydroxyurea (HYDREA) 500 MG capsule TAKE ONE CAPSULE BY MOUTH EVERY DAY FOR 4 DAYS A WEEK AND TAKE TWO CAPSULES BY MOUTH EVERY DAY FOR 3 DAYS A WEEK 90 capsule 2    isosorbide mononitrate (IMDUR) 30 MG 24 hr tablet Take 1 tablet (30 mg) by mouth daily. 30 tablet 11    lisinopril (ZESTRIL) 10 MG tablet Take 1 tablet (10 mg) by mouth every morning. 30 tablet 0    metoprolol succinate ER (TOPROL XL) 25 MG 24 hr tablet Take 1 tablet (25 mg) by mouth daily 90 tablet 3    polyethylene glycol (MIRALAX) 17 GM/Dose powder Take 1 capful by mouth daily as needed for constipation      traZODone (DESYREL) 100 MG tablet Take 100 mg by mouth At Bedtime      vitamin B1 (THIAMINE) 250 MG tablet Take 250 mg by mouth every morning.      vitamin C (ASCORBIC ACID) 500 MG tablet Take 500 mg by mouth every morning.      LASTACAFT 0.25 % ophthalmic solution Place 1 drop into both eyes every morning. (Patient not taking: Reported on 4/1/2025)      solifenacin (VESICARE) 5 MG tablet Take 5 mg by mouth at bedtime. (Patient not taking: Reported on 4/1/2025)         Allergies   Allergen Reactions    Adhesive Tape-Silicones [Adhesive Tape] Itching     States itchy rash with certain tapes    Penicillins Hives          Lab Results    Chemistry/lipid CBC Cardiac Enzymes/BNP/TSH/INR   Recent Labs   Lab Test 12/17/24  1340   CHOL 181   HDL 61   LDL 97   TRIG 117     Recent Labs   Lab Test 12/17/24  1340 11/04/24  1722 08/21/23  0758   LDL 97 81 106*     Recent Labs   Lab Test 12/18/24  1229 12/18/24  0757 12/18/24  0443   NA  --   --  142   POTASSIUM  --   --  3.6   CHLORIDE  --   --  108*   CO2  --   --  22   GLC 98   < > 98   BUN  --   --  16.7   CR  --   --  0.78   GFRESTIMATED  --   --  74   DARCI  --   --  9.2    < > = values in this interval not  displayed.     Recent Labs   Lab Test 12/18/24  0443 12/17/24  1340 11/04/24  1722   CR 0.78 0.82 0.77     Recent Labs   Lab Test 12/17/24  1340   A1C 5.7*          Recent Labs   Lab Test 12/18/24  0443   WBC 7.0   HGB 14.4   HCT 42.7   MCV 96        Recent Labs   Lab Test 12/18/24  0443 12/17/24  1340 11/04/24  1722   HGB 14.4 15.1 15.1    Recent Labs   Lab Test 02/22/23  1038 02/22/23  0828 06/25/22  1404   TROPONINI 0.01 <0.01 <0.01     Recent Labs   Lab Test 05/12/18  0840   BNP 74     Recent Labs   Lab Test 11/04/24  1722   TSH 2.10     Recent Labs   Lab Test 03/04/22  0628   INR 1.16*        Victoria Aguero MD  Noninvasive Cardiologist   [facility]

## 2025-04-01 ENCOUNTER — OFFICE VISIT (OUTPATIENT)
Dept: CARDIOLOGY | Facility: CLINIC | Age: 85
End: 2025-04-01
Attending: INTERNAL MEDICINE
Payer: COMMERCIAL

## 2025-04-01 VITALS
DIASTOLIC BLOOD PRESSURE: 76 MMHG | SYSTOLIC BLOOD PRESSURE: 134 MMHG | HEART RATE: 66 BPM | HEIGHT: 65 IN | BODY MASS INDEX: 26.02 KG/M2 | WEIGHT: 156.2 LBS | RESPIRATION RATE: 16 BRPM

## 2025-04-01 DIAGNOSIS — I25.118 CORONARY ARTERY DISEASE INVOLVING NATIVE CORONARY ARTERY OF NATIVE HEART WITH OTHER FORM OF ANGINA PECTORIS: ICD-10-CM

## 2025-04-01 DIAGNOSIS — R06.09 DOE (DYSPNEA ON EXERTION): Primary | ICD-10-CM

## 2025-04-01 DIAGNOSIS — E78.5 HYPERLIPIDEMIA LDL GOAL <70: ICD-10-CM

## 2025-04-01 DIAGNOSIS — I10 PRIMARY HYPERTENSION: ICD-10-CM

## 2025-04-01 PROCEDURE — 3075F SYST BP GE 130 - 139MM HG: CPT | Performed by: INTERNAL MEDICINE

## 2025-04-01 PROCEDURE — G2211 COMPLEX E/M VISIT ADD ON: HCPCS | Performed by: INTERNAL MEDICINE

## 2025-04-01 PROCEDURE — 3078F DIAST BP <80 MM HG: CPT | Performed by: INTERNAL MEDICINE

## 2025-04-01 PROCEDURE — 99214 OFFICE O/P EST MOD 30 MIN: CPT | Performed by: INTERNAL MEDICINE

## 2025-04-01 RX ORDER — ISOSORBIDE MONONITRATE 60 MG/1
60 TABLET, EXTENDED RELEASE ORAL DAILY
Qty: 90 TABLET | Refills: 3 | Status: SHIPPED | OUTPATIENT
Start: 2025-04-01

## 2025-04-01 NOTE — LETTER
4/1/2025    Candy Huang MD  [id number]  [place] [address]    RE: Libertad Hernadez       Dear Colleague,     I had the pleasure of seeing Libertad Odenvance in the [facility].    HEART CARE FOLLOW UP NOTE      [facility]  [phone]      Assessment/Recommendations   Assessment: 84 year old female with chest pain, PACs, SVT    Plan:  Chest pain, arm pain, dyspnea on exertion   Unclear etiology, did appear to correlate to PACs and SVT on Zio, but also had CAD on CTA (50%) that may have progressed, also consider noncardiac causes.  Will try increasing Imdur       -Increase Imdur to 60mg      -Return to clinic 1 month       -Once BP controlled if still having symptoms would pursue angiogram or stress test      TIA  In ER 12/18/2024 with slurred speech, CT and MRI negative, felt to be TIA.  She was advised Zio that was negative for atrial fibrillation.  Suspect due to poorly controlled HTN, though cannot fully exclude atrial fibrillation       -Continue ASA 81mg and Plavix 75mg per neurology       -Discussed ILR will hold off for now, she seems very confused and overwhelmed today        -As below for HTN     PACs and SVT  Symptoms did correlate to PACs and SVT but overall low burden       -Continue Toprol 25mg every day, if symptoms bothersome can increase dose        4.  CAD  Nonocclusive on CTA 2/2023, had episodes of chest pain and arm pain, will continue medical Rx for now by  increasing Imdur       -Continue ASA 81mg, Toprol 25mg, Lipitor 40mg      -Increase Imdur to 60mg      -Return to clinic 1 month       -If still symptoms once BP controlled would proceed with angiogram or stress test       5.  HTN  Well controlled currently but 150s at home.      -Increase Imdur to 60mg      -Continue Lisinopril 10mg, Toprol 25mg      6.  Hyperlipidemia   LDL = 81 on Simvastatin, now on Lipitor, goal < 70      -Continue Lipitor 40mg      -Recheck lipids, can increase Lipitor if  "LDL still > 70     The longitudinal plan of care for the diagnosis(es)/condition(s) as documented were addressed during this visit. Due to the added complexity in care, I will continue to support Libertad in the subsequent management and with ongoing continuity of care.          History of Present Illness/Subjective    Indication for visit:  Libertad Hernadez returns for follow up of chest pain, palpitations and was last seen on 1/2/2025 by myself.      HPI: Libertad Hernadez is a 84 year old female with a history of breast cancer, PE, PACs, HTN, hyperlipidemia, nonocclusive CAD who saw Dr Ansari for chest pain and palpitations. A CTA was negative for flow-limiting CAD, Zio showed PACs correlating to her symptoms, Metoprolol was added.  At follow up with me she reported on-going chest pain, I advised adding Imdur, change Simvastatin to Lipitor, and if symptoms persisted consider angiogram.     On 12/17/2024 she was admitted with slurred speech, negative CT and MRI, Plavix added and Lipitor increased for presumed TIA.  I saw her after and she was still wearing Zio, had additional chest pain and significant HTN so we increased Imdur.     She returns for follow up after adding Imdur and reports she had one episode of chest pain and arm pain while in bed at night, nothing exertional.  She has recovered from the TIA, no dyspnea, orthopnea, no palpitations, still wearing Zio    She returns for follow up and reports she never increased the dose, she is confused about medications and follow up.  Does not recall seeing me in Jan.  She denies any further chest pain.  Does note she gets woozy and \"punky\" in the morning after taking her pills for several weeks, most mornings but not every morning, wondering if due to Imdur but she has been on this since last fall and symptoms appear to be new.  Zio with 52 brief SVT, 15.7% PACs, < 1% PVCs and symptoms correlating to ectopic beats.  BP usually 150s at home.  No palpitations.  Also feeling " "more short of breath since CVA, no orthopnea or PND.    I reviewed notes from neurology prior to this visit.         Physical Examination  Past Cardiac History   Vitals: /76 (BP Location: Right arm, Patient Position: Sitting, Cuff Size: Adult Regular)   Pulse 66   Resp 16   Ht 1.638 m (5' 4.5\")   Wt 70.9 kg (156 lb 3.2 oz)   BMI 26.40 kg/m    BMI= Body mass index is 26.4 kg/m .  Wt Readings from Last 3 Encounters:   04/01/25 70.9 kg (156 lb 3.2 oz)   01/30/25 70.3 kg (155 lb)   01/02/25 70.3 kg (155 lb)       General Appearance:   no distress, normal body habitus   ENT/Mouth: membranes moist, no oral lesions or bleeding gums.      EYES:  no scleral icterus, normal conjunctivae   Neck: no carotid bruits or thyromegaly   Chest/Lungs:   lungs are clear to auscultation, no rales or wheezing,  sternal scar, equal chest wall expansion    Cardiovascular:   Regular. Normal first and second heart sounds with no murmurs, rubs, or gallops; the carotid, radial and posterior tibial pulses are intact, Jugular venous pressure normal , no edema bilaterally    Abdomen:  no organomegaly, masses, bruits, or tenderness; bowel sounds are present   Extremities: no cyanosis or clubbing   Skin: no xanthelasma, warm.    Neurologic: normal  bilateral, no tremors         CAD, nonocclusive  PACs, SVT        Zio: 1/9/2025  Zio monitoring from 12/24/2024 to 1/7/2025 (duration 13d 21h)  Predominant rhythm was sinus rhythm, 47 to 111bpm, average 66bpm.  1 recorded instance of apparent accelerated junctional rhythm 85bpm competing with sinus rhythm.  52 episode(s) of nonsustained supraventricular tachycardia - longest 13 beats, fastest 146bpm.  No sustained tachyarrhythmias.  No atrial fibrillation.  There were no pauses of greater than 3 seconds.  Frequent supraventricular ectopic beats (isolated 15.7%).  Rare premature ventricular contractions (isolated <1%).  Symptom triggers and diary entries (12) correlated to sinus rhythm " 59-85bpm, competing junctional rhythm, PACs, PVCs.        Most Recent Echocardiogram: 12/18/2024  The left ventricle is normal in size.  The visual ejection fraction is 60-65%.  No regional wall motion abnormalities noted.  Diastolic Doppler findings (E/E' ratio and/or other parameters) suggest left  ventricular filling pressures are increased.  Normal right ventricle size and systolic function.  The right ventricular systolic pressure is approximated at 24mmHg plus the  right atrial pressure.  IVC diameter <2.1 cm collapsing >50% with sniff suggests a normal RA pressure  of 3 mmHg.  Trivial pericardial effusion  Sinus rhythm was noted.  No obvious cardiac source of emboli was seen within the limits of a  transthoracic echocardiogram.     Most Recent Stress Test: None     CTA: 2/22/2023  1.  Total coronary calcium score 944 places the individual in the high risk for cardiac events when compared to an age and gender matched control group and implies a very high risk of cardiac events in the next ten years.  2.  Mild disease in distal left main, less than 20% stenosis.  3.  Mild disease in LAD and D1, less than 50% stenosis.  4.  Mild disease in ramus which is a big vessel, about 50% stenosis in proximal segment.  5.  Mild disease less than 40% stenosis in LCx which is small vessel.  6.  Mild disease in RCA, less than 50% stenosis.  Artifact is noticed in mid to distal RCA.  7.  Normal size of visualized aortic segments.  8.  No pericardial effusion.  No thrombus in left atrial appendage.        Most Recent Angiogram: None     ECG (reviewed by myself): 2/22/2023 NSR 63 bpm, RsR', PACs         Medical History  Family History Social History   Past Medical History:   Diagnosis Date     Arthritis      Asymptomatic varicose veins     Created by Conversion      Disorder of bone and cartilage, unspecified     Created by Conversion      Essential thrombocytosis (H)      Gastroesophageal reflux disease      Malignant neoplasm  of breast (female), unspecified site     Created by Conversion [facility] [place] Annotation: Oct 21 2010  9:56AM - Souleymane Shirley: stage 2;  10/18 lymph nodes positives/p chemo and [id number]      Motion sickness      Other and unspecified hyperlipidemia     Created by Conversion      PE (pulmonary thromboembolism) (H)      PONV (postoperative nausea and vomiting)      Primary cancer of bone marrow (H)      Thrombosis      Unspecified essential hypertension     Created by Conversion      Unspecified hereditary and idiopathic peripheral neuropathy     Created by Conversion      Family History   Problem Relation Age of Onset     Hypertension Father      Heart Disease Father      Diabetes Sister      Prostate Cancer Brother         Social History     Socioeconomic History     Marital status:      Spouse name: Not on file     Number of children: Not on file     Years of education: Not on file     Highest education level: Not on file   Occupational History     Not on file   Tobacco Use     Smoking status: Never     Passive exposure: Past     Smokeless tobacco: Never   Substance and Sexual Activity     Alcohol use: Yes     Alcohol/week: 1.0 standard drink of alcohol     Comment: very rarely     Drug use: Never     Sexual activity: Never   Other Topics Concern     Parent/sibling w/ CABG, MI or angioplasty before 65F 55M? Not Asked   Social History Narrative     Not on file     Social Drivers of Health     Financial Resource Strain: Not on file   Food Insecurity: Not on file   Transportation Needs: Not on file   Physical Activity: Not on file   Stress: Not on file   Social Connections: Not on file   Interpersonal Safety: Not on file   Housing Stability: Not on file           Medications  Allergies   Current Outpatient Medications   Medication Sig Dispense Refill     alendronate (FOSAMAX) 70 MG tablet Take 70 mg by mouth every 7 days. On Saturday       atorvastatin (LIPITOR) 40 MG tablet Take 1 tablet (40 mg) by mouth  daily. 30 tablet 0     Biotin 5000 MCG TABS Take 5,000 mcg by mouth every morning.       Calcium Carb-Cholecalciferol (CALCIUM 600+D) 600-800 MG-UNIT TABS Take 1 tablet by mouth 2 times daily       clopidogrel (PLAVIX) 75 MG tablet Take 1 tablet (75 mg) by mouth daily. 90 tablet 3     DULoxetine (CYMBALTA) 20 MG capsule Take 40 mg by mouth at bedtime.       famotidine (PEPCID) 20 MG tablet Take 1 tablet (20 mg) by mouth daily.       hydroxyurea (HYDREA) 500 MG capsule TAKE ONE CAPSULE BY MOUTH EVERY DAY FOR 4 DAYS A WEEK AND TAKE TWO CAPSULES BY MOUTH EVERY DAY FOR 3 DAYS A WEEK 90 capsule 2     isosorbide mononitrate (IMDUR) 30 MG 24 hr tablet Take 1 tablet (30 mg) by mouth daily. 30 tablet 11     lisinopril (ZESTRIL) 10 MG tablet Take 1 tablet (10 mg) by mouth every morning. 30 tablet 0     metoprolol succinate ER (TOPROL XL) 25 MG 24 hr tablet Take 1 tablet (25 mg) by mouth daily 90 tablet 3     polyethylene glycol (MIRALAX) 17 GM/Dose powder Take 1 capful by mouth daily as needed for constipation       traZODone (DESYREL) 100 MG tablet Take 100 mg by mouth At Bedtime       vitamin B1 (THIAMINE) 250 MG tablet Take 250 mg by mouth every morning.       vitamin C (ASCORBIC ACID) 500 MG tablet Take 500 mg by mouth every morning.       LASTACAFT 0.25 % ophthalmic solution Place 1 drop into both eyes every morning. (Patient not taking: Reported on 4/1/2025)       solifenacin (VESICARE) 5 MG tablet Take 5 mg by mouth at bedtime. (Patient not taking: Reported on 4/1/2025)         Allergies   Allergen Reactions     Adhesive Tape-Silicones [Adhesive Tape] Itching     States itchy rash with certain tapes     Penicillins Hives          Lab Results    Chemistry/lipid CBC Cardiac Enzymes/BNP/TSH/INR   Recent Labs   Lab Test 12/17/24  1340   CHOL 181   HDL 61   LDL 97   TRIG 117     Recent Labs   Lab Test 12/17/24  1340 11/04/24  1722 08/21/23  0758   LDL 97 81 106*     Recent Labs   Lab Test 12/18/24  1229 12/18/24  0757  12/18/24  0443   NA  --   --  142   POTASSIUM  --   --  3.6   CHLORIDE  --   --  108*   CO2  --   --  22   GLC 98   < > 98   BUN  --   --  16.7   CR  --   --  0.78   GFRESTIMATED  --   --  74   DARCI  --   --  9.2    < > = values in this interval not displayed.     Recent Labs   Lab Test 12/18/24  0443 12/17/24  1340 11/04/24  1722   CR 0.78 0.82 0.77     Recent Labs   Lab Test 12/17/24  1340   A1C 5.7*          Recent Labs   Lab Test 12/18/24  0443   WBC 7.0   HGB 14.4   HCT 42.7   MCV 96        Recent Labs   Lab Test 12/18/24  0443 12/17/24  1340 11/04/24  1722   HGB 14.4 15.1 15.1    Recent Labs   Lab Test 02/22/23  1038 02/22/23  0828 06/25/22  1404   TROPONINI 0.01 <0.01 <0.01     Recent Labs   Lab Test 05/12/18  0840   BNP 74     Recent Labs   Lab Test 11/04/24  1722   TSH 2.10     Recent Labs   Lab Test 03/04/22  0628   INR 1.16*        Victoria Aguero MD  Noninvasive Cardiologist   [facility]                                        Thank you for allowing me to participate in the care of your patient.      Sincerely,     Victoria Aguero MD     [facility]  cc:   Victoria Aguero MD  [facility] [place] [address]

## 2025-04-01 NOTE — PATIENT INSTRUCTIONS
Your blood pressure is still running a bit high.      Increase the isosorbide to 60mg (two tablets daily).    I sent a prescription for the higher dose to the pharmacy so when the 30mg tablets are gone you will need just one of the 60mg tablets  Follow-up with Dr Aguero in 1 month.  If you are still feeling short of breath at that time or are having more chest pain we should plan to get a stress test or angiogram to look  for blocked vessels to your heart that may  be casing your symptoms

## 2025-04-24 ENCOUNTER — TRANSFERRED RECORDS (OUTPATIENT)
Dept: HEALTH INFORMATION MANAGEMENT | Facility: CLINIC | Age: 85
End: 2025-04-24
Payer: COMMERCIAL

## 2025-05-01 NOTE — PROGRESS NOTES
HEART CARE FOLLOW UP NOTE      [facility]  [phone]      Assessment/Recommendations   Assessment: 84 year old female with chest pain, PACs, SVT     Plan:  Chest pain, arm pain, dyspnea on exertion   Unclear etiology, did appear to correlate to PACs and SVT on Zio, but also had CAD on CTA (50%) that may have progressed, also consider noncardiac causes.  With increased Imdur no further chest pain       -Continue Imdur 60mg      -If recurrent chest pain consider angiogram or stress test      TIA  In ER 12/18/2024 with slurred speech, CT and MRI negative, felt to be TIA.  She was advised Zio that was negative for atrial fibrillation.  Suspect due to poorly controlled HTN, though cannot fully exclude atrial fibrillation       -Continue ASA 81mg and Plavix 75mg per neurology       -Discussed ILR will hold off for now        -As below for HTN     PACs and SVT  Symptoms did correlate to PACs and SVT but overall low burden       -Continue Toprol 25mg every day, if symptoms bothersome can increase dose        4.  CAD  Nonocclusive on CTA 2/2023, had episodes of chest pain and arm pain, with increased Imdur that has resolved       -Continue ASA 81mg, Toprol 25mg, Lipitor 40mg, Imdur 60mg      -If recurrent chest pain can proceed with angiogram or stress test       5.  HTN  Well controlled       -Continue Lisinopril 10mg, Toprol 25mg, Imdur 60mg.  Spread out with Imdur in am, Lisinopril mid-day, and Toprol in pm.     6.  Hyperlipidemia   LDL = 81 on Simvastatin, now on Lipitor, goal < 70      -Continue Lipitor 40mg      -Recheck lipids, can increase Lipitor if LDL still > 70     The longitudinal plan of care for the diagnosis(es)/condition(s) as documented were addressed during this visit. Due to the added complexity in care, I will continue to support Libertad in the subsequent management and with ongoing continuity of care.          History of Present Illness/Subjective    Indication for visit:  Libertad MCKEON  "Maricarmen returns for follow up of chest pain and was last seen on 4/1/2025 by myself.      HPI: Libertad Herndaez is a 84 year old female with a history of breast cancer, PE, PACs, HTN, hyperlipidemia, nonocclusive CAD who saw Dr Ansari for chest pain and palpitations. A CTA was negative for flow-limiting CAD, Zio showed PACs correlating to her symptoms, Metoprolol was added.  At follow up with me she reported on-going chest pain, I advised adding Imdur, change Simvastatin to Lipitor, and if symptoms persisted consider angiogram.     On 12/17/2024 she was admitted with slurred speech, negative CT and MRI, Plavix added and Lipitor increased for presumed TIA.  I saw her after and she was still wearing Zio, had additional chest pain and I advised increased Imdur but she did not do so.  At last follow up we increased Imdur for HTN.    She returns for follow up and reports no further chest pain, no dyspnea, but about an hor or so after taking morning medications she feels a \"bit wonky\" for about 10-15 minutes.  BP at home 144 this morning, after taking morning medications.      I reviewed notes from PCP prior to this visit.         Physical Examination  Past Cardiac History   Vitals: /65 (BP Location: Right arm, Patient Position: Sitting, Cuff Size: Adult Large)   Pulse 66   Resp 14   Wt 71.7 kg (158 lb)   BMI 26.70 kg/m    BMI= Body mass index is 26.7 kg/m .  Wt Readings from Last 3 Encounters:   05/08/25 71.7 kg (158 lb)   04/01/25 70.9 kg (156 lb 3.2 oz)   01/30/25 70.3 kg (155 lb)       General Appearance:   no distress, normal body habitus   ENT/Mouth: membranes moist, no oral lesions or bleeding gums.      EYES:  no scleral icterus, normal conjunctivae   Neck: no carotid bruits or thyromegaly   Chest/Lungs:   lungs are clear to auscultation, no rales or wheezing,  sternal scar, equal chest wall expansion    Cardiovascular:   Regular. Normal first and second heart sounds with no murmurs, rubs, or gallops; the " carotid, radial and posterior tibial pulses are intact, Jugular venous pressure normal , no edema bilaterally    Abdomen:  no organomegaly, masses, bruits, or tenderness; bowel sounds are present   Extremities: no cyanosis or clubbing   Skin: no xanthelasma, warm.    Neurologic: normal  bilateral, no tremors           CAD, nonocclusive  PACs, SVT        Zio: 1/9/2025  Zio monitoring from 12/24/2024 to 1/7/2025 (duration 13d 21h)  Predominant rhythm was sinus rhythm, 47 to 111bpm, average 66bpm.  1 recorded instance of apparent accelerated junctional rhythm 85bpm competing with sinus rhythm.  52 episode(s) of nonsustained supraventricular tachycardia - longest 13 beats, fastest 146bpm.  No sustained tachyarrhythmias.  No atrial fibrillation.  There were no pauses of greater than 3 seconds.  Frequent supraventricular ectopic beats (isolated 15.7%).  Rare premature ventricular contractions (isolated <1%).  Symptom triggers and diary entries (12) correlated to sinus rhythm 59-85bpm, competing junctional rhythm, PACs, PVCs.        Most Recent Echocardiogram: 12/18/2024  The left ventricle is normal in size.  The visual ejection fraction is 60-65%.  No regional wall motion abnormalities noted.  Diastolic Doppler findings (E/E' ratio and/or other parameters) suggest left  ventricular filling pressures are increased.  Normal right ventricle size and systolic function.  The right ventricular systolic pressure is approximated at 24mmHg plus the  right atrial pressure.  IVC diameter <2.1 cm collapsing >50% with sniff suggests a normal RA pressure  of 3 mmHg.  Trivial pericardial effusion  Sinus rhythm was noted.  No obvious cardiac source of emboli was seen within the limits of a  transthoracic echocardiogram.     Most Recent Stress Test: None     CTA: 2/22/2023  1.  Total coronary calcium score 944 places the individual in the high risk for cardiac events when compared to an age and gender matched control group and  implies a very high risk of cardiac events in the next ten years.  2.  Mild disease in distal left main, less than 20% stenosis.  3.  Mild disease in LAD and D1, less than 50% stenosis.  4.  Mild disease in ramus which is a big vessel, about 50% stenosis in proximal segment.  5.  Mild disease less than 40% stenosis in LCx which is small vessel.  6.  Mild disease in RCA, less than 50% stenosis.  Artifact is noticed in mid to distal RCA.  7.  Normal size of visualized aortic segments.  8.  No pericardial effusion.  No thrombus in left atrial appendage.        Most Recent Angiogram: None     ECG (reviewed by myself): 2/22/2023 NSR 63 bpm, RsR', PACs               Medical History  Family History Social History   Past Medical History:   Diagnosis Date    Arthritis     Asymptomatic varicose veins     Created by Conversion     Disorder of bone and cartilage, unspecified     Created by Conversion     Essential thrombocytosis (H)     Gastroesophageal reflux disease     Malignant neoplasm of breast (female), unspecified site     Created by Conversion [facility] Annotation: Oct 21 2010  9:56AM - Shirley Comer: stage 2;  10/18 lymph nodes positives/p chemo and [id number]     Motion sickness     Other and unspecified hyperlipidemia     Created by Conversion     PE (pulmonary thromboembolism) (H)     PONV (postoperative nausea and vomiting)     Primary cancer of bone marrow (H)     Thrombosis     Unspecified essential hypertension     Created by Conversion     Unspecified hereditary and idiopathic peripheral neuropathy     Created by Conversion      Family History   Problem Relation Age of Onset    Hypertension Father     Heart Disease Father     Diabetes Sister     Prostate Cancer Brother         Social History     Socioeconomic History    Marital status:      Spouse name: Not on file    Number of children: Not on file    Years of education: Not on file    Highest education level: Not on file   Occupational  History    Not on file   Tobacco Use    Smoking status: Never     Passive exposure: Past    Smokeless tobacco: Never   Substance and Sexual Activity    Alcohol use: Yes     Alcohol/week: 1.0 standard drink of alcohol     Comment: very rarely    Drug use: Never    Sexual activity: Never   Other Topics Concern    Parent/sibling w/ CABG, MI or angioplasty before 65F 55M? Not Asked   Social History Narrative    Not on file     Social Drivers of Health     Financial Resource Strain: Not on file   Food Insecurity: Not on file   Transportation Needs: Not on file   Physical Activity: Not on file   Stress: Not on file   Social Connections: Not on file   Interpersonal Safety: Not on file   Housing Stability: Not on file           Medications  Allergies   Current Outpatient Medications   Medication Sig Dispense Refill    alendronate (FOSAMAX) 70 MG tablet Take 70 mg by mouth every 7 days. On Saturday      atorvastatin (LIPITOR) 40 MG tablet Take 1 tablet (40 mg) by mouth daily. 30 tablet 0    Biotin 5000 MCG TABS Take 5,000 mcg by mouth every morning.      Calcium Carb-Cholecalciferol (CALCIUM 600+D) 600-800 MG-UNIT TABS Take 1 tablet by mouth 2 times daily      clopidogrel (PLAVIX) 75 MG tablet Take 1 tablet (75 mg) by mouth daily. 90 tablet 3    DULoxetine (CYMBALTA) 20 MG capsule Take 40 mg by mouth at bedtime.      famotidine (PEPCID) 20 MG tablet Take 1 tablet (20 mg) by mouth daily.      hydroxyurea (HYDREA) 500 MG capsule TAKE ONE CAPSULE BY MOUTH EVERY DAY FOR 4 DAYS A WEEK AND TAKE TWO CAPSULES BY MOUTH EVERY DAY FOR 3 DAYS A WEEK 90 capsule 2    isosorbide mononitrate (IMDUR) 60 MG 24 hr tablet Take 1 tablet (60 mg) by mouth daily. 90 tablet 3    lisinopril (ZESTRIL) 10 MG tablet Take 1 tablet (10 mg) by mouth every morning. 30 tablet 0    metoprolol succinate ER (TOPROL XL) 25 MG 24 hr tablet Take 1 tablet (25 mg) by mouth daily 90 tablet 3    polyethylene glycol (MIRALAX) 17 GM/Dose powder Take 1 capful by mouth  daily as needed for constipation      traZODone (DESYREL) 100 MG tablet Take 100 mg by mouth At Bedtime      vitamin B1 (THIAMINE) 250 MG tablet Take 250 mg by mouth every morning.      vitamin C (ASCORBIC ACID) 500 MG tablet Take 500 mg by mouth every morning.      isosorbide mononitrate (IMDUR) 30 MG 24 hr tablet Take 1 tablet (30 mg) by mouth daily. (Patient not taking: Reported on 5/8/2025) 30 tablet 11    LASTACAFT 0.25 % ophthalmic solution Place 1 drop into both eyes every morning. (Patient not taking: Reported on 5/8/2025)      solifenacin (VESICARE) 5 MG tablet Take 5 mg by mouth at bedtime. (Patient not taking: Reported on 5/8/2025)         Allergies   Allergen Reactions    Adhesive Tape-Silicones [Adhesive Tape] Itching     States itchy rash with certain tapes    Penicillins Hives          Lab Results    Chemistry/lipid CBC Cardiac Enzymes/BNP/TSH/INR   Recent Labs   Lab Test 12/17/24  1340   CHOL 181   HDL 61   LDL 97   TRIG 117     Recent Labs   Lab Test 12/17/24  1340 11/04/24  1722 08/21/23  0758   LDL 97 81 106*     Recent Labs   Lab Test 12/18/24  1229 12/18/24  0757 12/18/24  0443   NA  --   --  142   POTASSIUM  --   --  3.6   CHLORIDE  --   --  108*   CO2  --   --  22   GLC 98   < > 98   BUN  --   --  16.7   CR  --   --  0.78   GFRESTIMATED  --   --  74   DARCI  --   --  9.2    < > = values in this interval not displayed.     Recent Labs   Lab Test 12/18/24  0443 12/17/24  1340 11/04/24  1722   CR 0.78 0.82 0.77     Recent Labs   Lab Test 12/17/24  1340   A1C 5.7*          Recent Labs   Lab Test 12/18/24  0443   WBC 7.0   HGB 14.4   HCT 42.7   MCV 96        Recent Labs   Lab Test 12/18/24  0443 12/17/24  1340 11/04/24  1722   HGB 14.4 15.1 15.1    Recent Labs   Lab Test 02/22/23  1038 02/22/23  0828 06/25/22  1404   TROPONINI 0.01 <0.01 <0.01     Recent Labs   Lab Test 05/12/18  0840   BNP 74     Recent Labs   Lab Test 11/04/24  1722   TSH 2.10     Recent Labs   Lab Test 03/04/22  0628   INR  1.16*        Victoria Aguero MD  Noninvasive Cardiologist   [facility]

## 2025-05-08 ENCOUNTER — OFFICE VISIT (OUTPATIENT)
Dept: CARDIOLOGY | Facility: CLINIC | Age: 85
End: 2025-05-08
Attending: INTERNAL MEDICINE
Payer: COMMERCIAL

## 2025-05-08 VITALS
SYSTOLIC BLOOD PRESSURE: 138 MMHG | DIASTOLIC BLOOD PRESSURE: 65 MMHG | WEIGHT: 158 LBS | HEART RATE: 66 BPM | RESPIRATION RATE: 14 BRPM | BODY MASS INDEX: 26.7 KG/M2

## 2025-05-08 DIAGNOSIS — I25.10 CORONARY ARTERY DISEASE INVOLVING NATIVE CORONARY ARTERY OF NATIVE HEART WITHOUT ANGINA PECTORIS: ICD-10-CM

## 2025-05-08 DIAGNOSIS — E78.5 HYPERLIPIDEMIA LDL GOAL <70: ICD-10-CM

## 2025-05-08 DIAGNOSIS — I10 PRIMARY HYPERTENSION: Primary | ICD-10-CM

## 2025-05-08 NOTE — PATIENT INSTRUCTIONS
Try moving the Lisinopril to mid-day, continue the Isosorbide in the morning and the Metoprolol in the evening.  Let Dr Aguero know if the blood pressure is still running > 140 at home.    Follow-up in 6 months, sooner if needed

## 2025-05-08 NOTE — LETTER
5/8/2025    Candy Huang MD  [address]  [place] [address]    RE: Libertad Hernadez       Dear Colleague,     I had the pleasure of seeing Libertad MCKEON Maricarmen in the [facility].    HEART CARE FOLLOW UP NOTE      [facility]  [phone]      Assessment/Recommendations   Assessment: 84 year old female with chest pain, PACs, SVT     Plan:  Chest pain, arm pain, dyspnea on exertion   Unclear etiology, did appear to correlate to PACs and SVT on Zio, but also had CAD on CTA (50%) that may have progressed, also consider noncardiac causes.  With increased Imdur no further chest pain       -Continue Imdur 60mg      -If recurrent chest pain consider angiogram or stress test      TIA  In ER 12/18/2024 with slurred speech, CT and MRI negative, felt to be TIA.  She was advised Zio that was negative for atrial fibrillation.  Suspect due to poorly controlled HTN, though cannot fully exclude atrial fibrillation       -Continue ASA 81mg and Plavix 75mg per neurology       -Discussed ILR will hold off for now        -As below for HTN     PACs and SVT  Symptoms did correlate to PACs and SVT but overall low burden       -Continue Toprol 25mg every day, if symptoms bothersome can increase dose        4.  CAD  Nonocclusive on CTA 2/2023, had episodes of chest pain and arm pain, with increased Imdur that has resolved       -Continue ASA 81mg, Toprol 25mg, Lipitor 40mg, Imdur 60mg      -If recurrent chest pain can proceed with angiogram or stress test       5.  HTN  Well controlled       -Continue Lisinopril 10mg, Toprol 25mg, Imdur 60mg.  Spread out with Imdur in am, Lisinopril mid-day, and Toprol in pm.     6.  Hyperlipidemia   LDL = 81 on Simvastatin, now on Lipitor, goal < 70      -Continue Lipitor 40mg      -Recheck lipids, can increase Lipitor if LDL still > 70     The longitudinal plan of care for the diagnosis(es)/condition(s) as documented were addressed during this visit. Due to the  "added complexity in care, I will continue to support Libertad in the subsequent management and with ongoing continuity of care.          History of Present Illness/Subjective    Indication for visit:  Libertad Hernadez returns for follow up of chest pain and was last seen on 4/1/2025 by myself.      HPI: Libertad Hernadez is a 84 year old female with a history of breast cancer, PE, PACs, HTN, hyperlipidemia, nonocclusive CAD who saw Dr Ansari for chest pain and palpitations. A CTA was negative for flow-limiting CAD, Zio showed PACs correlating to her symptoms, Metoprolol was added.  At follow up with me she reported on-going chest pain, I advised adding Imdur, change Simvastatin to Lipitor, and if symptoms persisted consider angiogram.     On 12/17/2024 she was admitted with slurred speech, negative CT and MRI, Plavix added and Lipitor increased for presumed TIA.  I saw her after and she was still wearing Zio, had additional chest pain and I advised increased Imdur but she did not do so.  At last follow up we increased Imdur for HTN.    She returns for follow up and reports no further chest pain, no dyspnea, but about an hor or so after taking morning medications she feels a \"bit wonky\" for about 10-15 minutes.  BP at home 144 this morning, after taking morning medications.      I reviewed notes from PCP prior to this visit.         Physical Examination  Past Cardiac History   Vitals: /65 (BP Location: Right arm, Patient Position: Sitting, Cuff Size: Adult Large)   Pulse 66   Resp 14   Wt 71.7 kg (158 lb)   BMI 26.70 kg/m    BMI= Body mass index is 26.7 kg/m .  Wt Readings from Last 3 Encounters:   05/08/25 71.7 kg (158 lb)   04/01/25 70.9 kg (156 lb 3.2 oz)   01/30/25 70.3 kg (155 lb)       General Appearance:   no distress, normal body habitus   ENT/Mouth: membranes moist, no oral lesions or bleeding gums.      EYES:  no scleral icterus, normal conjunctivae   Neck: no carotid bruits or thyromegaly   Chest/Lungs:   " lungs are clear to auscultation, no rales or wheezing,  sternal scar, equal chest wall expansion    Cardiovascular:   Regular. Normal first and second heart sounds with no murmurs, rubs, or gallops; the carotid, radial and posterior tibial pulses are intact, Jugular venous pressure normal , no edema bilaterally    Abdomen:  no organomegaly, masses, bruits, or tenderness; bowel sounds are present   Extremities: no cyanosis or clubbing   Skin: no xanthelasma, warm.    Neurologic: normal  bilateral, no tremors           CAD, nonocclusive  PACs, SVT        Zio: 1/9/2025  Zio monitoring from 12/24/2024 to 1/7/2025 (duration 13d 21h)  Predominant rhythm was sinus rhythm, 47 to 111bpm, average 66bpm.  1 recorded instance of apparent accelerated junctional rhythm 85bpm competing with sinus rhythm.  52 episode(s) of nonsustained supraventricular tachycardia - longest 13 beats, fastest 146bpm.  No sustained tachyarrhythmias.  No atrial fibrillation.  There were no pauses of greater than 3 seconds.  Frequent supraventricular ectopic beats (isolated 15.7%).  Rare premature ventricular contractions (isolated <1%).  Symptom triggers and diary entries (12) correlated to sinus rhythm 59-85bpm, competing junctional rhythm, PACs, PVCs.        Most Recent Echocardiogram: 12/18/2024  The left ventricle is normal in size.  The visual ejection fraction is 60-65%.  No regional wall motion abnormalities noted.  Diastolic Doppler findings (E/E' ratio and/or other parameters) suggest left  ventricular filling pressures are increased.  Normal right ventricle size and systolic function.  The right ventricular systolic pressure is approximated at 24mmHg plus the  right atrial pressure.  IVC diameter <2.1 cm collapsing >50% with sniff suggests a normal RA pressure  of 3 mmHg.  Trivial pericardial effusion  Sinus rhythm was noted.  No obvious cardiac source of emboli was seen within the limits of a  transthoracic echocardiogram.     Most  Recent Stress Test: None     CTA: 2/22/2023  1.  Total coronary calcium score 944 places the individual in the high risk for cardiac events when compared to an age and gender matched control group and implies a very high risk of cardiac events in the next ten years.  2.  Mild disease in distal left main, less than 20% stenosis.  3.  Mild disease in LAD and D1, less than 50% stenosis.  4.  Mild disease in ramus which is a big vessel, about 50% stenosis in proximal segment.  5.  Mild disease less than 40% stenosis in LCx which is small vessel.  6.  Mild disease in RCA, less than 50% stenosis.  Artifact is noticed in mid to distal RCA.  7.  Normal size of visualized aortic segments.  8.  No pericardial effusion.  No thrombus in left atrial appendage.        Most Recent Angiogram: None     ECG (reviewed by myself): 2/22/2023 NSR 63 bpm, RsR', PACs               Medical History  Family History Social History   Past Medical History:   Diagnosis Date     Arthritis      Asymptomatic varicose veins     Created by Conversion      Disorder of bone and cartilage, unspecified     Created by Conversion      Essential thrombocytosis (H)      Gastroesophageal reflux disease      Malignant neoplasm of breast (female), unspecified site     Created by Conversion [facility] Annotation: Oct 21 2010  9:56AM - Shirley Comer: stage 2;  10/18 lymph nodes positives/p chemo and [id number]      Motion sickness      Other and unspecified hyperlipidemia     Created by Conversion      PE (pulmonary thromboembolism) (H)      PONV (postoperative nausea and vomiting)      Primary cancer of bone marrow (H)      Thrombosis      Unspecified essential hypertension     Created by Conversion      Unspecified hereditary and idiopathic peripheral neuropathy     Created by Conversion      Family History   Problem Relation Age of Onset     Hypertension Father      Heart Disease Father      Diabetes Sister      Prostate Cancer Brother         Social  History     Socioeconomic History     Marital status:      Spouse name: Not on file     Number of children: Not on file     Years of education: Not on file     Highest education level: Not on file   Occupational History     Not on file   Tobacco Use     Smoking status: Never     Passive exposure: Past     Smokeless tobacco: Never   Substance and Sexual Activity     Alcohol use: Yes     Alcohol/week: 1.0 standard drink of alcohol     Comment: very rarely     Drug use: Never     Sexual activity: Never   Other Topics Concern     Parent/sibling w/ CABG, MI or angioplasty before 65F 55M? Not Asked   Social History Narrative     Not on file     Social Drivers of Health     Financial Resource Strain: Not on file   Food Insecurity: Not on file   Transportation Needs: Not on file   Physical Activity: Not on file   Stress: Not on file   Social Connections: Not on file   Interpersonal Safety: Not on file   Housing Stability: Not on file           Medications  Allergies   Current Outpatient Medications   Medication Sig Dispense Refill     alendronate (FOSAMAX) 70 MG tablet Take 70 mg by mouth every 7 days. On Saturday       atorvastatin (LIPITOR) 40 MG tablet Take 1 tablet (40 mg) by mouth daily. 30 tablet 0     Biotin 5000 MCG TABS Take 5,000 mcg by mouth every morning.       Calcium Carb-Cholecalciferol (CALCIUM 600+D) 600-800 MG-UNIT TABS Take 1 tablet by mouth 2 times daily       clopidogrel (PLAVIX) 75 MG tablet Take 1 tablet (75 mg) by mouth daily. 90 tablet 3     DULoxetine (CYMBALTA) 20 MG capsule Take 40 mg by mouth at bedtime.       famotidine (PEPCID) 20 MG tablet Take 1 tablet (20 mg) by mouth daily.       hydroxyurea (HYDREA) 500 MG capsule TAKE ONE CAPSULE BY MOUTH EVERY DAY FOR 4 DAYS A WEEK AND TAKE TWO CAPSULES BY MOUTH EVERY DAY FOR 3 DAYS A WEEK 90 capsule 2     isosorbide mononitrate (IMDUR) 60 MG 24 hr tablet Take 1 tablet (60 mg) by mouth daily. 90 tablet 3     lisinopril (ZESTRIL) 10 MG tablet Take  1 tablet (10 mg) by mouth every morning. 30 tablet 0     metoprolol succinate ER (TOPROL XL) 25 MG 24 hr tablet Take 1 tablet (25 mg) by mouth daily 90 tablet 3     polyethylene glycol (MIRALAX) 17 GM/Dose powder Take 1 capful by mouth daily as needed for constipation       traZODone (DESYREL) 100 MG tablet Take 100 mg by mouth At Bedtime       vitamin B1 (THIAMINE) 250 MG tablet Take 250 mg by mouth every morning.       vitamin C (ASCORBIC ACID) 500 MG tablet Take 500 mg by mouth every morning.       isosorbide mononitrate (IMDUR) 30 MG 24 hr tablet Take 1 tablet (30 mg) by mouth daily. (Patient not taking: Reported on 5/8/2025) 30 tablet 11     LASTACAFT 0.25 % ophthalmic solution Place 1 drop into both eyes every morning. (Patient not taking: Reported on 5/8/2025)       solifenacin (VESICARE) 5 MG tablet Take 5 mg by mouth at bedtime. (Patient not taking: Reported on 5/8/2025)         Allergies   Allergen Reactions     Adhesive Tape-Silicones [Adhesive Tape] Itching     States itchy rash with certain tapes     Penicillins Hives          Lab Results    Chemistry/lipid CBC Cardiac Enzymes/BNP/TSH/INR   Recent Labs   Lab Test 12/17/24  1340   CHOL 181   HDL 61   LDL 97   TRIG 117     Recent Labs   Lab Test 12/17/24  1340 11/04/24  1722 08/21/23  0758   LDL 97 81 106*     Recent Labs   Lab Test 12/18/24  1229 12/18/24  0757 12/18/24  0443   NA  --   --  142   POTASSIUM  --   --  3.6   CHLORIDE  --   --  108*   CO2  --   --  22   GLC 98   < > 98   BUN  --   --  16.7   CR  --   --  0.78   GFRESTIMATED  --   --  74   DARCI  --   --  9.2    < > = values in this interval not displayed.     Recent Labs   Lab Test 12/18/24  0443 12/17/24  1340 11/04/24  1722   CR 0.78 0.82 0.77     Recent Labs   Lab Test 12/17/24  1340   A1C 5.7*          Recent Labs   Lab Test 12/18/24  0443   WBC 7.0   HGB 14.4   HCT 42.7   MCV 96        Recent Labs   Lab Test 12/18/24  0443 12/17/24  1340 11/04/24  1722   HGB 14.4 15.1 15.1     Recent Labs   Lab Test 02/22/23  1038 02/22/23  0828 06/25/22  1404   TROPONINI 0.01 <0.01 <0.01     Recent Labs   Lab Test 05/12/18  0840   BNP 74     Recent Labs   Lab Test 11/04/24  1722   TSH 2.10     Recent Labs   Lab Test 03/04/22  0628   INR 1.16*        Victoria Aguero MD  Noninvasive Cardiologist   [facility]                                      Thank you for allowing me to participate in the care of your patient.      Sincerely,     Victoria Aguero MD     [facility]  cc:   Victoria Aguero MD  [address] [place] [address]

## 2025-08-06 PROBLEM — G45.9 TIA (TRANSIENT ISCHEMIC ATTACK): Status: RESOLVED | Noted: 2024-12-17 | Resolved: 2025-08-06

## 2025-08-06 PROBLEM — R35.0 URINARY FREQUENCY: Status: RESOLVED | Noted: 2018-01-09 | Resolved: 2025-08-06

## 2025-08-06 PROBLEM — Z86.73 HISTORY OF TIA (TRANSIENT ISCHEMIC ATTACK) AND STROKE: Status: ACTIVE | Noted: 2025-08-06

## 2025-08-11 ENCOUNTER — OFFICE VISIT (OUTPATIENT)
Dept: NEUROLOGY | Facility: CLINIC | Age: 85
End: 2025-08-11
Payer: COMMERCIAL

## 2025-08-11 VITALS
HEART RATE: 68 BPM | BODY MASS INDEX: 26.7 KG/M2 | DIASTOLIC BLOOD PRESSURE: 65 MMHG | RESPIRATION RATE: 16 BRPM | WEIGHT: 158 LBS | SYSTOLIC BLOOD PRESSURE: 123 MMHG

## 2025-08-11 DIAGNOSIS — R73.03 PREDIABETES: ICD-10-CM

## 2025-08-11 DIAGNOSIS — Z86.73 HISTORY OF TIA (TRANSIENT ISCHEMIC ATTACK) AND STROKE: Primary | ICD-10-CM

## 2025-08-11 PROCEDURE — 3078F DIAST BP <80 MM HG: CPT | Performed by: PSYCHIATRY & NEUROLOGY

## 2025-08-11 PROCEDURE — 3074F SYST BP LT 130 MM HG: CPT | Performed by: PSYCHIATRY & NEUROLOGY

## 2025-08-11 PROCEDURE — 99215 OFFICE O/P EST HI 40 MIN: CPT | Performed by: PSYCHIATRY & NEUROLOGY

## 2025-08-11 PROCEDURE — G2211 COMPLEX E/M VISIT ADD ON: HCPCS | Performed by: PSYCHIATRY & NEUROLOGY

## 2025-08-11 RX ORDER — ATORVASTATIN CALCIUM 40 MG/1
40 TABLET, FILM COATED ORAL DAILY
Qty: 90 TABLET | Refills: 3 | Status: SHIPPED | OUTPATIENT
Start: 2025-08-11

## 2025-08-11 RX ORDER — CLOPIDOGREL BISULFATE 75 MG/1
75 TABLET ORAL DAILY
Qty: 90 TABLET | Refills: 3 | Status: SHIPPED | OUTPATIENT
Start: 2025-08-11

## 2025-08-18 ENCOUNTER — LAB (OUTPATIENT)
Dept: LAB | Facility: HOSPITAL | Age: 85
End: 2025-08-18
Payer: COMMERCIAL

## 2025-08-18 DIAGNOSIS — Z86.73 HISTORY OF TIA (TRANSIENT ISCHEMIC ATTACK) AND STROKE: ICD-10-CM

## 2025-08-18 DIAGNOSIS — R73.03 PREDIABETES: ICD-10-CM

## 2025-08-18 LAB
CHOLEST SERPL-MCNC: 168 MG/DL
EST. AVERAGE GLUCOSE BLD GHB EST-MCNC: 114 MG/DL
FASTING STATUS PATIENT QL REPORTED: YES
HBA1C MFR BLD: 5.6 %
HDLC SERPL-MCNC: 65 MG/DL
LDLC SERPL CALC-MCNC: 86 MG/DL
NONHDLC SERPL-MCNC: 103 MG/DL
TRIGL SERPL-MCNC: 87 MG/DL

## 2025-08-18 PROCEDURE — 36415 COLL VENOUS BLD VENIPUNCTURE: CPT

## 2025-08-18 PROCEDURE — 83036 HEMOGLOBIN GLYCOSYLATED A1C: CPT

## 2025-08-18 PROCEDURE — 82465 ASSAY BLD/SERUM CHOLESTEROL: CPT

## 2025-08-20 LAB — PA ADP BLD-ACNC: 102 PRU

## 2025-08-20 PROCEDURE — 36415 COLL VENOUS BLD VENIPUNCTURE: CPT | Performed by: PSYCHIATRY & NEUROLOGY

## 2025-08-20 PROCEDURE — 85576 BLOOD PLATELET AGGREGATION: CPT | Performed by: PSYCHIATRY & NEUROLOGY

## (undated) DEVICE — CUSTOM PACK ANTERIOR HIP SOP5BAHHED

## (undated) DEVICE — GLOVE BIOGEL PI SZ 8.0 40880

## (undated) DEVICE — SOL ADH LIQUID BENZOIN SWAB 0.6ML C1544

## (undated) DEVICE — CEMENT PRESSURIZER FEMORAL CANAL SM

## (undated) DEVICE — SUTURE VICRYL+ 1 27IN CT-1 UND VCP261H

## (undated) DEVICE — SOL WATER IRRIG 1000ML BOTTLE 2F7114

## (undated) DEVICE — FORCEP BIOPSY DISP 000386

## (undated) DEVICE — TUBING SUCTION MEDI-VAC 1/4"X20' N620A - HE

## (undated) DEVICE — DRSG DRAIN 4X4" 7086

## (undated) DEVICE — Device

## (undated) DEVICE — SUCTION MANIFOLD NEPTUNE 2 SYS 4 PORT 0702-020-000

## (undated) DEVICE — DECANTER VIAL 2006S

## (undated) DEVICE — GLOVE BIOGEL INDICATOR 7.5 LF 41675

## (undated) DEVICE — TRAY PREP DRY SKIN SCRUB 067

## (undated) DEVICE — MAT FLOOR SURGICAL 40X38 0702140238

## (undated) DEVICE — SU ETHIBOND 5 V-37 4X30" MB66G

## (undated) DEVICE — DRAPE IOBAN 2 C-SECTION 3M 6697

## (undated) DEVICE — GLOVE BIOGEL PI SZ 8.5 40885

## (undated) DEVICE — PLATE GROUNDING ADULT W/CORD 9165L

## (undated) DEVICE — KIT DRAIN CLOSED WOUND SUCTION MED 400ML RESVR

## (undated) DEVICE — ALCOHOL ISOPROPYL 4 OZ 70% IA7004

## (undated) DEVICE — SUCTION MANIFOLD NEPTUNE 2 SYS 1 PORT 702-025-000

## (undated) DEVICE — ESU ELEC BLADE 6" COATED E1450-6

## (undated) DEVICE — CAST PADDING 4" STERILE 9044S

## (undated) DEVICE — SUCTION IRR SYSTEM W/O TIP INTERPULSE HANDPIECE 0210-100-000

## (undated) DEVICE — GOWN IMPERVIOUS BREATHABLE SMART XLG 89045

## (undated) DEVICE — PREP POVIDONE-IODINE 7.5% SCRUB 4OZ BOTTLE MDS093945

## (undated) DEVICE — GLOVE SURG PI ULTRA TOUCH M SZ 7-1/2 LF

## (undated) DEVICE — BLADE PRECISION 25X1.27X9 6725127090

## (undated) DEVICE — SOL NACL 0.9% IRRIG 1000ML BOTTLE 2F7124

## (undated) DEVICE — KIT PATIENT CARE HANA TABLE PROFX SUPINE 6855

## (undated) DEVICE — BONE CLEANING TIP INTERPULSE  0210-010-000

## (undated) DEVICE — GLOVE BIOGEL PI INDICATOR 8.0 LF 41680

## (undated) DEVICE — SUTURE VICRYL+ 2-0 27IN CT-1 UND VCP259H

## (undated) DEVICE — NEEDLE HYPO 21GA X 1-1/2 SAFETY 305917

## (undated) DEVICE — GLOVE BIOGEL PI SZ 7.0 40870

## (undated) DEVICE — ADH SKIN CLOSURE PREMIERPRO EXOFIN 1.0ML 3470

## (undated) DEVICE — SUTURE VICRYL+ 0 27IN CT-1 UND VCP260H

## (undated) DEVICE — SU ETHIBOND 1 CT-1 30" X425H

## (undated) DEVICE — DRSG AQUACEL AG HYDROFIBER  3.5X10" 422605

## (undated) DEVICE — DRSG DRAIN 2X2" 7087

## (undated) RX ORDER — PROPOFOL 10 MG/ML
INJECTION, EMULSION INTRAVENOUS
Status: DISPENSED
Start: 2022-11-22

## (undated) RX ORDER — PROPOFOL 10 MG/ML
INJECTION, EMULSION INTRAVENOUS
Status: DISPENSED
Start: 2022-03-04

## (undated) RX ORDER — GLYCOPYRROLATE 0.2 MG/ML
INJECTION INTRAMUSCULAR; INTRAVENOUS
Status: DISPENSED
Start: 2022-11-22

## (undated) RX ORDER — FENTANYL CITRATE 50 UG/ML
INJECTION, SOLUTION INTRAMUSCULAR; INTRAVENOUS
Status: DISPENSED
Start: 2022-03-04

## (undated) RX ORDER — EPHEDRINE SULFATE 50 MG/ML
INJECTION, SOLUTION INTRAMUSCULAR; INTRAVENOUS; SUBCUTANEOUS
Status: DISPENSED
Start: 2022-03-04

## (undated) RX ORDER — LIDOCAINE HYDROCHLORIDE 10 MG/ML
INJECTION, SOLUTION EPIDURAL; INFILTRATION; INTRACAUDAL; PERINEURAL
Status: DISPENSED
Start: 2022-03-04